# Patient Record
Sex: MALE | RURAL
[De-identification: names, ages, dates, MRNs, and addresses within clinical notes are randomized per-mention and may not be internally consistent; named-entity substitution may affect disease eponyms.]

---

## 2017-03-17 ENCOUNTER — APPOINTMENT (OUTPATIENT)
Age: 62
Setting detail: DERMATOLOGY
End: 2017-03-20

## 2017-03-17 DIAGNOSIS — D485 NEOPLASM OF UNCERTAIN BEHAVIOR OF SKIN: ICD-10-CM

## 2017-03-17 DIAGNOSIS — L57.8 OTHER SKIN CHANGES DUE TO CHRONIC EXPOSURE TO NONIONIZING RADIATION: ICD-10-CM

## 2017-03-17 DIAGNOSIS — L82.1 OTHER SEBORRHEIC KERATOSIS: ICD-10-CM

## 2017-03-17 DIAGNOSIS — L57.0 ACTINIC KERATOSIS: ICD-10-CM

## 2017-03-17 PROBLEM — D48.5 NEOPLASM OF UNCERTAIN BEHAVIOR OF SKIN: Status: ACTIVE | Noted: 2017-03-17

## 2017-03-17 PROCEDURE — OTHER LIQUID NITROGEN: OTHER

## 2017-03-17 PROCEDURE — OTHER COUNSELING: OTHER

## 2017-03-17 PROCEDURE — OTHER BIOPSY BY SHAVE METHOD: OTHER

## 2017-03-17 PROCEDURE — 99203 OFFICE O/P NEW LOW 30 MIN: CPT | Mod: 25

## 2017-03-17 PROCEDURE — OTHER MIPS QUALITY: OTHER

## 2017-03-17 PROCEDURE — 17003 DESTRUCT PREMALG LES 2-14: CPT

## 2017-03-17 PROCEDURE — 17000 DESTRUCT PREMALG LESION: CPT

## 2017-03-17 PROCEDURE — 11100: CPT | Mod: 59

## 2017-03-17 ASSESSMENT — LOCATION SIMPLE DESCRIPTION DERM
LOCATION SIMPLE: LEFT FOREARM
LOCATION SIMPLE: RIGHT FOREHEAD
LOCATION SIMPLE: LEFT CHEEK
LOCATION SIMPLE: LEFT TEMPLE
LOCATION SIMPLE: RIGHT FOREARM
LOCATION SIMPLE: LEFT PRETIBIAL REGION
LOCATION SIMPLE: RIGHT CHEEK
LOCATION SIMPLE: RIGHT PRETIBIAL REGION

## 2017-03-17 ASSESSMENT — LOCATION DETAILED DESCRIPTION DERM
LOCATION DETAILED: LEFT CENTRAL MALAR CHEEK
LOCATION DETAILED: LEFT LATERAL TEMPLE
LOCATION DETAILED: RIGHT DISTAL DORSAL FOREARM
LOCATION DETAILED: RIGHT LATERAL MALAR CHEEK
LOCATION DETAILED: LEFT PROXIMAL PRETIBIAL REGION
LOCATION DETAILED: RIGHT PROXIMAL PRETIBIAL REGION
LOCATION DETAILED: RIGHT SUPERIOR CENTRAL MALAR CHEEK
LOCATION DETAILED: RIGHT SUPERIOR LATERAL MALAR CHEEK
LOCATION DETAILED: RIGHT LATERAL FOREHEAD
LOCATION DETAILED: RIGHT SUPERIOR FOREHEAD
LOCATION DETAILED: LEFT DISTAL DORSAL FOREARM

## 2017-03-17 ASSESSMENT — LOCATION ZONE DERM
LOCATION ZONE: FACE
LOCATION ZONE: ARM
LOCATION ZONE: LEG

## 2017-03-17 NOTE — PROCEDURE: BIOPSY BY SHAVE METHOD
Electrodesiccation Text: The wound bed was treated with electrodesiccation after the biopsy was performed.
Additional Anesthesia Volume In Cc (Will Not Render If 0): 0
Curettage Text: The wound bed was treated with curettage after the biopsy was performed.
Consent: Written consent was obtained and risks were reviewed including but not limited to scarring, infection, bleeding, scabbing, incomplete removal, nerve damage and allergy to anesthesia.
Bill 21818 For Specimen Handling/Conveyance To Laboratory?: no
Anesthesia Volume In Cc: 0.5
Post-Care Instructions: I reviewed with the patient in detail post-care instructions. Patient is to keep the biopsy site dry overnight, and then apply bacitracin twice daily until healed. Patient may apply hydrogen peroxide soaks to remove any crusting.
Type Of Destruction Used: Curettage
Cryotherapy Text: The wound bed was treated with cryotherapy after the biopsy was performed.
Anesthesia Type: 1% lidocaine with epinephrine
Detail Level: Detailed
Hemostasis: Electrocautery
Dressing: bandage
Notification Instructions: Patient will be notified of biopsy results. However, patient instructed to call the office if not contacted within 2 weeks.
Wound Care: Vaseline
Electrodesiccation And Curettage Text: The wound bed was treated with electrodesiccation and curettage after the biopsy was performed.
Silver Nitrate Text: The wound bed was treated with silver nitrate after the biopsy was performed.
Biopsy Method: 10 blade
Billing Type: Third-Party Bill
Biopsy Type: H and E

## 2017-03-17 NOTE — PROCEDURE: LIQUID NITROGEN
Number Of Freeze-Thaw Cycles: 1 freeze-thaw cycle
Detail Level: Detailed
Post-Care Instructions: I reviewed with the patient in detail post-care instructions. Patient is to wear sunprotection, and avoid picking at any of the treated lesions. Pt may apply Vaseline to crusted or scabbing areas.
Duration Of Freeze Thaw-Cycle (Seconds): 5
Consent: The patient's consent was obtained including but not limited to risks of crusting, scabbing, blistering, scarring, darker or lighter pigmentary change, recurrence, incomplete removal and infection.
Render Post-Care Instructions In Note?: no

## 2017-03-17 NOTE — PROCEDURE: MIPS QUALITY
Quality 110: Preventive Care And Screening: Influenza Immunization: Influenza Immunization previously received during influenza season
Quality 111:Pneumonia Vaccination Status For Older Adults: Pneumococcal Vaccination Previously Received
Detail Level: Detailed
Quality 47: Advance Care Plan: Advance Care Planning discussed and documented; advance care plan or surrogate decision maker documented in the medical record.

## 2017-04-03 ENCOUNTER — APPOINTMENT (OUTPATIENT)
Age: 62
Setting detail: DERMATOLOGY
End: 2017-04-03

## 2017-04-03 DIAGNOSIS — L82.0 INFLAMED SEBORRHEIC KERATOSIS: ICD-10-CM

## 2017-04-03 PROBLEM — C44.329 SQUAMOUS CELL CARCINOMA OF SKIN OF OTHER PARTS OF FACE: Status: ACTIVE | Noted: 2017-04-03

## 2017-04-03 PROCEDURE — OTHER LIQUID NITROGEN: OTHER

## 2017-04-03 PROCEDURE — 77300 RADIATION THERAPY DOSE PLAN: CPT

## 2017-04-03 PROCEDURE — 17110 DESTRUCT B9 LESION 1-14: CPT

## 2017-04-03 PROCEDURE — OTHER OTHER: OTHER

## 2017-04-03 PROCEDURE — OTHER SUPERFICIAL RADIATION TREATMENT: OTHER

## 2017-04-03 PROCEDURE — 77334 RADIATION TREATMENT AID(S): CPT

## 2017-04-03 PROCEDURE — 99213 OFFICE O/P EST LOW 20 MIN: CPT | Mod: 25

## 2017-04-03 PROCEDURE — OTHER COUNSELING: OTHER

## 2017-04-03 PROCEDURE — 77261 THER RADIOLOGY TX PLNG SMPL: CPT

## 2017-04-03 PROCEDURE — 99214 OFFICE O/P EST MOD 30 MIN: CPT | Mod: 25

## 2017-04-03 PROCEDURE — 77280 THER RAD SIMULAJ FIELD SMPL: CPT

## 2017-04-03 ASSESSMENT — LOCATION DETAILED DESCRIPTION DERM
LOCATION DETAILED: RIGHT PROXIMAL PRETIBIAL REGION
LOCATION DETAILED: LEFT VENTRAL PROXIMAL FOREARM

## 2017-04-03 ASSESSMENT — LOCATION ZONE DERM
LOCATION ZONE: LEG
LOCATION ZONE: ARM

## 2017-04-03 ASSESSMENT — LOCATION SIMPLE DESCRIPTION DERM
LOCATION SIMPLE: LEFT FOREARM
LOCATION SIMPLE: RIGHT PRETIBIAL REGION

## 2017-04-03 NOTE — PROCEDURE: LIQUID NITROGEN
Include Z78.9 (Other Specified Conditions Influencing Health Status) As An Associated Diagnosis?: No
Medical Necessity Clause: This procedure was medically necessary because the lesions that were treated were:
Post-Care Instructions: I reviewed with the patient in detail post-care instructions. Patient is to wear sunprotection, and avoid picking at any of the treated lesions. Pt may apply Vaseline to crusted or scabbing areas.
Medical Necessity Information: It is in your best interest to select a reason for this procedure from the list below. All of these items fulfill various CMS LCD requirements except the new and changing color options.
Detail Level: Detailed
Consent: The patient's consent was obtained including but not limited to risks of crusting, scabbing, blistering, scarring, darker or lighter pigmentary change, recurrence, incomplete removal and infection.

## 2017-04-03 NOTE — PROCEDURE: OTHER
Other (Free Text): Discussed mohs, SRT.  Pt decided to proceed with SRT. Herbert performed today
Note Text (......Xxx Chief Complaint.): This diagnosis correlates with the
Detail Level: Zone

## 2017-04-03 NOTE — PROCEDURE: SUPERFICIAL RADIATION TREATMENT
Shielding Size (Optional- Include Units): 2.0 X 2.0
Bill For Treatment Devices Only: No
Shielding Size (Optional- Include Units) Rx 2: 2.5 x 2.5
Dose / Tx In Cgy (Optional): 288.42
Total Dose (Optional-Please Include Units): 5100 cGy
Dimensions-Y Axis In Cm: 1
Daily Fractionated Dose (Optional- Include Units) Rx 2: 255 cGy
Field Size (Applicator): 3.0 cm
Treatment Margins In Cm: 0.5
Fractionation Number (Evaluation): 15
Patient Positioning: Supine
Fractions / Week: 3
Port Dimensions-X Axis In Cm: 2
Computed Treatment Time In Min (Will Render The Same As Calculated Treatment Time If Left Blank): per device
Render Prescriptions In Note?: Yes
Treatment Time / Fractionation (Optional- Include Units): 0.34
Simple Simulation Afterword Text Will Be Included With Simple Simulations (Indications............): The patient had a complete consultation regarding all applicable modalities for the treatment of their skin cancer and based on a variety of factors including the type of tumor, size, and location, the relevant medical history as well as local tissue factors, the functional status of the individual, the ability to perform necessary postoperative wound instructions and the need for simultaneous treatments as well as overall wound healing status, it was determined that the patient would begin radiation therapy treatment for skin cancer.  A full simulation and treatment device design was performed including the determination and formulation of appropriate simple and complex devices including lead shield of 0.762 mm thickness to form molded customized shielding to specifically correlate with the lesion size including treatment margin.  The custom lead shield is adequate to accommodate the appropriate applicator and provide adequate shielding around the treatment site.  The specific field applicator, shields, and devices both simple and complex as well as the specific patient setup is outlined below.  The patient was given a full consent for superficial radiation to both verbally and in writing and the full determination of patient's eligibility for treatment and selection is outlined on the patient eligibility and treatment selection form.  The specific superficial radiotherapy prescription was determined and was documented on the superficial radiotherapy prescription form.  A treatment calculation was also performed and documented on the treatment calculation form.  Based on the prescription, the patient was scheduled for a series of fractional treatments.
Additional Prescription Justification Text: If there is any interruption in treatment exceeding 5 days please see Decay and Dose Adjustment Calculation and complete treatment under Prescription 2.
Simple Simulation Preamble Text Will Be Included With Simple Simulations (.......... Indications): Simple simulation was performed today for the following reasons:
Fractions / Week Rx 2: 4
Fractionation Number: 0
Daily Fractionated Dose (Optional- Include Units): 255cGy
Dose Per Fractionation In Cgy (Optional): 255
Treatment Device Design After Initial Simulation Justification (Will Render If Bill For Treatment Devices = Yes): The patient is status post radiation simulation and is evaluated as to the use of additional devices for shielding and placement for radiation therapy.
Intro Statement (Will Not Render If Left Blank): The patient is undergoing superficial radiation therapy for skin cancer and presents for weekly evaluation and management.  Per protocol and as documented on the flow sheet, the patient was questioned as to subjective redness, pruritus, pain, drainage, fatigue, or any other symptoms.  Objectively, the radiation area was evaluated with regards to erythema, atrophy, scale, crusting, erosion, ulceration, edema, purpura, tenderness, warmth, drainage, and any other findings.  The plan was extensively reviewed including the dose, and dosing schedule.  The simulation and clinical setup was also reviewed as was the external and any internal shields and based on this review the appropriateness and sufficiency of treatment was determined.
Energy (Optional-Please Include Units): 50kV
Functional Status: 0 (fully active)
Custom Shielding Preamble Text Will Not Be Included With Simple Simulations (.......... X X Y Cm): A lead shield of 0.762 mm thickness is utilized to form a molded, custom shield with a
Time Dose Fractionation (Optional- Include Units If Applicable): 88
Energy (Optional-Please Include Units) Rx 2: 50 kV
Detail Level: Detailed
Custom Shielding Afterword Text Will Not Be Included With Simple Simulations (X X Y Cm............): port to correlate with the lesion size, including treatment margin. The custom lead shield is adequate to accommodate the appropriate applicator and provide adequate shielding around the treatment site. Additional shielding (as noted below) is used to protect sensitive, normal tissues.
Assessment: Appropriate reaction
Fractions / Week Rx 4: 5
Treatment Time In Min (Optional): 0.38
Total Number Of Fractions: 20

## 2017-04-04 ENCOUNTER — APPOINTMENT (OUTPATIENT)
Age: 62
Setting detail: DERMATOLOGY
End: 2017-04-04

## 2017-04-04 PROBLEM — C44.329 SQUAMOUS CELL CARCINOMA OF SKIN OF OTHER PARTS OF FACE: Status: ACTIVE | Noted: 2017-04-04

## 2017-04-04 PROCEDURE — OTHER FOLLOW UP FOR NEXT VISIT: OTHER

## 2017-04-04 PROCEDURE — 77280 THER RAD SIMULAJ FIELD SMPL: CPT

## 2017-04-04 PROCEDURE — OTHER TREATMENT REGIMEN: OTHER

## 2017-04-04 PROCEDURE — G6001 ECHO GUIDANCE RADIOTHERAPY: HCPCS

## 2017-04-04 PROCEDURE — 77401 RADIATION TX DELIVERY SUPFC: CPT

## 2017-04-04 PROCEDURE — OTHER SUPERFICIAL RADIATION TREATMENT: OTHER

## 2017-04-04 NOTE — PROCEDURE: TREATMENT REGIMEN
Detail Level: Zone
Plan: Patient's skin evaluated and appears to have appropriate response and reaction according to dose and fraction. Patient will continue with SRT as prescribed. \\n\\nUS DEPTH: 0.83mm

## 2017-04-04 NOTE — PROCEDURE: SUPERFICIAL RADIATION TREATMENT
Day Of The Week Treatment Administered: Tuesday
Total Number Of Fractions Rx 2: 15
Total Dose (Optional-Please Include Units): 5100 cGy
Custom Shielding Preamble Text Will Not Be Included With Simple Simulations (.......... X X Y Cm): A lead shield of 0.762 mm thickness is utilized to form a molded, custom shield with a
Shielding Size (Optional- Include Units) Rx 2: 2.5 x 2.5
Field Size (Applicator): 3.0 cm
Daily Fractionated Dose (Optional- Include Units) Rx 2: 255 cGy
Energy (Optional-Please Include Units): 50kV
Render Prescriptions In Note?: No
Time Dose Fractionation (Optional- Include Units If Applicable): 88
Computed Treatment Time In Min (Will Render The Same As Calculated Treatment Time If Left Blank): per device
Additional Prescription Justification Text: If there is any interruption in treatment exceeding 5 days please see Decay and Dose Adjustment Calculation and complete treatment under Prescription 2.
Depth (Optional-Please Include Units): 0.83mm
Cumulative Dose In Cgy (Optional): 256.7
Fractions / Week: 3
Total Number Of Fractions: 20
Simple Simulation Preamble Text Will Be Included With Simple Simulations (.......... Indications): Simple simulation was performed today for the following reasons:
Port Dimensions-X Axis In Cm: 2
Number Of Days Off Treatment: 1
Functional Status: 0 (fully active)
Custom Shielding Afterword Text Will Not Be Included With Simple Simulations (X X Y Cm............): port to correlate with the lesion size, including treatment margin. The custom lead shield is adequate to accommodate the appropriate applicator and provide adequate shielding around the treatment site. Additional shielding (as noted below) is used to protect sensitive, normal tissues.
Initial Radiation Treatment Planning (Will Render If Bill Simulation = Yes): The patient had a complete consultation regarding all applicable modalities for the treatment of their skin cancer and based on a variety of factors including the type of tumor, size, and location, the relevant medical history as well as local tissue factors, the functional status of the individual, the ability to perform necessary postoperative wound instructions and the need for simultaneous treatments as well as overall wound healing status, it was determined that the patient would begin radiation therapy treatment for skin cancer.  A full simulation and treatment device design was performed including the determination and formulation of appropriate simple and complex devices including lead shield of 0.762 mm thickness to form molded customized shielding to specifically correlate with the lesion size including treatment margin.  The custom lead shield is adequate to accommodate the appropriate applicator and provide adequate shielding around the treatment site.  The specific field applicator, shields, and devices both simple and complex as well as the specific patient setup is outlined below.  The patient was given a full consent for superficial radiation to both verbally and in writing and the full determination of patient's eligibility for treatment and selection is outlined on the patient eligibility and treatment selection form.  The specific superficial radiotherapy prescription was determined and was documented on the superficial radiotherapy prescription form.  A treatment calculation was also performed and documented on the treatment calculation form.  Based on the prescription, the patient was scheduled for a series of fractional treatments.
Detail Level: Detailed
Treatment Time / Fractionation (Optional- Include Units): 0.34
Assessment: Appropriate reaction
Bill For Radiation Treatment: Yes
Fractions / Week Rx 4: 5
Patient Positioning: Supine
Dose Per Fractionation In Cgy (Optional): 255
Energy (Optional-Please Include Units) Rx 2: 50 kV
Intro Statement (Will Not Render If Left Blank): The patient is undergoing superficial radiation therapy for skin cancer and presents for weekly evaluation and management.  Per protocol and as documented on the flow sheet, the patient was questioned as to subjective redness, pruritus, pain, drainage, fatigue, or any other symptoms.  Objectively, the radiation area was evaluated with regards to erythema, atrophy, scale, crusting, erosion, ulceration, edema, purpura, tenderness, warmth, drainage, and any other findings.  The plan was extensively reviewed including the dose, and dosing schedule.  The simulation and clinical setup was also reviewed as was the external and any internal shields and based on this review the appropriateness and sufficiency of treatment was determined.
Treatment Margins In Cm: 0.5
Treatment Device Design After Initial Simulation Justification (Will Render If Bill For Treatment Devices = Yes): The patient is status post radiation simulation and is evaluated as to the use of additional devices for shielding and placement for radiation therapy.
Daily Fractionated Dose (Optional- Include Units): 255cGy
Shielding Size (Optional- Include Units): 2.0 X 2.0
Fractions / Week Rx 2: 4

## 2017-04-05 ENCOUNTER — APPOINTMENT (OUTPATIENT)
Age: 62
Setting detail: DERMATOLOGY
End: 2017-04-05

## 2017-04-05 PROBLEM — C44.329 SQUAMOUS CELL CARCINOMA OF SKIN OF OTHER PARTS OF FACE: Status: ACTIVE | Noted: 2017-04-05

## 2017-04-05 PROCEDURE — OTHER SUPERFICIAL RADIATION TREATMENT: OTHER

## 2017-04-05 PROCEDURE — OTHER FOLLOW UP FOR NEXT VISIT: OTHER

## 2017-04-05 PROCEDURE — 77280 THER RAD SIMULAJ FIELD SMPL: CPT

## 2017-04-05 PROCEDURE — OTHER TREATMENT REGIMEN: OTHER

## 2017-04-05 PROCEDURE — G6001 ECHO GUIDANCE RADIOTHERAPY: HCPCS

## 2017-04-05 PROCEDURE — 77401 RADIATION TX DELIVERY SUPFC: CPT

## 2017-04-05 NOTE — PROCEDURE: SUPERFICIAL RADIATION TREATMENT
Fractions / Week: 3
Prescription Used: 1
Simple Simulation Afterword Text Will Be Included With Simple Simulations (Indications............): The patient had a complete consultation regarding all applicable modalities for the treatment of their skin cancer and based on a variety of factors including the type of tumor, size, and location, the relevant medical history as well as local tissue factors, the functional status of the individual, the ability to perform necessary postoperative wound instructions and the need for simultaneous treatments as well as overall wound healing status, it was determined that the patient would begin radiation therapy treatment for skin cancer.  A full simulation and treatment device design was performed including the determination and formulation of appropriate simple and complex devices including lead shield of 0.762 mm thickness to form molded customized shielding to specifically correlate with the lesion size including treatment margin.  The custom lead shield is adequate to accommodate the appropriate applicator and provide adequate shielding around the treatment site.  The specific field applicator, shields, and devices both simple and complex as well as the specific patient setup is outlined below.  The patient was given a full consent for superficial radiation to both verbally and in writing and the full determination of patient's eligibility for treatment and selection is outlined on the patient eligibility and treatment selection form.  The specific superficial radiotherapy prescription was determined and was documented on the superficial radiotherapy prescription form.  A treatment calculation was also performed and documented on the treatment calculation form.  Based on the prescription, the patient was scheduled for a series of fractional treatments.
Total Number Of Fractions Rx 3: 15
Treatment Time / Fractionation (Optional- Include Units): 0.34
Daily Fractionated Dose (Optional- Include Units) Rx 2: 255 cGy
Bill For Dosimetry/Render Decay And Dose Adjustment Calculation In Note: No
Functional Status: 0 (fully active)
Port Dimensions-Y Axis In Cm: 2
Total Number Of Fractions: 20
Treatment Margins In Cm: 0.5
Shielding Size (Optional- Include Units) Rx 2: 2.5 x 2.5
Shielding Size (Optional- Include Units): 2.0 X 2.0
Total Dose (Optional-Please Include Units): 5100 cGy
Field Size (Applicator): 3.0 cm
Energy (Optional-Please Include Units): 50kV
Fractions / Week Rx 2: 4
Detail Level: Detailed
Computed Treatment Time In Min (Will Render The Same As Calculated Treatment Time If Left Blank): per device
Time Dose Fractionation (Optional- Include Units If Applicable): 88
Daily Fractionated Dose (Optional- Include Units): 255cGy
Patient Positioning: Supine
Depth (Optional-Please Include Units): 0.83mm
Custom Shielding Afterword Text Will Not Be Included With Simple Simulations (X X Y Cm............): port to correlate with the lesion size, including treatment margin. The custom lead shield is adequate to accommodate the appropriate applicator and provide adequate shielding around the treatment site. Additional shielding (as noted below) is used to protect sensitive, normal tissues.
Dose Per Fractionation In Cgy (Optional): 255
Fractions / Week Rx 3: 5
Energy (Optional-Please Include Units) Rx 2: 50 kV
Additional Prescription Justification Text: If there is any interruption in treatment exceeding 5 days please see Decay and Dose Adjustment Calculation and complete treatment under Prescription 2.
Simple Simulation Preamble Text Will Be Included With Simple Simulations (.......... Indications): Simple simulation was performed today for the following reasons:
Assessment: Appropriate reaction
Custom Shielding Preamble Text Will Not Be Included With Simple Simulations (.......... X X Y Cm): A lead shield of 0.762 mm thickness is utilized to form a molded, custom shield with a
Day Of The Week Treatment Administered: Wednesday
Bill For Radiation Treatment: Yes
Dose / Tx In Cgy (Optional): 256.7
Cumulative Dose In Cgy (Optional): 513.4
Intro Statement (Will Not Render If Left Blank): The patient is undergoing superficial radiation therapy for skin cancer and presents for weekly evaluation and management.  Per protocol and as documented on the flow sheet, the patient was questioned as to subjective redness, pruritus, pain, drainage, fatigue, or any other symptoms.  Objectively, the radiation area was evaluated with regards to erythema, atrophy, scale, crusting, erosion, ulceration, edema, purpura, tenderness, warmth, drainage, and any other findings.  The plan was extensively reviewed including the dose, and dosing schedule.  The simulation and clinical setup was also reviewed as was the external and any internal shields and based on this review the appropriateness and sufficiency of treatment was determined.
Treatment Device Design After Initial Simulation Justification (Will Render If Bill For Treatment Devices = Yes): The patient is status post radiation simulation and is evaluated as to the use of additional devices for shielding and placement for radiation therapy.

## 2017-04-05 NOTE — PROCEDURE: TREATMENT REGIMEN
Plan: Patient's skin evaluated and appears to have appropriate response and reaction according to dose and fraction. Patient will continue with SRT as prescribed. \\n\\nUS DEPTH: 1.26mm
Detail Level: Zone

## 2017-04-07 ENCOUNTER — APPOINTMENT (OUTPATIENT)
Age: 62
Setting detail: DERMATOLOGY
End: 2017-04-11

## 2017-04-07 PROBLEM — C44.329 SQUAMOUS CELL CARCINOMA OF SKIN OF OTHER PARTS OF FACE: Status: ACTIVE | Noted: 2017-04-07

## 2017-04-07 PROCEDURE — OTHER SUPERFICIAL RADIATION TREATMENT: OTHER

## 2017-04-07 PROCEDURE — OTHER FOLLOW UP FOR NEXT VISIT: OTHER

## 2017-04-07 PROCEDURE — 77280 THER RAD SIMULAJ FIELD SMPL: CPT

## 2017-04-07 PROCEDURE — G6001 ECHO GUIDANCE RADIOTHERAPY: HCPCS

## 2017-04-07 PROCEDURE — OTHER TREATMENT REGIMEN: OTHER

## 2017-04-07 PROCEDURE — 77401 RADIATION TX DELIVERY SUPFC: CPT

## 2017-04-07 NOTE — PROCEDURE: SUPERFICIAL RADIATION TREATMENT
Depth (Optional-Please Include Units): 0.83mm
Include Rx 3 When Rendering Additional Prescriptions: No
Daily Fractionated Dose (Optional- Include Units) Rx 2: 255 cGy
Daily Fractionated Dose (Optional- Include Units): 255cGy
Dose Per Fractionation In Cgy (Optional): 255
Field Size (Applicator) Rx 2: 3.0 cm
Intro Statement (Will Not Render If Left Blank): The patient is undergoing superficial radiation therapy for skin cancer and presents for weekly evaluation and management.  Per protocol and as documented on the flow sheet, the patient was questioned as to subjective redness, pruritus, pain, drainage, fatigue, or any other symptoms.  Objectively, the radiation area was evaluated with regards to erythema, atrophy, scale, crusting, erosion, ulceration, edema, purpura, tenderness, warmth, drainage, and any other findings.  The plan was extensively reviewed including the dose, and dosing schedule.  The simulation and clinical setup was also reviewed as was the external and any internal shields and based on this review the appropriateness and sufficiency of treatment was determined.
Detail Level: Detailed
Energy (Optional-Please Include Units): 50kV
Custom Shielding Preamble Text Will Not Be Included With Simple Simulations (.......... X X Y Cm): A lead shield of 0.762 mm thickness is utilized to form a molded, custom shield with a
Total Number Of Fractions: 20
Shielding Size (Optional- Include Units) Rx 2: 2.5 x 2.5
Patient Positioning: Supine
Day Of The Week Treatment Administered: Friday
Fractions / Week Rx 2: 4
Prescription Used: 1
Functional Status: 0 (fully active)
Port Dimensions-X Axis In Cm: 2
Shielding Size (Optional- Include Units): 2.0 X 2.0
Additional Prescription Justification Text: If there is any interruption in treatment exceeding 5 days please see Decay and Dose Adjustment Calculation and complete treatment under Prescription 2.
Fractionation Number: 3
Total Dose (Optional-Please Include Units): 5100 cGy
Fractions / Week Rx 4: 5
Energy (Optional-Please Include Units) Rx 2: 50 kV
Initial Radiation Treatment Planning (Will Render If Bill Simulation = Yes): The patient had a complete consultation regarding all applicable modalities for the treatment of their skin cancer and based on a variety of factors including the type of tumor, size, and location, the relevant medical history as well as local tissue factors, the functional status of the individual, the ability to perform necessary postoperative wound instructions and the need for simultaneous treatments as well as overall wound healing status, it was determined that the patient would begin radiation therapy treatment for skin cancer.  A full simulation and treatment device design was performed including the determination and formulation of appropriate simple and complex devices including lead shield of 0.762 mm thickness to form molded customized shielding to specifically correlate with the lesion size including treatment margin.  The custom lead shield is adequate to accommodate the appropriate applicator and provide adequate shielding around the treatment site.  The specific field applicator, shields, and devices both simple and complex as well as the specific patient setup is outlined below.  The patient was given a full consent for superficial radiation to both verbally and in writing and the full determination of patient's eligibility for treatment and selection is outlined on the patient eligibility and treatment selection form.  The specific superficial radiotherapy prescription was determined and was documented on the superficial radiotherapy prescription form.  A treatment calculation was also performed and documented on the treatment calculation form.  Based on the prescription, the patient was scheduled for a series of fractional treatments.
Treatment Time In Min (Optional): 0.34
Cumulative Dose In Cgy (Optional): 770.1
Computed Treatment Time In Min (Will Render The Same As Calculated Treatment Time If Left Blank): per device
Assessment: Appropriate reaction
Total Number Of Fractions Rx 2: 15
Time Dose Fractionation (Optional- Include Units If Applicable): 88
Dose / Tx In Cgy (Optional): 256.7
Bill For Radiation Treatment: Yes
Treatment Device Design After Initial Simulation Justification (Will Render If Bill For Treatment Devices = Yes): The patient is status post radiation simulation and is evaluated as to the use of additional devices for shielding and placement for radiation therapy.
Simple Simulation Preamble Text Will Be Included With Simple Simulations (.......... Indications): Simple simulation was performed today for the following reasons:
Treatment Margins In Cm: 0.5
Custom Shielding Afterword Text Will Not Be Included With Simple Simulations (X X Y Cm............): port to correlate with the lesion size, including treatment margin. The custom lead shield is adequate to accommodate the appropriate applicator and provide adequate shielding around the treatment site. Additional shielding (as noted below) is used to protect sensitive, normal tissues.

## 2017-04-10 ENCOUNTER — APPOINTMENT (OUTPATIENT)
Age: 62
Setting detail: DERMATOLOGY
End: 2017-04-11

## 2017-04-10 PROBLEM — C44.329 SQUAMOUS CELL CARCINOMA OF SKIN OF OTHER PARTS OF FACE: Status: ACTIVE | Noted: 2017-04-10

## 2017-04-10 PROCEDURE — OTHER SUPERFICIAL RADIATION TREATMENT: OTHER

## 2017-04-10 PROCEDURE — 77280 THER RAD SIMULAJ FIELD SMPL: CPT

## 2017-04-10 PROCEDURE — G6001 ECHO GUIDANCE RADIOTHERAPY: HCPCS

## 2017-04-10 PROCEDURE — OTHER TREATMENT REGIMEN: OTHER

## 2017-04-10 PROCEDURE — 77401 RADIATION TX DELIVERY SUPFC: CPT

## 2017-04-10 PROCEDURE — OTHER FOLLOW UP FOR NEXT VISIT: OTHER

## 2017-04-10 NOTE — PROCEDURE: TREATMENT REGIMEN
Plan: Patient's skin evaluated and appears to have appropriate response and reaction according to dose and fraction. Patient will continue with SRT as prescribed. \\n\\nUS DEPTH: 0.86mm
Detail Level: Zone

## 2017-04-10 NOTE — PROCEDURE: SUPERFICIAL RADIATION TREATMENT
Field Size (Applicator): 3.0 cm
Total Number Of Fractions: 20
Prescription Used: 1
Treatment Time In Min (Optional): 0.34
Include Rx 4 When Rendering Additional Prescriptions: No
Energy (Include Units): 50kV
Simple Simulation Afterword Text Will Be Included With Simple Simulations (Indications............): The patient had a complete consultation regarding all applicable modalities for the treatment of their skin cancer and based on a variety of factors including the type of tumor, size, and location, the relevant medical history as well as local tissue factors, the functional status of the individual, the ability to perform necessary postoperative wound instructions and the need for simultaneous treatments as well as overall wound healing status, it was determined that the patient would begin radiation therapy treatment for skin cancer.  A full simulation and treatment device design was performed including the determination and formulation of appropriate simple and complex devices including lead shield of 0.762 mm thickness to form molded customized shielding to specifically correlate with the lesion size including treatment margin.  The custom lead shield is adequate to accommodate the appropriate applicator and provide adequate shielding around the treatment site.  The specific field applicator, shields, and devices both simple and complex as well as the specific patient setup is outlined below.  The patient was given a full consent for superficial radiation to both verbally and in writing and the full determination of patient's eligibility for treatment and selection is outlined on the patient eligibility and treatment selection form.  The specific superficial radiotherapy prescription was determined and was documented on the superficial radiotherapy prescription form.  A treatment calculation was also performed and documented on the treatment calculation form.  Based on the prescription, the patient was scheduled for a series of fractional treatments.
Port Dimensions-Y Axis In Cm: 2
Daily Fractionated Dose (Optional- Include Units) Rx 2: 255 cGy
Assessment: Appropriate reaction
Total Number Of Fractions Rx 2: 15
Fractions / Week Rx 2: 4
Intro Statement (Will Not Render If Left Blank): The patient is undergoing superficial radiation therapy for skin cancer and presents for weekly evaluation and management.  Per protocol and as documented on the flow sheet, the patient was questioned as to subjective redness, pruritus, pain, drainage, fatigue, or any other symptoms.  Objectively, the radiation area was evaluated with regards to erythema, atrophy, scale, crusting, erosion, ulceration, edema, purpura, tenderness, warmth, drainage, and any other findings.  The plan was extensively reviewed including the dose, and dosing schedule.  The simulation and clinical setup was also reviewed as was the external and any internal shields and based on this review the appropriateness and sufficiency of treatment was determined.
Dose Per Fractionation In Cgy (Optional): 255
Shielding Size (Optional- Include Units) Rx 2: 2.5 x 2.5
Energy (Optional-Please Include Units) Rx 2: 50 kV
Depth (Optional-Please Include Units): 0.83mm
Simple Simulation Preamble Text Will Be Included With Simple Simulations (.......... Indications): Simple simulation was performed today for the following reasons:
Time Dose Fractionation (Optional- Include Units If Applicable): 88
Patient Positioning: Supine
Custom Shielding Afterword Text Will Not Be Included With Simple Simulations (X X Y Cm............): port to correlate with the lesion size, including treatment margin. The custom lead shield is adequate to accommodate the appropriate applicator and provide adequate shielding around the treatment site. Additional shielding (as noted below) is used to protect sensitive, normal tissues.
Fractions / Week Rx 3: 5
Additional Prescription Justification Text: If there is any interruption in treatment exceeding 5 days please see Decay and Dose Adjustment Calculation and complete treatment under Prescription 2.
Computed Treatment Time In Min (Will Render The Same As Calculated Treatment Time If Left Blank): per device
Custom Shielding Preamble Text Will Not Be Included With Simple Simulations (.......... X X Y Cm): A lead shield of 0.762 mm thickness is utilized to form a molded, custom shield with a
Bill For Radiation Treatment: Yes
Total Dose (Optional-Please Include Units): 5100 cGy
Cumulative Dose In Cgy (Optional): 1026.8
Treatment Margins In Cm: 0.5
Functional Status: 0 (fully active)
Treatment Device Design After Initial Simulation Justification (Will Render If Bill For Treatment Devices = Yes): The patient is status post radiation simulation and is evaluated as to the use of additional devices for shielding and placement for radiation therapy.
Daily Fractionated Dose (Optional- Include Units): 255cGy
Fractions / Week: 3
Dose / Tx In Cgy (Optional): 256.7
Day Of The Week Treatment Administered: Monday
Shielding Size (Optional- Include Units): 2.0 X 2.0
Detail Level: Detailed

## 2017-04-12 ENCOUNTER — APPOINTMENT (OUTPATIENT)
Age: 62
Setting detail: DERMATOLOGY
End: 2017-04-12

## 2017-04-12 PROBLEM — C44.329 SQUAMOUS CELL CARCINOMA OF SKIN OF OTHER PARTS OF FACE: Status: ACTIVE | Noted: 2017-04-12

## 2017-04-12 PROCEDURE — OTHER SUPERFICIAL RADIATION TREATMENT: OTHER

## 2017-04-12 PROCEDURE — G6001 ECHO GUIDANCE RADIOTHERAPY: HCPCS

## 2017-04-12 PROCEDURE — 77280 THER RAD SIMULAJ FIELD SMPL: CPT

## 2017-04-12 PROCEDURE — 77401 RADIATION TX DELIVERY SUPFC: CPT

## 2017-04-12 PROCEDURE — OTHER FOLLOW UP FOR NEXT VISIT: OTHER

## 2017-04-12 PROCEDURE — OTHER TREATMENT REGIMEN: OTHER

## 2017-04-12 NOTE — PROCEDURE: TREATMENT REGIMEN
Detail Level: Zone
Plan: Per the request of Dr. Gonzales, Jeffy Phan was seen today for Superficial Radiation Therapy requiring simulation (CPT® 78230) in preparation for treatment of specific diseased site(s). Simulation is necessary to determine correct patient and treatment portal positioning, deliver safe and effective radiation therapy. A high frequency ultrasound image was acquired prior to treatment today for three dimensional evaluation of tumor volume and response to treatment, in addition, geometric accuracy of field placement (CPT®  ). Physician evaluation of the ultrasound tumor depth will be ongoing through course of treatment, and is deemed medically necessary ensuring efficacy of treatment. Today’s image and setup was evaluated determining continuation of treatment with the current plan, or necessary changes as appropriate. All appropriate custom blocking and treatment parameters verified by radiation therapist according to initial simulation.\\n \\nUS image guidance and field placement prior to treatment delivery performed. US depth is 0.57mm.\\n \\nPer Dr. Gonzales, continued daily US guidance and simulation is required for field placement, measurement of tumor depth, progress and edema monitoring.

## 2017-04-12 NOTE — PROCEDURE: SUPERFICIAL RADIATION TREATMENT
Total Number Of Fractions Rx 2: 15
Field Size (Applicator): 3.0 cm
Treatment Device Design After Initial Simulation Justification (Will Render If Bill For Treatment Devices = Yes): The patient is status post radiation simulation and is evaluated as to the use of additional devices for shielding and placement for radiation therapy.
Port Dimensions-Y Axis In Cm: 2
Bill For Dosimetry/Render Treatment Time Calculation In Note: No
Fractions / Week Rx 2: 4
Custom Shielding Afterword Text Will Not Be Included With Simple Simulations (X X Y Cm............): port to correlate with the lesion size, including treatment margin. The custom lead shield is adequate to accommodate the appropriate applicator and provide adequate shielding around the treatment site. Additional shielding (as noted below) is used to protect sensitive, normal tissues.
Intro Statement (Will Not Render If Left Blank): The patient is undergoing superficial radiation therapy for skin cancer and presents for weekly evaluation and management.  Per protocol and as documented on the flow sheet, the patient was questioned as to subjective redness, pruritus, pain, drainage, fatigue, or any other symptoms.  Objectively, the radiation area was evaluated with regards to erythema, atrophy, scale, crusting, erosion, ulceration, edema, purpura, tenderness, warmth, drainage, and any other findings.  The plan was extensively reviewed including the dose, and dosing schedule.  The simulation and clinical setup was also reviewed as was the external and any internal shields and based on this review the appropriateness and sufficiency of treatment was determined.
Time Dose Fractionation (Optional- Include Units If Applicable): 88
Total Number Of Fractions: 20
Daily Fractionated Dose (Optional- Include Units): 255cGy
Fractions / Week: 3
Bill For Radiation Treatment: Yes
Treatment Time In Min (Optional): 0.34
Dimensions-X Axis In Cm: 1
Detail Level: Detailed
Simple Simulation Preamble Text Will Be Included With Simple Simulations (.......... Indications): Simple simulation was performed today for the following reasons:
Fractions / Week Rx 4: 5
Dose Per Fractionation In Cgy (Optional): 255
Total Dose (Optional-Please Include Units): 5100 cGy
Initial Radiation Treatment Planning (Will Render If Bill Simulation = Yes): The patient had a complete consultation regarding all applicable modalities for the treatment of their skin cancer and based on a variety of factors including the type of tumor, size, and location, the relevant medical history as well as local tissue factors, the functional status of the individual, the ability to perform necessary postoperative wound instructions and the need for simultaneous treatments as well as overall wound healing status, it was determined that the patient would begin radiation therapy treatment for skin cancer.  A full simulation and treatment device design was performed including the determination and formulation of appropriate simple and complex devices including lead shield of 0.762 mm thickness to form molded customized shielding to specifically correlate with the lesion size including treatment margin.  The custom lead shield is adequate to accommodate the appropriate applicator and provide adequate shielding around the treatment site.  The specific field applicator, shields, and devices both simple and complex as well as the specific patient setup is outlined below.  The patient was given a full consent for superficial radiation to both verbally and in writing and the full determination of patient's eligibility for treatment and selection is outlined on the patient eligibility and treatment selection form.  The specific superficial radiotherapy prescription was determined and was documented on the superficial radiotherapy prescription form.  A treatment calculation was also performed and documented on the treatment calculation form.  Based on the prescription, the patient was scheduled for a series of fractional treatments.
Energy (Optional-Please Include Units) Rx 2: 50 kV
Treatment Margins In Cm: 0.5
Cumulative Dose In Cgy (Optional): 1283.5
Functional Status: 0 (fully active)
Patient Positioning: Supine
Shielding Size (Optional- Include Units) Rx 2: 2.5 x 2.5
Dose / Tx In Cgy (Optional): 256.7
Energy (Include Units): 50kV
Computed Treatment Time In Min (Will Render The Same As Calculated Treatment Time If Left Blank): per device
Day Of The Week Treatment Administered: Wednesday
Additional Prescription Justification Text: If there is any interruption in treatment exceeding 5 days please see Decay and Dose Adjustment Calculation and complete treatment under Prescription 2.
Depth (Optional-Please Include Units): 0.83mm
Assessment: Appropriate reaction
Daily Fractionated Dose (Optional- Include Units) Rx 2: 255 cGy
Custom Shielding Preamble Text Will Not Be Included With Simple Simulations (.......... X X Y Cm): A lead shield of 0.762 mm thickness is utilized to form a molded, custom shield with a
Shielding Size (Optional- Include Units): 2.0 X 2.0

## 2017-04-13 ENCOUNTER — APPOINTMENT (OUTPATIENT)
Age: 62
Setting detail: DERMATOLOGY
End: 2017-04-13

## 2017-04-13 PROCEDURE — OTHER TREATMENT REGIMEN: OTHER

## 2017-04-13 PROCEDURE — 77427 RADIATION TX MANAGEMENT X5: CPT

## 2017-04-13 PROCEDURE — OTHER SUPERFICIAL RADIATION TREATMENT: OTHER

## 2017-04-13 PROCEDURE — OTHER FOLLOW UP FOR NEXT VISIT: OTHER

## 2017-04-13 PROCEDURE — G6001 ECHO GUIDANCE RADIOTHERAPY: HCPCS

## 2017-04-14 PROBLEM — C44.329 SQUAMOUS CELL CARCINOMA OF SKIN OF OTHER PARTS OF FACE: Status: ACTIVE | Noted: 2017-04-14

## 2017-04-14 NOTE — PROCEDURE: SUPERFICIAL RADIATION TREATMENT
Patient Positioning: Supine
Dimensions-Y Axis In Cm: 1
Dose / Tx In Cgy (Optional): 256.7
Include Rx 2 When Rendering Additional Prescriptions: No
Total Number Of Fractions: 20
Shielding Size (Optional- Include Units) Rx 2: 2.5 x 2.5
Fractions / Week Rx 2: 4
Initial Radiation Treatment Planning (Will Render If Bill Simulation = Yes): The patient had a complete consultation regarding all applicable modalities for the treatment of their skin cancer and based on a variety of factors including the type of tumor, size, and location, the relevant medical history as well as local tissue factors, the functional status of the individual, the ability to perform necessary postoperative wound instructions and the need for simultaneous treatments as well as overall wound healing status, it was determined that the patient would begin radiation therapy treatment for skin cancer.  A full simulation and treatment device design was performed including the determination and formulation of appropriate simple and complex devices including lead shield of 0.762 mm thickness to form molded customized shielding to specifically correlate with the lesion size including treatment margin.  The custom lead shield is adequate to accommodate the appropriate applicator and provide adequate shielding around the treatment site.  The specific field applicator, shields, and devices both simple and complex as well as the specific patient setup is outlined below.  The patient was given a full consent for superficial radiation to both verbally and in writing and the full determination of patient's eligibility for treatment and selection is outlined on the patient eligibility and treatment selection form.  The specific superficial radiotherapy prescription was determined and was documented on the superficial radiotherapy prescription form.  A treatment calculation was also performed and documented on the treatment calculation form.  Based on the prescription, the patient was scheduled for a series of fractional treatments.
Detail Level: Detailed
Energy (Include Units): 50kV
Comments: RTOG Stage 0
Treatment Time In Min (Optional): 0.34
Treatment Device Design After Initial Simulation Justification (Will Render If Bill For Treatment Devices = Yes): The patient is status post radiation simulation and is evaluated as to the use of additional devices for shielding and placement for radiation therapy.
Cumulative Dose In Cgy (Optional): 1283.5
Field Size (Applicator) Rx 2: 3.0 cm
Simple Simulation Preamble Text Will Be Included With Simple Simulations (.......... Indications): Simple simulation was performed today for the following reasons:
Total Dose (Optional-Please Include Units): 5100 cGy
Custom Shielding Preamble Text Will Not Be Included With Simple Simulations (.......... X X Y Cm): A lead shield of 0.762 mm thickness is utilized to form a molded, custom shield with a
Fractions / Week Rx 4: 5
Depth (Optional-Please Include Units): 0.83mm
Intro Statement (Will Not Render If Left Blank): The patient is undergoing superficial radiation therapy for skin cancer and presents for weekly evaluation and management.  Per protocol and as documented on the flow sheet, the patient was questioned as to subjective redness, pruritus, pain, drainage, fatigue, or any other symptoms.  Objectively, the radiation area was evaluated with regards to erythema, atrophy, scale, crusting, erosion, ulceration, edema, purpura, tenderness, warmth, drainage, and any other findings.  The plan was extensively reviewed including the dose, and dosing schedule.  The simulation and clinical setup was also reviewed as was the external and any internal shields and based on this review the appropriateness and sufficiency of treatment was determined.
Custom Shielding Afterword Text Will Not Be Included With Simple Simulations (X X Y Cm............): port to correlate with the lesion size, including treatment margin. The custom lead shield is adequate to accommodate the appropriate applicator and provide adequate shielding around the treatment site. Additional shielding (as noted below) is used to protect sensitive, normal tissues.
Energy (Optional-Please Include Units) Rx 2: 50 kV
Day Of The Week Treatment Administered: Wednesday
Functional Status: 0 (fully active)
Shielding Size (Optional- Include Units): 2.0 X 2.0
Port Dimensions-X Axis In Cm: 2
Total Number Of Fractions Rx 2: 15
Daily Fractionated Dose (Optional- Include Units) Rx 2: 255 cGy
Time Dose Fractionation (Optional- Include Units If Applicable): 88
Computed Treatment Time In Min (Will Render The Same As Calculated Treatment Time If Left Blank): per device
Daily Fractionated Dose (Optional- Include Units): 255cGy
Bill And Render Text From Evaluation And Management Tab (Will Bill 19751): Yes
Additional Prescription Justification Text: If there is any interruption in treatment exceeding 5 days please see Decay and Dose Adjustment Calculation and complete treatment under Prescription 2.
Fractions / Week: 3
Dose Per Fractionation In Cgy (Optional): 255
Assessment: Appropriate reaction
Treatment Margins In Cm: 0.5

## 2017-04-14 NOTE — PROCEDURE: TREATMENT REGIMEN
Plan: Per the request of Dr. Gonzales, Jeffy Phan was seen today for Superficial Radiation Therapy requiring simulation (CPT® 19639) in preparation for treatment of specific diseased site(s). Simulation is necessary to determine correct patient and treatment portal positioning, deliver safe and effective radiation therapy. A high frequency ultrasound image was acquired prior to treatment today for three dimensional evaluation of tumor volume and response to treatment, in addition, geometric accuracy of field placement (CPT®  ). Physician evaluation of the ultrasound tumor depth will be ongoing through course of treatment, and is deemed medically necessary ensuring efficacy of treatment. Today’s image and setup was evaluated determining continuation of treatment with the current plan, or necessary changes as appropriate. All appropriate custom blocking and treatment parameters verified by radiation therapist according to initial simulation.\\n \\nUS image guidance and field placement prior to treatment delivery performed. US depth is 0.80mm.\\n \\nPer Dr. Gonzales, continued daily US guidance and simulation is required for field placement, measurement of tumor depth, progress and edema monitoring.
Detail Level: Zone

## 2017-04-17 ENCOUNTER — APPOINTMENT (OUTPATIENT)
Age: 62
Setting detail: DERMATOLOGY
End: 2017-04-18

## 2017-04-17 PROBLEM — C44.329 SQUAMOUS CELL CARCINOMA OF SKIN OF OTHER PARTS OF FACE: Status: ACTIVE | Noted: 2017-04-17

## 2017-04-17 PROCEDURE — 77280 THER RAD SIMULAJ FIELD SMPL: CPT

## 2017-04-17 PROCEDURE — OTHER FOLLOW UP FOR NEXT VISIT: OTHER

## 2017-04-17 PROCEDURE — OTHER SUPERFICIAL RADIATION TREATMENT: OTHER

## 2017-04-17 PROCEDURE — 77401 RADIATION TX DELIVERY SUPFC: CPT

## 2017-04-17 PROCEDURE — OTHER TREATMENT REGIMEN: OTHER

## 2017-04-17 PROCEDURE — G6001 ECHO GUIDANCE RADIOTHERAPY: HCPCS

## 2017-04-17 NOTE — PROCEDURE: TREATMENT REGIMEN
Plan: Per the request of Dr. Gonzales, Jeffy Phan was seen today for Superficial Radiation Therapy requiring simulation (CPT® 70785) in preparation for treatment of specific diseased site(s). Simulation is necessary to determine correct patient and treatment portal positioning, deliver safe and effective radiation therapy. A high frequency ultrasound image was acquired prior to treatment today for three dimensional evaluation of tumor volume and response to treatment, in addition, geometric accuracy of field placement (CPT®  ). Physician evaluation of the ultrasound tumor depth will be ongoing through course of treatment, and is deemed medically necessary ensuring efficacy of treatment. Today’s image and setup was evaluated determining continuation of treatment with the current plan, or necessary changes as appropriate. All appropriate custom blocking and treatment parameters verified by radiation therapist according to initial simulation.\\n \\nUS image guidance and field placement prior to treatment delivery performed. US depth is 0.70mm.\\n \\nPer Dr. Gonzales, continued daily US guidance and simulation is required for field placement, measurement of tumor depth, progress and edema monitoring.
Detail Level: Zone

## 2017-04-17 NOTE — PROCEDURE: SUPERFICIAL RADIATION TREATMENT
Shielding Size (Optional- Include Units): 2.0 X 2.0
Field Size (Applicator) Rx 2: 3.0 cm
Energy (Optional-Please Include Units): 50kV
Bill For Radiation Treatment: Yes
Shielding Size (Optional- Include Units) Rx 2: 2.5 x 2.5
Bill For Simulation And Treatment Device Design: No
Simple Simulation Afterword Text Will Be Included With Simple Simulations (Indications............): The patient had a complete consultation regarding all applicable modalities for the treatment of their skin cancer and based on a variety of factors including the type of tumor, size, and location, the relevant medical history as well as local tissue factors, the functional status of the individual, the ability to perform necessary postoperative wound instructions and the need for simultaneous treatments as well as overall wound healing status, it was determined that the patient would begin radiation therapy treatment for skin cancer.  A full simulation and treatment device design was performed including the determination and formulation of appropriate simple and complex devices including lead shield of 0.762 mm thickness to form molded customized shielding to specifically correlate with the lesion size including treatment margin.  The custom lead shield is adequate to accommodate the appropriate applicator and provide adequate shielding around the treatment site.  The specific field applicator, shields, and devices both simple and complex as well as the specific patient setup is outlined below.  The patient was given a full consent for superficial radiation to both verbally and in writing and the full determination of patient's eligibility for treatment and selection is outlined on the patient eligibility and treatment selection form.  The specific superficial radiotherapy prescription was determined and was documented on the superficial radiotherapy prescription form.  A treatment calculation was also performed and documented on the treatment calculation form.  Based on the prescription, the patient was scheduled for a series of fractional treatments.
Prescription Used: 1
Dose Per Fractionation In Cgy (Optional): 255
Additional Prescription Justification Text: If there is any interruption in treatment exceeding 5 days please see Decay and Dose Adjustment Calculation and complete treatment under Prescription 2.
Depth (Optional-Please Include Units): 0.83mm
Treatment Time In Min (Optional): 0.34
Computed Treatment Time In Min (Will Render The Same As Calculated Treatment Time If Left Blank): per device
Intro Statement (Will Not Render If Left Blank): The patient is undergoing superficial radiation therapy for skin cancer and presents for weekly evaluation and management.  Per protocol and as documented on the flow sheet, the patient was questioned as to subjective redness, pruritus, pain, drainage, fatigue, or any other symptoms.  Objectively, the radiation area was evaluated with regards to erythema, atrophy, scale, crusting, erosion, ulceration, edema, purpura, tenderness, warmth, drainage, and any other findings.  The plan was extensively reviewed including the dose, and dosing schedule.  The simulation and clinical setup was also reviewed as was the external and any internal shields and based on this review the appropriateness and sufficiency of treatment was determined.
Dose / Tx In Cgy (Optional): 256.7
Total Number Of Fractions: 20
Daily Fractionated Dose (Optional- Include Units): 255cGy
Custom Shielding Preamble Text Will Not Be Included With Simple Simulations (.......... X X Y Cm): A lead shield of 0.762 mm thickness is utilized to form a molded, custom shield with a
Fractionation Number (Evaluation): 5
Energy (Optional-Please Include Units) Rx 2: 50 kV
Port Dimensions-X Axis In Cm: 2
Detail Level: Detailed
Custom Shielding Afterword Text Will Not Be Included With Simple Simulations (X X Y Cm............): port to correlate with the lesion size, including treatment margin. The custom lead shield is adequate to accommodate the appropriate applicator and provide adequate shielding around the treatment site. Additional shielding (as noted below) is used to protect sensitive, normal tissues.
Fractionation Number: 6
Treatment Device Design After Initial Simulation Justification (Will Render If Bill For Treatment Devices = Yes): The patient is status post radiation simulation and is evaluated as to the use of additional devices for shielding and placement for radiation therapy.
Patient Positioning: Supine
Simple Simulation Preamble Text Will Be Included With Simple Simulations (.......... Indications): Simple simulation was performed today for the following reasons:
Comments: RTOG Stage 0
Treatment Margins In Cm: 0.5
Functional Status: 0 (fully active)
Time Dose Fractionation (Optional- Include Units If Applicable): 88
Fractions / Week: 3
Day Of The Week Treatment Administered: Monday
Total Number Of Fractions Rx 4: 15
Assessment: Appropriate reaction
Fractions / Week Rx 2: 4
Daily Fractionated Dose (Optional- Include Units) Rx 2: 255 cGy
Cumulative Dose In Cgy (Optional): 1540.2
Total Dose (Optional-Please Include Units): 5100 cGy

## 2017-04-19 ENCOUNTER — APPOINTMENT (OUTPATIENT)
Age: 62
Setting detail: DERMATOLOGY
End: 2017-04-19

## 2017-04-19 PROBLEM — C44.329 SQUAMOUS CELL CARCINOMA OF SKIN OF OTHER PARTS OF FACE: Status: ACTIVE | Noted: 2017-04-19

## 2017-04-19 PROCEDURE — 77401 RADIATION TX DELIVERY SUPFC: CPT

## 2017-04-19 PROCEDURE — 77280 THER RAD SIMULAJ FIELD SMPL: CPT

## 2017-04-19 PROCEDURE — G6001 ECHO GUIDANCE RADIOTHERAPY: HCPCS

## 2017-04-19 PROCEDURE — OTHER TREATMENT REGIMEN: OTHER

## 2017-04-19 PROCEDURE — OTHER SUPERFICIAL RADIATION TREATMENT: OTHER

## 2017-04-19 PROCEDURE — OTHER FOLLOW UP FOR NEXT VISIT: OTHER

## 2017-04-19 NOTE — PROCEDURE: TREATMENT REGIMEN
Detail Level: Zone
Plan: Per the request of Dr. Gonzales, Jeffy Phan was seen today for Superficial Radiation Therapy requiring simulation (CPT® 36750) in preparation for treatment of specific diseased site(s). Simulation is necessary to determine correct patient and treatment portal positioning, deliver safe and effective radiation therapy. A high frequency ultrasound image was acquired prior to treatment today for three dimensional evaluation of tumor volume and response to treatment, in addition, geometric accuracy of field placement (CPT®  ). Physician evaluation of the ultrasound tumor depth will be ongoing through course of treatment, and is deemed medically necessary ensuring efficacy of treatment. Today’s image and setup was evaluated determining continuation of treatment with the current plan, or necessary changes as appropriate. All appropriate custom blocking and treatment parameters verified by radiation therapist according to initial simulation.\\n \\nUS image guidance and field placement prior to treatment delivery performed. US depth is 0.62mm.\\n \\nPer Dr. Gonzales, continued daily US guidance and simulation is required for field placement, measurement of tumor depth, progress and edema monitoring.

## 2017-04-19 NOTE — PROCEDURE: SUPERFICIAL RADIATION TREATMENT
Total Number Of Fractions Rx 3: 15
Shielding Size (Optional- Include Units) Rx 2: 2.5 x 2.5
Treatment Time In Min (Optional): 0.34
Daily Fractionated Dose (Optional- Include Units): 255cGy
Field Size (Applicator): 3.0 cm
Cumulative Dose In Cgy (Optional): 1796.9
Fractions / Week Rx 4: 5
Assessment: Appropriate reaction
Include Rx 3 When Rendering Additional Prescriptions: No
Treatment Device Design After Initial Simulation Justification (Will Render If Bill For Treatment Devices = Yes): The patient is status post radiation simulation and is evaluated as to the use of additional devices for shielding and placement for radiation therapy.
Patient Positioning: Supine
Time Dose Fractionation (Optional- Include Units If Applicable): 88
Energy (Optional-Please Include Units): 50kV
Port Dimensions-X Axis In Cm: 2
Energy (Optional-Please Include Units) Rx 2: 50 kV
Dimensions-X Axis In Cm: 1
Simple Simulation Afterword Text Will Be Included With Simple Simulations (Indications............): The patient had a complete consultation regarding all applicable modalities for the treatment of their skin cancer and based on a variety of factors including the type of tumor, size, and location, the relevant medical history as well as local tissue factors, the functional status of the individual, the ability to perform necessary postoperative wound instructions and the need for simultaneous treatments as well as overall wound healing status, it was determined that the patient would begin radiation therapy treatment for skin cancer.  A full simulation and treatment device design was performed including the determination and formulation of appropriate simple and complex devices including lead shield of 0.762 mm thickness to form molded customized shielding to specifically correlate with the lesion size including treatment margin.  The custom lead shield is adequate to accommodate the appropriate applicator and provide adequate shielding around the treatment site.  The specific field applicator, shields, and devices both simple and complex as well as the specific patient setup is outlined below.  The patient was given a full consent for superficial radiation to both verbally and in writing and the full determination of patient's eligibility for treatment and selection is outlined on the patient eligibility and treatment selection form.  The specific superficial radiotherapy prescription was determined and was documented on the superficial radiotherapy prescription form.  A treatment calculation was also performed and documented on the treatment calculation form.  Based on the prescription, the patient was scheduled for a series of fractional treatments.
Treatment Margins In Cm: 0.5
Fractions / Week: 3
Fractionation Number: 7
Functional Status: 0 (fully active)
Computed Treatment Time In Min (Will Render The Same As Calculated Treatment Time If Left Blank): per device
Total Dose (Optional-Please Include Units): 5100 cGy
Custom Shielding Preamble Text Will Not Be Included With Simple Simulations (.......... X X Y Cm): A lead shield of 0.762 mm thickness is utilized to form a molded, custom shield with a
Dose Per Fractionation In Cgy (Optional): 255
Shielding Size (Optional- Include Units): 2.0 X 2.0
Detail Level: Detailed
Total Number Of Fractions: 20
Fractions / Week Rx 2: 4
Depth (Optional-Please Include Units): 0.83mm
Intro Statement (Will Not Render If Left Blank): The patient is undergoing superficial radiation therapy for skin cancer and presents for weekly evaluation and management.  Per protocol and as documented on the flow sheet, the patient was questioned as to subjective redness, pruritus, pain, drainage, fatigue, or any other symptoms.  Objectively, the radiation area was evaluated with regards to erythema, atrophy, scale, crusting, erosion, ulceration, edema, purpura, tenderness, warmth, drainage, and any other findings.  The plan was extensively reviewed including the dose, and dosing schedule.  The simulation and clinical setup was also reviewed as was the external and any internal shields and based on this review the appropriateness and sufficiency of treatment was determined.
Dose / Tx In Cgy (Optional): 256.7
Additional Prescription Justification Text: If there is any interruption in treatment exceeding 5 days please see Decay and Dose Adjustment Calculation and complete treatment under Prescription 2.
Day Of The Week Treatment Administered: Wednesday
Daily Fractionated Dose (Optional- Include Units) Rx 2: 255 cGy
Custom Shielding Afterword Text Will Not Be Included With Simple Simulations (X X Y Cm............): port to correlate with the lesion size, including treatment margin. The custom lead shield is adequate to accommodate the appropriate applicator and provide adequate shielding around the treatment site. Additional shielding (as noted below) is used to protect sensitive, normal tissues.
Simple Simulation Preamble Text Will Be Included With Simple Simulations (.......... Indications): Simple simulation was performed today for the following reasons:
Comments: RTOG Stage 0
Bill For Radiation Treatment: Yes

## 2017-04-21 ENCOUNTER — APPOINTMENT (OUTPATIENT)
Age: 62
Setting detail: DERMATOLOGY
End: 2017-04-21

## 2017-04-21 PROBLEM — C44.329 SQUAMOUS CELL CARCINOMA OF SKIN OF OTHER PARTS OF FACE: Status: ACTIVE | Noted: 2017-04-21

## 2017-04-21 PROCEDURE — OTHER FOLLOW UP FOR NEXT VISIT: OTHER

## 2017-04-21 PROCEDURE — 77280 THER RAD SIMULAJ FIELD SMPL: CPT

## 2017-04-21 PROCEDURE — OTHER TREATMENT REGIMEN: OTHER

## 2017-04-21 PROCEDURE — 77401 RADIATION TX DELIVERY SUPFC: CPT

## 2017-04-21 PROCEDURE — G6001 ECHO GUIDANCE RADIOTHERAPY: HCPCS

## 2017-04-21 PROCEDURE — OTHER SUPERFICIAL RADIATION TREATMENT: OTHER

## 2017-04-21 NOTE — PROCEDURE: TREATMENT REGIMEN
Detail Level: Zone
Plan: Per the request of Dr. Gonzales, Jeffy Phan was seen today for Superficial Radiation Therapy requiring simulation (CPT® 18832) in preparation for treatment of specific diseased site(s). Simulation is necessary to determine correct patient and treatment portal positioning, deliver safe and effective radiation therapy. A high frequency ultrasound image was acquired prior to treatment today for three dimensional evaluation of tumor volume and response to treatment, in addition, geometric accuracy of field placement (CPT®  ). Physician evaluation of the ultrasound tumor depth will be ongoing through course of treatment, and is deemed medically necessary ensuring efficacy of treatment. Today’s image and setup was evaluated determining continuation of treatment with the current plan, or necessary changes as appropriate. All appropriate custom blocking and treatment parameters verified by radiation therapist according to initial simulation.\\n \\nUS image guidance and field placement prior to treatment delivery performed. US depth is 0.94mm.\\n \\nPer Dr. Gonzales, continued daily US guidance and simulation is required for field placement, measurement of tumor depth, progress and edema monitoring.

## 2017-04-21 NOTE — PROCEDURE: SUPERFICIAL RADIATION TREATMENT
Daily Fractionated Dose (Optional- Include Units): 255cGy
Functional Status: 0 (fully active)
Intro Statement (Will Not Render If Left Blank): The patient is undergoing superficial radiation therapy for skin cancer and presents for weekly evaluation and management.  Per protocol and as documented on the flow sheet, the patient was questioned as to subjective redness, pruritus, pain, drainage, fatigue, or any other symptoms.  Objectively, the radiation area was evaluated with regards to erythema, atrophy, scale, crusting, erosion, ulceration, edema, purpura, tenderness, warmth, drainage, and any other findings.  The plan was extensively reviewed including the dose, and dosing schedule.  The simulation and clinical setup was also reviewed as was the external and any internal shields and based on this review the appropriateness and sufficiency of treatment was determined.
Energy (Optional-Please Include Units): 50kV
Prescription Used: 1
Field Size (Applicator): 3.0 cm
Custom Shielding Preamble Text Will Not Be Included With Simple Simulations (.......... X X Y Cm): A lead shield of 0.762 mm thickness is utilized to form a molded, custom shield with a
Render Additional Prescriptions In Note?: No
Total Dose (Optional-Please Include Units): 5100 cGy
Fractions / Week Rx 4: 5
Fractionation Number: 8
Cumulative Dose In Cgy (Optional): 2053.6
Bill For Radiation Treatment: Yes
Total Number Of Fractions Rx 3: 15
Comments: RTOG Stage 0
Initial Radiation Treatment Planning (Will Render If Bill Simulation = Yes): The patient had a complete consultation regarding all applicable modalities for the treatment of their skin cancer and based on a variety of factors including the type of tumor, size, and location, the relevant medical history as well as local tissue factors, the functional status of the individual, the ability to perform necessary postoperative wound instructions and the need for simultaneous treatments as well as overall wound healing status, it was determined that the patient would begin radiation therapy treatment for skin cancer.  A full simulation and treatment device design was performed including the determination and formulation of appropriate simple and complex devices including lead shield of 0.762 mm thickness to form molded customized shielding to specifically correlate with the lesion size including treatment margin.  The custom lead shield is adequate to accommodate the appropriate applicator and provide adequate shielding around the treatment site.  The specific field applicator, shields, and devices both simple and complex as well as the specific patient setup is outlined below.  The patient was given a full consent for superficial radiation to both verbally and in writing and the full determination of patient's eligibility for treatment and selection is outlined on the patient eligibility and treatment selection form.  The specific superficial radiotherapy prescription was determined and was documented on the superficial radiotherapy prescription form.  A treatment calculation was also performed and documented on the treatment calculation form.  Based on the prescription, the patient was scheduled for a series of fractional treatments.
Dose / Tx In Cgy (Optional): 256.7
Depth (Optional-Please Include Units): 0.83mm
Fractions / Week: 3
Treatment Time / Fractionation (Optional- Include Units): 0.34
Shielding Size (Optional- Include Units): 2.0 X 2.0
Simple Simulation Preamble Text Will Be Included With Simple Simulations (.......... Indications): Simple simulation was performed today for the following reasons:
Computed Treatment Time In Min (Will Render The Same As Calculated Treatment Time If Left Blank): per device
Assessment: Appropriate reaction
Total Number Of Fractions: 20
Additional Prescription Justification Text: If there is any interruption in treatment exceeding 5 days please see Decay and Dose Adjustment Calculation and complete treatment under Prescription 2.
Dose Per Fractionation In Cgy (Optional): 255
Port Dimensions-Y Axis In Cm: 2
Patient Positioning: Supine
Time Dose Fractionation (Optional- Include Units If Applicable): 88
Energy (Optional-Please Include Units) Rx 2: 50 kV
Custom Shielding Afterword Text Will Not Be Included With Simple Simulations (X X Y Cm............): port to correlate with the lesion size, including treatment margin. The custom lead shield is adequate to accommodate the appropriate applicator and provide adequate shielding around the treatment site. Additional shielding (as noted below) is used to protect sensitive, normal tissues.
Shielding Size (Optional- Include Units) Rx 2: 2.5 x 2.5
Day Of The Week Treatment Administered: Friday
Daily Fractionated Dose (Optional- Include Units) Rx 2: 255 cGy
Treatment Margins In Cm: 0.5
Fractions / Week Rx 2: 4
Treatment Device Design After Initial Simulation Justification (Will Render If Bill For Treatment Devices = Yes): The patient is status post radiation simulation and is evaluated as to the use of additional devices for shielding and placement for radiation therapy.
Detail Level: Detailed

## 2017-04-24 ENCOUNTER — APPOINTMENT (OUTPATIENT)
Age: 62
Setting detail: DERMATOLOGY
End: 2017-04-25

## 2017-04-24 PROBLEM — C44.329 SQUAMOUS CELL CARCINOMA OF SKIN OF OTHER PARTS OF FACE: Status: ACTIVE | Noted: 2017-04-24

## 2017-04-24 PROCEDURE — OTHER TREATMENT REGIMEN: OTHER

## 2017-04-24 PROCEDURE — 77280 THER RAD SIMULAJ FIELD SMPL: CPT

## 2017-04-24 PROCEDURE — OTHER SUPERFICIAL RADIATION TREATMENT: OTHER

## 2017-04-24 PROCEDURE — 77401 RADIATION TX DELIVERY SUPFC: CPT

## 2017-04-24 PROCEDURE — OTHER FOLLOW UP FOR NEXT VISIT: OTHER

## 2017-04-24 PROCEDURE — G6001 ECHO GUIDANCE RADIOTHERAPY: HCPCS

## 2017-04-24 NOTE — PROCEDURE: SUPERFICIAL RADIATION TREATMENT
Functional Status: 0 (fully active)
Total Number Of Fractions: 20
Field Size (Applicator): 3.0 cm
Render Additional Prescriptions In Note?: No
Dimensions-X Axis In Cm: 1
Energy (Optional-Please Include Units): 50kV
Depth (Optional-Please Include Units): 0.83mm
Computed Treatment Time In Min (Will Render The Same As Calculated Treatment Time If Left Blank): per device
Cumulative Dose In Cgy (Optional): 2310.3
Detail Level: Detailed
Fractionation Number (Evaluation): 5
Treatment Device Design After Initial Simulation Justification (Will Render If Bill For Treatment Devices = Yes): The patient is status post radiation simulation and is evaluated as to the use of additional devices for shielding and placement for radiation therapy.
Simple Simulation Preamble Text Will Be Included With Simple Simulations (.......... Indications): Simple simulation was performed today for the following reasons:
Daily Fractionated Dose (Optional- Include Units): 255cGy
Fractions / Week Rx 2: 4
Bill For Radiation Treatment: Yes
Fractions / Week: 3
Simple Simulation Afterword Text Will Be Included With Simple Simulations (Indications............): The patient had a complete consultation regarding all applicable modalities for the treatment of their skin cancer and based on a variety of factors including the type of tumor, size, and location, the relevant medical history as well as local tissue factors, the functional status of the individual, the ability to perform necessary postoperative wound instructions and the need for simultaneous treatments as well as overall wound healing status, it was determined that the patient would begin radiation therapy treatment for skin cancer.  A full simulation and treatment device design was performed including the determination and formulation of appropriate simple and complex devices including lead shield of 0.762 mm thickness to form molded customized shielding to specifically correlate with the lesion size including treatment margin.  The custom lead shield is adequate to accommodate the appropriate applicator and provide adequate shielding around the treatment site.  The specific field applicator, shields, and devices both simple and complex as well as the specific patient setup is outlined below.  The patient was given a full consent for superficial radiation to both verbally and in writing and the full determination of patient's eligibility for treatment and selection is outlined on the patient eligibility and treatment selection form.  The specific superficial radiotherapy prescription was determined and was documented on the superficial radiotherapy prescription form.  A treatment calculation was also performed and documented on the treatment calculation form.  Based on the prescription, the patient was scheduled for a series of fractional treatments.
Additional Prescription Justification Text: If there is any interruption in treatment exceeding 5 days please see Decay and Dose Adjustment Calculation and complete treatment under Prescription 2.
Assessment: Appropriate reaction
Daily Fractionated Dose (Optional- Include Units) Rx 2: 255 cGy
Port Dimensions-Y Axis In Cm: 2
Treatment Time / Fractionation (Optional- Include Units): 0.34
Time Dose Fractionation (Optional- Include Units If Applicable): 88
Total Number Of Fractions Rx 4: 15
Total Dose (Optional-Please Include Units): 5100 cGy
Patient Positioning: Supine
Dose / Tx In Cgy (Optional): 256.7
Day Of The Week Treatment Administered: Monday
Fractionation Number: 9
Treatment Margins In Cm: 0.5
Dose Per Fractionation In Cgy (Optional): 255
Shielding Size (Optional- Include Units) Rx 2: 2.5 x 2.5
Custom Shielding Preamble Text Will Not Be Included With Simple Simulations (.......... X X Y Cm): A lead shield of 0.762 mm thickness is utilized to form a molded, custom shield with a
Energy (Optional-Please Include Units) Rx 2: 50 kV
Shielding Size (Optional- Include Units): 2.0 X 2.0
Intro Statement (Will Not Render If Left Blank): The patient is undergoing superficial radiation therapy for skin cancer and presents for weekly evaluation and management.  Per protocol and as documented on the flow sheet, the patient was questioned as to subjective redness, pruritus, pain, drainage, fatigue, or any other symptoms.  Objectively, the radiation area was evaluated with regards to erythema, atrophy, scale, crusting, erosion, ulceration, edema, purpura, tenderness, warmth, drainage, and any other findings.  The plan was extensively reviewed including the dose, and dosing schedule.  The simulation and clinical setup was also reviewed as was the external and any internal shields and based on this review the appropriateness and sufficiency of treatment was determined.
Custom Shielding Afterword Text Will Not Be Included With Simple Simulations (X X Y Cm............): port to correlate with the lesion size, including treatment margin. The custom lead shield is adequate to accommodate the appropriate applicator and provide adequate shielding around the treatment site. Additional shielding (as noted below) is used to protect sensitive, normal tissues.
Comments: RTOG Stage 0

## 2017-04-24 NOTE — PROCEDURE: TREATMENT REGIMEN
Plan: Per the request of Dr. Gonzales, Jeffy Phan was seen today for Superficial Radiation Therapy requiring simulation (CPT® 40032) in preparation for treatment of specific diseased site(s). Simulation is necessary to determine correct patient and treatment portal positioning, deliver safe and effective radiation therapy. A high frequency ultrasound image was acquired prior to treatment today for three dimensional evaluation of tumor volume and response to treatment, in addition, geometric accuracy of field placement (CPT®  ). Physician evaluation of the ultrasound tumor depth will be ongoing through course of treatment, and is deemed medically necessary ensuring efficacy of treatment. Today’s image and setup was evaluated determining continuation of treatment with the current plan, or necessary changes as appropriate. All appropriate custom blocking and treatment parameters verified by radiation therapist according to initial simulation.\\n \\nUS image guidance and field placement prior to treatment delivery performed. US depth is 0.88mm.\\n \\nPer Dr. Gonzales, continued daily US guidance and simulation is required for field placement, measurement of tumor depth, progress and edema monitoring.
Detail Level: Zone

## 2017-04-26 ENCOUNTER — APPOINTMENT (OUTPATIENT)
Age: 62
Setting detail: DERMATOLOGY
End: 2017-04-27

## 2017-04-26 PROBLEM — C44.329 SQUAMOUS CELL CARCINOMA OF SKIN OF OTHER PARTS OF FACE: Status: ACTIVE | Noted: 2017-04-26

## 2017-04-26 PROCEDURE — OTHER TREATMENT REGIMEN: OTHER

## 2017-04-26 PROCEDURE — OTHER SUPERFICIAL RADIATION TREATMENT: OTHER

## 2017-04-26 PROCEDURE — OTHER FOLLOW UP FOR NEXT VISIT: OTHER

## 2017-04-26 PROCEDURE — 77401 RADIATION TX DELIVERY SUPFC: CPT

## 2017-04-26 PROCEDURE — 77280 THER RAD SIMULAJ FIELD SMPL: CPT

## 2017-04-26 PROCEDURE — G6001 ECHO GUIDANCE RADIOTHERAPY: HCPCS

## 2017-04-26 NOTE — PROCEDURE: SUPERFICIAL RADIATION TREATMENT
Port Dimensions-Y Axis In Cm: 2
Dose Per Fractionation In Cgy (Optional): 255
Field Size (Applicator): 3.0 cm
Shielding Size (Optional- Include Units): 2.0 X 2.0
Comments: RTOG Stage 0
Treatment Device Design After Initial Simulation Justification (Will Render If Bill For Treatment Devices = Yes): The patient is status post radiation simulation and is evaluated as to the use of additional devices for shielding and placement for radiation therapy.
Treatment Time In Min (Optional): 0.34
Assessment: Appropriate reaction
Cumulative Dose In Cgy (Optional): 2567.0
Fractionation Number (Evaluation): 5
Custom Shielding Afterword Text Will Not Be Included With Simple Simulations (X X Y Cm............): port to correlate with the lesion size, including treatment margin. The custom lead shield is adequate to accommodate the appropriate applicator and provide adequate shielding around the treatment site. Additional shielding (as noted below) is used to protect sensitive, normal tissues.
Bill For Dosimetry/Render Decay And Dose Adjustment Calculation In Note: No
Energy (Optional-Please Include Units) Rx 2: 50 kV
Fractions / Week: 3
Custom Shielding Preamble Text Will Not Be Included With Simple Simulations (.......... X X Y Cm): A lead shield of 0.762 mm thickness is utilized to form a molded, custom shield with a
Bill For Radiation Treatment: Yes
Dimensions-Y Axis In Cm: 1
Intro Statement (Will Not Render If Left Blank): The patient is undergoing superficial radiation therapy for skin cancer and presents for weekly evaluation and management.  Per protocol and as documented on the flow sheet, the patient was questioned as to subjective redness, pruritus, pain, drainage, fatigue, or any other symptoms.  Objectively, the radiation area was evaluated with regards to erythema, atrophy, scale, crusting, erosion, ulceration, edema, purpura, tenderness, warmth, drainage, and any other findings.  The plan was extensively reviewed including the dose, and dosing schedule.  The simulation and clinical setup was also reviewed as was the external and any internal shields and based on this review the appropriateness and sufficiency of treatment was determined.
Simple Simulation Preamble Text Will Be Included With Simple Simulations (.......... Indications): Simple simulation was performed today for the following reasons:
Detail Level: Detailed
Additional Prescription Justification Text: If there is any interruption in treatment exceeding 5 days please see Decay and Dose Adjustment Calculation and complete treatment under Prescription 2.
Total Number Of Fractions Rx 2: 15
Shielding Size (Optional- Include Units) Rx 2: 2.5 x 2.5
Fractions / Week Rx 2: 4
Energy (Optional-Please Include Units): 50kV
Total Dose (Optional-Please Include Units): 5100 cGy
Fractionation Number: 10
Treatment Margins In Cm: 0.5
Depth (Optional-Please Include Units): 0.83mm
Day Of The Week Treatment Administered: Wednesday
Total Number Of Fractions: 20
Time Dose Fractionation (Optional- Include Units If Applicable): 88
Patient Positioning: Supine
Computed Treatment Time In Min (Will Render The Same As Calculated Treatment Time If Left Blank): per device
Initial Radiation Treatment Planning (Will Render If Bill Simulation = Yes): The patient had a complete consultation regarding all applicable modalities for the treatment of their skin cancer and based on a variety of factors including the type of tumor, size, and location, the relevant medical history as well as local tissue factors, the functional status of the individual, the ability to perform necessary postoperative wound instructions and the need for simultaneous treatments as well as overall wound healing status, it was determined that the patient would begin radiation therapy treatment for skin cancer.  A full simulation and treatment device design was performed including the determination and formulation of appropriate simple and complex devices including lead shield of 0.762 mm thickness to form molded customized shielding to specifically correlate with the lesion size including treatment margin.  The custom lead shield is adequate to accommodate the appropriate applicator and provide adequate shielding around the treatment site.  The specific field applicator, shields, and devices both simple and complex as well as the specific patient setup is outlined below.  The patient was given a full consent for superficial radiation to both verbally and in writing and the full determination of patient's eligibility for treatment and selection is outlined on the patient eligibility and treatment selection form.  The specific superficial radiotherapy prescription was determined and was documented on the superficial radiotherapy prescription form.  A treatment calculation was also performed and documented on the treatment calculation form.  Based on the prescription, the patient was scheduled for a series of fractional treatments.
Dose / Tx In Cgy (Optional): 256.7
Daily Fractionated Dose (Optional- Include Units): 255cGy
Daily Fractionated Dose (Optional- Include Units) Rx 2: 255 cGy
Functional Status: 0 (fully active)

## 2017-04-26 NOTE — PROCEDURE: TREATMENT REGIMEN
Detail Level: Zone
Plan: Per the request of Dr. Gonzales, Jeffy Phan was seen today for Superficial Radiation Therapy requiring simulation (CPT® 40436) in preparation for treatment of specific diseased site(s). Simulation is necessary to determine correct patient and treatment portal positioning, deliver safe and effective radiation therapy. A high frequency ultrasound image was acquired prior to treatment today for three dimensional evaluation of tumor volume and response to treatment, in addition, geometric accuracy of field placement (CPT®  ). Physician evaluation of the ultrasound tumor depth will be ongoing through course of treatment, and is deemed medically necessary ensuring efficacy of treatment. Today’s image and setup was evaluated determining continuation of treatment with the current plan, or necessary changes as appropriate. All appropriate custom blocking and treatment parameters verified by radiation therapist according to initial simulation.\\n \\nUS image guidance and field placement prior to treatment delivery performed. US depth is 0.63m.\\n \\nPer Dr. Gonzales, continued daily US guidance and simulation is required for field placement, measurement of tumor depth, progress and edema monitoring.

## 2017-04-27 ENCOUNTER — APPOINTMENT (OUTPATIENT)
Age: 62
Setting detail: DERMATOLOGY
End: 2017-04-27

## 2017-04-27 PROBLEM — C44.329 SQUAMOUS CELL CARCINOMA OF SKIN OF OTHER PARTS OF FACE: Status: ACTIVE | Noted: 2017-04-27

## 2017-04-27 PROCEDURE — 77427 RADIATION TX MANAGEMENT X5: CPT

## 2017-04-27 PROCEDURE — OTHER TREATMENT REGIMEN: OTHER

## 2017-04-27 PROCEDURE — G6001 ECHO GUIDANCE RADIOTHERAPY: HCPCS

## 2017-04-27 PROCEDURE — OTHER FOLLOW UP FOR NEXT VISIT: OTHER

## 2017-04-27 PROCEDURE — OTHER SUPERFICIAL RADIATION TREATMENT: OTHER

## 2017-04-27 NOTE — PROCEDURE: SUPERFICIAL RADIATION TREATMENT
Bill For Radiation Treatment: No
Total Number Of Fractions Rx 3: 15
Fractions / Week Rx 2: 4
Comments: RTOG Stage 1
Number Of Days Off Treatment: 1
Field Size (Applicator): 3.0 cm
Energy (Include Units): 50kV
Shielding Size (Optional- Include Units) Rx 2: 2.5 x 2.5
Treatment Device Design After Initial Simulation Justification (Will Render If Bill For Treatment Devices = Yes): The patient is status post radiation simulation and is evaluated as to the use of additional devices for shielding and placement for radiation therapy.
Additional Prescription Justification Text: If there is any interruption in treatment exceeding 5 days please see Decay and Dose Adjustment Calculation and complete treatment under Prescription 2.
Shielding Size (Optional- Include Units): 2.0 X 2.0
Treatment Margins In Cm: 0.5
Treatment Time / Fractionation (Optional- Include Units): 0.34
Port Dimensions-Y Axis In Cm: 2
Custom Shielding Afterword Text Will Not Be Included With Simple Simulations (X X Y Cm............): port to correlate with the lesion size, including treatment margin. The custom lead shield is adequate to accommodate the appropriate applicator and provide adequate shielding around the treatment site. Additional shielding (as noted below) is used to protect sensitive, normal tissues.
Dose / Tx In Cgy (Optional): 256.7
Bill And Render Text From Evaluation And Management Tab (Will Bill [id number]): Yes
Depth (Optional-Please Include Units): 0.83mm
Total Dose (Optional-Please Include Units): 5100 cGy
Dose Per Fractionation In Cgy (Optional): 255
Computed Treatment Time In Min (Will Render The Same As Calculated Treatment Time If Left Blank): per device
Daily Fractionated Dose (Optional- Include Units): 255cGy
Detail Level: Detailed
Fractionation Number: 10
Cumulative Dose In Cgy (Optional): 2567.0
Patient Positioning: Supine
Functional Status: 0 (fully active)
Initial Radiation Treatment Planning (Will Render If Bill Simulation = Yes): The patient had a complete consultation regarding all applicable modalities for the treatment of their skin cancer and based on a variety of factors including the type of tumor, size, and location, the relevant medical history as well as local tissue factors, the functional status of the individual, the ability to perform necessary postoperative wound instructions and the need for simultaneous treatments as well as overall wound healing status, it was determined that the patient would begin radiation therapy treatment for skin cancer.  A full simulation and treatment device design was performed including the determination and formulation of appropriate simple and complex devices including lead shield of 0.762 mm thickness to form molded customized shielding to specifically correlate with the lesion size including treatment margin.  The custom lead shield is adequate to accommodate the appropriate applicator and provide adequate shielding around the treatment site.  The specific field applicator, shields, and devices both simple and complex as well as the specific patient setup is outlined below.  The patient was given a full consent for superficial radiation to both verbally and in writing and the full determination of patient's eligibility for treatment and selection is outlined on the patient eligibility and treatment selection form.  The specific superficial radiotherapy prescription was determined and was documented on the superficial radiotherapy prescription form.  A treatment calculation was also performed and documented on the treatment calculation form.  Based on the prescription, the patient was scheduled for a series of fractional treatments.
Energy (Optional-Please Include Units) Rx 2: 50 kV
Assessment: Appropriate reaction
Intro Statement (Will Not Render If Left Blank): The patient is undergoing superficial radiation therapy for skin cancer and presents for weekly evaluation and management.  Per protocol and as documented on the flow sheet, the patient was questioned as to subjective redness, pruritus, pain, drainage, fatigue, or any other symptoms.  Objectively, the radiation area was evaluated with regards to erythema, atrophy, scale, crusting, erosion, ulceration, edema, purpura, tenderness, warmth, drainage, and any other findings.  The plan was extensively reviewed including the dose, and dosing schedule.  The simulation and clinical setup was also reviewed as was the external and any internal shields and based on this review the appropriateness and sufficiency of treatment was determined.
Fractions / Week: 3
Custom Shielding Preamble Text Will Not Be Included With Simple Simulations (.......... X X Y Cm): A lead shield of 0.762 mm thickness is utilized to form a molded, custom shield with a
Daily Fractionated Dose (Optional- Include Units) Rx 2: 255 cGy
Time Dose Fractionation (Optional- Include Units If Applicable): 88
Simple Simulation Preamble Text Will Be Included With Simple Simulations (.......... Indications): Simple simulation was performed today for the following reasons:
Total Number Of Fractions: 20
Day Of The Week Treatment Administered: Wednesday
Fractions / Week Rx 3: 5

## 2017-04-27 NOTE — PROCEDURE: TREATMENT REGIMEN
Plan: US image guidance performed. US depth is 0.76mm.\\n \\nPer Dr. Gonzales, continued daily US guidance and simulation is required for field placement, measurement of tumor depth, progress and edema monitoring
Detail Level: Zone

## 2017-05-01 ENCOUNTER — APPOINTMENT (OUTPATIENT)
Age: 62
Setting detail: DERMATOLOGY
End: 2017-05-01

## 2017-05-01 PROBLEM — C44.329 SQUAMOUS CELL CARCINOMA OF SKIN OF OTHER PARTS OF FACE: Status: ACTIVE | Noted: 2017-05-01

## 2017-05-01 PROCEDURE — OTHER FOLLOW UP FOR NEXT VISIT: OTHER

## 2017-05-01 PROCEDURE — OTHER SUPERFICIAL RADIATION TREATMENT: OTHER

## 2017-05-01 PROCEDURE — 77280 THER RAD SIMULAJ FIELD SMPL: CPT

## 2017-05-01 PROCEDURE — OTHER TREATMENT REGIMEN: OTHER

## 2017-05-01 PROCEDURE — G6001 ECHO GUIDANCE RADIOTHERAPY: HCPCS

## 2017-05-01 PROCEDURE — 77401 RADIATION TX DELIVERY SUPFC: CPT

## 2017-05-01 NOTE — PROCEDURE: SUPERFICIAL RADIATION TREATMENT
Fractions / Week Rx 3: 5
Cumulative Dose In Cgy (Optional): 2823.7
Bill For Dosimetry/Render Treatment Time Calculation In Note: No
Number Of Days Off Treatment: 1
Treatment Margins In Cm: 0.5
Daily Fractionated Dose (Optional- Include Units): 255cGy
Computed Treatment Time In Min (Will Render The Same As Calculated Treatment Time If Left Blank): per device
Treatment Time / Fractionation (Optional- Include Units): 0.34
Time Dose Fractionation (Optional- Include Units If Applicable): 88
Bill For Radiation Treatment: Yes
Fractions / Week: 3
Treatment Device Design After Initial Simulation Justification (Will Render If Bill For Treatment Devices = Yes): The patient is status post radiation simulation and is evaluated as to the use of additional devices for shielding and placement for radiation therapy.
Dose Per Fractionation In Cgy (Optional): 255
Field Size (Applicator): 3.0 cm
Custom Shielding Preamble Text Will Not Be Included With Simple Simulations (.......... X X Y Cm): A lead shield of 0.762 mm thickness is utilized to form a molded, custom shield with a
Simple Simulation Preamble Text Will Be Included With Simple Simulations (.......... Indications): Simple simulation was performed today for the following reasons:
Simple Simulation Afterword Text Will Be Included With Simple Simulations (Indications............): The patient had a complete consultation regarding all applicable modalities for the treatment of their skin cancer and based on a variety of factors including the type of tumor, size, and location, the relevant medical history as well as local tissue factors, the functional status of the individual, the ability to perform necessary postoperative wound instructions and the need for simultaneous treatments as well as overall wound healing status, it was determined that the patient would begin radiation therapy treatment for skin cancer.  A full simulation and treatment device design was performed including the determination and formulation of appropriate simple and complex devices including lead shield of 0.762 mm thickness to form molded customized shielding to specifically correlate with the lesion size including treatment margin.  The custom lead shield is adequate to accommodate the appropriate applicator and provide adequate shielding around the treatment site.  The specific field applicator, shields, and devices both simple and complex as well as the specific patient setup is outlined below.  The patient was given a full consent for superficial radiation to both verbally and in writing and the full determination of patient's eligibility for treatment and selection is outlined on the patient eligibility and treatment selection form.  The specific superficial radiotherapy prescription was determined and was documented on the superficial radiotherapy prescription form.  A treatment calculation was also performed and documented on the treatment calculation form.  Based on the prescription, the patient was scheduled for a series of fractional treatments.
Detail Level: Detailed
Functional Status: 0 (fully active)
Total Number Of Fractions Rx 3: 15
Depth (Optional-Please Include Units): 0.83mm
Energy (Optional-Please Include Units): 50kV
Intro Statement (Will Not Render If Left Blank): The patient is undergoing superficial radiation therapy for skin cancer and presents for weekly evaluation and management.  Per protocol and as documented on the flow sheet, the patient was questioned as to subjective redness, pruritus, pain, drainage, fatigue, or any other symptoms.  Objectively, the radiation area was evaluated with regards to erythema, atrophy, scale, crusting, erosion, ulceration, edema, purpura, tenderness, warmth, drainage, and any other findings.  The plan was extensively reviewed including the dose, and dosing schedule.  The simulation and clinical setup was also reviewed as was the external and any internal shields and based on this review the appropriateness and sufficiency of treatment was determined.
Fractionation Number: 11
Additional Prescription Justification Text: If there is any interruption in treatment exceeding 5 days please see Decay and Dose Adjustment Calculation and complete treatment under Prescription 2.
Port Dimensions-Y Axis In Cm: 2
Comments: RTOG Stage 0
Custom Shielding Afterword Text Will Not Be Included With Simple Simulations (X X Y Cm............): port to correlate with the lesion size, including treatment margin. The custom lead shield is adequate to accommodate the appropriate applicator and provide adequate shielding around the treatment site. Additional shielding (as noted below) is used to protect sensitive, normal tissues.
Day Of The Week Treatment Administered: Monday
Daily Fractionated Dose (Optional- Include Units) Rx 2: 255 cGy
Shielding Size (Optional- Include Units): 2.0 X 2.0
Fractions / Week Rx 2: 4
Total Number Of Fractions: 20
Total Dose (Optional-Please Include Units): 5100 cGy
Patient Positioning: Supine
Shielding Size (Optional- Include Units) Rx 2: 2.5 x 2.5
Dose / Tx In Cgy (Optional): 256.7
Energy (Optional-Please Include Units) Rx 2: 50 kV
Assessment: Appropriate reaction

## 2017-05-01 NOTE — PROCEDURE: TREATMENT REGIMEN
Detail Level: Zone
Plan: Per the request of Dr. Gonzales, Jeffy Phan was seen today for Superficial Radiation Therapy requiring simulation (CPT® 06095) in preparation for treatment of specific diseased site(s). Simulation is necessary to determine correct patient and treatment portal positioning, deliver safe and effective radiation therapy. A high frequency ultrasound image was acquired prior to treatment today for three dimensional evaluation of tumor volume and response to treatment, in addition, geometric accuracy of field placement (CPT®  ). Physician evaluation of the ultrasound tumor depth will be ongoing through course of treatment, and is deemed medically necessary ensuring efficacy of treatment. Today’s image and setup was evaluated determining continuation of treatment with the current plan, or necessary changes as appropriate. All appropriate custom blocking and treatment parameters verified by radiation therapist according to initial simulation.\\n \\nUS image guidance and field placement prior to treatment delivery performed. US depth is 0.67m.\\n \\nPer Dr. Gonzales, continued daily US guidance and simulation is required for field placement, measurement of tumor depth, progress and edema monitoring.

## 2017-05-02 ENCOUNTER — APPOINTMENT (OUTPATIENT)
Age: 62
Setting detail: DERMATOLOGY
End: 2017-05-03

## 2017-05-02 PROBLEM — C44.329 SQUAMOUS CELL CARCINOMA OF SKIN OF OTHER PARTS OF FACE: Status: ACTIVE | Noted: 2017-05-02

## 2017-05-02 PROCEDURE — OTHER SUPERFICIAL RADIATION TREATMENT: OTHER

## 2017-05-02 PROCEDURE — 77401 RADIATION TX DELIVERY SUPFC: CPT

## 2017-05-02 PROCEDURE — OTHER FOLLOW UP FOR NEXT VISIT: OTHER

## 2017-05-02 PROCEDURE — 77280 THER RAD SIMULAJ FIELD SMPL: CPT

## 2017-05-02 PROCEDURE — OTHER TREATMENT REGIMEN: OTHER

## 2017-05-02 PROCEDURE — G6001 ECHO GUIDANCE RADIOTHERAPY: HCPCS

## 2017-05-02 NOTE — PROCEDURE: SUPERFICIAL RADIATION TREATMENT
Port Dimensions-Y Axis In Cm: 2
Patient Positioning: Supine
Depth (Optional-Please Include Units): 0.83mm
Time Dose Fractionation (Optional- Include Units If Applicable): 88
Dose Per Fractionation In Cgy (Optional): 255
Total Dose (Optional-Please Include Units): 5100 cGy
Fractions / Week Rx 2: 4
Bill For Radiation Treatment: Yes
Fractionation Number: 12
Custom Shielding Afterword Text Will Not Be Included With Simple Simulations (X X Y Cm............): port to correlate with the lesion size, including treatment margin. The custom lead shield is adequate to accommodate the appropriate applicator and provide adequate shielding around the treatment site. Additional shielding (as noted below) is used to protect sensitive, normal tissues.
Total Number Of Fractions Rx 3: 15
Field Size (Applicator): 3.0 cm
Computed Treatment Time In Min (Will Render The Same As Calculated Treatment Time If Left Blank): per device
Comments: RTOG Stage 0
Bill For Treatment Devices Only: No
Prescription Used: 1
Treatment Device Design After Initial Simulation Justification (Will Render If Bill For Treatment Devices = Yes): The patient is status post radiation simulation and is evaluated as to the use of additional devices for shielding and placement for radiation therapy.
Additional Prescription Justification Text: If there is any interruption in treatment exceeding 5 days please see Decay and Dose Adjustment Calculation and complete treatment under Prescription 2.
Intro Statement (Will Not Render If Left Blank): The patient is undergoing superficial radiation therapy for skin cancer and presents for weekly evaluation and management.  Per protocol and as documented on the flow sheet, the patient was questioned as to subjective redness, pruritus, pain, drainage, fatigue, or any other symptoms.  Objectively, the radiation area was evaluated with regards to erythema, atrophy, scale, crusting, erosion, ulceration, edema, purpura, tenderness, warmth, drainage, and any other findings.  The plan was extensively reviewed including the dose, and dosing schedule.  The simulation and clinical setup was also reviewed as was the external and any internal shields and based on this review the appropriateness and sufficiency of treatment was determined.
Fractions / Week Rx 3: 5
Energy (Optional-Please Include Units): 50kV
Simple Simulation Afterword Text Will Be Included With Simple Simulations (Indications............): The patient had a complete consultation regarding all applicable modalities for the treatment of their skin cancer and based on a variety of factors including the type of tumor, size, and location, the relevant medical history as well as local tissue factors, the functional status of the individual, the ability to perform necessary postoperative wound instructions and the need for simultaneous treatments as well as overall wound healing status, it was determined that the patient would begin radiation therapy treatment for skin cancer.  A full simulation and treatment device design was performed including the determination and formulation of appropriate simple and complex devices including lead shield of 0.762 mm thickness to form molded customized shielding to specifically correlate with the lesion size including treatment margin.  The custom lead shield is adequate to accommodate the appropriate applicator and provide adequate shielding around the treatment site.  The specific field applicator, shields, and devices both simple and complex as well as the specific patient setup is outlined below.  The patient was given a full consent for superficial radiation to both verbally and in writing and the full determination of patient's eligibility for treatment and selection is outlined on the patient eligibility and treatment selection form.  The specific superficial radiotherapy prescription was determined and was documented on the superficial radiotherapy prescription form.  A treatment calculation was also performed and documented on the treatment calculation form.  Based on the prescription, the patient was scheduled for a series of fractional treatments.
Day Of The Week Treatment Administered: Tuesday
Treatment Margins In Cm: 0.5
Total Number Of Fractions: 20
Custom Shielding Preamble Text Will Not Be Included With Simple Simulations (.......... X X Y Cm): A lead shield of 0.762 mm thickness is utilized to form a molded, custom shield with a
Energy (Optional-Please Include Units) Rx 2: 50 kV
Treatment Time / Fractionation (Optional- Include Units): 0.34
Daily Fractionated Dose (Optional- Include Units) Rx 2: 255 cGy
Functional Status: 0 (fully active)
Daily Fractionated Dose (Optional- Include Units): 255cGy
Fractions / Week: 3
Shielding Size (Optional- Include Units) Rx 2: 2.5 x 2.5
Dose / Tx In Cgy (Optional): 256.7
Simple Simulation Preamble Text Will Be Included With Simple Simulations (.......... Indications): Simple simulation was performed today for the following reasons:
Shielding Size (Optional- Include Units): 2.0 X 2.0
Cumulative Dose In Cgy (Optional): 3080.4
Detail Level: Detailed
Assessment: Appropriate reaction

## 2017-05-02 NOTE — PROCEDURE: TREATMENT REGIMEN
Plan: Per the request of Dr. Gonzales, Jeffy Phan was seen today for Superficial Radiation Therapy requiring simulation (CPT® 13280) in preparation for treatment of specific diseased site(s). Simulation is necessary to determine correct patient and treatment portal positioning, deliver safe and effective radiation therapy. A high frequency ultrasound image was acquired prior to treatment today for three dimensional evaluation of tumor volume and response to treatment, in addition, geometric accuracy of field placement (CPT®  ). Physician evaluation of the ultrasound tumor depth will be ongoing through course of treatment, and is deemed medically necessary ensuring efficacy of treatment. Today’s image and setup was evaluated determining continuation of treatment with the current plan, or necessary changes as appropriate. All appropriate custom blocking and treatment parameters verified by radiation therapist according to initial simulation.\\n \\nUS image guidance and field placement prior to treatment delivery performed. US depth is 0.69m.\\n \\nPer Dr. Gonzales, continued daily US guidance and simulation is required for field placement, measurement of tumor depth, progress and edema monitoring.
Detail Level: Zone

## 2017-05-04 ENCOUNTER — APPOINTMENT (OUTPATIENT)
Age: 62
Setting detail: DERMATOLOGY
End: 2017-05-05

## 2017-05-04 PROBLEM — C44.329 SQUAMOUS CELL CARCINOMA OF SKIN OF OTHER PARTS OF FACE: Status: ACTIVE | Noted: 2017-05-04

## 2017-05-04 PROCEDURE — 77401 RADIATION TX DELIVERY SUPFC: CPT

## 2017-05-04 PROCEDURE — 77280 THER RAD SIMULAJ FIELD SMPL: CPT

## 2017-05-04 PROCEDURE — OTHER SUPERFICIAL RADIATION TREATMENT: OTHER

## 2017-05-04 PROCEDURE — OTHER TREATMENT REGIMEN: OTHER

## 2017-05-04 PROCEDURE — G6001 ECHO GUIDANCE RADIOTHERAPY: HCPCS

## 2017-05-04 PROCEDURE — OTHER FOLLOW UP FOR NEXT VISIT: OTHER

## 2017-05-04 NOTE — PROCEDURE: SUPERFICIAL RADIATION TREATMENT
Field Size (Applicator): 3.0 cm
Include Rx 2 When Rendering Additional Prescriptions: No
Treatment Device Design After Initial Simulation Justification (Will Render If Bill For Treatment Devices = Yes): The patient is status post radiation simulation and is evaluated as to the use of additional devices for shielding and placement for radiation therapy.
Energy (Optional-Please Include Units): 50kV
Prescription Used: 1
Daily Fractionated Dose (Optional- Include Units): 255cGy
Total Number Of Fractions Rx 3: 15
Fractions / Week Rx 3: 5
Intro Statement (Will Not Render If Left Blank): The patient is undergoing superficial radiation therapy for skin cancer and presents for weekly evaluation and management.  Per protocol and as documented on the flow sheet, the patient was questioned as to subjective redness, pruritus, pain, drainage, fatigue, or any other symptoms.  Objectively, the radiation area was evaluated with regards to erythema, atrophy, scale, crusting, erosion, ulceration, edema, purpura, tenderness, warmth, drainage, and any other findings.  The plan was extensively reviewed including the dose, and dosing schedule.  The simulation and clinical setup was also reviewed as was the external and any internal shields and based on this review the appropriateness and sufficiency of treatment was determined.
Cumulative Dose In Cgy (Optional): 3337.1
Shielding Size (Optional- Include Units) Rx 2: 2.5 x 2.5
Detail Level: Detailed
Port Dimensions-X Axis In Cm: 2
Initial Radiation Treatment Planning (Will Render If Bill Simulation = Yes): The patient had a complete consultation regarding all applicable modalities for the treatment of their skin cancer and based on a variety of factors including the type of tumor, size, and location, the relevant medical history as well as local tissue factors, the functional status of the individual, the ability to perform necessary postoperative wound instructions and the need for simultaneous treatments as well as overall wound healing status, it was determined that the patient would begin radiation therapy treatment for skin cancer.  A full simulation and treatment device design was performed including the determination and formulation of appropriate simple and complex devices including lead shield of 0.762 mm thickness to form molded customized shielding to specifically correlate with the lesion size including treatment margin.  The custom lead shield is adequate to accommodate the appropriate applicator and provide adequate shielding around the treatment site.  The specific field applicator, shields, and devices both simple and complex as well as the specific patient setup is outlined below.  The patient was given a full consent for superficial radiation to both verbally and in writing and the full determination of patient's eligibility for treatment and selection is outlined on the patient eligibility and treatment selection form.  The specific superficial radiotherapy prescription was determined and was documented on the superficial radiotherapy prescription form.  A treatment calculation was also performed and documented on the treatment calculation form.  Based on the prescription, the patient was scheduled for a series of fractional treatments.
Daily Fractionated Dose (Optional- Include Units) Rx 2: 255 cGy
Total Dose (Optional-Please Include Units): 5100 cGy
Patient Positioning: Supine
Computed Treatment Time In Min (Will Render The Same As Calculated Treatment Time If Left Blank): per device
Dose Per Fractionation In Cgy (Optional): 255
Energy (Optional-Please Include Units) Rx 2: 50 kV
Fractionation Number: 13
Total Number Of Fractions: 20
Day Of The Week Treatment Administered: Friday
Fractions / Week Rx 2: 4
Comments: RTOG Stage 0
Bill For Radiation Treatment: Yes
Simple Simulation Preamble Text Will Be Included With Simple Simulations (.......... Indications): Simple simulation was performed today for the following reasons:
Custom Shielding Preamble Text Will Not Be Included With Simple Simulations (.......... X X Y Cm): A lead shield of 0.762 mm thickness is utilized to form a molded, custom shield with a
Treatment Time / Fractionation (Optional- Include Units): 0.34
Fractions / Week: 3
Functional Status: 0 (fully active)
Time Dose Fractionation (Optional- Include Units If Applicable): 88
Treatment Margins In Cm: 0.5
Shielding Size (Optional- Include Units): 2.0 X 2.0
Additional Prescription Justification Text: If there is any interruption in treatment exceeding 5 days please see Decay and Dose Adjustment Calculation and complete treatment under Prescription 2.
Assessment: Appropriate reaction
Custom Shielding Afterword Text Will Not Be Included With Simple Simulations (X X Y Cm............): port to correlate with the lesion size, including treatment margin. The custom lead shield is adequate to accommodate the appropriate applicator and provide adequate shielding around the treatment site. Additional shielding (as noted below) is used to protect sensitive, normal tissues.
Depth (Optional-Please Include Units): 0.83mm
Dose / Tx In Cgy (Optional): 256.7

## 2017-05-04 NOTE — PROCEDURE: TREATMENT REGIMEN
Detail Level: Zone
Plan: Per the request of Dr. Gonzales, Jeffy Phan was seen today for Superficial Radiation Therapy requiring simulation (CPT® 64030) in preparation for treatment of specific diseased site(s). Simulation is necessary to determine correct patient and treatment portal positioning, deliver safe and effective radiation therapy. A high frequency ultrasound image was acquired prior to treatment today for three dimensional evaluation of tumor volume and response to treatment, in addition, geometric accuracy of field placement (CPT®  ). Physician evaluation of the ultrasound tumor depth will be ongoing through course of treatment, and is deemed medically necessary ensuring efficacy of treatment. Today’s image and setup was evaluated determining continuation of treatment with the current plan, or necessary changes as appropriate. All appropriate custom blocking and treatment parameters verified by radiation therapist according to initial simulation.\\n \\nUS image guidance and field placement prior to treatment delivery performed. US depth is 0.80mm.\\n \\nPer Dr. Gonzales, continued daily US guidance and simulation is required for field placement, measurement of tumor depth, progress and edema monitoring.

## 2017-05-08 ENCOUNTER — APPOINTMENT (OUTPATIENT)
Age: 62
Setting detail: DERMATOLOGY
End: 2017-05-08

## 2017-05-08 PROBLEM — C44.329 SQUAMOUS CELL CARCINOMA OF SKIN OF OTHER PARTS OF FACE: Status: ACTIVE | Noted: 2017-05-08

## 2017-05-08 PROCEDURE — OTHER SUPERFICIAL RADIATION TREATMENT: OTHER

## 2017-05-08 PROCEDURE — 77280 THER RAD SIMULAJ FIELD SMPL: CPT

## 2017-05-08 PROCEDURE — OTHER TREATMENT REGIMEN: OTHER

## 2017-05-08 PROCEDURE — OTHER FOLLOW UP FOR NEXT VISIT: OTHER

## 2017-05-08 PROCEDURE — 77401 RADIATION TX DELIVERY SUPFC: CPT

## 2017-05-08 PROCEDURE — G6001 ECHO GUIDANCE RADIOTHERAPY: HCPCS

## 2017-05-08 NOTE — PROCEDURE: SUPERFICIAL RADIATION TREATMENT
Include Rx 2 When Rendering Additional Prescriptions: No
Total Dose (Optional-Please Include Units): 5100 cGy
Dose Per Fractionation In Cgy (Optional): 255
Custom Shielding Afterword Text Will Not Be Included With Simple Simulations (X X Y Cm............): port to correlate with the lesion size, including treatment margin. The custom lead shield is adequate to accommodate the appropriate applicator and provide adequate shielding around the treatment site. Additional shielding (as noted below) is used to protect sensitive, normal tissues.
Field Size (Applicator): 3.0 cm
Total Number Of Fractions Rx 4: 15
Fractionation Number (Evaluation): 5
Daily Fractionated Dose (Optional- Include Units): 255cGy
Additional Prescription Justification Text: If there is any interruption in treatment exceeding 5 days please see Decay and Dose Adjustment Calculation and complete treatment under Prescription 2.
Treatment Time / Fractionation (Optional- Include Units): 0.34
Daily Fractionated Dose (Optional- Include Units) Rx 2: 255 cGy
Day Of The Week Treatment Administered: Monday
Number Of Treatment Days: 1
Energy (Optional-Please Include Units): 50kV
Port Dimensions-Y Axis In Cm: 2
Depth (Optional-Please Include Units): 0.83mm
Time Dose Fractionation (Optional- Include Units If Applicable): 88
Simple Simulation Preamble Text Will Be Included With Simple Simulations (.......... Indications): Simple simulation was performed today for the following reasons:
Dose / Tx In Cgy (Optional): 256.7
Intro Statement (Will Not Render If Left Blank): The patient is undergoing superficial radiation therapy for skin cancer and presents for weekly evaluation and management.  Per protocol and as documented on the flow sheet, the patient was questioned as to subjective redness, pruritus, pain, drainage, fatigue, or any other symptoms.  Objectively, the radiation area was evaluated with regards to erythema, atrophy, scale, crusting, erosion, ulceration, edema, purpura, tenderness, warmth, drainage, and any other findings.  The plan was extensively reviewed including the dose, and dosing schedule.  The simulation and clinical setup was also reviewed as was the external and any internal shields and based on this review the appropriateness and sufficiency of treatment was determined.
Comments: RTOG Stage 0
Functional Status: 0 (fully active)
Detail Level: Detailed
Simple Simulation Afterword Text Will Be Included With Simple Simulations (Indications............): The patient had a complete consultation regarding all applicable modalities for the treatment of their skin cancer and based on a variety of factors including the type of tumor, size, and location, the relevant medical history as well as local tissue factors, the functional status of the individual, the ability to perform necessary postoperative wound instructions and the need for simultaneous treatments as well as overall wound healing status, it was determined that the patient would begin radiation therapy treatment for skin cancer.  A full simulation and treatment device design was performed including the determination and formulation of appropriate simple and complex devices including lead shield of 0.762 mm thickness to form molded customized shielding to specifically correlate with the lesion size including treatment margin.  The custom lead shield is adequate to accommodate the appropriate applicator and provide adequate shielding around the treatment site.  The specific field applicator, shields, and devices both simple and complex as well as the specific patient setup is outlined below.  The patient was given a full consent for superficial radiation to both verbally and in writing and the full determination of patient's eligibility for treatment and selection is outlined on the patient eligibility and treatment selection form.  The specific superficial radiotherapy prescription was determined and was documented on the superficial radiotherapy prescription form.  A treatment calculation was also performed and documented on the treatment calculation form.  Based on the prescription, the patient was scheduled for a series of fractional treatments.
Fractions / Week: 3
Assessment: Appropriate reaction
Total Number Of Fractions: 20
Energy (Optional-Please Include Units) Rx 2: 50 kV
Fractionation Number: 14
Custom Shielding Preamble Text Will Not Be Included With Simple Simulations (.......... X X Y Cm): A lead shield of 0.762 mm thickness is utilized to form a molded, custom shield with a
Treatment Margins In Cm: 0.5
Patient Positioning: Supine
Cumulative Dose In Cgy (Optional): 3593.8
Bill For Radiation Treatment: Yes
Shielding Size (Optional- Include Units): 2.0 X 2.0
Computed Treatment Time In Min (Will Render The Same As Calculated Treatment Time If Left Blank): per device
Fractions / Week Rx 2: 4
Shielding Size (Optional- Include Units) Rx 2: 2.5 x 2.5
Treatment Device Design After Initial Simulation Justification (Will Render If Bill For Treatment Devices = Yes): The patient is status post radiation simulation and is evaluated as to the use of additional devices for shielding and placement for radiation therapy.

## 2017-05-08 NOTE — PROCEDURE: TREATMENT REGIMEN
Plan: Per the request of Dr. Gonzales, Jeffy Phan was seen today for Superficial Radiation Therapy requiring simulation (CPT® 80150) in preparation for treatment of specific diseased site(s). Simulation is necessary to determine correct patient and treatment portal positioning, deliver safe and effective radiation therapy. A high frequency ultrasound image was acquired prior to treatment today for three dimensional evaluation of tumor volume and response to treatment, in addition, geometric accuracy of field placement (CPT®  ). Physician evaluation of the ultrasound tumor depth will be ongoing through course of treatment, and is deemed medically necessary ensuring efficacy of treatment. Today’s image and setup was evaluated determining continuation of treatment with the current plan, or necessary changes as appropriate. All appropriate custom blocking and treatment parameters verified by radiation therapist according to initial simulation.\\n \\nUS image guidance and field placement prior to treatment delivery performed. US depth is 0.79mm.\\n \\nPer Dr. Gonzales, continued daily US guidance and simulation is required for field placement, measurement of tumor depth, progress and edema monitoring.
Detail Level: Zone

## 2017-05-10 ENCOUNTER — APPOINTMENT (OUTPATIENT)
Age: 62
Setting detail: DERMATOLOGY
End: 2017-05-10

## 2017-05-10 PROBLEM — C44.329 SQUAMOUS CELL CARCINOMA OF SKIN OF OTHER PARTS OF FACE: Status: ACTIVE | Noted: 2017-05-10

## 2017-05-10 PROCEDURE — OTHER SUPERFICIAL RADIATION TREATMENT: OTHER

## 2017-05-10 PROCEDURE — OTHER FOLLOW UP FOR NEXT VISIT: OTHER

## 2017-05-10 PROCEDURE — 77280 THER RAD SIMULAJ FIELD SMPL: CPT

## 2017-05-10 PROCEDURE — OTHER TREATMENT REGIMEN: OTHER

## 2017-05-10 PROCEDURE — 77401 RADIATION TX DELIVERY SUPFC: CPT

## 2017-05-10 PROCEDURE — G6001 ECHO GUIDANCE RADIOTHERAPY: HCPCS

## 2017-05-10 NOTE — PROCEDURE: SUPERFICIAL RADIATION TREATMENT
Computed Treatment Time In Min (Will Render The Same As Calculated Treatment Time If Left Blank): per device
Time Dose Fractionation (Optional- Include Units If Applicable): 88
Dimensions-X Axis In Cm: 1
Include Rx 4 When Rendering Additional Prescriptions: No
Intro Statement (Will Not Render If Left Blank): The patient is undergoing superficial radiation therapy for skin cancer and presents for weekly evaluation and management.  Per protocol and as documented on the flow sheet, the patient was questioned as to subjective redness, pruritus, pain, drainage, fatigue, or any other symptoms.  Objectively, the radiation area was evaluated with regards to erythema, atrophy, scale, crusting, erosion, ulceration, edema, purpura, tenderness, warmth, drainage, and any other findings.  The plan was extensively reviewed including the dose, and dosing schedule.  The simulation and clinical setup was also reviewed as was the external and any internal shields and based on this review the appropriateness and sufficiency of treatment was determined.
Dose / Tx In Cgy (Optional): 256.7
Cumulative Dose In Cgy (Optional): 3850.5
Total Number Of Fractions Rx 2: 15
Fractionation Number (Evaluation): 5
Initial Radiation Treatment Planning (Will Render If Bill Simulation = Yes): The patient had a complete consultation regarding all applicable modalities for the treatment of their skin cancer and based on a variety of factors including the type of tumor, size, and location, the relevant medical history as well as local tissue factors, the functional status of the individual, the ability to perform necessary postoperative wound instructions and the need for simultaneous treatments as well as overall wound healing status, it was determined that the patient would begin radiation therapy treatment for skin cancer.  A full simulation and treatment device design was performed including the determination and formulation of appropriate simple and complex devices including lead shield of 0.762 mm thickness to form molded customized shielding to specifically correlate with the lesion size including treatment margin.  The custom lead shield is adequate to accommodate the appropriate applicator and provide adequate shielding around the treatment site.  The specific field applicator, shields, and devices both simple and complex as well as the specific patient setup is outlined below.  The patient was given a full consent for superficial radiation to both verbally and in writing and the full determination of patient's eligibility for treatment and selection is outlined on the patient eligibility and treatment selection form.  The specific superficial radiotherapy prescription was determined and was documented on the superficial radiotherapy prescription form.  A treatment calculation was also performed and documented on the treatment calculation form.  Based on the prescription, the patient was scheduled for a series of fractional treatments.
Field Size (Applicator): 3.0 cm
Shielding Size (Optional- Include Units) Rx 2: 2.5 x 2.5
Simple Simulation Preamble Text Will Be Included With Simple Simulations (.......... Indications): Simple simulation was performed today for the following reasons:
Daily Fractionated Dose (Optional- Include Units): 255cGy
Port Dimensions-X Axis In Cm: 2
Total Dose (Optional-Please Include Units): 5100 cGy
Additional Prescription Justification Text: If there is any interruption in treatment exceeding 5 days please see Decay and Dose Adjustment Calculation and complete treatment under Prescription 2.
Energy (Optional-Please Include Units): 50kV
Detail Level: Detailed
Shielding Size (Optional- Include Units): 2.0 X 2.0
Treatment Time / Fractionation (Optional- Include Units): 0.34
Fractions / Week: 3
Assessment: Appropriate reaction
Functional Status: 0 (fully active)
Dose Per Fractionation In Cgy (Optional): 255
Patient Positioning: Supine
Total Number Of Fractions: 20
Custom Shielding Afterword Text Will Not Be Included With Simple Simulations (X X Y Cm............): port to correlate with the lesion size, including treatment margin. The custom lead shield is adequate to accommodate the appropriate applicator and provide adequate shielding around the treatment site. Additional shielding (as noted below) is used to protect sensitive, normal tissues.
Custom Shielding Preamble Text Will Not Be Included With Simple Simulations (.......... X X Y Cm): A lead shield of 0.762 mm thickness is utilized to form a molded, custom shield with a
Energy (Optional-Please Include Units) Rx 2: 50 kV
Bill For Radiation Treatment: Yes
Treatment Margins In Cm: 0.5
Depth (Optional-Please Include Units): 0.83mm
Daily Fractionated Dose (Optional- Include Units) Rx 2: 255 cGy
Treatment Device Design After Initial Simulation Justification (Will Render If Bill For Treatment Devices = Yes): The patient is status post radiation simulation and is evaluated as to the use of additional devices for shielding and placement for radiation therapy.
Fractions / Week Rx 2: 4
Day Of The Week Treatment Administered: Wednesday
Comments: RTOG Stage 0

## 2017-05-10 NOTE — PROCEDURE: TREATMENT REGIMEN
Plan: Per the request of Dr. Gonzales, Jeffy Phan was seen today for Superficial Radiation Therapy requiring simulation (CPT® 03676) in preparation for treatment of specific diseased site(s). Simulation is necessary to determine correct patient and treatment portal positioning, deliver safe and effective radiation therapy. A high frequency ultrasound image was acquired prior to treatment today for three dimensional evaluation of tumor volume and response to treatment, in addition, geometric accuracy of field placement (CPT®  ). Physician evaluation of the ultrasound tumor depth will be ongoing through course of treatment, and is deemed medically necessary ensuring efficacy of treatment. Today’s image and setup was evaluated determining continuation of treatment with the current plan, or necessary changes as appropriate. All appropriate custom blocking and treatment parameters verified by radiation therapist according to initial simulation.\\n \\nUS image guidance and field placement prior to treatment delivery performed. US depth is 0.51mm.\\n \\nPer Dr. Gonzales, continued daily US guidance and simulation is required for field placement, measurement of tumor depth, progress and edema monitoring.
Detail Level: Zone

## 2017-05-11 ENCOUNTER — APPOINTMENT (OUTPATIENT)
Age: 62
Setting detail: DERMATOLOGY
End: 2017-05-19

## 2017-05-11 ENCOUNTER — APPOINTMENT (OUTPATIENT)
Age: 62
Setting detail: DERMATOLOGY
End: 2017-05-11

## 2017-05-11 DIAGNOSIS — L82.1 OTHER SEBORRHEIC KERATOSIS: ICD-10-CM

## 2017-05-11 DIAGNOSIS — L57.8 OTHER SKIN CHANGES DUE TO CHRONIC EXPOSURE TO NONIONIZING RADIATION: ICD-10-CM

## 2017-05-11 PROBLEM — C44.329 SQUAMOUS CELL CARCINOMA OF SKIN OF OTHER PARTS OF FACE: Status: ACTIVE | Noted: 2017-05-11

## 2017-05-11 PROCEDURE — OTHER COUNSELING: OTHER

## 2017-05-11 PROCEDURE — G6001 ECHO GUIDANCE RADIOTHERAPY: HCPCS

## 2017-05-11 PROCEDURE — OTHER FOLLOW UP FOR NEXT VISIT: OTHER

## 2017-05-11 PROCEDURE — OTHER LIQUID NITROGEN: OTHER

## 2017-05-11 PROCEDURE — 99213 OFFICE O/P EST LOW 20 MIN: CPT

## 2017-05-11 PROCEDURE — OTHER TREATMENT REGIMEN: OTHER

## 2017-05-11 PROCEDURE — 77427 RADIATION TX MANAGEMENT X5: CPT

## 2017-05-11 PROCEDURE — OTHER SUPERFICIAL RADIATION TREATMENT: OTHER

## 2017-05-11 ASSESSMENT — LOCATION SIMPLE DESCRIPTION DERM
LOCATION SIMPLE: LEFT CHEEK
LOCATION SIMPLE: RIGHT CHEEK
LOCATION SIMPLE: RIGHT SHOULDER
LOCATION SIMPLE: POSTERIOR NECK
LOCATION SIMPLE: RIGHT UPPER BACK

## 2017-05-11 ASSESSMENT — LOCATION DETAILED DESCRIPTION DERM
LOCATION DETAILED: RIGHT MEDIAL TRAPEZIAL NECK
LOCATION DETAILED: LEFT CENTRAL MALAR CHEEK
LOCATION DETAILED: RIGHT INFERIOR CENTRAL MALAR CHEEK
LOCATION DETAILED: RIGHT POSTERIOR SHOULDER
LOCATION DETAILED: RIGHT SUPERIOR LATERAL UPPER BACK

## 2017-05-11 ASSESSMENT — LOCATION ZONE DERM
LOCATION ZONE: NECK
LOCATION ZONE: TRUNK
LOCATION ZONE: ARM
LOCATION ZONE: FACE

## 2017-05-11 NOTE — PROCEDURE: TREATMENT REGIMEN
Detail Level: Zone
Plan: Per the request of Dr. Gonzales, Jeffy Phan was seen today for Superficial Radiation Therapy requiring simulation (CPT® 00224) in preparation for treatment of specific diseased site(s). Simulation is necessary to determine correct patient and treatment portal positioning, deliver safe and effective radiation therapy. A high frequency ultrasound image was acquired prior to treatment today for three dimensional evaluation of tumor volume and response to treatment, in addition, geometric accuracy of field placement (CPT®  ). Physician evaluation of the ultrasound tumor depth will be ongoing through course of treatment, and is deemed medically necessary ensuring efficacy of treatment. Today’s image and setup was evaluated determining continuation of treatment with the current plan, or necessary changes as appropriate. All appropriate custom blocking and treatment parameters verified by radiation therapist according to initial simulation.\\n \\nUS image guidance and field placement prior to treatment delivery performed. US depth is 0.56mm.\\n \\nPer Dr. Gonzales, continued daily US guidance and simulation is required for field placement, measurement of tumor depth, progress and edema monitoring.

## 2017-05-11 NOTE — PROCEDURE: SUPERFICIAL RADIATION TREATMENT
Fractions / Week Rx 2: 4
Detail Level: Detailed
Malcolm For Simulation Without Treatment Device Design (Simple Simulation): No
Bill And Render Text From Evaluation And Management Tab (Will Bill [id number]): Yes
Patient Positioning: Supine
Additional Prescription Justification Text: If there is any interruption in treatment exceeding 5 days please see Decay and Dose Adjustment Calculation and complete treatment under Prescription 2.
Shielding Size (Optional- Include Units): 2.0 X 2.0
Intro Statement (Will Not Render If Left Blank): The patient is undergoing superficial radiation therapy for skin cancer and presents for weekly evaluation and management.  Per protocol and as documented on the flow sheet, the patient was questioned as to subjective redness, pruritus, pain, drainage, fatigue, or any other symptoms.  Objectively, the radiation area was evaluated with regards to erythema, atrophy, scale, crusting, erosion, ulceration, edema, purpura, tenderness, warmth, drainage, and any other findings.  The plan was extensively reviewed including the dose, and dosing schedule.  The simulation and clinical setup was also reviewed as was the external and any internal shields and based on this review the appropriateness and sufficiency of treatment was determined.
Depth (Optional-Please Include Units): 0.83mm
Fractionation Number (Evaluation): 15
Dose Per Fractionation In Cgy (Optional): 255
Custom Shielding Afterword Text Will Not Be Included With Simple Simulations (X X Y Cm............): port to correlate with the lesion size, including treatment margin. The custom lead shield is adequate to accommodate the appropriate applicator and provide adequate shielding around the treatment site. Additional shielding (as noted below) is used to protect sensitive, normal tissues.
Number Of Days Off Treatment: 1
Fractions / Week: 3
Fractions / Week Rx 4: 5
Assessment: Appropriate reaction
Custom Shielding Preamble Text Will Not Be Included With Simple Simulations (.......... X X Y Cm): A lead shield of 0.762 mm thickness is utilized to form a molded, custom shield with a
Cumulative Dose In Cgy (Optional): 3850.5
Field Size (Applicator): 3.0 cm
Functional Status: 0 (fully active)
Port Dimensions-X Axis In Cm: 2
Day Of The Week Treatment Administered: Wednesday
Simple Simulation Afterword Text Will Be Included With Simple Simulations (Indications............): The patient had a complete consultation regarding all applicable modalities for the treatment of their skin cancer and based on a variety of factors including the type of tumor, size, and location, the relevant medical history as well as local tissue factors, the functional status of the individual, the ability to perform necessary postoperative wound instructions and the need for simultaneous treatments as well as overall wound healing status, it was determined that the patient would begin radiation therapy treatment for skin cancer.  A full simulation and treatment device design was performed including the determination and formulation of appropriate simple and complex devices including lead shield of 0.762 mm thickness to form molded customized shielding to specifically correlate with the lesion size including treatment margin.  The custom lead shield is adequate to accommodate the appropriate applicator and provide adequate shielding around the treatment site.  The specific field applicator, shields, and devices both simple and complex as well as the specific patient setup is outlined below.  The patient was given a full consent for superficial radiation to both verbally and in writing and the full determination of patient's eligibility for treatment and selection is outlined on the patient eligibility and treatment selection form.  The specific superficial radiotherapy prescription was determined and was documented on the superficial radiotherapy prescription form.  A treatment calculation was also performed and documented on the treatment calculation form.  Based on the prescription, the patient was scheduled for a series of fractional treatments.
Time Dose Fractionation (Optional- Include Units If Applicable): 88
Energy (Optional-Please Include Units): 50kV
Simple Simulation Preamble Text Will Be Included With Simple Simulations (.......... Indications): Simple simulation was performed today for the following reasons:
Treatment Device Design After Initial Simulation Justification (Will Render If Bill For Treatment Devices = Yes): The patient is status post radiation simulation and is evaluated as to the use of additional devices for shielding and placement for radiation therapy.
Daily Fractionated Dose (Optional- Include Units) Rx 2: 255 cGy
Total Dose (Optional-Please Include Units): 5100 cGy
Daily Fractionated Dose (Optional- Include Units): 255cGy
Shielding Size (Optional- Include Units) Rx 2: 2.5 x 2.5
Treatment Time In Min (Optional): 0.34
Energy (Optional-Please Include Units) Rx 2: 50 kV
Treatment Margins In Cm: 0.5
Comments: RTOG Stage 1
Total Number Of Fractions: 20
Dose / Tx In Cgy (Optional): 256.7
Computed Treatment Time In Min (Will Render The Same As Calculated Treatment Time If Left Blank): per device

## 2017-05-11 NOTE — PROCEDURE: LIQUID NITROGEN
Medical Necessity Information: It is in your best interest to select a reason for this procedure from the list below. All of these items fulfill various CMS LCD requirements except the new and changing color options.
Duration Of Freeze Thaw-Cycle (Seconds): 0
Post-Care Instructions: I reviewed with the patient in detail post-care instructions. Patient is to wear sunprotection, and avoid picking at any of the treated lesions. Pt may apply Vaseline to crusted or scabbing areas.
Render Post-Care Instructions In Note?: no
Detail Level: Detailed
Consent: The patient's consent was obtained including but not limited to risks of crusting, scabbing, blistering, scarring, darker or lighter pigmentary change, recurrence, incomplete removal and infection.
Medical Necessity Clause: This procedure was medically necessary because the lesions that were treated were:

## 2017-05-12 ENCOUNTER — APPOINTMENT (OUTPATIENT)
Age: 62
Setting detail: DERMATOLOGY
End: 2017-05-15

## 2017-05-12 PROBLEM — C44.329 SQUAMOUS CELL CARCINOMA OF SKIN OF OTHER PARTS OF FACE: Status: ACTIVE | Noted: 2017-05-12

## 2017-05-12 PROCEDURE — OTHER SUPERFICIAL RADIATION TREATMENT: OTHER

## 2017-05-12 PROCEDURE — OTHER TREATMENT REGIMEN: OTHER

## 2017-05-12 PROCEDURE — 77401 RADIATION TX DELIVERY SUPFC: CPT

## 2017-05-12 PROCEDURE — 77280 THER RAD SIMULAJ FIELD SMPL: CPT

## 2017-05-12 PROCEDURE — G6001 ECHO GUIDANCE RADIOTHERAPY: HCPCS

## 2017-05-12 PROCEDURE — OTHER FOLLOW UP FOR NEXT VISIT: OTHER

## 2017-05-12 NOTE — PROCEDURE: TREATMENT REGIMEN
Plan: Per the request of Dr. Gonzales, Jeffy Phan was seen today for Superficial Radiation Therapy requiring simulation (CPT® 22992) in preparation for treatment of specific diseased site(s). Simulation is necessary to determine correct patient and treatment portal positioning, deliver safe and effective radiation therapy. A high frequency ultrasound image was acquired prior to treatment today for three dimensional evaluation of tumor volume and response to treatment, in addition, geometric accuracy of field placement (CPT®  ). Physician evaluation of the ultrasound tumor depth will be ongoing through course of treatment, and is deemed medically necessary ensuring efficacy of treatment. Today’s image and setup was evaluated determining continuation of treatment with the current plan, or necessary changes as appropriate. All appropriate custom blocking and treatment parameters verified by radiation therapist according to initial simulation.\\n \\nUS image guidance and field placement prior to treatment delivery performed. US depth is 0.61mm.\\n \\nPer Dr. Gonzales, continued daily US guidance and simulation is required for field placement, measurement of tumor depth, progress and edema monitoring.
Detail Level: Zone

## 2017-05-12 NOTE — PROCEDURE: SUPERFICIAL RADIATION TREATMENT
Energy (Optional-Please Include Units) Rx 2: 50 kV
Bill For Treatment Devices Only: No
Dimensions-Y Axis In Cm: 1
Computed Treatment Time In Min (Will Render The Same As Calculated Treatment Time If Left Blank): per device
Daily Fractionated Dose (Optional- Include Units): 255cGy
Assessment: Appropriate reaction
Treatment Time / Fractionation (Optional- Include Units): 0.34
Simple Simulation Preamble Text Will Be Included With Simple Simulations (.......... Indications): Simple simulation was performed today for the following reasons:
Additional Prescription Justification Text: If there is any interruption in treatment exceeding 5 days please see Decay and Dose Adjustment Calculation and complete treatment under Prescription 2.
Simple Simulation Afterword Text Will Be Included With Simple Simulations (Indications............): The patient had a complete consultation regarding all applicable modalities for the treatment of their skin cancer and based on a variety of factors including the type of tumor, size, and location, the relevant medical history as well as local tissue factors, the functional status of the individual, the ability to perform necessary postoperative wound instructions and the need for simultaneous treatments as well as overall wound healing status, it was determined that the patient would begin radiation therapy treatment for skin cancer.  A full simulation and treatment device design was performed including the determination and formulation of appropriate simple and complex devices including lead shield of 0.762 mm thickness to form molded customized shielding to specifically correlate with the lesion size including treatment margin.  The custom lead shield is adequate to accommodate the appropriate applicator and provide adequate shielding around the treatment site.  The specific field applicator, shields, and devices both simple and complex as well as the specific patient setup is outlined below.  The patient was given a full consent for superficial radiation to both verbally and in writing and the full determination of patient's eligibility for treatment and selection is outlined on the patient eligibility and treatment selection form.  The specific superficial radiotherapy prescription was determined and was documented on the superficial radiotherapy prescription form.  A treatment calculation was also performed and documented on the treatment calculation form.  Based on the prescription, the patient was scheduled for a series of fractional treatments.
Field Size (Applicator): 3.0 cm
Fractionation Number: 16
Total Dose (Optional-Please Include Units): 5100 cGy
Port Dimensions-X Axis In Cm: 2
Energy (Include Units): 50kV
Total Number Of Fractions Rx 4: 15
Comments: RTOG Stage 0
Fractions / Week Rx 4: 5
Shielding Size (Optional- Include Units): 2.0 X 2.0
Total Number Of Fractions: 20
Detail Level: Detailed
Treatment Device Design After Initial Simulation Justification (Will Render If Bill For Treatment Devices = Yes): The patient is status post radiation simulation and is evaluated as to the use of additional devices for shielding and placement for radiation therapy.
Day Of The Week Treatment Administered: Friday
Custom Shielding Afterword Text Will Not Be Included With Simple Simulations (X X Y Cm............): port to correlate with the lesion size, including treatment margin. The custom lead shield is adequate to accommodate the appropriate applicator and provide adequate shielding around the treatment site. Additional shielding (as noted below) is used to protect sensitive, normal tissues.
Intro Statement (Will Not Render If Left Blank): The patient is undergoing superficial radiation therapy for skin cancer and presents for weekly evaluation and management.  Per protocol and as documented on the flow sheet, the patient was questioned as to subjective redness, pruritus, pain, drainage, fatigue, or any other symptoms.  Objectively, the radiation area was evaluated with regards to erythema, atrophy, scale, crusting, erosion, ulceration, edema, purpura, tenderness, warmth, drainage, and any other findings.  The plan was extensively reviewed including the dose, and dosing schedule.  The simulation and clinical setup was also reviewed as was the external and any internal shields and based on this review the appropriateness and sufficiency of treatment was determined.
Fractions / Week Rx 2: 4
Dose / Tx In Cgy (Optional): 256.7
Functional Status: 0 (fully active)
Dose Per Fractionation In Cgy (Optional): 255
Bill For Radiation Treatment: Yes
Time Dose Fractionation (Optional- Include Units If Applicable): 88
Shielding Size (Optional- Include Units) Rx 2: 2.5 x 2.5
Cumulative Dose In Cgy (Optional): 4107.2
Treatment Margins In Cm: 0.5
Daily Fractionated Dose (Optional- Include Units) Rx 2: 255 cGy
Fractions / Week: 3
Patient Positioning: Supine
Depth (Optional-Please Include Units): 0.83mm
Custom Shielding Preamble Text Will Not Be Included With Simple Simulations (.......... X X Y Cm): A lead shield of 0.762 mm thickness is utilized to form a molded, custom shield with a

## 2017-05-15 ENCOUNTER — APPOINTMENT (OUTPATIENT)
Age: 62
Setting detail: DERMATOLOGY
End: 2017-05-15

## 2017-05-15 PROBLEM — C44.329 SQUAMOUS CELL CARCINOMA OF SKIN OF OTHER PARTS OF FACE: Status: ACTIVE | Noted: 2017-05-15

## 2017-05-15 PROCEDURE — 77280 THER RAD SIMULAJ FIELD SMPL: CPT

## 2017-05-15 PROCEDURE — OTHER TREATMENT REGIMEN: OTHER

## 2017-05-15 PROCEDURE — 77401 RADIATION TX DELIVERY SUPFC: CPT

## 2017-05-15 PROCEDURE — G6001 ECHO GUIDANCE RADIOTHERAPY: HCPCS

## 2017-05-15 PROCEDURE — OTHER FOLLOW UP FOR NEXT VISIT: OTHER

## 2017-05-15 PROCEDURE — OTHER SUPERFICIAL RADIATION TREATMENT: OTHER

## 2017-05-15 NOTE — PROCEDURE: TREATMENT REGIMEN
Detail Level: Zone
Plan: Per the request of Dr. Gonzales, Jeffy Phan was seen today for Superficial Radiation Therapy requiring simulation (CPT® 34075) in preparation for treatment of specific diseased site(s). Simulation is necessary to determine correct patient and treatment portal positioning, deliver safe and effective radiation therapy. A high frequency ultrasound image was acquired prior to treatment today for three dimensional evaluation of tumor volume and response to treatment, in addition, geometric accuracy of field placement (CPT®  ). Physician evaluation of the ultrasound tumor depth will be ongoing through course of treatment, and is deemed medically necessary ensuring efficacy of treatment. Today’s image and setup was evaluated determining continuation of treatment with the current plan, or necessary changes as appropriate. All appropriate custom blocking and treatment parameters verified by radiation therapist according to initial simulation.\\n \\nUS image guidance and field placement prior to treatment delivery performed. US depth is 0.55mm.\\n \\nPer Dr. Gonzales, continued daily US guidance and simulation is required for field placement, measurement of tumor depth, progress and edema monitoring.

## 2017-05-15 NOTE — PROCEDURE: SUPERFICIAL RADIATION TREATMENT
Fractions / Week Rx 3: 5
Bill For Dosimetry/Render Treatment Time Calculation In Note: No
Simple Simulation Preamble Text Will Be Included With Simple Simulations (.......... Indications): Simple simulation was performed today for the following reasons:
Field Size (Applicator): 3.0 cm
Energy (Optional-Please Include Units): 50kV
Total Number Of Fractions Rx 2: 15
Port Dimensions-X Axis In Cm: 2
Assessment: Appropriate reaction
Treatment Time / Fractionation (Optional- Include Units): 0.34
Bill For Radiation Treatment: Yes
Daily Fractionated Dose (Optional- Include Units) Rx 2: 255 cGy
Number Of Treatment Days: 1
Total Dose (Optional-Please Include Units): 5100 cGy
Day Of The Week Treatment Administered: Monday
Computed Treatment Time In Min (Will Render The Same As Calculated Treatment Time If Left Blank): per device
Simple Simulation Afterword Text Will Be Included With Simple Simulations (Indications............): The patient had a complete consultation regarding all applicable modalities for the treatment of their skin cancer and based on a variety of factors including the type of tumor, size, and location, the relevant medical history as well as local tissue factors, the functional status of the individual, the ability to perform necessary postoperative wound instructions and the need for simultaneous treatments as well as overall wound healing status, it was determined that the patient would begin radiation therapy treatment for skin cancer.  A full simulation and treatment device design was performed including the determination and formulation of appropriate simple and complex devices including lead shield of 0.762 mm thickness to form molded customized shielding to specifically correlate with the lesion size including treatment margin.  The custom lead shield is adequate to accommodate the appropriate applicator and provide adequate shielding around the treatment site.  The specific field applicator, shields, and devices both simple and complex as well as the specific patient setup is outlined below.  The patient was given a full consent for superficial radiation to both verbally and in writing and the full determination of patient's eligibility for treatment and selection is outlined on the patient eligibility and treatment selection form.  The specific superficial radiotherapy prescription was determined and was documented on the superficial radiotherapy prescription form.  A treatment calculation was also performed and documented on the treatment calculation form.  Based on the prescription, the patient was scheduled for a series of fractional treatments.
Functional Status: 0 (fully active)
Patient Positioning: Supine
Time Dose Fractionation (Optional- Include Units If Applicable): 88
Fractions / Week: 3
Treatment Device Design After Initial Simulation Justification (Will Render If Bill For Treatment Devices = Yes): The patient is status post radiation simulation and is evaluated as to the use of additional devices for shielding and placement for radiation therapy.
Additional Prescription Justification Text: If there is any interruption in treatment exceeding 5 days please see Decay and Dose Adjustment Calculation and complete treatment under Prescription 2.
Dose Per Fractionation In Cgy (Optional): 255
Comments: RTOG Stage 0
Dose / Tx In Cgy (Optional): 256.7
Cumulative Dose In Cgy (Optional): 4363.9
Fractionation Number: 17
Treatment Margins In Cm: 0.5
Energy (Optional-Please Include Units) Rx 2: 50 kV
Depth (Optional-Please Include Units): 0.83mm
Custom Shielding Preamble Text Will Not Be Included With Simple Simulations (.......... X X Y Cm): A lead shield of 0.762 mm thickness is utilized to form a molded, custom shield with a
Shielding Size (Optional- Include Units) Rx 2: 2.5 x 2.5
Total Number Of Fractions: 20
Fractions / Week Rx 2: 4
Detail Level: Detailed
Daily Fractionated Dose (Optional- Include Units): 255cGy
Intro Statement (Will Not Render If Left Blank): The patient is undergoing superficial radiation therapy for skin cancer and presents for weekly evaluation and management.  Per protocol and as documented on the flow sheet, the patient was questioned as to subjective redness, pruritus, pain, drainage, fatigue, or any other symptoms.  Objectively, the radiation area was evaluated with regards to erythema, atrophy, scale, crusting, erosion, ulceration, edema, purpura, tenderness, warmth, drainage, and any other findings.  The plan was extensively reviewed including the dose, and dosing schedule.  The simulation and clinical setup was also reviewed as was the external and any internal shields and based on this review the appropriateness and sufficiency of treatment was determined.
Custom Shielding Afterword Text Will Not Be Included With Simple Simulations (X X Y Cm............): port to correlate with the lesion size, including treatment margin. The custom lead shield is adequate to accommodate the appropriate applicator and provide adequate shielding around the treatment site. Additional shielding (as noted below) is used to protect sensitive, normal tissues.
Shielding Size (Optional- Include Units): 2.0 X 2.0

## 2017-05-17 ENCOUNTER — APPOINTMENT (OUTPATIENT)
Age: 62
Setting detail: DERMATOLOGY
End: 2017-05-17

## 2017-05-17 PROBLEM — C44.329 SQUAMOUS CELL CARCINOMA OF SKIN OF OTHER PARTS OF FACE: Status: ACTIVE | Noted: 2017-05-17

## 2017-05-17 PROCEDURE — OTHER FOLLOW UP FOR NEXT VISIT: OTHER

## 2017-05-17 PROCEDURE — OTHER TREATMENT REGIMEN: OTHER

## 2017-05-17 PROCEDURE — 77401 RADIATION TX DELIVERY SUPFC: CPT

## 2017-05-17 PROCEDURE — G6001 ECHO GUIDANCE RADIOTHERAPY: HCPCS

## 2017-05-17 PROCEDURE — OTHER SUPERFICIAL RADIATION TREATMENT: OTHER

## 2017-05-17 PROCEDURE — 77280 THER RAD SIMULAJ FIELD SMPL: CPT

## 2017-05-17 NOTE — PROCEDURE: SUPERFICIAL RADIATION TREATMENT
Bill For Simulation And Treatment Device Design: No
Treatment Time In Min (Optional): 0.34
Daily Fractionated Dose (Optional- Include Units): 255cGy
Custom Shielding Preamble Text Will Not Be Included With Simple Simulations (.......... X X Y Cm): A lead shield of 0.762 mm thickness is utilized to form a molded, custom shield with a
Additional Prescription Justification Text: If there is any interruption in treatment exceeding 5 days please see Decay and Dose Adjustment Calculation and complete treatment under Prescription 2.
Depth (Optional-Please Include Units): 0.83mm
Bill For Radiation Treatment: Yes
Computed Treatment Time In Min (Will Render The Same As Calculated Treatment Time If Left Blank): per device
Daily Fractionated Dose (Optional- Include Units) Rx 2: 255 cGy
Dimensions-X Axis In Cm: 1
Field Size (Applicator): 3.0 cm
Simple Simulation Preamble Text Will Be Included With Simple Simulations (.......... Indications): Simple simulation was performed today for the following reasons:
Shielding Size (Optional- Include Units) Rx 2: 2.5 x 2.5
Fractions / Week Rx 3: 5
Day Of The Week Treatment Administered: Wednesday
Cumulative Dose In Cgy (Optional): 4620.6
Functional Status: 0 (fully active)
Detail Level: Detailed
Energy (Optional-Please Include Units): 50kV
Assessment: Appropriate reaction
Port Dimensions-X Axis In Cm: 2
Total Number Of Fractions Rx 3: 15
Dose Per Fractionation In Cgy (Optional): 255
Energy (Optional-Please Include Units) Rx 2: 50 kV
Treatment Margins In Cm: 0.5
Total Dose (Optional-Please Include Units): 5100 cGy
Total Number Of Fractions: 20
Simple Simulation Afterword Text Will Be Included With Simple Simulations (Indications............): The patient had a complete consultation regarding all applicable modalities for the treatment of their skin cancer and based on a variety of factors including the type of tumor, size, and location, the relevant medical history as well as local tissue factors, the functional status of the individual, the ability to perform necessary postoperative wound instructions and the need for simultaneous treatments as well as overall wound healing status, it was determined that the patient would begin radiation therapy treatment for skin cancer.  A full simulation and treatment device design was performed including the determination and formulation of appropriate simple and complex devices including lead shield of 0.762 mm thickness to form molded customized shielding to specifically correlate with the lesion size including treatment margin.  The custom lead shield is adequate to accommodate the appropriate applicator and provide adequate shielding around the treatment site.  The specific field applicator, shields, and devices both simple and complex as well as the specific patient setup is outlined below.  The patient was given a full consent for superficial radiation to both verbally and in writing and the full determination of patient's eligibility for treatment and selection is outlined on the patient eligibility and treatment selection form.  The specific superficial radiotherapy prescription was determined and was documented on the superficial radiotherapy prescription form.  A treatment calculation was also performed and documented on the treatment calculation form.  Based on the prescription, the patient was scheduled for a series of fractional treatments.
Fractions / Week Rx 2: 4
Shielding Size (Optional- Include Units): 2.0 X 2.0
Fractions / Week: 3
Intro Statement (Will Not Render If Left Blank): The patient is undergoing superficial radiation therapy for skin cancer and presents for weekly evaluation and management.  Per protocol and as documented on the flow sheet, the patient was questioned as to subjective redness, pruritus, pain, drainage, fatigue, or any other symptoms.  Objectively, the radiation area was evaluated with regards to erythema, atrophy, scale, crusting, erosion, ulceration, edema, purpura, tenderness, warmth, drainage, and any other findings.  The plan was extensively reviewed including the dose, and dosing schedule.  The simulation and clinical setup was also reviewed as was the external and any internal shields and based on this review the appropriateness and sufficiency of treatment was determined.
Treatment Device Design After Initial Simulation Justification (Will Render If Bill For Treatment Devices = Yes): The patient is status post radiation simulation and is evaluated as to the use of additional devices for shielding and placement for radiation therapy.
Dose / Tx In Cgy (Optional): 256.7
Custom Shielding Afterword Text Will Not Be Included With Simple Simulations (X X Y Cm............): port to correlate with the lesion size, including treatment margin. The custom lead shield is adequate to accommodate the appropriate applicator and provide adequate shielding around the treatment site. Additional shielding (as noted below) is used to protect sensitive, normal tissues.
Fractionation Number: 18
Time Dose Fractionation (Optional- Include Units If Applicable): 88
Comments: RTOG Stage 0
Patient Positioning: Supine

## 2017-05-17 NOTE — PROCEDURE: TREATMENT REGIMEN
Plan: Per the request of Dr. Gonzales, Jeffy Phan was seen today for Superficial Radiation Therapy requiring simulation (CPT® 71865) in preparation for treatment of specific diseased site(s). Simulation is necessary to determine correct patient and treatment portal positioning, deliver safe and effective radiation therapy. A high frequency ultrasound image was acquired prior to treatment today for three dimensional evaluation of tumor volume and response to treatment, in addition, geometric accuracy of field placement (CPT®  ). Physician evaluation of the ultrasound tumor depth will be ongoing through course of treatment, and is deemed medically necessary ensuring efficacy of treatment. Today’s image and setup was evaluated determining continuation of treatment with the current plan, or necessary changes as appropriate. All appropriate custom blocking and treatment parameters verified by radiation therapist according to initial simulation.\\n \\nUS image guidance and field placement prior to treatment delivery performed. US depth is 0.47mm.\\n \\nPer Dr. Gonzales, continued daily US guidance and simulation is required for field placement, measurement of tumor depth, progress and edema monitoring.
Detail Level: Zone

## 2017-05-19 ENCOUNTER — APPOINTMENT (OUTPATIENT)
Age: 62
Setting detail: DERMATOLOGY
End: 2017-05-22

## 2017-05-19 PROBLEM — C44.329 SQUAMOUS CELL CARCINOMA OF SKIN OF OTHER PARTS OF FACE: Status: ACTIVE | Noted: 2017-05-19

## 2017-05-19 PROCEDURE — OTHER TREATMENT REGIMEN: OTHER

## 2017-05-19 PROCEDURE — OTHER SUPERFICIAL RADIATION TREATMENT: OTHER

## 2017-05-19 PROCEDURE — OTHER FOLLOW UP FOR NEXT VISIT: OTHER

## 2017-05-19 PROCEDURE — 77401 RADIATION TX DELIVERY SUPFC: CPT

## 2017-05-19 PROCEDURE — 77280 THER RAD SIMULAJ FIELD SMPL: CPT

## 2017-05-19 PROCEDURE — G6001 ECHO GUIDANCE RADIOTHERAPY: HCPCS

## 2017-05-19 NOTE — PROCEDURE: TREATMENT REGIMEN
Detail Level: Zone
Plan: Per the request of Dr. Gonzales, Jeffy Phan was seen today for Superficial Radiation Therapy requiring simulation (CPT® 67383) in preparation for treatment of specific diseased site(s). Simulation is necessary to determine correct patient and treatment portal positioning, deliver safe and effective radiation therapy. A high frequency ultrasound image was acquired prior to treatment today for three dimensional evaluation of tumor volume and response to treatment, in addition, geometric accuracy of field placement (CPT®  ). Physician evaluation of the ultrasound tumor depth will be ongoing through course of treatment, and is deemed medically necessary ensuring efficacy of treatment. Today’s image and setup was evaluated determining continuation of treatment with the current plan, or necessary changes as appropriate. All appropriate custom blocking and treatment parameters verified by radiation therapist according to initial simulation.\\n \\nUS image guidance and field placement prior to treatment delivery performed. US depth is 0.63mm.\\n \\nPer Dr. Gonzales, continued daily US guidance and simulation is required for field placement, measurement of tumor depth, progress and edema monitoring.

## 2017-05-19 NOTE — PROCEDURE: SUPERFICIAL RADIATION TREATMENT
Bill For Dosimetry/Render Decay And Dose Adjustment Calculation In Note: No
Total Number Of Fractions Rx 4: 15
Simple Simulation Preamble Text Will Be Included With Simple Simulations (.......... Indications): Simple simulation was performed today for the following reasons:
Simple Simulation Afterword Text Will Be Included With Simple Simulations (Indications............): The patient had a complete consultation regarding all applicable modalities for the treatment of their skin cancer and based on a variety of factors including the type of tumor, size, and location, the relevant medical history as well as local tissue factors, the functional status of the individual, the ability to perform necessary postoperative wound instructions and the need for simultaneous treatments as well as overall wound healing status, it was determined that the patient would begin radiation therapy treatment for skin cancer.  A full simulation and treatment device design was performed including the determination and formulation of appropriate simple and complex devices including lead shield of 0.762 mm thickness to form molded customized shielding to specifically correlate with the lesion size including treatment margin.  The custom lead shield is adequate to accommodate the appropriate applicator and provide adequate shielding around the treatment site.  The specific field applicator, shields, and devices both simple and complex as well as the specific patient setup is outlined below.  The patient was given a full consent for superficial radiation to both verbally and in writing and the full determination of patient's eligibility for treatment and selection is outlined on the patient eligibility and treatment selection form.  The specific superficial radiotherapy prescription was determined and was documented on the superficial radiotherapy prescription form.  A treatment calculation was also performed and documented on the treatment calculation form.  Based on the prescription, the patient was scheduled for a series of fractional treatments.
Energy (Include Units): 50kV
Assessment: Appropriate reaction
Field Size (Applicator): 3.0 cm
Additional Prescription Justification Text: If there is any interruption in treatment exceeding 5 days please see Decay and Dose Adjustment Calculation and complete treatment under Prescription 2.
Time Dose Fractionation (Optional- Include Units If Applicable): 88
Treatment Device Design After Initial Simulation Justification (Will Render If Bill For Treatment Devices = Yes): The patient is status post radiation simulation and is evaluated as to the use of additional devices for shielding and placement for radiation therapy.
Computed Treatment Time In Min (Will Render The Same As Calculated Treatment Time If Left Blank): per device
Treatment Time / Fractionation (Optional- Include Units): 0.34
Dose Per Fractionation In Cgy (Optional): 255
Fractions / Week Rx 3: 5
Total Dose (Optional-Please Include Units): 5100 cGy
Bill For Radiation Treatment: Yes
Intro Statement (Will Not Render If Left Blank): The patient is undergoing superficial radiation therapy for skin cancer and presents for weekly evaluation and management.  Per protocol and as documented on the flow sheet, the patient was questioned as to subjective redness, pruritus, pain, drainage, fatigue, or any other symptoms.  Objectively, the radiation area was evaluated with regards to erythema, atrophy, scale, crusting, erosion, ulceration, edema, purpura, tenderness, warmth, drainage, and any other findings.  The plan was extensively reviewed including the dose, and dosing schedule.  The simulation and clinical setup was also reviewed as was the external and any internal shields and based on this review the appropriateness and sufficiency of treatment was determined.
Patient Positioning: Supine
Number Of Treatment Days: 1
Dose / Tx In Cgy (Optional): 256.7
Fractionation Number: 19
Daily Fractionated Dose (Optional- Include Units): 255cGy
Custom Shielding Preamble Text Will Not Be Included With Simple Simulations (.......... X X Y Cm): A lead shield of 0.762 mm thickness is utilized to form a molded, custom shield with a
Port Dimensions-Y Axis In Cm: 2
Comments: RTOG Stage 0
Fractions / Week: 3
Depth (Optional-Please Include Units): 0.83mm
Fractions / Week Rx 2: 4
Detail Level: Detailed
Day Of The Week Treatment Administered: Friday
Total Number Of Fractions: 20
Daily Fractionated Dose (Optional- Include Units) Rx 2: 255 cGy
Energy (Optional-Please Include Units) Rx 2: 50 kV
Treatment Margins In Cm: 0.5
Shielding Size (Optional- Include Units) Rx 2: 2.5 x 2.5
Custom Shielding Afterword Text Will Not Be Included With Simple Simulations (X X Y Cm............): port to correlate with the lesion size, including treatment margin. The custom lead shield is adequate to accommodate the appropriate applicator and provide adequate shielding around the treatment site. Additional shielding (as noted below) is used to protect sensitive, normal tissues.
Shielding Size (Optional- Include Units): 2.0 X 2.0
Cumulative Dose In Cgy (Optional): 4877.3
Functional Status: 0 (fully active)

## 2017-05-22 ENCOUNTER — APPOINTMENT (OUTPATIENT)
Age: 62
Setting detail: DERMATOLOGY
End: 2017-05-22

## 2017-05-22 PROBLEM — C44.329 SQUAMOUS CELL CARCINOMA OF SKIN OF OTHER PARTS OF FACE: Status: ACTIVE | Noted: 2017-05-22

## 2017-05-22 PROCEDURE — 77280 THER RAD SIMULAJ FIELD SMPL: CPT

## 2017-05-22 PROCEDURE — G6001 ECHO GUIDANCE RADIOTHERAPY: HCPCS

## 2017-05-22 PROCEDURE — 77401 RADIATION TX DELIVERY SUPFC: CPT

## 2017-05-22 PROCEDURE — OTHER FOLLOW UP FOR NEXT VISIT: OTHER

## 2017-05-22 PROCEDURE — OTHER TREATMENT REGIMEN: OTHER

## 2017-05-22 PROCEDURE — OTHER SUPERFICIAL RADIATION TREATMENT: OTHER

## 2017-05-22 NOTE — PROCEDURE: TREATMENT REGIMEN
Detail Level: Zone
Plan: Per the request of Dr. Gonzales, Jeffy Phan was seen today for Superficial Radiation Therapy requiring simulation (CPT® 25595) in preparation for treatment of specific diseased site(s). Simulation is necessary to determine correct patient and treatment portal positioning, deliver safe and effective radiation therapy. A high frequency ultrasound image was acquired prior to treatment today for three dimensional evaluation of tumor volume and response to treatment, in addition, geometric accuracy of field placement (CPT®  ). Physician evaluation of the ultrasound tumor depth will be ongoing through course of treatment, and is deemed medically necessary ensuring efficacy of treatment. Today’s image and setup was evaluated determining continuation of treatment with the current plan, or necessary changes as appropriate. All appropriate custom blocking and treatment parameters verified by radiation therapist according to initial simulation.\\n \\nUS image guidance and field placement prior to treatment delivery performed. US depth shows No Visual Disease.\\n \\nPer Dr. Gonzales, continued daily US guidance and simulation is required for field placement, measurement of tumor depth, progress and edema monitoring.

## 2017-05-22 NOTE — PROCEDURE: SUPERFICIAL RADIATION TREATMENT
Dose Per Fractionation In Cgy (Optional): 255
Fractions / Week: 3
Total Number Of Fractions: 20
Total Number Of Fractions Rx 3: 15
Dose / Tx In Cgy (Optional): 256.7
Depth (Optional-Please Include Units): 0.83mm
Fractions / Week Rx 3: 5
Custom Shielding Afterword Text Will Not Be Included With Simple Simulations (X X Y Cm............): port to correlate with the lesion size, including treatment margin. The custom lead shield is adequate to accommodate the appropriate applicator and provide adequate shielding around the treatment site. Additional shielding (as noted below) is used to protect sensitive, normal tissues.
Render Prescriptions In Note?: No
Intro Statement (Will Not Render If Left Blank): The patient is undergoing superficial radiation therapy for skin cancer and presents for weekly evaluation and management.  Per protocol and as documented on the flow sheet, the patient was questioned as to subjective redness, pruritus, pain, drainage, fatigue, or any other symptoms.  Objectively, the radiation area was evaluated with regards to erythema, atrophy, scale, crusting, erosion, ulceration, edema, purpura, tenderness, warmth, drainage, and any other findings.  The plan was extensively reviewed including the dose, and dosing schedule.  The simulation and clinical setup was also reviewed as was the external and any internal shields and based on this review the appropriateness and sufficiency of treatment was determined.
Functional Status: 0 (fully active)
Daily Fractionated Dose (Optional- Include Units): 255cGy
Energy (Include Units): 50kV
Shielding Size (Optional- Include Units): 2.0 X 2.0
Prescription Used: 1
Assessment: Appropriate reaction
Treatment Time In Min (Optional): 0.34
Detail Level: Detailed
Total Dose (Optional-Please Include Units): 5100 cGy
Field Size (Applicator) Rx 2: 3.0 cm
Computed Treatment Time In Min (Will Render The Same As Calculated Treatment Time If Left Blank): per device
Daily Fractionated Dose (Optional- Include Units) Rx 2: 255 cGy
Custom Shielding Preamble Text Will Not Be Included With Simple Simulations (.......... X X Y Cm): A lead shield of 0.762 mm thickness is utilized to form a molded, custom shield with a
Day Of The Week Treatment Administered: Monday
Port Dimensions-X Axis In Cm: 2
Initial Radiation Treatment Planning (Will Render If Bill Simulation = Yes): The patient had a complete consultation regarding all applicable modalities for the treatment of their skin cancer and based on a variety of factors including the type of tumor, size, and location, the relevant medical history as well as local tissue factors, the functional status of the individual, the ability to perform necessary postoperative wound instructions and the need for simultaneous treatments as well as overall wound healing status, it was determined that the patient would begin radiation therapy treatment for skin cancer.  A full simulation and treatment device design was performed including the determination and formulation of appropriate simple and complex devices including lead shield of 0.762 mm thickness to form molded customized shielding to specifically correlate with the lesion size including treatment margin.  The custom lead shield is adequate to accommodate the appropriate applicator and provide adequate shielding around the treatment site.  The specific field applicator, shields, and devices both simple and complex as well as the specific patient setup is outlined below.  The patient was given a full consent for superficial radiation to both verbally and in writing and the full determination of patient's eligibility for treatment and selection is outlined on the patient eligibility and treatment selection form.  The specific superficial radiotherapy prescription was determined and was documented on the superficial radiotherapy prescription form.  A treatment calculation was also performed and documented on the treatment calculation form.  Based on the prescription, the patient was scheduled for a series of fractional treatments.
Fractions / Week Rx 2: 4
Treatment Device Design After Initial Simulation Justification (Will Render If Bill For Treatment Devices = Yes): The patient is status post radiation simulation and is evaluated as to the use of additional devices for shielding and placement for radiation therapy.
Energy (Optional-Please Include Units) Rx 2: 50 kV
Comments: RTOG Stage 0
Time Dose Fractionation (Optional- Include Units If Applicable): 88
Patient Positioning: Supine
Additional Prescription Justification Text: If there is any interruption in treatment exceeding 5 days please see Decay and Dose Adjustment Calculation and complete treatment under Prescription 2.
Shielding Size (Optional- Include Units) Rx 2: 2.5 x 2.5
Cumulative Dose In Cgy (Optional): 5134.0
Treatment Margins In Cm: 0.5
Bill For Radiation Treatment: Yes
Simple Simulation Preamble Text Will Be Included With Simple Simulations (.......... Indications): Simple simulation was performed today for the following reasons:

## 2017-06-05 ENCOUNTER — APPOINTMENT (OUTPATIENT)
Age: 62
Setting detail: DERMATOLOGY
End: 2017-06-07

## 2017-06-05 PROBLEM — C44.329 SQUAMOUS CELL CARCINOMA OF SKIN OF OTHER PARTS OF FACE: Status: ACTIVE | Noted: 2017-06-05

## 2017-06-05 PROCEDURE — G6001 ECHO GUIDANCE RADIOTHERAPY: HCPCS

## 2017-06-05 PROCEDURE — OTHER SUPERFICIAL RADIATION TREATMENT: OTHER

## 2017-06-05 PROCEDURE — 77427 RADIATION TX MANAGEMENT X5: CPT

## 2017-06-05 PROCEDURE — OTHER FOLLOW UP FOR NEXT VISIT: OTHER

## 2017-06-05 PROCEDURE — OTHER TREATMENT REGIMEN: OTHER

## 2017-06-05 NOTE — PROCEDURE: TREATMENT REGIMEN
Detail Level: Zone
Plan: Per the request of Dr. Gonzales, Jeffy Phan was seen today for Superficial Radiation Therapy requiring simulation (CPT® 77395) in preparation for treatment of specific diseased site(s). Simulation is necessary to determine correct patient and treatment portal positioning, deliver safe and effective radiation therapy. A high frequency ultrasound image was acquired prior to treatment today for three dimensional evaluation of tumor volume and response to treatment, in addition, geometric accuracy of field placement (CPT®  ). Physician evaluation of the ultrasound tumor depth will be ongoing through course of treatment, and is deemed medically necessary ensuring efficacy of treatment. Today’s image and setup was evaluated determining continuation of treatment with the current plan, or necessary changes as appropriate. All appropriate custom blocking and treatment parameters verified by radiation therapist according to initial simulation.\\n \\nUS image guidance and field placement prior to treatment delivery performed. US depth shows No Visual Disease.\\n \\nPer Dr. Gonzales, continued daily US guidance and simulation is required for field placement, measurement of tumor depth, progress and edema monitoring.

## 2017-06-05 NOTE — PROCEDURE: SUPERFICIAL RADIATION TREATMENT
Energy (Include Units): 50kV
Energy (Optional-Please Include Units) Rx 2: 50 kV
Comments: RTOG Stage 1
Shielding Size (Optional- Include Units) Rx 2: 2.5 x 2.5
Bill For Dosimetry/Render Treatment Time Calculation In Note: No
Prescription Used: 1
Dose Per Fractionation In Cgy (Optional): 255
Port Dimensions-X Axis In Cm: 2
Time Dose Fractionation (Optional- Include Units If Applicable): 88
Detail Level: Detailed
Depth (Optional-Please Include Units): 0.83mm
Fractions / Week Rx 3: 5
Intro Statement (Will Not Render If Left Blank): The patient is undergoing superficial radiation therapy for skin cancer and presents for weekly evaluation and management.  Per protocol and as documented on the flow sheet, the patient was questioned as to subjective redness, pruritus, pain, drainage, fatigue, or any other symptoms.  Objectively, the radiation area was evaluated with regards to erythema, atrophy, scale, crusting, erosion, ulceration, edema, purpura, tenderness, warmth, drainage, and any other findings.  The plan was extensively reviewed including the dose, and dosing schedule.  The simulation and clinical setup was also reviewed as was the external and any internal shields and based on this review the appropriateness and sufficiency of treatment was determined.
Functional Status: 0 (fully active)
Assessment: Appropriate reaction
Treatment Margins In Cm: 0.5
Fractions / Week: 3
Patient Positioning: Supine
Fractionation Number: 20
Bill And Render Text From Evaluation And Management Tab (Will Bill [id number]): Yes
Initial Radiation Treatment Planning (Will Render If Bill Simulation = Yes): The patient had a complete consultation regarding all applicable modalities for the treatment of their skin cancer and based on a variety of factors including the type of tumor, size, and location, the relevant medical history as well as local tissue factors, the functional status of the individual, the ability to perform necessary postoperative wound instructions and the need for simultaneous treatments as well as overall wound healing status, it was determined that the patient would begin radiation therapy treatment for skin cancer.  A full simulation and treatment device design was performed including the determination and formulation of appropriate simple and complex devices including lead shield of 0.762 mm thickness to form molded customized shielding to specifically correlate with the lesion size including treatment margin.  The custom lead shield is adequate to accommodate the appropriate applicator and provide adequate shielding around the treatment site.  The specific field applicator, shields, and devices both simple and complex as well as the specific patient setup is outlined below.  The patient was given a full consent for superficial radiation to both verbally and in writing and the full determination of patient's eligibility for treatment and selection is outlined on the patient eligibility and treatment selection form.  The specific superficial radiotherapy prescription was determined and was documented on the superficial radiotherapy prescription form.  A treatment calculation was also performed and documented on the treatment calculation form.  Based on the prescription, the patient was scheduled for a series of fractional treatments.
Treatment Time / Fractionation (Optional- Include Units): 0.34
Total Dose (Optional-Please Include Units): 5100 cGy
Total Number Of Fractions Rx 2: 15
Additional Prescription Justification Text: If there is any interruption in treatment exceeding 5 days please see Decay and Dose Adjustment Calculation and complete treatment under Prescription 2.
Field Size (Applicator): 3.0 cm
Dose / Tx In Cgy (Optional): 256.7
Custom Shielding Afterword Text Will Not Be Included With Simple Simulations (X X Y Cm............): port to correlate with the lesion size, including treatment margin. The custom lead shield is adequate to accommodate the appropriate applicator and provide adequate shielding around the treatment site. Additional shielding (as noted below) is used to protect sensitive, normal tissues.
Shielding Size (Optional- Include Units): 2.0 X 2.0
Fractions / Week Rx 2: 4
Simple Simulation Preamble Text Will Be Included With Simple Simulations (.......... Indications): Simple simulation was performed today for the following reasons:
Daily Fractionated Dose (Optional- Include Units) Rx 2: 255 cGy
Treatment Device Design After Initial Simulation Justification (Will Render If Bill For Treatment Devices = Yes): The patient is status post radiation simulation and is evaluated as to the use of additional devices for shielding and placement for radiation therapy.
Computed Treatment Time In Min (Will Render The Same As Calculated Treatment Time If Left Blank): per device
Daily Fractionated Dose (Optional- Include Units): 255cGy
Custom Shielding Preamble Text Will Not Be Included With Simple Simulations (.......... X X Y Cm): A lead shield of 0.762 mm thickness is utilized to form a molded, custom shield with a
Cumulative Dose In Cgy (Optional): 5134.0
Day Of The Week Treatment Administered: Monday

## 2017-09-05 ENCOUNTER — APPOINTMENT (OUTPATIENT)
Age: 62
Setting detail: DERMATOLOGY
End: 2017-09-05

## 2017-09-05 DIAGNOSIS — L82.1 OTHER SEBORRHEIC KERATOSIS: ICD-10-CM

## 2017-09-05 DIAGNOSIS — L57.0 ACTINIC KERATOSIS: ICD-10-CM

## 2017-09-05 DIAGNOSIS — D485 NEOPLASM OF UNCERTAIN BEHAVIOR OF SKIN: ICD-10-CM

## 2017-09-05 PROBLEM — D48.5 NEOPLASM OF UNCERTAIN BEHAVIOR OF SKIN: Status: ACTIVE | Noted: 2017-09-05

## 2017-09-05 PROCEDURE — OTHER COUNSELING: OTHER

## 2017-09-05 PROCEDURE — 11100: CPT | Mod: 59

## 2017-09-05 PROCEDURE — OTHER LIQUID NITROGEN: OTHER

## 2017-09-05 PROCEDURE — 11101: CPT

## 2017-09-05 PROCEDURE — 99213 OFFICE O/P EST LOW 20 MIN: CPT | Mod: 25

## 2017-09-05 PROCEDURE — 17004 DESTROY PREMAL LESIONS 15/>: CPT

## 2017-09-05 PROCEDURE — OTHER BIOPSY BY SHAVE METHOD: OTHER

## 2017-09-05 ASSESSMENT — LOCATION DETAILED DESCRIPTION DERM
LOCATION DETAILED: LEFT PROXIMAL DORSAL FOREARM
LOCATION DETAILED: LEFT FOREHEAD
LOCATION DETAILED: RIGHT MEDIAL TRAPEZIAL NECK
LOCATION DETAILED: RIGHT MEDIAL ZYGOMA
LOCATION DETAILED: LEFT MEDIAL UPPER BACK
LOCATION DETAILED: RIGHT SUPERIOR UPPER BACK
LOCATION DETAILED: LEFT SUPERIOR LATERAL UPPER BACK
LOCATION DETAILED: RIGHT SUPERIOR HELIX
LOCATION DETAILED: RIGHT INFERIOR CENTRAL MALAR CHEEK
LOCATION DETAILED: MID POSTERIOR NECK
LOCATION DETAILED: LEFT INFERIOR LATERAL FOREHEAD
LOCATION DETAILED: LEFT DISTAL DORSAL FOREARM
LOCATION DETAILED: RIGHT SUPERIOR LATERAL UPPER BACK
LOCATION DETAILED: LEFT CENTRAL MALAR CHEEK
LOCATION DETAILED: LEFT SUPERIOR UPPER BACK
LOCATION DETAILED: RIGHT LATERAL MALAR CHEEK
LOCATION DETAILED: LEFT LATERAL EYEBROW

## 2017-09-05 ASSESSMENT — LOCATION SIMPLE DESCRIPTION DERM
LOCATION SIMPLE: RIGHT EAR
LOCATION SIMPLE: LEFT UPPER BACK
LOCATION SIMPLE: POSTERIOR NECK
LOCATION SIMPLE: RIGHT CHEEK
LOCATION SIMPLE: RIGHT ZYGOMA
LOCATION SIMPLE: LEFT FOREHEAD
LOCATION SIMPLE: RIGHT UPPER BACK
LOCATION SIMPLE: LEFT FOREARM
LOCATION SIMPLE: LEFT EYEBROW
LOCATION SIMPLE: LEFT CHEEK

## 2017-09-05 ASSESSMENT — LOCATION ZONE DERM
LOCATION ZONE: FACE
LOCATION ZONE: TRUNK
LOCATION ZONE: EAR
LOCATION ZONE: ARM
LOCATION ZONE: NECK

## 2017-09-05 NOTE — PROCEDURE: BIOPSY BY SHAVE METHOD
Anesthesia Type: 1% lidocaine with epinephrine
Biopsy Type: H and E
Post-Care Instructions: I reviewed with the patient in detail post-care instructions. Patient is to keep the biopsy site dry overnight, and then apply bacitracin twice daily until healed. Patient may apply hydrogen peroxide soaks to remove any crusting.
Hemostasis: Electrocautery
Wound Care: Vaseline
Curettage Text: The wound bed was treated with curettage after the biopsy was performed.
Notification Instructions: Patient will be notified of biopsy results. However, patient instructed to call the office if not contacted within 2 weeks.
Dressing: bandage
Cryotherapy Text: The wound bed was treated with cryotherapy after the biopsy was performed.
X Size Of Lesion In Cm: 0
Size Of Lesion In Cm: 0.5
Consent: Written consent was obtained and risks were reviewed including but not limited to scarring, infection, bleeding, scabbing, incomplete removal, nerve damage and allergy to anesthesia.
Bill [id number] For Specimen Handling/Conveyance To Laboratory?: no
Electrodesiccation And Curettage Text: The wound bed was treated with electrodesiccation and curettage after the biopsy was performed.
Size Of Lesion In Cm: 0.4
Type Of Destruction Used: Curettage
Billing Type: Third-Party Bill
Electrodesiccation Text: The wound bed was treated with electrodesiccation after the biopsy was performed.
Silver Nitrate Text: The wound bed was treated with silver nitrate after the biopsy was performed.
Biopsy Method: 10 blade
Detail Level: Detailed

## 2017-09-26 ENCOUNTER — APPOINTMENT (OUTPATIENT)
Age: 62
Setting detail: DERMATOLOGY
End: 2017-09-27

## 2017-09-26 PROBLEM — C44.519 BASAL CELL CARCINOMA OF SKIN OF OTHER PART OF TRUNK: Status: ACTIVE | Noted: 2017-09-26

## 2017-09-26 PROCEDURE — OTHER EXCISION: OTHER

## 2017-09-26 PROCEDURE — 11606 EXC TR-EXT MAL+MARG >4 CM: CPT

## 2017-09-26 PROCEDURE — 12032 INTMD RPR S/A/T/EXT 2.6-7.5: CPT

## 2017-09-26 NOTE — PROCEDURE: EXCISION
Star Wedge Flap Text: The defect edges were debeveled with a #15 scalpel blade.  Given the location of the defect, shape of the defect and the proximity to free margins a star wedge flap was deemed most appropriate.  Using a sterile surgical marker, an appropriate rotation flap was drawn incorporating the defect and placing the expected incisions within the relaxed skin tension lines where possible. The area thus outlined was incised deep to adipose tissue with a #15 scalpel blade.  The skin margins were undermined to an appropriate distance in all directions utilizing iris scissors.
Modified Advancement Flap Text: The defect edges were debeveled with a #15 scalpel blade.  Given the location of the defect, shape of the defect and the proximity to free margins a modified advancement flap was deemed most appropriate.  Using a sterile surgical marker, an appropriate advancement flap was drawn incorporating the defect and placing the expected incisions within the relaxed skin tension lines where possible.    The area thus outlined was incised deep to adipose tissue with a #15 scalpel blade.  The skin margins were undermined to an appropriate distance in all directions utilizing iris scissors.
Bilateral Helical Rim Advancement Flap Text: The defect edges were debeveled with a #15 blade scalpel.  Given the location of the defect and the proximity to free margins (helical rim) a bilateral helical rim advancement flap was deemed most appropriate.  Using a sterile surgical marker, the appropriate advancement flaps were drawn incorporating the defect and placing the expected incisions between the helical rim and antihelix where possible.  The area thus outlined was incised through and through with a #15 scalpel blade.  With a skin hook and iris scissors, the flaps were gently and sharply undermined and freed up.
Anesthesia Type: 1% lidocaine with epinephrine
Posterior Auricular Interpolation Flap Text: A decision was made to reconstruct the defect utilizing an interpolation axial flap and a staged reconstruction.  A telfa template was made of the defect.  This telfa template was then used to outline the posterior auricular interpolation flap.  The donor area for the pedicle flap was then injected with anesthesia.  The flap was excised through the skin and subcutaneous tissue down to the layer of the underlying musculature.  The pedicle flap was carefully excised within this deep plane to maintain its blood supply.  The edges of the donor site were undermined.   The donor site was closed in a primary fashion.  The pedicle was then rotated into position and sutured.  Once the tube was sutured into place, adequate blood supply was confirmed with blanching and refill.  The pedicle was then wrapped with xeroform gauze and dressed appropriately with a telfa and gauze bandage to ensure continued blood supply and protect the attached pedicle.
Transposition Flap Text: The defect edges were debeveled with a #15 scalpel blade.  Given the location of the defect and the proximity to free margins a transposition flap was deemed most appropriate.  Using a sterile surgical marker, an appropriate transposition flap was drawn incorporating the defect.    The area thus outlined was incised deep to adipose tissue with a #15 scalpel blade.  The skin margins were undermined to an appropriate distance in all directions utilizing iris scissors.
Show Previous Accession Variable: Yes
Wound Care: Bacitracin
Complex Repair And M Plasty Text: The defect edges were debeveled with a #15 scalpel blade.  The primary defect was closed partially with a complex linear closure.  Given the location of the remaining defect, shape of the defect and the proximity to free margins an M plasty was deemed most appropriate for complete closure of the defect.  Using a sterile surgical marker, an appropriate advancement flap was drawn incorporating the defect and placing the expected incisions within the relaxed skin tension lines where possible.    The area thus outlined was incised deep to adipose tissue with a #15 scalpel blade.  The skin margins were undermined to an appropriate distance in all directions utilizing iris scissors.
Complex Repair And Rhombic Flap Text: The defect edges were debeveled with a #15 scalpel blade.  The primary defect was closed partially with a complex linear closure.  Given the location of the remaining defect, shape of the defect and the proximity to free margins a rhombic flap was deemed most appropriate for complete closure of the defect.  Using a sterile surgical marker, an appropriate advancement flap was drawn incorporating the defect and placing the expected incisions within the relaxed skin tension lines where possible.    The area thus outlined was incised deep to adipose tissue with a #15 scalpel blade.  The skin margins were undermined to an appropriate distance in all directions utilizing iris scissors.
Composite Graft Text: The defect edges were debeveled with a #15 scalpel blade.  Given the location of the defect, shape of the defect, the proximity to free margins and the fact the defect was full thickness a composite graft was deemed most appropriate.  The defect was outline and then transferred to the donor site.  A full thickness graft was then excised from the donor site. The graft was then placed in the primary defect, oriented appropriately and then sutured into place.  The secondary defect was then repaired using a primary closure.
Complex Repair And Bilobe Flap Text: The defect edges were debeveled with a #15 scalpel blade.  The primary defect was closed partially with a complex linear closure.  Given the location of the remaining defect, shape of the defect and the proximity to free margins a bilobe flap was deemed most appropriate for complete closure of the defect.  Using a sterile surgical marker, an appropriate advancement flap was drawn incorporating the defect and placing the expected incisions within the relaxed skin tension lines where possible.    The area thus outlined was incised deep to adipose tissue with a #15 scalpel blade.  The skin margins were undermined to an appropriate distance in all directions utilizing iris scissors.
Skin Substitute Units (Will Override Primary Defect Units If Greater Than 0): 0
Mucosal Advancement Flap Text: Given the location of the defect, shape of the defect and the proximity to free margins a mucosal advancement flap was deemed most appropriate. Incisions were made with a 15 blade scalpel in the appropriate fashion along the cutaneous vermilion border and the mucosal lip. The remaining actinically damaged mucosal tissue was excised.  The mucosal advancement flap was then elevated to the gingival sulcus with care taken to preserve the neurovascular structures and advanced into the primary defect. Care was taken to ensure that precise realignment of the vermilion border was achieved.
Complex Repair And Epidermal Autograft Text: The defect edges were debeveled with a #15 scalpel blade.  The primary defect was closed partially with a complex linear closure.  Given the location of the defect, shape of the defect and the proximity to free margins an epidermal autograft was deemed most appropriate to repair the remaining defect.  The graft was trimmed to fit the size of the remaining defect.  The graft was then placed in the primary defect, oriented appropriately, and sutured into place.
Interpolation Flap Text: A decision was made to reconstruct the defect utilizing an interpolation axial flap and a staged reconstruction.  A telfa template was made of the defect.  This telfa template was then used to outline the interpolation flap.  The donor area for the pedicle flap was then injected with anesthesia.  The flap was excised through the skin and subcutaneous tissue down to the layer of the underlying musculature.  The interpolation flap was carefully excised within this deep plane to maintain its blood supply.  The edges of the donor site were undermined.   The donor site was closed in a primary fashion.  The pedicle was then rotated into position and sutured.  Once the tube was sutured into place, adequate blood supply was confirmed with blanching and refill.  The pedicle was then wrapped with xeroform gauze and dressed appropriately with a telfa and gauze bandage to ensure continued blood supply and protect the attached pedicle.
Body Location Override (Optional - Billing Will Still Be Based On Selected Body Map Location If Applicable): upper back
Bill [id number] For Specimen Handling/Conveyance To Laboratory?: no
V-Y Plasty Text: The defect edges were debeveled with a #15 scalpel blade.  Given the location of the defect, shape of the defect and the proximity to free margins an V-Y advancement flap was deemed most appropriate.  Using a sterile surgical marker, an appropriate advancement flap was drawn incorporating the defect and placing the expected incisions within the relaxed skin tension lines where possible.    The area thus outlined was incised deep to adipose tissue with a #15 scalpel blade.  The skin margins were undermined to an appropriate distance in all directions utilizing iris scissors.
Complex Repair And Dermal Autograft Text: The defect edges were debeveled with a #15 scalpel blade.  The primary defect was closed partially with a complex linear closure.  Given the location of the defect, shape of the defect and the proximity to free margins an dermal autograft was deemed most appropriate to repair the remaining defect.  The graft was trimmed to fit the size of the remaining defect.  The graft was then placed in the primary defect, oriented appropriately, and sutured into place.
Complex Repair And Z Plasty Text: The defect edges were debeveled with a #15 scalpel blade.  The primary defect was closed partially with a complex linear closure.  Given the location of the remaining defect, shape of the defect and the proximity to free margins a Z plasty was deemed most appropriate for complete closure of the defect.  Using a sterile surgical marker, an appropriate advancement flap was drawn incorporating the defect and placing the expected incisions within the relaxed skin tension lines where possible.    The area thus outlined was incised deep to adipose tissue with a #15 scalpel blade.  The skin margins were undermined to an appropriate distance in all directions utilizing iris scissors.
Hemostasis: Electrocautery
Size Of Lesion In Cm: 3.5
Detail Level: Detailed
Positioning (Leave Blank If You Do Not Want): The patient was placed in a comfortable position exposing the surgical site.
Saucerization Excision Additional Text (Leave Blank If You Do Not Want): The margin was drawn around the clinically apparent lesion.  Incisions were then made along these lines, in a tangential fashion, to the appropriate tissue plane and the lesion was extirpated.
Additional Primary Defect Width (In Cm): -
Complex Repair And Melolabial Flap Text: The defect edges were debeveled with a #15 scalpel blade.  The primary defect was closed partially with a complex linear closure.  Given the location of the remaining defect, shape of the defect and the proximity to free margins a melolabial flap was deemed most appropriate for complete closure of the defect.  Using a sterile surgical marker, an appropriate advancement flap was drawn incorporating the defect and placing the expected incisions within the relaxed skin tension lines where possible.    The area thus outlined was incised deep to adipose tissue with a #15 scalpel blade.  The skin margins were undermined to an appropriate distance in all directions utilizing iris scissors.
V-Y Flap Text: The defect edges were debeveled with a #15 scalpel blade.  Given the location of the defect, shape of the defect and the proximity to free margins a V-Y flap was deemed most appropriate.  Using a sterile surgical marker, an appropriate advancement flap was drawn incorporating the defect and placing the expected incisions within the relaxed skin tension lines where possible.    The area thus outlined was incised deep to adipose tissue with a #15 scalpel blade.  The skin margins were undermined to an appropriate distance in all directions utilizing iris scissors.
Complex Repair And Split-Thickness Skin Graft Text: The defect edges were debeveled with a #15 scalpel blade.  The primary defect was closed partially with a complex linear closure.  Given the location of the defect, shape of the defect and the proximity to free margins a split thickness skin graft was deemed most appropriate to repair the remaining defect.  The graft was trimmed to fit the size of the remaining defect.  The graft was then placed in the primary defect, oriented appropriately, and sutured into place.
Complex Repair And Ftsg Text: The defect edges were debeveled with a #15 scalpel blade.  The primary defect was closed partially with a complex linear closure.  Given the location of the defect, shape of the defect and the proximity to free margins a full thickness skin graft was deemed most appropriate to repair the remaining defect.  The graft was trimmed to fit the size of the remaining defect.  The graft was then placed in the primary defect, oriented appropriately, and sutured into place.
Advancement Flap (Single) Text: The defect edges were debeveled with a #15 scalpel blade.  Given the location of the defect and the proximity to free margins a single advancement flap was deemed most appropriate.  Using a sterile surgical marker, an appropriate advancement flap was drawn incorporating the defect and placing the expected incisions within the relaxed skin tension lines where possible.    The area thus outlined was incised deep to adipose tissue with a #15 scalpel blade.  The skin margins were undermined to an appropriate distance in all directions utilizing iris scissors.
Complex Repair And Double M Plasty Text: The defect edges were debeveled with a #15 scalpel blade.  The primary defect was closed partially with a complex linear closure.  Given the location of the remaining defect, shape of the defect and the proximity to free margins a double M plasty was deemed most appropriate for complete closure of the defect.  Using a sterile surgical marker, an appropriate advancement flap was drawn incorporating the defect and placing the expected incisions within the relaxed skin tension lines where possible.    The area thus outlined was incised deep to adipose tissue with a #15 scalpel blade.  The skin margins were undermined to an appropriate distance in all directions utilizing iris scissors.
Estimated Blood Loss (Cc): minimal
Burow's Advancement Flap Text: The defect edges were debeveled with a #15 scalpel blade.  Given the location of the defect and the proximity to free margins a Burow's advancement flap was deemed most appropriate.  Using a sterile surgical marker, the appropriate advancement flap was drawn incorporating the defect and placing the expected incisions within the relaxed skin tension lines where possible.    The area thus outlined was incised deep to adipose tissue with a #15 scalpel blade.  The skin margins were undermined to an appropriate distance in all directions utilizing iris scissors.
Keystone Flap Text: The defect edges were debeveled with a #15 scalpel blade.  Given the location of the defect, shape of the defect a keystone flap was deemed most appropriate.  Using a sterile surgical marker, an appropriate keystone flap was drawn incorporating the defect, outlining the appropriate donor tissue and placing the expected incisions within the relaxed skin tension lines where possible. The area thus outlined was incised deep to adipose tissue with a #15 scalpel blade.  The skin margins were undermined to an appropriate distance in all directions around the primary defect and laterally outward around the flap utilizing iris scissors.
Repair Type: Intermediate
Rotation Flap Text: The defect edges were debeveled with a #15 scalpel blade.  Given the location of the defect, shape of the defect and the proximity to free margins a rotation flap was deemed most appropriate.  Using a sterile surgical marker, an appropriate rotation flap was drawn incorporating the defect and placing the expected incisions within the relaxed skin tension lines where possible.    The area thus outlined was incised deep to adipose tissue with a #15 scalpel blade.  The skin margins were undermined to an appropriate distance in all directions utilizing iris scissors.
Mastoid Interpolation Flap Text: A decision was made to reconstruct the defect utilizing an interpolation axial flap and a staged reconstruction.  A telfa template was made of the defect.  This telfa template was then used to outline the mastoid interpolation flap.  The donor area for the pedicle flap was then injected with anesthesia.  The flap was excised through the skin and subcutaneous tissue down to the layer of the underlying musculature.  The pedicle flap was carefully excised within this deep plane to maintain its blood supply.  The edges of the donor site were undermined.   The donor site was closed in a primary fashion.  The pedicle was then rotated into position and sutured.  Once the tube was sutured into place, adequate blood supply was confirmed with blanching and refill.  The pedicle was then wrapped with xeroform gauze and dressed appropriately with a telfa and gauze bandage to ensure continued blood supply and protect the attached pedicle.
Skin Substitute Text: The defect edges were debeveled with a #15 scalpel blade.  Given the location of the defect, shape of the defect and the proximity to free margins a skin substitute graft was deemed most appropriate.  The graft material was trimmed to fit the size of the defect. The graft was then placed in the primary defect and oriented appropriately.
Complex Repair And W Plasty Text: The defect edges were debeveled with a #15 scalpel blade.  The primary defect was closed partially with a complex linear closure.  Given the location of the remaining defect, shape of the defect and the proximity to free margins a W plasty was deemed most appropriate for complete closure of the defect.  Using a sterile surgical marker, an appropriate advancement flap was drawn incorporating the defect and placing the expected incisions within the relaxed skin tension lines where possible.    The area thus outlined was incised deep to adipose tissue with a #15 scalpel blade.  The skin margins were undermined to an appropriate distance in all directions utilizing iris scissors.
Slit Excision Additional Text (Leave Blank If You Do Not Want): A linear line was drawn on the skin overlying the lesion. An incision was made slowly until the lesion was visualized.  Once visualized, the lesion was removed with blunt dissection.
Epidermal Autograft Text: The defect edges were debeveled with a #15 scalpel blade.  Given the location of the defect, shape of the defect and the proximity to free margins an epidermal autograft was deemed most appropriate.  Using a sterile surgical marker, the primary defect shape was transferred to the donor site. The epidermal graft was then harvested.  The skin graft was then placed in the primary defect and oriented appropriately.
No Repair - Repaired With Adjacent Surgical Defect Text (Leave Blank If You Do Not Want): After the excision the defect was repaired concurrently with another surgical defect which was in close approximation.
Complex Repair And Double Advancement Flap Text: The defect edges were debeveled with a #15 scalpel blade.  The primary defect was closed partially with a complex linear closure.  Given the location of the remaining defect, shape of the defect and the proximity to free margins a double advancement flap was deemed most appropriate for complete closure of the defect.  Using a sterile surgical marker, an appropriate advancement flap was drawn incorporating the defect and placing the expected incisions within the relaxed skin tension lines where possible.    The area thus outlined was incised deep to adipose tissue with a #15 scalpel blade.  The skin margins were undermined to an appropriate distance in all directions utilizing iris scissors.
Dressing: dry sterile dressing
Complex Repair And Xenograft Text: The defect edges were debeveled with a #15 scalpel blade.  The primary defect was closed partially with a complex linear closure.  Given the location of the defect, shape of the defect and the proximity to free margins a xenograft was deemed most appropriate to repair the remaining defect.  The graft was trimmed to fit the size of the remaining defect.  The graft was then placed in the primary defect, oriented appropriately, and sutured into place.
Intermediate Repair Preamble Text (Leave Blank If You Do Not Want): Undermining was performed with blunt dissection.
Size Of Margin In Cm: 0.3
Complex Repair And Modified Advancement Flap Text: The defect edges were debeveled with a #15 scalpel blade.  The primary defect was closed partially with a complex linear closure.  Given the location of the remaining defect, shape of the defect and the proximity to free margins a modified advancement flap was deemed most appropriate for complete closure of the defect.  Using a sterile surgical marker, an appropriate advancement flap was drawn incorporating the defect and placing the expected incisions within the relaxed skin tension lines where possible.    The area thus outlined was incised deep to adipose tissue with a #15 scalpel blade.  The skin margins were undermined to an appropriate distance in all directions utilizing iris scissors.
Tissue Cultured Epidermal Autograft Text: The defect edges were debeveled with a #15 scalpel blade.  Given the location of the defect, shape of the defect and the proximity to free margins a tissue cultured epidermal autograft was deemed most appropriate.  The graft was then trimmed to fit the size of the defect.  The graft was then placed in the primary defect and oriented appropriately.
Dorsal Nasal Flap Text: The defect edges were debeveled with a #15 scalpel blade.  Given the location of the defect and the proximity to free margins a dorsal nasal flap was deemed most appropriate.  Using a sterile surgical marker, an appropriate dorsal nasal flap was drawn around the defect.    The area thus outlined was incised deep to adipose tissue with a #15 scalpel blade.  The skin margins were undermined to an appropriate distance in all directions utilizing iris scissors.
O-L Flap Text: The defect edges were debeveled with a #15 scalpel blade.  Given the location of the defect, shape of the defect and the proximity to free margins an O-L flap was deemed most appropriate.  Using a sterile surgical marker, an appropriate advancement flap was drawn incorporating the defect and placing the expected incisions within the relaxed skin tension lines where possible.    The area thus outlined was incised deep to adipose tissue with a #15 scalpel blade.  The skin margins were undermined to an appropriate distance in all directions utilizing iris scissors.
Crescentic Advancement Flap Text: The defect edges were debeveled with a #15 scalpel blade.  Given the location of the defect and the proximity to free margins a crescentic advancement flap was deemed most appropriate.  Using a sterile surgical marker, the appropriate advancement flap was drawn incorporating the defect and placing the expected incisions within the relaxed skin tension lines where possible.    The area thus outlined was incised deep to adipose tissue with a #15 scalpel blade.  The skin margins were undermined to an appropriate distance in all directions utilizing iris scissors.
Epidermal Sutures: 4-0 Ethilon
Alar Island Pedicle Flap Text: The defect edges were debeveled with a #15 scalpel blade.  Given the location of the defect, shape of the defect and the proximity to the alar rim an island pedicle advancement flap was deemed most appropriate.  Using a sterile surgical marker, an appropriate advancement flap was drawn incorporating the defect, outlining the appropriate donor tissue and placing the expected incisions within the nasal ala running parallel to the alar rim. The area thus outlined was incised with a #15 scalpel blade.  The skin margins were undermined minimally to an appropriate distance in all directions around the primary defect and laterally outward around the island pedicle utilizing iris scissors.  There was minimal undermining beneath the pedicle flap.
Intermediate / Complex Repair - Final Wound Length In Cm: 6
Bilobed Flap Text: The defect edges were debeveled with a #15 scalpel blade.  Given the location of the defect and the proximity to free margins a bilobe flap was deemed most appropriate.  Using a sterile surgical marker, an appropriate bilobe flap drawn around the defect.    The area thus outlined was incised deep to adipose tissue with a #15 scalpel blade.  The skin margins were undermined to an appropriate distance in all directions utilizing iris scissors.
Melolabial Interpolation Flap Text: A decision was made to reconstruct the defect utilizing an interpolation axial flap and a staged reconstruction.  A telfa template was made of the defect.  This telfa template was then used to outline the melolabial interpolation flap.  The donor area for the pedicle flap was then injected with anesthesia.  The flap was excised through the skin and subcutaneous tissue down to the layer of the underlying musculature.  The pedicle flap was carefully excised within this deep plane to maintain its blood supply.  The edges of the donor site were undermined.   The donor site was closed in a primary fashion.  The pedicle was then rotated into position and sutured.  Once the tube was sutured into place, adequate blood supply was confirmed with blanching and refill.  The pedicle was then wrapped with xeroform gauze and dressed appropriately with a telfa and gauze bandage to ensure continued blood supply and protect the attached pedicle.
Scalpel Size: 15 blade
Excision Depth: adipose tissue
Island Pedicle Flap Text: The defect edges were debeveled with a #15 scalpel blade.  Given the location of the defect, shape of the defect and the proximity to free margins an island pedicle advancement flap was deemed most appropriate.  Using a sterile surgical marker, an appropriate advancement flap was drawn incorporating the defect, outlining the appropriate donor tissue and placing the expected incisions within the relaxed skin tension lines where possible.    The area thus outlined was incised deep to adipose tissue with a #15 scalpel blade.  The skin margins were undermined to an appropriate distance in all directions around the primary defect and laterally outward around the island pedicle utilizing iris scissors.  There was minimal undermining beneath the pedicle flap.
Bilobed Transposition Flap Text: The defect edges were debeveled with a #15 scalpel blade.  Given the location of the defect and the proximity to free margins a bilobed transposition flap was deemed most appropriate.  Using a sterile surgical marker, an appropriate bilobe flap drawn around the defect.    The area thus outlined was incised deep to adipose tissue with a #15 scalpel blade.  The skin margins were undermined to an appropriate distance in all directions utilizing iris scissors.
Z Plasty Text: The lesion was extirpated to the level of the fat with a #15 scalpel blade.  Given the location of the defect, shape of the defect and the proximity to free margins a Z-plasty was deemed most appropriate for repair.  Using a sterile surgical marker, the appropriate transposition arms of the Z-plasty were drawn incorporating the defect and placing the expected incisions within the relaxed skin tension lines where possible.    The area thus outlined was incised deep to adipose tissue with a #15 scalpel blade.  The skin margins were undermined to an appropriate distance in all directions utilizing iris scissors.  The opposing transposition arms were then transposed into place in opposite direction and anchored with interrupted buried subcutaneous sutures.
Post-Care Instructions: I reviewed with the patient in detail post-care instructions. Patient is not to engage in any heavy lifting, exercise, or swimming for the next 14 days. Should the patient develop any fevers, chills, bleeding, severe pain patient will contact the office immediately.
Complex Repair And O-L Flap Text: The defect edges were debeveled with a #15 scalpel blade.  The primary defect was closed partially with a complex linear closure.  Given the location of the remaining defect, shape of the defect and the proximity to free margins an O-L flap was deemed most appropriate for complete closure of the defect.  Using a sterile surgical marker, an appropriate flap was drawn incorporating the defect and placing the expected incisions within the relaxed skin tension lines where possible.    The area thus outlined was incised deep to adipose tissue with a #15 scalpel blade.  The skin margins were undermined to an appropriate distance in all directions utilizing iris scissors.
Suture Removal: 14 days
Pre-Excision Curettage Text (Leave Blank If You Do Not Want): Prior to drawing the surgical margin the visible lesion was removed with electrodesiccation and curettage to clearly define the lesion size.
Lip Wedge Excision Repair Text: Given the location of the defect and the proximity to free margins a full thickness wedge repair was deemed most appropriate.  Using a sterile surgical marker, the appropriate repair was drawn incorporating the defect and placing the expected incisions perpendicular to the vermilion border.  The vermilion border was also meticulously outlined to ensure appropriate reapproximation during the repair.  The area thus outlined was incised through and through with a #15 scalpel blade.  The muscularis and dermis were reaproximated with deep sutures following hemostasis. Care was taken to realign the vermilion border before proceeding with the superficial closure.  Once the vermilion was realigned the superfical and mucosal closure was finished.
Excision Method: Fusiform
Ftsg Text: The defect edges were debeveled with a #15 scalpel blade.  Given the location of the defect, shape of the defect and the proximity to free margins a full thickness skin graft was deemed most appropriate.  Using a sterile surgical marker, the primary defect shape was transferred to the donor site. The area thus outlined was incised deep to adipose tissue with a #15 scalpel blade.  The harvested graft was then trimmed of adipose tissue until only dermis and epidermis was left.  The skin margins of the secondary defect were undermined to an appropriate distance in all directions utilizing iris scissors.  The secondary defect was closed with interrupted buried subcutaneous sutures.  The skin edges were then re-apposed with running  sutures.  The skin graft was then placed in the primary defect and oriented appropriately.
Complex Repair And Tissue Cultured Epidermal Autograft Text: The defect edges were debeveled with a #15 scalpel blade.  The primary defect was closed partially with a complex linear closure.  Given the location of the defect, shape of the defect and the proximity to free margins an tissue cultured epidermal autograft was deemed most appropriate to repair the remaining defect.  The graft was trimmed to fit the size of the remaining defect.  The graft was then placed in the primary defect, oriented appropriately, and sutured into place.
W Plasty Text: The lesion was extirpated to the level of the fat with a #15 scalpel blade.  Given the location of the defect, shape of the defect and the proximity to free margins a W-plasty was deemed most appropriate for repair.  Using a sterile surgical marker, the appropriate transposition arms of the W-plasty were drawn incorporating the defect and placing the expected incisions within the relaxed skin tension lines where possible.    The area thus outlined was incised deep to adipose tissue with a #15 scalpel blade.  The skin margins were undermined to an appropriate distance in all directions utilizing iris scissors.  The opposing transposition arms were then transposed into place in opposite direction and anchored with interrupted buried subcutaneous sutures.
H Plasty Text: Given the location of the defect, shape of the defect and the proximity to free margins a H-plasty was deemed most appropriate for repair.  Using a sterile surgical marker, the appropriate advancement arms of the H-plasty were drawn incorporating the defect and placing the expected incisions within the relaxed skin tension lines where possible. The area thus outlined was incised deep to adipose tissue with a #15 scalpel blade. The skin margins were undermined to an appropriate distance in all directions utilizing iris scissors.  The opposing advancement arms were then advanced into place in opposite direction and anchored with interrupted buried subcutaneous sutures.
Melolabial Transposition Flap Text: The defect edges were debeveled with a #15 scalpel blade.  Given the location of the defect and the proximity to free margins a melolabial flap was deemed most appropriate.  Using a sterile surgical marker, an appropriate melolabial transposition flap was drawn incorporating the defect.    The area thus outlined was incised deep to adipose tissue with a #15 scalpel blade.  The skin margins were undermined to an appropriate distance in all directions utilizing iris scissors.
Graft Donor Site Bandage (Optional-Leave Blank If You Don't Want In Note): Steri-strips and a pressure bandage were applied to the donor site.
Consent was obtained from the patient. The risks and benefits to therapy were discussed in detail. Specifically, the risks of infection, scarring, bleeding, prolonged wound healing, incomplete removal, allergy to anesthesia, nerve injury and recurrence were addressed. Prior to the procedure, the treatment site was clearly identified and confirmed by the patient. All components of Universal Protocol/PAUSE Rule completed.
Excisional Biopsy Additional Text (Leave Blank If You Do Not Want): The margin was drawn around the clinically apparent lesion. An elliptical shape was then drawn on the skin incorporating the lesion and margins.  Incisions were then made along these lines to the appropriate tissue plane and the lesion was extirpated.
Cheek-To-Nose Interpolation Flap Text: A decision was made to reconstruct the defect utilizing an interpolation axial flap and a staged reconstruction.  A telfa template was made of the defect.  This telfa template was then used to outline the Cheek-To-Nose Interpolation flap.  The donor area for the pedicle flap was then injected with anesthesia.  The flap was excised through the skin and subcutaneous tissue down to the layer of the underlying musculature.  The interpolation flap was carefully excised within this deep plane to maintain its blood supply.  The edges of the donor site were undermined.   The donor site was closed in a primary fashion.  The pedicle was then rotated into position and sutured.  Once the tube was sutured into place, adequate blood supply was confirmed with blanching and refill.  The pedicle was then wrapped with xeroform gauze and dressed appropriately with a telfa and gauze bandage to ensure continued blood supply and protect the attached pedicle.
Billing Type: Third-Party Bill
Advancement-Rotation Flap Text: The defect edges were debeveled with a #15 scalpel blade.  Given the location of the defect, shape of the defect and the proximity to free margins an advancement-rotation flap was deemed most appropriate.  Using a sterile surgical marker, an appropriate flap was drawn incorporating the defect and placing the expected incisions within the relaxed skin tension lines where possible. The area thus outlined was incised deep to adipose tissue with a #15 scalpel blade.  The skin margins were undermined to an appropriate distance in all directions utilizing iris scissors.
Home Suture Removal Text: Patient was provided a home suture removal kit and will remove their sutures at home.  If they have any questions or difficulties they will call the office.
Complex Repair And Rotation Flap Text: The defect edges were debeveled with a #15 scalpel blade.  The primary defect was closed partially with a complex linear closure.  Given the location of the remaining defect, shape of the defect and the proximity to free margins a rotation flap was deemed most appropriate for complete closure of the defect.  Using a sterile surgical marker, an appropriate advancement flap was drawn incorporating the defect and placing the expected incisions within the relaxed skin tension lines where possible.    The area thus outlined was incised deep to adipose tissue with a #15 scalpel blade.  The skin margins were undermined to an appropriate distance in all directions utilizing iris scissors.
Cheek Interpolation Flap Text: A decision was made to reconstruct the defect utilizing an interpolation axial flap and a staged reconstruction.  A telfa template was made of the defect.  This telfa template was then used to outline the Cheek Interpolation flap.  The donor area for the pedicle flap was then injected with anesthesia.  The flap was excised through the skin and subcutaneous tissue down to the layer of the underlying musculature.  The interpolation flap was carefully excised within this deep plane to maintain its blood supply.  The edges of the donor site were undermined.   The donor site was closed in a primary fashion.  The pedicle was then rotated into position and sutured.  Once the tube was sutured into place, adequate blood supply was confirmed with blanching and refill.  The pedicle was then wrapped with xeroform gauze and dressed appropriately with a telfa and gauze bandage to ensure continued blood supply and protect the attached pedicle.
O-Z Plasty Text: The defect edges were debeveled with a #15 scalpel blade.  Given the location of the defect, shape of the defect and the proximity to free margins an O-Z plasty (double transposition flap) was deemed most appropriate.  Using a sterile surgical marker, the appropriate transposition flaps were drawn incorporating the defect and placing the expected incisions within the relaxed skin tension lines where possible.    The area thus outlined was incised deep to adipose tissue with a #15 scalpel blade.  The skin margins were undermined to an appropriate distance in all directions utilizing iris scissors.  Hemostasis was achieved with electrocautery.  The flaps were then transposed into place, one clockwise and the other counterclockwise, and anchored with interrupted buried subcutaneous sutures.
Island Pedicle Flap-Requiring Vessel Identification Text: The defect edges were debeveled with a #15 scalpel blade.  Given the location of the defect, shape of the defect and the proximity to free margins an island pedicle advancement flap was deemed most appropriate.  Using a sterile surgical marker, an appropriate advancement flap was drawn, based on the axial vessel mentioned above, incorporating the defect, outlining the appropriate donor tissue and placing the expected incisions within the relaxed skin tension lines where possible.    The area thus outlined was incised deep to adipose tissue with a #15 scalpel blade.  The skin margins were undermined to an appropriate distance in all directions around the primary defect and laterally outward around the island pedicle utilizing iris scissors.  There was minimal undermining beneath the pedicle flap.
Partial Purse String (Intermediate) Text: Given the location of the defect and the characteristics of the surrounding skin an intermediate purse string closure was deemed most appropriate.  Undermining was performed circumferentially around the surgical defect.  A purse string suture was then placed and tightened. Wound tension of the circular defect prevented complete closure of the wound.
S Plasty Text: Given the location and shape of the defect, and the orientation of relaxed skin tension lines, an S-plasty was deemed most appropriate for repair.  Using a sterile surgical marker, the appropriate outline of the S-plasty was drawn, incorporating the defect and placing the expected incisions within the relaxed skin tension lines where possible.  The area thus outlined was incised deep to adipose tissue with a #15 scalpel blade.  The skin margins were undermined to an appropriate distance in all directions utilizing iris scissors. The skin flaps were advanced over the defect.  The opposing margins were then approximated with interrupted buried subcutaneous sutures.
Island Pedicle Flap With Canthal Suspension Text: The defect edges were debeveled with a #15 scalpel blade.  Given the location of the defect, shape of the defect and the proximity to free margins an island pedicle advancement flap was deemed most appropriate.  Using a sterile surgical marker, an appropriate advancement flap was drawn incorporating the defect, outlining the appropriate donor tissue and placing the expected incisions within the relaxed skin tension lines where possible. The area thus outlined was incised deep to adipose tissue with a #15 scalpel blade.  The skin margins were undermined to an appropriate distance in all directions around the primary defect and laterally outward around the island pedicle utilizing iris scissors.  There was minimal undermining beneath the pedicle flap. A suspension suture was placed in the canthal tendon to prevent tension and prevent ectropion.
Partial Purse String (Simple) Text: Given the location of the defect and the characteristics of the surrounding skin a simple purse string closure was deemed most appropriate.  Undermining was performed circumferentially around the surgical defect.  A purse string suture was then placed and tightened. Wound tension of the circular defect prevented complete closure of the wound.
Complex Repair And Skin Substitute Graft Text: The defect edges were debeveled with a #15 scalpel blade.  The primary defect was closed partially with a complex linear closure.  Given the location of the remaining defect, shape of the defect and the proximity to free margins a skin substitute graft was deemed most appropriate to repair the remaining defect.  The graft was trimmed to fit the size of the remaining defect.  The graft was then placed in the primary defect, oriented appropriately, and sutured into place.
Complex Repair And Single Advancement Flap Text: The defect edges were debeveled with a #15 scalpel blade.  The primary defect was closed partially with a complex linear closure.  Given the location of the remaining defect, shape of the defect and the proximity to free margins a single advancement flap was deemed most appropriate for complete closure of the defect.  Using a sterile surgical marker, an appropriate advancement flap was drawn incorporating the defect and placing the expected incisions within the relaxed skin tension lines where possible.    The area thus outlined was incised deep to adipose tissue with a #15 scalpel blade.  The skin margins were undermined to an appropriate distance in all directions utilizing iris scissors.
Complex Repair Preamble Text (Leave Blank If You Do Not Want): Extensive wide undermining was performed.
Epidermal Closure: running vertical and retension
Double Island Pedicle Flap Text: The defect edges were debeveled with a #15 scalpel blade.  Given the location of the defect, shape of the defect and the proximity to free margins a double island pedicle advancement flap was deemed most appropriate.  Using a sterile surgical marker, an appropriate advancement flap was drawn incorporating the defect, outlining the appropriate donor tissue and placing the expected incisions within the relaxed skin tension lines where possible.    The area thus outlined was incised deep to adipose tissue with a #15 scalpel blade.  The skin margins were undermined to an appropriate distance in all directions around the primary defect and laterally outward around the island pedicle utilizing iris scissors.  There was minimal undermining beneath the pedicle flap.
Path Notes (To The Dermatopathologist): Please check margins.
Paramedian Forehead Flap Text: A decision was made to reconstruct the defect utilizing an interpolation axial flap and a staged reconstruction.  A telfa template was made of the defect.  This telfa template was then used to outline the paramedian forehead pedicle flap.  The donor area for the pedicle flap was then injected with anesthesia.  The flap was excised through the skin and subcutaneous tissue down to the layer of the underlying musculature.  The pedicle flap was carefully excised within this deep plane to maintain its blood supply.  The edges of the donor site were undermined.   The donor site was closed in a primary fashion.  The pedicle was then rotated into position and sutured.  Once the tube was sutured into place, adequate blood supply was confirmed with blanching and refill.  The pedicle was then wrapped with xeroform gauze and dressed appropriately with a telfa and gauze bandage to ensure continued blood supply and protect the attached pedicle.
Xenograft Text: The defect edges were debeveled with a #15 scalpel blade.  Given the location of the defect, shape of the defect and the proximity to free margins a xenograft was deemed most appropriate.  The graft was then trimmed to fit the size of the defect.  The graft was then placed in the primary defect and oriented appropriately.
Ear Star Wedge Flap Text: The defect edges were debeveled with a #15 blade scalpel.  Given the location of the defect and the proximity to free margins (helical rim) an ear star wedge flap was deemed most appropriate.  Using a sterile surgical marker, the appropriate flap was drawn incorporating the defect and placing the expected incisions between the helical rim and antihelix where possible.  The area thus outlined was incised through and through with a #15 scalpel blade.
Bi-Rhombic Flap Text: The defect edges were debeveled with a #15 scalpel blade.  Given the location of the defect and the proximity to free margins a bi-rhombic flap was deemed most appropriate.  Using a sterile surgical marker, an appropriate rhombic flap was drawn incorporating the defect. The area thus outlined was incised deep to adipose tissue with a #15 scalpel blade.  The skin margins were undermined to an appropriate distance in all directions utilizing iris scissors.
Complex Repair And Dorsal Nasal Flap Text: The defect edges were debeveled with a #15 scalpel blade.  The primary defect was closed partially with a complex linear closure.  Given the location of the remaining defect, shape of the defect and the proximity to free margins a dorsal nasal flap was deemed most appropriate for complete closure of the defect.  Using a sterile surgical marker, an appropriate flap was drawn incorporating the defect and placing the expected incisions within the relaxed skin tension lines where possible.    The area thus outlined was incised deep to adipose tissue with a #15 scalpel blade.  The skin margins were undermined to an appropriate distance in all directions utilizing iris scissors.
Advancement Flap (Double) Text: The defect edges were debeveled with a #15 scalpel blade.  Given the location of the defect and the proximity to free margins a double advancement flap was deemed most appropriate.  Using a sterile surgical marker, the appropriate advancement flaps were drawn incorporating the defect and placing the expected incisions within the relaxed skin tension lines where possible.    The area thus outlined was incised deep to adipose tissue with a #15 scalpel blade.  The skin margins were undermined to an appropriate distance in all directions utilizing iris scissors.
O-T Advancement Flap Text: The defect edges were debeveled with a #15 scalpel blade.  Given the location of the defect, shape of the defect and the proximity to free margins an O-T advancement flap was deemed most appropriate.  Using a sterile surgical marker, an appropriate advancement flap was drawn incorporating the defect and placing the expected incisions within the relaxed skin tension lines where possible.    The area thus outlined was incised deep to adipose tissue with a #15 scalpel blade.  The skin margins were undermined to an appropriate distance in all directions utilizing iris scissors.
Complex Repair And O-T Advancement Flap Text: The defect edges were debeveled with a #15 scalpel blade.  The primary defect was closed partially with a complex linear closure.  Given the location of the remaining defect, shape of the defect and the proximity to free margins an O-T advancement flap was deemed most appropriate for complete closure of the defect.  Using a sterile surgical marker, an appropriate advancement flap was drawn incorporating the defect and placing the expected incisions within the relaxed skin tension lines where possible.    The area thus outlined was incised deep to adipose tissue with a #15 scalpel blade.  The skin margins were undermined to an appropriate distance in all directions utilizing iris scissors.
Complex Repair And A-T Advancement Flap Text: The defect edges were debeveled with a #15 scalpel blade.  The primary defect was closed partially with a complex linear closure.  Given the location of the remaining defect, shape of the defect and the proximity to free margins an A-T advancement flap was deemed most appropriate for complete closure of the defect.  Using a sterile surgical marker, an appropriate advancement flap was drawn incorporating the defect and placing the expected incisions within the relaxed skin tension lines where possible.    The area thus outlined was incised deep to adipose tissue with a #15 scalpel blade.  The skin margins were undermined to an appropriate distance in all directions utilizing iris scissors.
Elliptical Excision Additional Text (Leave Blank If You Do Not Want): The margin was drawn around the clinically apparent lesion.  An elliptical shape was then drawn on the skin incorporating the lesion and margins.  Incisions were then made along these lines to the appropriate tissue plane and the lesion was extirpated.
Cartilage Graft Text: The defect edges were debeveled with a #15 scalpel blade.  Given the location of the defect, shape of the defect, the fact the defect involved a full thickness cartilage defect a cartilage graft was deemed most appropriate.  An appropriate donor site was identified, cleansed, and anesthetized. The cartilage graft was then harvested and transferred to the recipient site, oriented appropriately and then sutured into place.  The secondary defect was then repaired using a primary closure.
O-T Plasty Text: The defect edges were debeveled with a #15 scalpel blade.  Given the location of the defect, shape of the defect and the proximity to free margins an O-T plasty was deemed most appropriate.  Using a sterile surgical marker, an appropriate O-T plasty was drawn incorporating the defect and placing the expected incisions within the relaxed skin tension lines where possible.    The area thus outlined was incised deep to adipose tissue with a #15 scalpel blade.  The skin margins were undermined to an appropriate distance in all directions utilizing iris scissors.
Dermal Autograft Text: The defect edges were debeveled with a #15 scalpel blade.  Given the location of the defect, shape of the defect and the proximity to free margins a dermal autograft was deemed most appropriate.  Using a sterile surgical marker, the primary defect shape was transferred to the donor site. The area thus outlined was incised deep to adipose tissue with a #15 scalpel blade.  The harvested graft was then trimmed of adipose and epidermal tissue until only dermis was left.  The skin graft was then placed in the primary defect and oriented appropriately.
Rhombic Flap Text: The defect edges were debeveled with a #15 scalpel blade.  Given the location of the defect and the proximity to free margins a rhombic flap was deemed most appropriate.  Using a sterile surgical marker, an appropriate rhombic flap was drawn incorporating the defect.    The area thus outlined was incised deep to adipose tissue with a #15 scalpel blade.  The skin margins were undermined to an appropriate distance in all directions utilizing iris scissors.
Purse String (Simple) Text: Given the location of the defect and the characteristics of the surrounding skin a purse string simple closure was deemed most appropriate.  Undermining was performed circumferentially around the surgical defect.  A purse string suture was then placed and tightened.
Trilobed Flap Text: The defect edges were debeveled with a #15 scalpel blade.  Given the location of the defect and the proximity to free margins a trilobed flap was deemed most appropriate.  Using a sterile surgical marker, an appropriate trilobed flap drawn around the defect.    The area thus outlined was incised deep to adipose tissue with a #15 scalpel blade.  The skin margins were undermined to an appropriate distance in all directions utilizing iris scissors.
Complex Repair And Transposition Flap Text: The defect edges were debeveled with a #15 scalpel blade.  The primary defect was closed partially with a complex linear closure.  Given the location of the remaining defect, shape of the defect and the proximity to free margins a transposition flap was deemed most appropriate for complete closure of the defect.  Using a sterile surgical marker, an appropriate advancement flap was drawn incorporating the defect and placing the expected incisions within the relaxed skin tension lines where possible.    The area thus outlined was incised deep to adipose tissue with a #15 scalpel blade.  The skin margins were undermined to an appropriate distance in all directions utilizing iris scissors.
Perilesional Excision Additional Text (Leave Blank If You Do Not Want): The margin was drawn around the clinically apparent lesion. Incisions were then made along these lines to the appropriate tissue plane and the lesion was extirpated.
Epidermal Closure Graft Donor Site (Optional): simple interrupted
Deep Sutures: 3-0 Maxon
Split-Thickness Skin Graft Text: The defect edges were debeveled with a #15 scalpel blade.  Given the location of the defect, shape of the defect and the proximity to free margins a split thickness skin graft was deemed most appropriate.  Using a sterile surgical marker, the primary defect shape was transferred to the donor site. The split thickness graft was then harvested.  The skin graft was then placed in the primary defect and oriented appropriately.
Complex Repair And V-Y Plasty Text: The defect edges were debeveled with a #15 scalpel blade.  The primary defect was closed partially with a complex linear closure.  Given the location of the remaining defect, shape of the defect and the proximity to free margins a V-Y plasty was deemed most appropriate for complete closure of the defect.  Using a sterile surgical marker, an appropriate advancement flap was drawn incorporating the defect and placing the expected incisions within the relaxed skin tension lines where possible.    The area thus outlined was incised deep to adipose tissue with a #15 scalpel blade.  The skin margins were undermined to an appropriate distance in all directions utilizing iris scissors.
Fusiform Excision Additional Text (Leave Blank If You Do Not Want): The margin was drawn around the clinically apparent lesion.  A fusiform shape was then drawn on the skin incorporating the lesion and margins.  Incisions were then made along these lines to the appropriate tissue plane and the lesion was extirpated.
Hatchet Flap Text: The defect edges were debeveled with a #15 scalpel blade.  Given the location of the defect, shape of the defect and the proximity to free margins a hatchet flap was deemed most appropriate.  Using a sterile surgical marker, an appropriate hatchet flap was drawn incorporating the defect and placing the expected incisions within the relaxed skin tension lines where possible.    The area thus outlined was incised deep to adipose tissue with a #15 scalpel blade.  The skin margins were undermined to an appropriate distance in all directions utilizing iris scissors.
Muscle Hinge Flap Text: The defect edges were debeveled with a #15 scalpel blade.  Given the size, depth and location of the defect and the proximity to free margins a muscle hinge flap was deemed most appropriate.  Using a sterile surgical marker, an appropriate hinge flap was drawn incorporating the defect. The area thus outlined was incised with a #15 scalpel blade.  The skin margins were undermined to an appropriate distance in all directions utilizing iris scissors.
Spiral Flap Text: The defect edges were debeveled with a #15 scalpel blade.  Given the location of the defect, shape of the defect and the proximity to free margins a spiral flap was deemed most appropriate.  Using a sterile surgical marker, an appropriate rotation flap was drawn incorporating the defect and placing the expected incisions within the relaxed skin tension lines where possible. The area thus outlined was incised deep to adipose tissue with a #15 scalpel blade.  The skin margins were undermined to an appropriate distance in all directions utilizing iris scissors.
Purse String (Intermediate) Text: Given the location of the defect and the characteristics of the surrounding skin a pursestring intermediate closure was deemed most appropriate.  Undermining was performed circumfirentially around the surgical defect.  A purstring suture was then placed and tightened.
Helical Rim Advancement Flap Text: The defect edges were debeveled with a #15 blade scalpel.  Given the location of the defect and the proximity to free margins (helical rim) a double helical rim advancement flap was deemed most appropriate.  Using a sterile surgical marker, the appropriate advancement flaps were drawn incorporating the defect and placing the expected incisions between the helical rim and antihelix where possible.  The area thus outlined was incised through and through with a #15 scalpel blade.  With a skin hook and iris scissors, the flaps were gently and sharply undermined and freed up.
A-T Advancement Flap Text: The defect edges were debeveled with a #15 scalpel blade.  Given the location of the defect, shape of the defect and the proximity to free margins an A-T advancement flap was deemed most appropriate.  Using a sterile surgical marker, an appropriate advancement flap was drawn incorporating the defect and placing the expected incisions within the relaxed skin tension lines where possible.    The area thus outlined was incised deep to adipose tissue with a #15 scalpel blade.  The skin margins were undermined to an appropriate distance in all directions utilizing iris scissors.
Repair Performed By Another Provider Text (Leave Blank If You Do Not Want): After the tissue was excised the defect was repaired by another provider.
Curvilinear Excision Additional Text (Leave Blank If You Do Not Want): The margin was drawn around the clinically apparent lesion.  A curvilinear shape was then drawn on the skin incorporating the lesion and margins.  Incisions were then made along these lines to the appropriate tissue plane and the lesion was extirpated.

## 2017-10-11 ENCOUNTER — APPOINTMENT (OUTPATIENT)
Age: 62
Setting detail: DERMATOLOGY
End: 2017-10-12

## 2017-10-11 DIAGNOSIS — Z48.02 ENCOUNTER FOR REMOVAL OF SUTURES: ICD-10-CM

## 2017-10-11 PROCEDURE — OTHER SUTURE REMOVAL (GLOBAL PERIOD): OTHER

## 2017-10-11 ASSESSMENT — LOCATION DETAILED DESCRIPTION DERM: LOCATION DETAILED: LEFT MID-UPPER BACK

## 2017-10-11 ASSESSMENT — LOCATION SIMPLE DESCRIPTION DERM: LOCATION SIMPLE: LEFT UPPER BACK

## 2017-10-11 ASSESSMENT — LOCATION ZONE DERM: LOCATION ZONE: TRUNK

## 2017-10-11 NOTE — PROCEDURE: SUTURE REMOVAL (GLOBAL PERIOD)
Detail Level: Detailed
Add 58760 Cpt? (Important Note: In 2017 The Use Of 69863 Is Being Tracked By Cms To Determine Future Global Period Reimbursement For Global Periods): no

## 2018-03-08 ENCOUNTER — APPOINTMENT (OUTPATIENT)
Age: 63
Setting detail: DERMATOLOGY
End: 2018-03-09

## 2018-03-08 DIAGNOSIS — L57.0 ACTINIC KERATOSIS: ICD-10-CM

## 2018-03-08 DIAGNOSIS — Z85.828 PERSONAL HISTORY OF OTHER MALIGNANT NEOPLASM OF SKIN: ICD-10-CM

## 2018-03-08 DIAGNOSIS — L82.1 OTHER SEBORRHEIC KERATOSIS: ICD-10-CM

## 2018-03-08 PROBLEM — D48.5 NEOPLASM OF UNCERTAIN BEHAVIOR OF SKIN: Status: ACTIVE | Noted: 2018-03-08

## 2018-03-08 PROCEDURE — 11100: CPT | Mod: 59

## 2018-03-08 PROCEDURE — OTHER BIOPSY BY SHAVE METHOD: OTHER

## 2018-03-08 PROCEDURE — OTHER COUNSELING: OTHER

## 2018-03-08 PROCEDURE — OTHER LIQUID NITROGEN: OTHER

## 2018-03-08 PROCEDURE — 99213 OFFICE O/P EST LOW 20 MIN: CPT | Mod: 25

## 2018-03-08 PROCEDURE — 17003 DESTRUCT PREMALG LES 2-14: CPT

## 2018-03-08 PROCEDURE — 17000 DESTRUCT PREMALG LESION: CPT

## 2018-03-08 PROCEDURE — 69100 BIOPSY OF EXTERNAL EAR: CPT | Mod: 59

## 2018-03-08 ASSESSMENT — LOCATION DETAILED DESCRIPTION DERM
LOCATION DETAILED: LEFT POPLITEAL SKIN
LOCATION DETAILED: LEFT DISTAL DORSAL FOREARM
LOCATION DETAILED: LEFT INFERIOR UPPER BACK
LOCATION DETAILED: RIGHT DISTAL DORSAL FOREARM
LOCATION DETAILED: LEFT CENTRAL MALAR CHEEK
LOCATION DETAILED: RIGHT ANTERIOR MEDIAL PROXIMAL THIGH
LOCATION DETAILED: RIGHT CENTRAL ZYGOMA
LOCATION DETAILED: RIGHT PROXIMAL DORSAL FOREARM
LOCATION DETAILED: LEFT CENTRAL ZYGOMA
LOCATION DETAILED: RIGHT SCAPHA
LOCATION DETAILED: RIGHT CENTRAL TEMPLE
LOCATION DETAILED: RIGHT SUPERIOR CRUS OF ANTIHELIX
LOCATION DETAILED: LEFT INFERIOR LATERAL MALAR CHEEK
LOCATION DETAILED: RIGHT MID-UPPER BACK
LOCATION DETAILED: LEFT SUPERIOR POSTERIOR NECK

## 2018-03-08 ASSESSMENT — LOCATION ZONE DERM
LOCATION ZONE: EAR
LOCATION ZONE: LEG
LOCATION ZONE: TRUNK
LOCATION ZONE: FACE
LOCATION ZONE: ARM
LOCATION ZONE: NECK

## 2018-03-08 ASSESSMENT — LOCATION SIMPLE DESCRIPTION DERM
LOCATION SIMPLE: RIGHT THIGH
LOCATION SIMPLE: RIGHT UPPER BACK
LOCATION SIMPLE: LEFT CHEEK
LOCATION SIMPLE: LEFT FOREARM
LOCATION SIMPLE: POSTERIOR NECK
LOCATION SIMPLE: RIGHT FOREARM
LOCATION SIMPLE: LEFT ZYGOMA
LOCATION SIMPLE: RIGHT TEMPLE
LOCATION SIMPLE: LEFT POPLITEAL SKIN
LOCATION SIMPLE: RIGHT ZYGOMA
LOCATION SIMPLE: LEFT UPPER BACK
LOCATION SIMPLE: RIGHT EAR

## 2018-03-08 NOTE — PROCEDURE: BIOPSY BY SHAVE METHOD
Biopsy Type: H and E
Billing Type: Third-Party Bill
Bill For Surgical Tray: no
Size Of Lesion In Cm: 0.9
Curettage Text: The wound bed was treated with curettage after the biopsy was performed.
Electrodesiccation And Curettage Text: The wound bed was treated with electrodesiccation and curettage after the biopsy was performed.
Post-Care Instructions: I reviewed with the patient in detail post-care instructions. Patient is to keep the biopsy site dry overnight, and then apply bacitracin twice daily until healed. Patient may apply hydrogen peroxide soaks to remove any crusting.
Type Of Destruction Used: Curettage
Detail Level: Detailed
Additional Anesthesia Volume In Cc (Will Not Render If 0): 0
Cryotherapy Text: The wound bed was treated with cryotherapy after the biopsy was performed.
Wound Care: Mupirocin
Silver Nitrate Text: The wound bed was treated with silver nitrate after the biopsy was performed.
Consent: Written consent was obtained and risks were reviewed including but not limited to scarring, infection, bleeding, scabbing, incomplete removal, nerve damage and allergy to anesthesia.
Electrodesiccation Text: The wound bed was treated with electrodesiccation after the biopsy was performed.
Anesthesia Volume In Cc: 0.5
Size Of Lesion In Cm: 1
Anesthesia Type: 1% lidocaine with epinephrine
Biopsy Method: 10 blade
Notification Instructions: Patient will be notified of biopsy results. However, patient instructed to call the office if not contacted within 2 weeks.
Dressing: bandage
Hemostasis: Electrocautery

## 2018-03-20 ENCOUNTER — APPOINTMENT (OUTPATIENT)
Age: 63
Setting detail: DERMATOLOGY
End: 2018-03-21

## 2018-03-20 DIAGNOSIS — L57.0 ACTINIC KERATOSIS: ICD-10-CM

## 2018-03-20 PROCEDURE — 17003 DESTRUCT PREMALG LES 2-14: CPT

## 2018-03-20 PROCEDURE — OTHER LIQUID NITROGEN: OTHER

## 2018-03-20 PROCEDURE — 17000 DESTRUCT PREMALG LESION: CPT

## 2018-03-20 ASSESSMENT — LOCATION DETAILED DESCRIPTION DERM
LOCATION DETAILED: LEFT INFERIOR POSTAURICULAR SKIN
LOCATION DETAILED: LEFT POSTAURICULAR SKIN

## 2018-03-20 ASSESSMENT — LOCATION SIMPLE DESCRIPTION DERM: LOCATION SIMPLE: POSTERIOR SCALP

## 2018-03-20 ASSESSMENT — LOCATION ZONE DERM: LOCATION ZONE: SCALP

## 2018-03-20 NOTE — PROCEDURE: LIQUID NITROGEN
Consent: The patient's consent was obtained including but not limited to risks of crusting, scabbing, blistering, scarring, darker or lighter pigmentary change, recurrence, incomplete removal and infection.
Detail Level: Detailed
Render Post-Care Instructions In Note?: no
Number Of Freeze-Thaw Cycles: 1 freeze-thaw cycle
Duration Of Freeze Thaw-Cycle (Seconds): 5
Post-Care Instructions: I reviewed with the patient in detail post-care instructions. Patient is to wear sunprotection, and avoid picking at any of the treated lesions. Pt may apply Vaseline to crusted or scabbing areas.

## 2018-09-06 ENCOUNTER — APPOINTMENT (OUTPATIENT)
Age: 63
Setting detail: DERMATOLOGY
End: 2018-09-07

## 2018-09-06 DIAGNOSIS — L57.8 OTHER SKIN CHANGES DUE TO CHRONIC EXPOSURE TO NONIONIZING RADIATION: ICD-10-CM

## 2018-09-06 DIAGNOSIS — L82.1 OTHER SEBORRHEIC KERATOSIS: ICD-10-CM

## 2018-09-06 DIAGNOSIS — L81.4 OTHER MELANIN HYPERPIGMENTATION: ICD-10-CM

## 2018-09-06 DIAGNOSIS — L57.0 ACTINIC KERATOSIS: ICD-10-CM

## 2018-09-06 DIAGNOSIS — Z85.828 PERSONAL HISTORY OF OTHER MALIGNANT NEOPLASM OF SKIN: ICD-10-CM

## 2018-09-06 PROCEDURE — 17000 DESTRUCT PREMALG LESION: CPT

## 2018-09-06 PROCEDURE — 99214 OFFICE O/P EST MOD 30 MIN: CPT | Mod: 25

## 2018-09-06 PROCEDURE — OTHER REASSURANCE: OTHER

## 2018-09-06 PROCEDURE — 17003 DESTRUCT PREMALG LES 2-14: CPT

## 2018-09-06 PROCEDURE — OTHER OBSERVATION: OTHER

## 2018-09-06 PROCEDURE — OTHER COUNSELING: OTHER

## 2018-09-06 PROCEDURE — OTHER LIQUID NITROGEN: OTHER

## 2018-09-06 ASSESSMENT — LOCATION DETAILED DESCRIPTION DERM
LOCATION DETAILED: RIGHT SUPERIOR MEDIAL UPPER BACK
LOCATION DETAILED: RIGHT PROXIMAL DORSAL FOREARM
LOCATION DETAILED: RIGHT SUPERIOR LATERAL MALAR CHEEK
LOCATION DETAILED: LEFT INFERIOR ANTERIOR NECK
LOCATION DETAILED: LEFT MID TEMPLE
LOCATION DETAILED: LEFT DORSAL WRIST
LOCATION DETAILED: RIGHT POSTERIOR SHOULDER
LOCATION DETAILED: LEFT ULNAR DORSAL HAND
LOCATION DETAILED: LEFT CENTRAL MALAR CHEEK
LOCATION DETAILED: LEFT POSTERIOR SHOULDER
LOCATION DETAILED: RIGHT POSTERIOR NECK
LOCATION DETAILED: RIGHT INFERIOR POSTERIOR NECK
LOCATION DETAILED: LEFT INFERIOR UPPER BACK
LOCATION DETAILED: LEFT PROXIMAL DORSAL FOREARM

## 2018-09-06 ASSESSMENT — LOCATION SIMPLE DESCRIPTION DERM
LOCATION SIMPLE: LEFT CHEEK
LOCATION SIMPLE: RIGHT CHEEK
LOCATION SIMPLE: RIGHT UPPER BACK
LOCATION SIMPLE: LEFT UPPER BACK
LOCATION SIMPLE: LEFT ANTERIOR NECK
LOCATION SIMPLE: LEFT HAND
LOCATION SIMPLE: LEFT SHOULDER
LOCATION SIMPLE: RIGHT SHOULDER
LOCATION SIMPLE: LEFT TEMPLE
LOCATION SIMPLE: LEFT WRIST
LOCATION SIMPLE: POSTERIOR NECK
LOCATION SIMPLE: LEFT FOREARM
LOCATION SIMPLE: RIGHT FOREARM

## 2018-09-06 ASSESSMENT — LOCATION ZONE DERM
LOCATION ZONE: NECK
LOCATION ZONE: ARM
LOCATION ZONE: HAND
LOCATION ZONE: FACE
LOCATION ZONE: TRUNK

## 2018-09-06 NOTE — PROCEDURE: OBSERVATION
Size Of Lesion In Cm (Optional): 0
Body Location Override (Optional - Billing Will Still Be Based On Selected Body Map Location If Applicable): left lat temple
Detail Level: Detailed
Body Location Override (Optional - Billing Will Still Be Based On Selected Body Map Location If Applicable): left medial upper back

## 2019-03-20 ENCOUNTER — APPOINTMENT (OUTPATIENT)
Age: 64
Setting detail: DERMATOLOGY
End: 2019-03-21

## 2019-03-20 DIAGNOSIS — L57.0 ACTINIC KERATOSIS: ICD-10-CM

## 2019-03-20 DIAGNOSIS — D485 NEOPLASM OF UNCERTAIN BEHAVIOR OF SKIN: ICD-10-CM

## 2019-03-20 DIAGNOSIS — Z85.828 PERSONAL HISTORY OF OTHER MALIGNANT NEOPLASM OF SKIN: ICD-10-CM

## 2019-03-20 PROBLEM — D48.5 NEOPLASM OF UNCERTAIN BEHAVIOR OF SKIN: Status: ACTIVE | Noted: 2019-03-20

## 2019-03-20 PROCEDURE — 17000 DESTRUCT PREMALG LESION: CPT | Mod: 59

## 2019-03-20 PROCEDURE — OTHER BIOPSY BY SHAVE METHOD: OTHER

## 2019-03-20 PROCEDURE — OTHER MIPS QUALITY: OTHER

## 2019-03-20 PROCEDURE — 99213 OFFICE O/P EST LOW 20 MIN: CPT | Mod: 25

## 2019-03-20 PROCEDURE — OTHER OBSERVATION: OTHER

## 2019-03-20 PROCEDURE — 11102 TANGNTL BX SKIN SINGLE LES: CPT

## 2019-03-20 PROCEDURE — OTHER COUNSELING: OTHER

## 2019-03-20 PROCEDURE — 17003 DESTRUCT PREMALG LES 2-14: CPT

## 2019-03-20 PROCEDURE — OTHER LIQUID NITROGEN: OTHER

## 2019-03-20 PROCEDURE — 11103 TANGNTL BX SKIN EA SEP/ADDL: CPT

## 2019-03-20 ASSESSMENT — LOCATION SIMPLE DESCRIPTION DERM
LOCATION SIMPLE: LEFT FOREHEAD
LOCATION SIMPLE: LEFT FOREARM
LOCATION SIMPLE: RIGHT ZYGOMA
LOCATION SIMPLE: RIGHT CHEEK
LOCATION SIMPLE: SCALP
LOCATION SIMPLE: LEFT CHEEK
LOCATION SIMPLE: NOSE
LOCATION SIMPLE: RIGHT FOREHEAD
LOCATION SIMPLE: LEFT SHOULDER
LOCATION SIMPLE: LEFT UPPER BACK
LOCATION SIMPLE: RIGHT TEMPLE
LOCATION SIMPLE: LEFT TEMPLE

## 2019-03-20 ASSESSMENT — LOCATION DETAILED DESCRIPTION DERM
LOCATION DETAILED: LEFT POSTERIOR SHOULDER
LOCATION DETAILED: LEFT FOREHEAD
LOCATION DETAILED: LEFT DISTAL DORSAL FOREARM
LOCATION DETAILED: RIGHT SUPERIOR LATERAL MALAR CHEEK
LOCATION DETAILED: LEFT INFERIOR FOREHEAD
LOCATION DETAILED: RIGHT LATERAL TEMPLE
LOCATION DETAILED: LEFT CENTRAL POSTAURICULAR SKIN
LOCATION DETAILED: LEFT INFERIOR UPPER BACK
LOCATION DETAILED: LEFT CENTRAL TEMPLE
LOCATION DETAILED: RIGHT MEDIAL ZYGOMA
LOCATION DETAILED: LEFT INFERIOR CENTRAL MALAR CHEEK
LOCATION DETAILED: LEFT PROXIMAL DORSAL FOREARM
LOCATION DETAILED: RIGHT FOREHEAD
LOCATION DETAILED: RIGHT NASAL DORSUM
LOCATION DETAILED: LEFT MID TEMPLE
LOCATION DETAILED: RIGHT CENTRAL TEMPLE
LOCATION DETAILED: RIGHT CENTRAL ZYGOMA
LOCATION DETAILED: LEFT SUPERIOR CENTRAL MALAR CHEEK

## 2019-03-20 ASSESSMENT — LOCATION ZONE DERM
LOCATION ZONE: ARM
LOCATION ZONE: NOSE
LOCATION ZONE: SCALP
LOCATION ZONE: FACE
LOCATION ZONE: TRUNK

## 2019-03-20 NOTE — PROCEDURE: BIOPSY BY SHAVE METHOD
Silver Nitrate Text: The wound bed was treated with silver nitrate after the biopsy was performed.
Cryotherapy Text: The wound bed was treated with cryotherapy after the biopsy was performed.
Additional Anesthesia Volume In Cc (Will Not Render If 0): 0
Dressing: bandage
Anesthesia Type: 1% lidocaine with epinephrine
Bill For Surgical Tray: no
Biopsy Type: H and E
Billing Type: Third-Party Bill
Curettage Text: The wound bed was treated with curettage after the biopsy was performed.
Size Of Lesion In Cm: 0.4
Was A Bandage Applied: Yes
Electrodesiccation Text: The wound bed was treated with electrodesiccation after the biopsy was performed.
Consent: Written consent was obtained and risks were reviewed including but not limited to scarring, infection, bleeding, scabbing, incomplete removal, nerve damage and allergy to anesthesia.  Clinical evaluation reveals changes suspicious for rule out provided in pathology requisition.  Intent of procedure is to obtain tissue sample for histopathic examination.  A skin biopsy is considered a necessary and appropriate to clarify the diagnosis.
Post-Care Instructions: I reviewed with the patient in detail post-care instructions. Patient is to keep the biopsy site dry overnight, and then apply bacitracin twice daily until healed. Patient may apply hydrogen peroxide soaks to remove any crusting.
Biopsy Method: 10 blade
Hemostasis: Electrocautery
Wound Care: Vaseline
Depth Of Biopsy: dermis
Notification Instructions: Patient will be notified of biopsy results. However, patient instructed to call the office if not contacted within 2 weeks.
Electrodesiccation And Curettage Text: The wound bed was treated with electrodesiccation and curettage after the biopsy was performed.
Type Of Destruction Used: Curettage
Anesthesia Volume In Cc: 0.5
Detail Level: Detailed

## 2019-03-20 NOTE — PROCEDURE: OBSERVATION
Size Of Lesion In Cm (Optional): 0
Body Location Override (Optional - Billing Will Still Be Based On Selected Body Map Location If Applicable): left lat temple
Body Location Override (Optional - Billing Will Still Be Based On Selected Body Map Location If Applicable): left medial upper back
Detail Level: Detailed

## 2019-03-20 NOTE — PROCEDURE: LIQUID NITROGEN
Detail Level: Detailed
Duration Of Freeze Thaw-Cycle (Seconds): 5
Number Of Freeze-Thaw Cycles: 1 freeze-thaw cycle
Render Post-Care Instructions In Note?: no
Consent: The patient's consent was obtained including but not limited to risks of crusting, scabbing, blistering, scarring, darker or lighter pigmentary change, recurrence, incomplete removal and infection.
Post-Care Instructions: I reviewed with the patient in detail post-care instructions. Patient is to wear sunprotection, and avoid picking at any of the treated lesions. Pt may apply Vaseline to crusted or scabbing areas.

## 2019-04-17 ENCOUNTER — APPOINTMENT (OUTPATIENT)
Age: 64
Setting detail: DERMATOLOGY
End: 2019-04-18

## 2019-04-17 DIAGNOSIS — L57.0 ACTINIC KERATOSIS: ICD-10-CM

## 2019-04-17 PROBLEM — C44.619 BASAL CELL CARCINOMA OF SKIN OF LEFT UPPER LIMB, INCLUDING SHOULDER: Status: ACTIVE | Noted: 2019-04-17

## 2019-04-17 PROCEDURE — OTHER LIQUID NITROGEN: OTHER

## 2019-04-17 PROCEDURE — OTHER MIPS QUALITY: OTHER

## 2019-04-17 PROCEDURE — OTHER EXCISION: OTHER

## 2019-04-17 PROCEDURE — 12032 INTMD RPR S/A/T/EXT 2.6-7.5: CPT | Mod: 59

## 2019-04-17 PROCEDURE — 11603 EXC TR-EXT MAL+MARG 2.1-3 CM: CPT

## 2019-04-17 PROCEDURE — 17000 DESTRUCT PREMALG LESION: CPT | Mod: 59

## 2019-04-17 ASSESSMENT — LOCATION DETAILED DESCRIPTION DERM: LOCATION DETAILED: LEFT CENTRAL POSTAURICULAR SKIN

## 2019-04-17 ASSESSMENT — LOCATION ZONE DERM: LOCATION ZONE: SCALP

## 2019-04-17 ASSESSMENT — LOCATION SIMPLE DESCRIPTION DERM: LOCATION SIMPLE: SCALP

## 2019-04-17 NOTE — PROCEDURE: EXCISION
Anesthesia Volume In Cc: 6
Trilobed Flap Text: The defect edges were debeveled with a #15 scalpel blade.  Given the location of the defect and the proximity to free margins a trilobed flap was deemed most appropriate.  Using a sterile surgical marker, an appropriate trilobed flap drawn around the defect.    The area thus outlined was incised deep to adipose tissue with a #15 scalpel blade.  The skin margins were undermined to an appropriate distance in all directions utilizing iris scissors.
Island Pedicle Flap-Requiring Vessel Identification Text: The defect edges were debeveled with a #15 scalpel blade.  Given the location of the defect, shape of the defect and the proximity to free margins an island pedicle advancement flap was deemed most appropriate.  Using a sterile surgical marker, an appropriate advancement flap was drawn, based on the axial vessel mentioned above, incorporating the defect, outlining the appropriate donor tissue and placing the expected incisions within the relaxed skin tension lines where possible.    The area thus outlined was incised deep to adipose tissue with a #15 scalpel blade.  The skin margins were undermined to an appropriate distance in all directions around the primary defect and laterally outward around the island pedicle utilizing iris scissors.  There was minimal undermining beneath the pedicle flap.
Bilobed Transposition Flap Text: The defect edges were debeveled with a #15 scalpel blade.  Given the location of the defect and the proximity to free margins a bilobed transposition flap was deemed most appropriate.  Using a sterile surgical marker, an appropriate bilobe flap drawn around the defect.    The area thus outlined was incised deep to adipose tissue with a #15 scalpel blade.  The skin margins were undermined to an appropriate distance in all directions utilizing iris scissors.
Complex Repair And Ftsg Text: The defect edges were debeveled with a #15 scalpel blade.  The primary defect was closed partially with a complex linear closure.  Given the location of the defect, shape of the defect and the proximity to free margins a full thickness skin graft was deemed most appropriate to repair the remaining defect.  The graft was trimmed to fit the size of the remaining defect.  The graft was then placed in the primary defect, oriented appropriately, and sutured into place.
Bi-Rhombic Flap Text: The defect edges were debeveled with a #15 scalpel blade.  Given the location of the defect and the proximity to free margins a bi-rhombic flap was deemed most appropriate.  Using a sterile surgical marker, an appropriate rhombic flap was drawn incorporating the defect. The area thus outlined was incised deep to adipose tissue with a #15 scalpel blade.  The skin margins were undermined to an appropriate distance in all directions utilizing iris scissors.
Complex Repair And Transposition Flap Text: The defect edges were debeveled with a #15 scalpel blade.  The primary defect was closed partially with a complex linear closure.  Given the location of the remaining defect, shape of the defect and the proximity to free margins a transposition flap was deemed most appropriate for complete closure of the defect.  Using a sterile surgical marker, an appropriate advancement flap was drawn incorporating the defect and placing the expected incisions within the relaxed skin tension lines where possible.    The area thus outlined was incised deep to adipose tissue with a #15 scalpel blade.  The skin margins were undermined to an appropriate distance in all directions utilizing iris scissors.
Complex Repair And Xenograft Text: The defect edges were debeveled with a #15 scalpel blade.  The primary defect was closed partially with a complex linear closure.  Given the location of the defect, shape of the defect and the proximity to free margins a xenograft was deemed most appropriate to repair the remaining defect.  The graft was trimmed to fit the size of the remaining defect.  The graft was then placed in the primary defect, oriented appropriately, and sutured into place.
Transposition Flap Text: The defect edges were debeveled with a #15 scalpel blade.  Given the location of the defect and the proximity to free margins a transposition flap was deemed most appropriate.  Using a sterile surgical marker, an appropriate transposition flap was drawn incorporating the defect.    The area thus outlined was incised deep to adipose tissue with a #15 scalpel blade.  The skin margins were undermined to an appropriate distance in all directions utilizing iris scissors.
Purse String (Intermediate) Text: Given the location of the defect and the characteristics of the surrounding skin a pursestring intermediate closure was deemed most appropriate.  Undermining was performed circumfirentially around the surgical defect.  A purstring suture was then placed and tightened.
Cheek Interpolation Flap Text: A decision was made to reconstruct the defect utilizing an interpolation axial flap and a staged reconstruction.  A telfa template was made of the defect.  This telfa template was then used to outline the Cheek Interpolation flap.  The donor area for the pedicle flap was then injected with anesthesia.  The flap was excised through the skin and subcutaneous tissue down to the layer of the underlying musculature.  The interpolation flap was carefully excised within this deep plane to maintain its blood supply.  The edges of the donor site were undermined.   The donor site was closed in a primary fashion.  The pedicle was then rotated into position and sutured.  Once the tube was sutured into place, adequate blood supply was confirmed with blanching and refill.  The pedicle was then wrapped with xeroform gauze and dressed appropriately with a telfa and gauze bandage to ensure continued blood supply and protect the attached pedicle.
Lip Wedge Excision Repair Text: Given the location of the defect and the proximity to free margins a full thickness wedge repair was deemed most appropriate.  Using a sterile surgical marker, the appropriate repair was drawn incorporating the defect and placing the expected incisions perpendicular to the vermilion border.  The vermilion border was also meticulously outlined to ensure appropriate reapproximation during the repair.  The area thus outlined was incised through and through with a #15 scalpel blade.  The muscularis and dermis were reaproximated with deep sutures following hemostasis. Care was taken to realign the vermilion border before proceeding with the superficial closure.  Once the vermilion was realigned the superfical and mucosal closure was finished.
Complex Repair Preamble Text (Leave Blank If You Do Not Want): Extensive wide undermining was performed.
Wound Care: Mupirocin
Crescentic Intermediate Repair Preamble Text (Leave Blank If You Do Not Want): Undermining was performed with blunt dissection.
Mastoid Interpolation Flap Text: A decision was made to reconstruct the defect utilizing an interpolation axial flap and a staged reconstruction.  A telfa template was made of the defect.  This telfa template was then used to outline the mastoid interpolation flap.  The donor area for the pedicle flap was then injected with anesthesia.  The flap was excised through the skin and subcutaneous tissue down to the layer of the underlying musculature.  The pedicle flap was carefully excised within this deep plane to maintain its blood supply.  The edges of the donor site were undermined.   The donor site was closed in a primary fashion.  The pedicle was then rotated into position and sutured.  Once the tube was sutured into place, adequate blood supply was confirmed with blanching and refill.  The pedicle was then wrapped with xeroform gauze and dressed appropriately with a telfa and gauze bandage to ensure continued blood supply and protect the attached pedicle.
Rhomboid Transposition Flap Text: The defect edges were debeveled with a #15 scalpel blade.  Given the location of the defect and the proximity to free margins a rhomboid transposition flap was deemed most appropriate.  Using a sterile surgical marker, an appropriate rhomboid flap was drawn incorporating the defect.    The area thus outlined was incised deep to adipose tissue with a #15 scalpel blade.  The skin margins were undermined to an appropriate distance in all directions utilizing iris scissors.
Bill For Surgical Tray: no
Epidermal Sutures: 3-0 Nylon
A-T Advancement Flap Text: The defect edges were debeveled with a #15 scalpel blade.  Given the location of the defect, shape of the defect and the proximity to free margins an A-T advancement flap was deemed most appropriate.  Using a sterile surgical marker, an appropriate advancement flap was drawn incorporating the defect and placing the expected incisions within the relaxed skin tension lines where possible.    The area thus outlined was incised deep to adipose tissue with a #15 scalpel blade.  The skin margins were undermined to an appropriate distance in all directions utilizing iris scissors.
Star Wedge Flap Text: The defect edges were debeveled with a #15 scalpel blade.  Given the location of the defect, shape of the defect and the proximity to free margins a star wedge flap was deemed most appropriate.  Using a sterile surgical marker, an appropriate rotation flap was drawn incorporating the defect and placing the expected incisions within the relaxed skin tension lines where possible. The area thus outlined was incised deep to adipose tissue with a #15 scalpel blade.  The skin margins were undermined to an appropriate distance in all directions utilizing iris scissors.
Muscle Hinge Flap Text: The defect edges were debeveled with a #15 scalpel blade.  Given the size, depth and location of the defect and the proximity to free margins a muscle hinge flap was deemed most appropriate.  Using a sterile surgical marker, an appropriate hinge flap was drawn incorporating the defect. The area thus outlined was incised with a #15 scalpel blade.  The skin margins were undermined to an appropriate distance in all directions utilizing iris scissors.
W Plasty Text: The lesion was extirpated to the level of the fat with a #15 scalpel blade.  Given the location of the defect, shape of the defect and the proximity to free margins a W-plasty was deemed most appropriate for repair.  Using a sterile surgical marker, the appropriate transposition arms of the W-plasty were drawn incorporating the defect and placing the expected incisions within the relaxed skin tension lines where possible.    The area thus outlined was incised deep to adipose tissue with a #15 scalpel blade.  The skin margins were undermined to an appropriate distance in all directions utilizing iris scissors.  The opposing transposition arms were then transposed into place in opposite direction and anchored with interrupted buried subcutaneous sutures.
Complex Repair And V-Y Plasty Text: The defect edges were debeveled with a #15 scalpel blade.  The primary defect was closed partially with a complex linear closure.  Given the location of the remaining defect, shape of the defect and the proximity to free margins a V-Y plasty was deemed most appropriate for complete closure of the defect.  Using a sterile surgical marker, an appropriate advancement flap was drawn incorporating the defect and placing the expected incisions within the relaxed skin tension lines where possible.    The area thus outlined was incised deep to adipose tissue with a #15 scalpel blade.  The skin margins were undermined to an appropriate distance in all directions utilizing iris scissors.
Show Asc Variables: Yes
Partial Purse String (Simple) Text: Given the location of the defect and the characteristics of the surrounding skin a simple purse string closure was deemed most appropriate.  Undermining was performed circumferentially around the surgical defect.  A purse string suture was then placed and tightened. Wound tension of the circular defect prevented complete closure of the wound.
Complex Repair And O-T Advancement Flap Text: The defect edges were debeveled with a #15 scalpel blade.  The primary defect was closed partially with a complex linear closure.  Given the location of the remaining defect, shape of the defect and the proximity to free margins an O-T advancement flap was deemed most appropriate for complete closure of the defect.  Using a sterile surgical marker, an appropriate advancement flap was drawn incorporating the defect and placing the expected incisions within the relaxed skin tension lines where possible.    The area thus outlined was incised deep to adipose tissue with a #15 scalpel blade.  The skin margins were undermined to an appropriate distance in all directions utilizing iris scissors.
Burow's Advancement Flap Text: The defect edges were debeveled with a #15 scalpel blade.  Given the location of the defect and the proximity to free margins a Burow's advancement flap was deemed most appropriate.  Using a sterile surgical marker, the appropriate advancement flap was drawn incorporating the defect and placing the expected incisions within the relaxed skin tension lines where possible.    The area thus outlined was incised deep to adipose tissue with a #15 scalpel blade.  The skin margins were undermined to an appropriate distance in all directions utilizing iris scissors.
Home Suture Removal Text: Patient was provided a home suture removal kit and will remove their sutures at home.  If they have any questions or difficulties they will call the office.
Information: Selecting Yes will display possible errors in your note based on the variables you have selected. This validation is only offered as a suggestion for you. PLEASE NOTE THAT THE VALIDATION TEXT WILL BE REMOVED WHEN YOU FINALIZE YOUR NOTE. IF YOU WANT TO FAX A PRELIMINARY NOTE YOU WILL NEED TO TOGGLE THIS TO 'NO' IF YOU DO NOT WANT IT IN YOUR FAXED NOTE.
Secondary Defect Length (In Cm): 0
Complex Repair And Epidermal Autograft Text: The defect edges were debeveled with a #15 scalpel blade.  The primary defect was closed partially with a complex linear closure.  Given the location of the defect, shape of the defect and the proximity to free margins an epidermal autograft was deemed most appropriate to repair the remaining defect.  The graft was trimmed to fit the size of the remaining defect.  The graft was then placed in the primary defect, oriented appropriately, and sutured into place.
Complex Repair And Double Advancement Flap Text: The defect edges were debeveled with a #15 scalpel blade.  The primary defect was closed partially with a complex linear closure.  Given the location of the remaining defect, shape of the defect and the proximity to free margins a double advancement flap was deemed most appropriate for complete closure of the defect.  Using a sterile surgical marker, an appropriate advancement flap was drawn incorporating the defect and placing the expected incisions within the relaxed skin tension lines where possible.    The area thus outlined was incised deep to adipose tissue with a #15 scalpel blade.  The skin margins were undermined to an appropriate distance in all directions utilizing iris scissors.
Graft Donor Site Bandage (Optional-Leave Blank If You Don't Want In Note): Steri-strips and a pressure bandage were applied to the donor site.
Repair Type: Intermediate
Excisional Biopsy Additional Text (Leave Blank If You Do Not Want): The margin was drawn around the clinically apparent lesion. An elliptical shape was then drawn on the skin incorporating the lesion and margins.  Incisions were then made along these lines to the appropriate tissue plane and the lesion was extirpated.
Complex Repair And Dorsal Nasal Flap Text: The defect edges were debeveled with a #15 scalpel blade.  The primary defect was closed partially with a complex linear closure.  Given the location of the remaining defect, shape of the defect and the proximity to free margins a dorsal nasal flap was deemed most appropriate for complete closure of the defect.  Using a sterile surgical marker, an appropriate flap was drawn incorporating the defect and placing the expected incisions within the relaxed skin tension lines where possible.    The area thus outlined was incised deep to adipose tissue with a #15 scalpel blade.  The skin margins were undermined to an appropriate distance in all directions utilizing iris scissors.
Size Of Margin In Cm: 0.3
Intermediate / Complex Repair - Final Wound Length In Cm: 3.5
Bilobed Flap Text: The defect edges were debeveled with a #15 scalpel blade.  Given the location of the defect and the proximity to free margins a bilobe flap was deemed most appropriate.  Using a sterile surgical marker, an appropriate bilobe flap drawn around the defect.    The area thus outlined was incised deep to adipose tissue with a #15 scalpel blade.  The skin margins were undermined to an appropriate distance in all directions utilizing iris scissors.
Pre-Excision Curettage Text (Leave Blank If You Do Not Want): Prior to drawing the surgical margin the visible lesion was removed with electrodesiccation and curettage to clearly define the lesion size.
Dressing: dry sterile dressing
Estimated Blood Loss (Cc): minimal
Dermal Autograft Text: The defect edges were debeveled with a #15 scalpel blade.  Given the location of the defect, shape of the defect and the proximity to free margins a dermal autograft was deemed most appropriate.  Using a sterile surgical marker, the primary defect shape was transferred to the donor site. The area thus outlined was incised deep to adipose tissue with a #15 scalpel blade.  The harvested graft was then trimmed of adipose and epidermal tissue until only dermis was left.  The skin graft was then placed in the primary defect and oriented appropriately.
Complex Repair And Split-Thickness Skin Graft Text: The defect edges were debeveled with a #15 scalpel blade.  The primary defect was closed partially with a complex linear closure.  Given the location of the defect, shape of the defect and the proximity to free margins a split thickness skin graft was deemed most appropriate to repair the remaining defect.  The graft was trimmed to fit the size of the remaining defect.  The graft was then placed in the primary defect, oriented appropriately, and sutured into place.
Complex Repair And Rhombic Flap Text: The defect edges were debeveled with a #15 scalpel blade.  The primary defect was closed partially with a complex linear closure.  Given the location of the remaining defect, shape of the defect and the proximity to free margins a rhombic flap was deemed most appropriate for complete closure of the defect.  Using a sterile surgical marker, an appropriate advancement flap was drawn incorporating the defect and placing the expected incisions within the relaxed skin tension lines where possible.    The area thus outlined was incised deep to adipose tissue with a #15 scalpel blade.  The skin margins were undermined to an appropriate distance in all directions utilizing iris scissors.
Hatchet Flap Text: The defect edges were debeveled with a #15 scalpel blade.  Given the location of the defect, shape of the defect and the proximity to free margins a hatchet flap was deemed most appropriate.  Using a sterile surgical marker, an appropriate hatchet flap was drawn incorporating the defect and placing the expected incisions within the relaxed skin tension lines where possible.    The area thus outlined was incised deep to adipose tissue with a #15 scalpel blade.  The skin margins were undermined to an appropriate distance in all directions utilizing iris scissors.
Ftsg Text: The defect edges were debeveled with a #15 scalpel blade.  Given the location of the defect, shape of the defect and the proximity to free margins a full thickness skin graft was deemed most appropriate.  Using a sterile surgical marker, the primary defect shape was transferred to the donor site. The area thus outlined was incised deep to adipose tissue with a #15 scalpel blade.  The harvested graft was then trimmed of adipose tissue until only dermis and epidermis was left.  The skin margins of the secondary defect were undermined to an appropriate distance in all directions utilizing iris scissors.  The secondary defect was closed with interrupted buried subcutaneous sutures.  The skin edges were then re-apposed with running  sutures.  The skin graft was then placed in the primary defect and oriented appropriately.
Size Of Lesion In Cm: 1.5
Xenograft Text: The defect edges were debeveled with a #15 scalpel blade.  Given the location of the defect, shape of the defect and the proximity to free margins a xenograft was deemed most appropriate.  The graft was then trimmed to fit the size of the defect.  The graft was then placed in the primary defect and oriented appropriately.
Positioning (Leave Blank If You Do Not Want): The patient was placed in a comfortable position exposing the surgical site.
Perilesional Excision Additional Text (Leave Blank If You Do Not Want): The margin was drawn around the clinically apparent lesion. Incisions were then made along these lines to the appropriate tissue plane and the lesion was extirpated.
Post-Care Instructions: I reviewed with the patient in detail post-care instructions. Patient is not to engage in any heavy lifting, exercise, or swimming for the next 14 days. Should the patient develop any fevers, chills, bleeding, severe pain patient will contact the office immediately.
Double O-Z Plasty Text: The defect edges were debeveled with a #15 scalpel blade.  Given the location of the defect, shape of the defect and the proximity to free margins a Double O-Z plasty (double transposition flap) was deemed most appropriate.  Using a sterile surgical marker, the appropriate transposition flaps were drawn incorporating the defect and placing the expected incisions within the relaxed skin tension lines where possible. The area thus outlined was incised deep to adipose tissue with a #15 scalpel blade.  The skin margins were undermined to an appropriate distance in all directions utilizing iris scissors.  Hemostasis was achieved with electrocautery.  The flaps were then transposed into place, one clockwise and the other counterclockwise, and anchored with interrupted buried subcutaneous sutures.
Anesthesia Type: 1% lidocaine with epinephrine
Complex Repair And Single Advancement Flap Text: The defect edges were debeveled with a #15 scalpel blade.  The primary defect was closed partially with a complex linear closure.  Given the location of the remaining defect, shape of the defect and the proximity to free margins a single advancement flap was deemed most appropriate for complete closure of the defect.  Using a sterile surgical marker, an appropriate advancement flap was drawn incorporating the defect and placing the expected incisions within the relaxed skin tension lines where possible.    The area thus outlined was incised deep to adipose tissue with a #15 scalpel blade.  The skin margins were undermined to an appropriate distance in all directions utilizing iris scissors.
O-T Advancement Flap Text: The defect edges were debeveled with a #15 scalpel blade.  Given the location of the defect, shape of the defect and the proximity to free margins an O-T advancement flap was deemed most appropriate.  Using a sterile surgical marker, an appropriate advancement flap was drawn incorporating the defect and placing the expected incisions within the relaxed skin tension lines where possible.    The area thus outlined was incised deep to adipose tissue with a #15 scalpel blade.  The skin margins were undermined to an appropriate distance in all directions utilizing iris scissors.
Deep Sutures: 3-0 Vicryl
Crescentic Advancement Flap Text: The defect edges were debeveled with a #15 scalpel blade.  Given the location of the defect and the proximity to free margins a crescentic advancement flap was deemed most appropriate.  Using a sterile surgical marker, the appropriate advancement flap was drawn incorporating the defect and placing the expected incisions within the relaxed skin tension lines where possible.    The area thus outlined was incised deep to adipose tissue with a #15 scalpel blade.  The skin margins were undermined to an appropriate distance in all directions utilizing iris scissors.
Complex Repair And Melolabial Flap Text: The defect edges were debeveled with a #15 scalpel blade.  The primary defect was closed partially with a complex linear closure.  Given the location of the remaining defect, shape of the defect and the proximity to free margins a melolabial flap was deemed most appropriate for complete closure of the defect.  Using a sterile surgical marker, an appropriate advancement flap was drawn incorporating the defect and placing the expected incisions within the relaxed skin tension lines where possible.    The area thus outlined was incised deep to adipose tissue with a #15 scalpel blade.  The skin margins were undermined to an appropriate distance in all directions utilizing iris scissors.
Cartilage Graft Text: The defect edges were debeveled with a #15 scalpel blade.  Given the location of the defect, shape of the defect, the fact the defect involved a full thickness cartilage defect a cartilage graft was deemed most appropriate.  An appropriate donor site was identified, cleansed, and anesthetized. The cartilage graft was then harvested and transferred to the recipient site, oriented appropriately and then sutured into place.  The secondary defect was then repaired using a primary closure.
Elliptical Excision Additional Text (Leave Blank If You Do Not Want): The margin was drawn around the clinically apparent lesion.  An elliptical shape was then drawn on the skin incorporating the lesion and margins.  Incisions were then made along these lines to the appropriate tissue plane and the lesion was extirpated.
Cheek-To-Nose Interpolation Flap Text: A decision was made to reconstruct the defect utilizing an interpolation axial flap and a staged reconstruction.  A telfa template was made of the defect.  This telfa template was then used to outline the Cheek-To-Nose Interpolation flap.  The donor area for the pedicle flap was then injected with anesthesia.  The flap was excised through the skin and subcutaneous tissue down to the layer of the underlying musculature.  The interpolation flap was carefully excised within this deep plane to maintain its blood supply.  The edges of the donor site were undermined.   The donor site was closed in a primary fashion.  The pedicle was then rotated into position and sutured.  Once the tube was sutured into place, adequate blood supply was confirmed with blanching and refill.  The pedicle was then wrapped with xeroform gauze and dressed appropriately with a telfa and gauze bandage to ensure continued blood supply and protect the attached pedicle.
Saucerization Excision Additional Text (Leave Blank If You Do Not Want): The margin was drawn around the clinically apparent lesion.  Incisions were then made along these lines, in a tangential fashion, to the appropriate tissue plane and the lesion was extirpated.
Path Notes (To The Dermatopathologist): Please check margins.
Complex Repair And Bilobe Flap Text: The defect edges were debeveled with a #15 scalpel blade.  The primary defect was closed partially with a complex linear closure.  Given the location of the remaining defect, shape of the defect and the proximity to free margins a bilobe flap was deemed most appropriate for complete closure of the defect.  Using a sterile surgical marker, an appropriate advancement flap was drawn incorporating the defect and placing the expected incisions within the relaxed skin tension lines where possible.    The area thus outlined was incised deep to adipose tissue with a #15 scalpel blade.  The skin margins were undermined to an appropriate distance in all directions utilizing iris scissors.
Partial Purse String (Intermediate) Text: Given the location of the defect and the characteristics of the surrounding skin an intermediate purse string closure was deemed most appropriate.  Undermining was performed circumferentially around the surgical defect.  A purse string suture was then placed and tightened. Wound tension of the circular defect prevented complete closure of the wound.
Mucosal Advancement Flap Text: Given the location of the defect, shape of the defect and the proximity to free margins a mucosal advancement flap was deemed most appropriate. Incisions were made with a 15 blade scalpel in the appropriate fashion along the cutaneous vermilion border and the mucosal lip. The remaining actinically damaged mucosal tissue was excised.  The mucosal advancement flap was then elevated to the gingival sulcus with care taken to preserve the neurovascular structures and advanced into the primary defect. Care was taken to ensure that precise realignment of the vermilion border was achieved.
Curvilinear Excision Additional Text (Leave Blank If You Do Not Want): The margin was drawn around the clinically apparent lesion.  A curvilinear shape was then drawn on the skin incorporating the lesion and margins.  Incisions were then made along these lines to the appropriate tissue plane and the lesion was extirpated.
Excision Method: Fusiform
Interpolation Flap Text: A decision was made to reconstruct the defect utilizing an interpolation axial flap and a staged reconstruction.  A telfa template was made of the defect.  This telfa template was then used to outline the interpolation flap.  The donor area for the pedicle flap was then injected with anesthesia.  The flap was excised through the skin and subcutaneous tissue down to the layer of the underlying musculature.  The interpolation flap was carefully excised within this deep plane to maintain its blood supply.  The edges of the donor site were undermined.   The donor site was closed in a primary fashion.  The pedicle was then rotated into position and sutured.  Once the tube was sutured into place, adequate blood supply was confirmed with blanching and refill.  The pedicle was then wrapped with xeroform gauze and dressed appropriately with a telfa and gauze bandage to ensure continued blood supply and protect the attached pedicle.
Melolabial Interpolation Flap Text: A decision was made to reconstruct the defect utilizing an interpolation axial flap and a staged reconstruction.  A telfa template was made of the defect.  This telfa template was then used to outline the melolabial interpolation flap.  The donor area for the pedicle flap was then injected with anesthesia.  The flap was excised through the skin and subcutaneous tissue down to the layer of the underlying musculature.  The pedicle flap was carefully excised within this deep plane to maintain its blood supply.  The edges of the donor site were undermined.   The donor site was closed in a primary fashion.  The pedicle was then rotated into position and sutured.  Once the tube was sutured into place, adequate blood supply was confirmed with blanching and refill.  The pedicle was then wrapped with xeroform gauze and dressed appropriately with a telfa and gauze bandage to ensure continued blood supply and protect the attached pedicle.
V-Y Plasty Text: The defect edges were debeveled with a #15 scalpel blade.  Given the location of the defect, shape of the defect and the proximity to free margins an V-Y advancement flap was deemed most appropriate.  Using a sterile surgical marker, an appropriate advancement flap was drawn incorporating the defect and placing the expected incisions within the relaxed skin tension lines where possible.    The area thus outlined was incised deep to adipose tissue with a #15 scalpel blade.  The skin margins were undermined to an appropriate distance in all directions utilizing iris scissors.
Melolabial Transposition Flap Text: The defect edges were debeveled with a #15 scalpel blade.  Given the location of the defect and the proximity to free margins a melolabial flap was deemed most appropriate.  Using a sterile surgical marker, an appropriate melolabial transposition flap was drawn incorporating the defect.    The area thus outlined was incised deep to adipose tissue with a #15 scalpel blade.  The skin margins were undermined to an appropriate distance in all directions utilizing iris scissors.
S Plasty Text: Given the location and shape of the defect, and the orientation of relaxed skin tension lines, an S-plasty was deemed most appropriate for repair.  Using a sterile surgical marker, the appropriate outline of the S-plasty was drawn, incorporating the defect and placing the expected incisions within the relaxed skin tension lines where possible.  The area thus outlined was incised deep to adipose tissue with a #15 scalpel blade.  The skin margins were undermined to an appropriate distance in all directions utilizing iris scissors. The skin flaps were advanced over the defect.  The opposing margins were then approximated with interrupted buried subcutaneous sutures.
Slit Excision Additional Text (Leave Blank If You Do Not Want): A linear line was drawn on the skin overlying the lesion. An incision was made slowly until the lesion was visualized.  Once visualized, the lesion was removed with blunt dissection.
Modified Advancement Flap Text: The defect edges were debeveled with a #15 scalpel blade.  Given the location of the defect, shape of the defect and the proximity to free margins a modified advancement flap was deemed most appropriate.  Using a sterile surgical marker, an appropriate advancement flap was drawn incorporating the defect and placing the expected incisions within the relaxed skin tension lines where possible.    The area thus outlined was incised deep to adipose tissue with a #15 scalpel blade.  The skin margins were undermined to an appropriate distance in all directions utilizing iris scissors.
Complex Repair And Double M Plasty Text: The defect edges were debeveled with a #15 scalpel blade.  The primary defect was closed partially with a complex linear closure.  Given the location of the remaining defect, shape of the defect and the proximity to free margins a double M plasty was deemed most appropriate for complete closure of the defect.  Using a sterile surgical marker, an appropriate advancement flap was drawn incorporating the defect and placing the expected incisions within the relaxed skin tension lines where possible.    The area thus outlined was incised deep to adipose tissue with a #15 scalpel blade.  The skin margins were undermined to an appropriate distance in all directions utilizing iris scissors.
Helical Rim Advancement Flap Text: The defect edges were debeveled with a #15 blade scalpel.  Given the location of the defect and the proximity to free margins (helical rim) a double helical rim advancement flap was deemed most appropriate.  Using a sterile surgical marker, the appropriate advancement flaps were drawn incorporating the defect and placing the expected incisions between the helical rim and antihelix where possible.  The area thus outlined was incised through and through with a #15 scalpel blade.  With a skin hook and iris scissors, the flaps were gently and sharply undermined and freed up.
Island Pedicle Flap With Canthal Suspension Text: The defect edges were debeveled with a #15 scalpel blade.  Given the location of the defect, shape of the defect and the proximity to free margins an island pedicle advancement flap was deemed most appropriate.  Using a sterile surgical marker, an appropriate advancement flap was drawn incorporating the defect, outlining the appropriate donor tissue and placing the expected incisions within the relaxed skin tension lines where possible. The area thus outlined was incised deep to adipose tissue with a #15 scalpel blade.  The skin margins were undermined to an appropriate distance in all directions around the primary defect and laterally outward around the island pedicle utilizing iris scissors.  There was minimal undermining beneath the pedicle flap. A suspension suture was placed in the canthal tendon to prevent tension and prevent ectropion.
Complex Repair And Skin Substitute Graft Text: The defect edges were debeveled with a #15 scalpel blade.  The primary defect was closed partially with a complex linear closure.  Given the location of the remaining defect, shape of the defect and the proximity to free margins a skin substitute graft was deemed most appropriate to repair the remaining defect.  The graft was trimmed to fit the size of the remaining defect.  The graft was then placed in the primary defect, oriented appropriately, and sutured into place.
Spiral Flap Text: The defect edges were debeveled with a #15 scalpel blade.  Given the location of the defect, shape of the defect and the proximity to free margins a spiral flap was deemed most appropriate.  Using a sterile surgical marker, an appropriate rotation flap was drawn incorporating the defect and placing the expected incisions within the relaxed skin tension lines where possible. The area thus outlined was incised deep to adipose tissue with a #15 scalpel blade.  The skin margins were undermined to an appropriate distance in all directions utilizing iris scissors.
Alar Island Pedicle Flap Text: The defect edges were debeveled with a #15 scalpel blade.  Given the location of the defect, shape of the defect and the proximity to the alar rim an island pedicle advancement flap was deemed most appropriate.  Using a sterile surgical marker, an appropriate advancement flap was drawn incorporating the defect, outlining the appropriate donor tissue and placing the expected incisions within the nasal ala running parallel to the alar rim. The area thus outlined was incised with a #15 scalpel blade.  The skin margins were undermined minimally to an appropriate distance in all directions around the primary defect and laterally outward around the island pedicle utilizing iris scissors.  There was minimal undermining beneath the pedicle flap.
Billing Type: Third-Party Bill
Advancement-Rotation Flap Text: The defect edges were debeveled with a #15 scalpel blade.  Given the location of the defect, shape of the defect and the proximity to free margins an advancement-rotation flap was deemed most appropriate.  Using a sterile surgical marker, an appropriate flap was drawn incorporating the defect and placing the expected incisions within the relaxed skin tension lines where possible. The area thus outlined was incised deep to adipose tissue with a #15 scalpel blade.  The skin margins were undermined to an appropriate distance in all directions utilizing iris scissors.
Ear Star Wedge Flap Text: The defect edges were debeveled with a #15 blade scalpel.  Given the location of the defect and the proximity to free margins (helical rim) an ear star wedge flap was deemed most appropriate.  Using a sterile surgical marker, the appropriate flap was drawn incorporating the defect and placing the expected incisions between the helical rim and antihelix where possible.  The area thus outlined was incised through and through with a #15 scalpel blade.
Fusiform Excision Additional Text (Leave Blank If You Do Not Want): The margin was drawn around the clinically apparent lesion.  A fusiform shape was then drawn on the skin incorporating the lesion and margins.  Incisions were then made along these lines to the appropriate tissue plane and the lesion was extirpated.
Detail Level: Detailed
Advancement Flap (Single) Text: The defect edges were debeveled with a #15 scalpel blade.  Given the location of the defect and the proximity to free margins a single advancement flap was deemed most appropriate.  Using a sterile surgical marker, an appropriate advancement flap was drawn incorporating the defect and placing the expected incisions within the relaxed skin tension lines where possible.    The area thus outlined was incised deep to adipose tissue with a #15 scalpel blade.  The skin margins were undermined to an appropriate distance in all directions utilizing iris scissors.
O-Z Plasty Text: The defect edges were debeveled with a #15 scalpel blade.  Given the location of the defect, shape of the defect and the proximity to free margins an O-Z plasty (double transposition flap) was deemed most appropriate.  Using a sterile surgical marker, the appropriate transposition flaps were drawn incorporating the defect and placing the expected incisions within the relaxed skin tension lines where possible.    The area thus outlined was incised deep to adipose tissue with a #15 scalpel blade.  The skin margins were undermined to an appropriate distance in all directions utilizing iris scissors.  Hemostasis was achieved with electrocautery.  The flaps were then transposed into place, one clockwise and the other counterclockwise, and anchored with interrupted buried subcutaneous sutures.
Epidermal Closure: simple interrupted
O-L Flap Text: The defect edges were debeveled with a #15 scalpel blade.  Given the location of the defect, shape of the defect and the proximity to free margins an O-L flap was deemed most appropriate.  Using a sterile surgical marker, an appropriate advancement flap was drawn incorporating the defect and placing the expected incisions within the relaxed skin tension lines where possible.    The area thus outlined was incised deep to adipose tissue with a #15 scalpel blade.  The skin margins were undermined to an appropriate distance in all directions utilizing iris scissors.
Complex Repair And O-L Flap Text: The defect edges were debeveled with a #15 scalpel blade.  The primary defect was closed partially with a complex linear closure.  Given the location of the remaining defect, shape of the defect and the proximity to free margins an O-L flap was deemed most appropriate for complete closure of the defect.  Using a sterile surgical marker, an appropriate flap was drawn incorporating the defect and placing the expected incisions within the relaxed skin tension lines where possible.    The area thus outlined was incised deep to adipose tissue with a #15 scalpel blade.  The skin margins were undermined to an appropriate distance in all directions utilizing iris scissors.
Z Plasty Text: The lesion was extirpated to the level of the fat with a #15 scalpel blade.  Given the location of the defect, shape of the defect and the proximity to free margins a Z-plasty was deemed most appropriate for repair.  Using a sterile surgical marker, the appropriate transposition arms of the Z-plasty were drawn incorporating the defect and placing the expected incisions within the relaxed skin tension lines where possible.    The area thus outlined was incised deep to adipose tissue with a #15 scalpel blade.  The skin margins were undermined to an appropriate distance in all directions utilizing iris scissors.  The opposing transposition arms were then transposed into place in opposite direction and anchored with interrupted buried subcutaneous sutures.
H Plasty Text: Given the location of the defect, shape of the defect and the proximity to free margins a H-plasty was deemed most appropriate for repair.  Using a sterile surgical marker, the appropriate advancement arms of the H-plasty were drawn incorporating the defect and placing the expected incisions within the relaxed skin tension lines where possible. The area thus outlined was incised deep to adipose tissue with a #15 scalpel blade. The skin margins were undermined to an appropriate distance in all directions utilizing iris scissors.  The opposing advancement arms were then advanced into place in opposite direction and anchored with interrupted buried subcutaneous sutures.
Hemostasis: Electrocautery
Paramedian Forehead Flap Text: A decision was made to reconstruct the defect utilizing an interpolation axial flap and a staged reconstruction.  A telfa template was made of the defect.  This telfa template was then used to outline the paramedian forehead pedicle flap.  The donor area for the pedicle flap was then injected with anesthesia.  The flap was excised through the skin and subcutaneous tissue down to the layer of the underlying musculature.  The pedicle flap was carefully excised within this deep plane to maintain its blood supply.  The edges of the donor site were undermined.   The donor site was closed in a primary fashion.  The pedicle was then rotated into position and sutured.  Once the tube was sutured into place, adequate blood supply was confirmed with blanching and refill.  The pedicle was then wrapped with xeroform gauze and dressed appropriately with a telfa and gauze bandage to ensure continued blood supply and protect the attached pedicle.
Consent was obtained from the patient. The risks and benefits to therapy were discussed in detail. Specifically, the risks of infection, scarring, bleeding, prolonged wound healing, incomplete removal, allergy to anesthesia, nerve injury and recurrence were addressed. Prior to the procedure, the treatment site was clearly identified and confirmed by the patient. All components of Universal Protocol/PAUSE Rule completed.
Island Pedicle Flap Text: The defect edges were debeveled with a #15 scalpel blade.  Given the location of the defect, shape of the defect and the proximity to free margins an island pedicle advancement flap was deemed most appropriate.  Using a sterile surgical marker, an appropriate advancement flap was drawn incorporating the defect, outlining the appropriate donor tissue and placing the expected incisions within the relaxed skin tension lines where possible.    The area thus outlined was incised deep to adipose tissue with a #15 scalpel blade.  The skin margins were undermined to an appropriate distance in all directions around the primary defect and laterally outward around the island pedicle utilizing iris scissors.  There was minimal undermining beneath the pedicle flap.
O-T Plasty Text: The defect edges were debeveled with a #15 scalpel blade.  Given the location of the defect, shape of the defect and the proximity to free margins an O-T plasty was deemed most appropriate.  Using a sterile surgical marker, an appropriate O-T plasty was drawn incorporating the defect and placing the expected incisions within the relaxed skin tension lines where possible.    The area thus outlined was incised deep to adipose tissue with a #15 scalpel blade.  The skin margins were undermined to an appropriate distance in all directions utilizing iris scissors.
Scalpel Size: 10 blade
Tissue Cultured Epidermal Autograft Text: The defect edges were debeveled with a #15 scalpel blade.  Given the location of the defect, shape of the defect and the proximity to free margins a tissue cultured epidermal autograft was deemed most appropriate.  The graft was then trimmed to fit the size of the defect.  The graft was then placed in the primary defect and oriented appropriately.
Complex Repair And Tissue Cultured Epidermal Autograft Text: The defect edges were debeveled with a #15 scalpel blade.  The primary defect was closed partially with a complex linear closure.  Given the location of the defect, shape of the defect and the proximity to free margins an tissue cultured epidermal autograft was deemed most appropriate to repair the remaining defect.  The graft was trimmed to fit the size of the remaining defect.  The graft was then placed in the primary defect, oriented appropriately, and sutured into place.
Epidermal Autograft Text: The defect edges were debeveled with a #15 scalpel blade.  Given the location of the defect, shape of the defect and the proximity to free margins an epidermal autograft was deemed most appropriate.  Using a sterile surgical marker, the primary defect shape was transferred to the donor site. The epidermal graft was then harvested.  The skin graft was then placed in the primary defect and oriented appropriately.
Rotation Flap Text: The defect edges were debeveled with a #15 scalpel blade.  Given the location of the defect, shape of the defect and the proximity to free margins a rotation flap was deemed most appropriate.  Using a sterile surgical marker, an appropriate rotation flap was drawn incorporating the defect and placing the expected incisions within the relaxed skin tension lines where possible.    The area thus outlined was incised deep to adipose tissue with a #15 scalpel blade.  The skin margins were undermined to an appropriate distance in all directions utilizing iris scissors.
Posterior Auricular Interpolation Flap Text: A decision was made to reconstruct the defect utilizing an interpolation axial flap and a staged reconstruction.  A telfa template was made of the defect.  This telfa template was then used to outline the posterior auricular interpolation flap.  The donor area for the pedicle flap was then injected with anesthesia.  The flap was excised through the skin and subcutaneous tissue down to the layer of the underlying musculature.  The pedicle flap was carefully excised within this deep plane to maintain its blood supply.  The edges of the donor site were undermined.   The donor site was closed in a primary fashion.  The pedicle was then rotated into position and sutured.  Once the tube was sutured into place, adequate blood supply was confirmed with blanching and refill.  The pedicle was then wrapped with xeroform gauze and dressed appropriately with a telfa and gauze bandage to ensure continued blood supply and protect the attached pedicle.
Dorsal Nasal Flap Text: The defect edges were debeveled with a #15 scalpel blade.  Given the location of the defect and the proximity to free margins a dorsal nasal flap was deemed most appropriate.  Using a sterile surgical marker, an appropriate dorsal nasal flap was drawn around the defect.    The area thus outlined was incised deep to adipose tissue with a #15 scalpel blade.  The skin margins were undermined to an appropriate distance in all directions utilizing iris scissors.
Rhombic Flap Text: The defect edges were debeveled with a #15 scalpel blade.  Given the location of the defect and the proximity to free margins a rhombic flap was deemed most appropriate.  Using a sterile surgical marker, an appropriate rhombic flap was drawn incorporating the defect.    The area thus outlined was incised deep to adipose tissue with a #15 scalpel blade.  The skin margins were undermined to an appropriate distance in all directions utilizing iris scissors.
Repair Performed By Another Provider Text (Leave Blank If You Do Not Want): After the tissue was excised the defect was repaired by another provider.
Complex Repair And Rotation Flap Text: The defect edges were debeveled with a #15 scalpel blade.  The primary defect was closed partially with a complex linear closure.  Given the location of the remaining defect, shape of the defect and the proximity to free margins a rotation flap was deemed most appropriate for complete closure of the defect.  Using a sterile surgical marker, an appropriate advancement flap was drawn incorporating the defect and placing the expected incisions within the relaxed skin tension lines where possible.    The area thus outlined was incised deep to adipose tissue with a #15 scalpel blade.  The skin margins were undermined to an appropriate distance in all directions utilizing iris scissors.
Complex Repair And M Plasty Text: The defect edges were debeveled with a #15 scalpel blade.  The primary defect was closed partially with a complex linear closure.  Given the location of the remaining defect, shape of the defect and the proximity to free margins an M plasty was deemed most appropriate for complete closure of the defect.  Using a sterile surgical marker, an appropriate advancement flap was drawn incorporating the defect and placing the expected incisions within the relaxed skin tension lines where possible.    The area thus outlined was incised deep to adipose tissue with a #15 scalpel blade.  The skin margins were undermined to an appropriate distance in all directions utilizing iris scissors.
Complex Repair And Dermal Autograft Text: The defect edges were debeveled with a #15 scalpel blade.  The primary defect was closed partially with a complex linear closure.  Given the location of the defect, shape of the defect and the proximity to free margins an dermal autograft was deemed most appropriate to repair the remaining defect.  The graft was trimmed to fit the size of the remaining defect.  The graft was then placed in the primary defect, oriented appropriately, and sutured into place.
Keystone Flap Text: The defect edges were debeveled with a #15 scalpel blade.  Given the location of the defect, shape of the defect a keystone flap was deemed most appropriate.  Using a sterile surgical marker, an appropriate keystone flap was drawn incorporating the defect, outlining the appropriate donor tissue and placing the expected incisions within the relaxed skin tension lines where possible. The area thus outlined was incised deep to adipose tissue with a #15 scalpel blade.  The skin margins were undermined to an appropriate distance in all directions around the primary defect and laterally outward around the flap utilizing iris scissors.
Mercedes Flap Text: The defect edges were debeveled with a #15 scalpel blade.  Given the location of the defect, shape of the defect and the proximity to free margins a Mercedes flap was deemed most appropriate.  Using a sterile surgical marker, an appropriate advancement flap was drawn incorporating the defect and placing the expected incisions within the relaxed skin tension lines where possible. The area thus outlined was incised deep to adipose tissue with a #15 scalpel blade.  The skin margins were undermined to an appropriate distance in all directions utilizing iris scissors.
No Repair - Repaired With Adjacent Surgical Defect Text (Leave Blank If You Do Not Want): After the excision the defect was repaired concurrently with another surgical defect which was in close approximation.
Excision Depth: adipose tissue
Complex Repair And Z Plasty Text: The defect edges were debeveled with a #15 scalpel blade.  The primary defect was closed partially with a complex linear closure.  Given the location of the remaining defect, shape of the defect and the proximity to free margins a Z plasty was deemed most appropriate for complete closure of the defect.  Using a sterile surgical marker, an appropriate advancement flap was drawn incorporating the defect and placing the expected incisions within the relaxed skin tension lines where possible.    The area thus outlined was incised deep to adipose tissue with a #15 scalpel blade.  The skin margins were undermined to an appropriate distance in all directions utilizing iris scissors.
Complex Repair And A-T Advancement Flap Text: The defect edges were debeveled with a #15 scalpel blade.  The primary defect was closed partially with a complex linear closure.  Given the location of the remaining defect, shape of the defect and the proximity to free margins an A-T advancement flap was deemed most appropriate for complete closure of the defect.  Using a sterile surgical marker, an appropriate advancement flap was drawn incorporating the defect and placing the expected incisions within the relaxed skin tension lines where possible.    The area thus outlined was incised deep to adipose tissue with a #15 scalpel blade.  The skin margins were undermined to an appropriate distance in all directions utilizing iris scissors.
Composite Graft Text: The defect edges were debeveled with a #15 scalpel blade.  Given the location of the defect, shape of the defect, the proximity to free margins and the fact the defect was full thickness a composite graft was deemed most appropriate.  The defect was outline and then transferred to the donor site.  A full thickness graft was then excised from the donor site. The graft was then placed in the primary defect, oriented appropriately and then sutured into place.  The secondary defect was then repaired using a primary closure.
Skin Substitute Text: The defect edges were debeveled with a #15 scalpel blade.  Given the location of the defect, shape of the defect and the proximity to free margins a skin substitute graft was deemed most appropriate.  The graft material was trimmed to fit the size of the defect. The graft was then placed in the primary defect and oriented appropriately.
V-Y Flap Text: The defect edges were debeveled with a #15 scalpel blade.  Given the location of the defect, shape of the defect and the proximity to free margins a V-Y flap was deemed most appropriate.  Using a sterile surgical marker, an appropriate advancement flap was drawn incorporating the defect and placing the expected incisions within the relaxed skin tension lines where possible.    The area thus outlined was incised deep to adipose tissue with a #15 scalpel blade.  The skin margins were undermined to an appropriate distance in all directions utilizing iris scissors.
Banner Transposition Flap Text: The defect edges were debeveled with a #15 scalpel blade.  Given the location of the defect and the proximity to free margins a Banner transposition flap was deemed most appropriate.  Using a sterile surgical marker, an appropriate flap drawn around the defect. The area thus outlined was incised deep to adipose tissue with a #15 scalpel blade.  The skin margins were undermined to an appropriate distance in all directions utilizing iris scissors.
Complex Repair And Modified Advancement Flap Text: The defect edges were debeveled with a #15 scalpel blade.  The primary defect was closed partially with a complex linear closure.  Given the location of the remaining defect, shape of the defect and the proximity to free margins a modified advancement flap was deemed most appropriate for complete closure of the defect.  Using a sterile surgical marker, an appropriate advancement flap was drawn incorporating the defect and placing the expected incisions within the relaxed skin tension lines where possible.    The area thus outlined was incised deep to adipose tissue with a #15 scalpel blade.  The skin margins were undermined to an appropriate distance in all directions utilizing iris scissors.
Split-Thickness Skin Graft Text: The defect edges were debeveled with a #15 scalpel blade.  Given the location of the defect, shape of the defect and the proximity to free margins a split thickness skin graft was deemed most appropriate.  Using a sterile surgical marker, the primary defect shape was transferred to the donor site. The split thickness graft was then harvested.  The skin graft was then placed in the primary defect and oriented appropriately.
Bilateral Helical Rim Advancement Flap Text: The defect edges were debeveled with a #15 blade scalpel.  Given the location of the defect and the proximity to free margins (helical rim) a bilateral helical rim advancement flap was deemed most appropriate.  Using a sterile surgical marker, the appropriate advancement flaps were drawn incorporating the defect and placing the expected incisions between the helical rim and antihelix where possible.  The area thus outlined was incised through and through with a #15 scalpel blade.  With a skin hook and iris scissors, the flaps were gently and sharply undermined and freed up.
Complex Repair And W Plasty Text: The defect edges were debeveled with a #15 scalpel blade.  The primary defect was closed partially with a complex linear closure.  Given the location of the remaining defect, shape of the defect and the proximity to free margins a W plasty was deemed most appropriate for complete closure of the defect.  Using a sterile surgical marker, an appropriate advancement flap was drawn incorporating the defect and placing the expected incisions within the relaxed skin tension lines where possible.    The area thus outlined was incised deep to adipose tissue with a #15 scalpel blade.  The skin margins were undermined to an appropriate distance in all directions utilizing iris scissors.
Double Island Pedicle Flap Text: The defect edges were debeveled with a #15 scalpel blade.  Given the location of the defect, shape of the defect and the proximity to free margins a double island pedicle advancement flap was deemed most appropriate.  Using a sterile surgical marker, an appropriate advancement flap was drawn incorporating the defect, outlining the appropriate donor tissue and placing the expected incisions within the relaxed skin tension lines where possible.    The area thus outlined was incised deep to adipose tissue with a #15 scalpel blade.  The skin margins were undermined to an appropriate distance in all directions around the primary defect and laterally outward around the island pedicle utilizing iris scissors.  There was minimal undermining beneath the pedicle flap.
Advancement Flap (Double) Text: The defect edges were debeveled with a #15 scalpel blade.  Given the location of the defect and the proximity to free margins a double advancement flap was deemed most appropriate.  Using a sterile surgical marker, the appropriate advancement flaps were drawn incorporating the defect and placing the expected incisions within the relaxed skin tension lines where possible.    The area thus outlined was incised deep to adipose tissue with a #15 scalpel blade.  The skin margins were undermined to an appropriate distance in all directions utilizing iris scissors.
Purse String (Simple) Text: Given the location of the defect and the characteristics of the surrounding skin a purse string simple closure was deemed most appropriate.  Undermining was performed circumferentially around the surgical defect.  A purse string suture was then placed and tightened.

## 2019-05-01 ENCOUNTER — APPOINTMENT (OUTPATIENT)
Age: 64
Setting detail: DERMATOLOGY
End: 2019-05-02

## 2019-05-01 DIAGNOSIS — Z48.02 ENCOUNTER FOR REMOVAL OF SUTURES: ICD-10-CM

## 2019-05-01 PROCEDURE — OTHER SUTURE REMOVAL (GLOBAL PERIOD): OTHER

## 2019-05-01 PROCEDURE — 99024 POSTOP FOLLOW-UP VISIT: CPT

## 2019-05-01 PROCEDURE — OTHER MIPS QUALITY: OTHER

## 2019-05-01 ASSESSMENT — LOCATION DETAILED DESCRIPTION DERM: LOCATION DETAILED: LEFT POSTERIOR SHOULDER

## 2019-05-01 ASSESSMENT — LOCATION SIMPLE DESCRIPTION DERM: LOCATION SIMPLE: LEFT SHOULDER

## 2019-05-01 ASSESSMENT — LOCATION ZONE DERM: LOCATION ZONE: ARM

## 2019-05-01 NOTE — PROCEDURE: SUTURE REMOVAL (GLOBAL PERIOD)
Detail Level: Detailed
Add 08166 Cpt? (Important Note: In 2017 The Use Of 84497 Is Being Tracked By Cms To Determine Future Global Period Reimbursement For Global Periods): yes

## 2019-09-19 ENCOUNTER — APPOINTMENT (OUTPATIENT)
Age: 64
Setting detail: DERMATOLOGY
End: 2019-09-20

## 2019-09-19 DIAGNOSIS — L71.8 OTHER ROSACEA: ICD-10-CM

## 2019-09-19 DIAGNOSIS — L57.0 ACTINIC KERATOSIS: ICD-10-CM

## 2019-09-19 DIAGNOSIS — L81.4 OTHER MELANIN HYPERPIGMENTATION: ICD-10-CM

## 2019-09-19 DIAGNOSIS — L82.1 OTHER SEBORRHEIC KERATOSIS: ICD-10-CM

## 2019-09-19 DIAGNOSIS — Z71.89 OTHER SPECIFIED COUNSELING: ICD-10-CM

## 2019-09-19 DIAGNOSIS — Z85.828 PERSONAL HISTORY OF OTHER MALIGNANT NEOPLASM OF SKIN: ICD-10-CM

## 2019-09-19 PROCEDURE — OTHER TREATMENT REGIMEN: OTHER

## 2019-09-19 PROCEDURE — OTHER OBSERVATION: OTHER

## 2019-09-19 PROCEDURE — 99214 OFFICE O/P EST MOD 30 MIN: CPT | Mod: 25

## 2019-09-19 PROCEDURE — 17004 DESTROY PREMAL LESIONS 15/>: CPT

## 2019-09-19 PROCEDURE — OTHER LIQUID NITROGEN: OTHER

## 2019-09-19 PROCEDURE — OTHER REASSURANCE: OTHER

## 2019-09-19 PROCEDURE — OTHER MIPS QUALITY: OTHER

## 2019-09-19 PROCEDURE — OTHER COUNSELING: OTHER

## 2019-09-19 ASSESSMENT — LOCATION DETAILED DESCRIPTION DERM
LOCATION DETAILED: LEFT MEDIAL INFERIOR CHEST
LOCATION DETAILED: LEFT SUPERIOR PREAURICULAR CHEEK
LOCATION DETAILED: RIGHT LATERAL INFERIOR CHEST
LOCATION DETAILED: LEFT SUPERIOR POSTAURICULAR SKIN
LOCATION DETAILED: LEFT LATERAL TEMPLE
LOCATION DETAILED: RIGHT SUPERIOR PREAURICULAR CHEEK
LOCATION DETAILED: LEFT SUPERIOR CENTRAL MALAR CHEEK
LOCATION DETAILED: LEFT MEDIAL UPPER BACK
LOCATION DETAILED: RIGHT LATERAL ZYGOMA
LOCATION DETAILED: RIGHT DISTAL PRETIBIAL REGION
LOCATION DETAILED: LEFT CLAVICULAR NECK
LOCATION DETAILED: RIGHT DISTAL DORSAL FOREARM
LOCATION DETAILED: RIGHT INFERIOR LATERAL FOREHEAD
LOCATION DETAILED: LEFT PROXIMAL PRETIBIAL REGION
LOCATION DETAILED: RIGHT INFERIOR LATERAL MALAR CHEEK
LOCATION DETAILED: LEFT CENTRAL BUCCAL CHEEK
LOCATION DETAILED: LEFT SUPERIOR LATERAL MIDBACK
LOCATION DETAILED: RIGHT CENTRAL FRONTAL SCALP
LOCATION DETAILED: RIGHT INFERIOR MEDIAL MIDBACK
LOCATION DETAILED: LEFT SUPERIOR LATERAL MALAR CHEEK
LOCATION DETAILED: LEFT SUPERIOR LATERAL UPPER BACK
LOCATION DETAILED: RIGHT CENTRAL TEMPLE
LOCATION DETAILED: LEFT INFERIOR MEDIAL MIDBACK
LOCATION DETAILED: LEFT MID TEMPLE
LOCATION DETAILED: LEFT PROXIMAL DORSAL FOREARM
LOCATION DETAILED: LEFT INFERIOR UPPER BACK

## 2019-09-19 ASSESSMENT — LOCATION SIMPLE DESCRIPTION DERM
LOCATION SIMPLE: LEFT UPPER BACK
LOCATION SIMPLE: RIGHT FOREARM
LOCATION SIMPLE: LEFT FOREARM
LOCATION SIMPLE: CHEST
LOCATION SIMPLE: LEFT PRETIBIAL REGION
LOCATION SIMPLE: RIGHT CHEEK
LOCATION SIMPLE: RIGHT PRETIBIAL REGION
LOCATION SIMPLE: SCALP
LOCATION SIMPLE: LEFT CHEEK
LOCATION SIMPLE: RIGHT FOREHEAD
LOCATION SIMPLE: LEFT TEMPLE
LOCATION SIMPLE: LEFT LOWER BACK
LOCATION SIMPLE: RIGHT ZYGOMA
LOCATION SIMPLE: RIGHT LOWER BACK
LOCATION SIMPLE: RIGHT TEMPLE
LOCATION SIMPLE: LEFT ANTERIOR NECK

## 2019-09-19 ASSESSMENT — LOCATION ZONE DERM
LOCATION ZONE: ARM
LOCATION ZONE: SCALP
LOCATION ZONE: TRUNK
LOCATION ZONE: LEG
LOCATION ZONE: FACE
LOCATION ZONE: NECK

## 2019-09-19 NOTE — PROCEDURE: OBSERVATION
X Size Of Lesion In Cm (Optional): 0
Detail Level: Detailed
Body Location Override (Optional - Billing Will Still Be Based On Selected Body Map Location If Applicable): left medial upper back
Body Location Override (Optional - Billing Will Still Be Based On Selected Body Map Location If Applicable): left lat temple

## 2019-10-02 ENCOUNTER — APPOINTMENT (OUTPATIENT)
Age: 64
Setting detail: DERMATOLOGY
End: 2019-10-03

## 2019-10-02 DIAGNOSIS — Z71.89 OTHER SPECIFIED COUNSELING: ICD-10-CM

## 2019-10-02 DIAGNOSIS — Z41.9 ENCOUNTER FOR PROCEDURE FOR PURPOSES OTHER THAN REMEDYING HEALTH STATE, UNSPECIFIED: ICD-10-CM

## 2019-10-02 PROCEDURE — OTHER COUNSELING: OTHER

## 2019-10-02 PROCEDURE — OTHER EXCEL V LASER: OTHER

## 2019-10-02 PROCEDURE — OTHER MIPS QUALITY: OTHER

## 2019-10-02 ASSESSMENT — LOCATION SIMPLE DESCRIPTION DERM
LOCATION SIMPLE: RIGHT FOREHEAD
LOCATION SIMPLE: LEFT FOREHEAD

## 2019-10-02 ASSESSMENT — LOCATION DETAILED DESCRIPTION DERM
LOCATION DETAILED: RIGHT FOREHEAD
LOCATION DETAILED: RIGHT MEDIAL FOREHEAD
LOCATION DETAILED: LEFT FOREHEAD

## 2019-10-02 ASSESSMENT — LOCATION ZONE DERM: LOCATION ZONE: FACE

## 2019-10-02 NOTE — PROCEDURE: EXCEL V LASER
Spot Size: 5
Post-Procedure Text: Following the treatment, ice and broad spectrum sunscreen was applied to the treatment areas.  Post-care instructions were discussed.
Repetition Rate: 1.0 Hz
Treatment Number (Will Not Render If 0): 0
Consent: Written consent obtained, risks reviewed including but not limited to crusting, scabbing, blistering, scarring, darker or lighter pigmentary change, and/or incomplete removal.
Pulse Width In Msec: less than 2
Post-Care Instructions: I reviewed with the patient in detail post-care instructions. Patient is to apply vaseline with a q-tip to all crusted areas, and avoid picking at any scabs. Pt should stay away from the sun and wear sun protection until fully healed.
Laser Type: KTP 532nm
Pre-Procedure Text: After consent was obtained and the procedure was explained, all persons present in the exam room put on their protective eyewear. Cold gel was applied to the treatment areas.
Procedural Text: The treatment areas where then treated as noted above.
Detail Level: Detailed
Price (Use Numbers Only, No Special Characters Or $): 50
Fluence In J: 9.0

## 2019-11-06 ENCOUNTER — APPOINTMENT (OUTPATIENT)
Age: 64
Setting detail: DERMATOLOGY
End: 2019-11-07

## 2019-11-06 DIAGNOSIS — Z71.89 OTHER SPECIFIED COUNSELING: ICD-10-CM

## 2019-11-06 DIAGNOSIS — Z41.9 ENCOUNTER FOR PROCEDURE FOR PURPOSES OTHER THAN REMEDYING HEALTH STATE, UNSPECIFIED: ICD-10-CM

## 2019-11-06 PROCEDURE — OTHER MIPS QUALITY: OTHER

## 2019-11-06 PROCEDURE — OTHER EXCEL V LASER: OTHER

## 2019-11-06 PROCEDURE — OTHER COUNSELING: OTHER

## 2019-11-06 ASSESSMENT — LOCATION SIMPLE DESCRIPTION DERM
LOCATION SIMPLE: RIGHT FOREHEAD
LOCATION SIMPLE: LEFT FOREHEAD

## 2019-11-06 ASSESSMENT — LOCATION DETAILED DESCRIPTION DERM
LOCATION DETAILED: RIGHT FOREHEAD
LOCATION DETAILED: LEFT FOREHEAD
LOCATION DETAILED: RIGHT MEDIAL FOREHEAD

## 2019-11-06 ASSESSMENT — LOCATION ZONE DERM: LOCATION ZONE: FACE

## 2019-11-06 NOTE — PROCEDURE: EXCEL V LASER
Laser Type: KTP 532nm
Pulse Width In Msec: less than 2
Consent: Written consent obtained, risks reviewed including but not limited to crusting, scabbing, blistering, scarring, darker or lighter pigmentary change, and/or incomplete removal.
Spot Size: 5
Post-Care Instructions: I reviewed with the patient in detail post-care instructions. Patient is to apply vaseline with a q-tip to all crusted areas, and avoid picking at any scabs. Pt should stay away from the sun and wear sun protection until fully healed.
Fluence In J: 9.0
Treatment Number (Will Not Render If 0): 0
Procedural Text: The treatment areas where then treated as noted above.
Detail Level: Detailed
Pre-Procedure Text: After consent was obtained and the procedure was explained, all persons present in the exam room put on their protective eyewear. Cold gel was applied to the treatment areas.
Repetition Rate: 1.0 Hz
Post-Procedure Text: Following the treatment, ice and broad spectrum sunscreen was applied to the treatment areas.  Post-care instructions were discussed.

## 2020-03-19 ENCOUNTER — APPOINTMENT (OUTPATIENT)
Age: 65
Setting detail: DERMATOLOGY
End: 2020-03-20

## 2020-03-19 DIAGNOSIS — L57.0 ACTINIC KERATOSIS: ICD-10-CM

## 2020-03-19 DIAGNOSIS — L82.1 OTHER SEBORRHEIC KERATOSIS: ICD-10-CM

## 2020-03-19 DIAGNOSIS — Z85.828 PERSONAL HISTORY OF OTHER MALIGNANT NEOPLASM OF SKIN: ICD-10-CM

## 2020-03-19 PROCEDURE — 17000 DESTRUCT PREMALG LESION: CPT

## 2020-03-19 PROCEDURE — OTHER REASSURANCE: OTHER

## 2020-03-19 PROCEDURE — 99214 OFFICE O/P EST MOD 30 MIN: CPT | Mod: 25

## 2020-03-19 PROCEDURE — OTHER LIQUID NITROGEN: OTHER

## 2020-03-19 PROCEDURE — 17003 DESTRUCT PREMALG LES 2-14: CPT

## 2020-03-19 PROCEDURE — OTHER OTHER: OTHER

## 2020-03-19 PROCEDURE — OTHER MIPS QUALITY: OTHER

## 2020-03-19 PROCEDURE — OTHER COUNSELING: OTHER

## 2020-03-19 ASSESSMENT — LOCATION DETAILED DESCRIPTION DERM
LOCATION DETAILED: RIGHT CENTRAL FRONTAL SCALP
LOCATION DETAILED: RIGHT SUPERIOR CENTRAL MALAR CHEEK
LOCATION DETAILED: LEFT INFERIOR LATERAL MALAR CHEEK
LOCATION DETAILED: LEFT DISTAL DORSAL FOREARM
LOCATION DETAILED: RIGHT CENTRAL TEMPLE
LOCATION DETAILED: LEFT SUPERIOR HELIX
LOCATION DETAILED: LEFT LATERAL MALAR CHEEK
LOCATION DETAILED: RIGHT SUPERIOR LATERAL BUCCAL CHEEK
LOCATION DETAILED: RIGHT MEDIAL SUPERIOR CHEST
LOCATION DETAILED: INFERIOR THORACIC SPINE
LOCATION DETAILED: LEFT DORSAL WRIST
LOCATION DETAILED: LEFT POSTERIOR SHOULDER
LOCATION DETAILED: RIGHT CLAVICULAR SKIN
LOCATION DETAILED: LEFT CENTRAL MALAR CHEEK
LOCATION DETAILED: LEFT PROXIMAL DORSAL FOREARM
LOCATION DETAILED: LEFT PROXIMAL CALF
LOCATION DETAILED: LEFT SUPERIOR CENTRAL MALAR CHEEK
LOCATION DETAILED: LEFT SCAPHA
LOCATION DETAILED: RIGHT INFERIOR LATERAL MALAR CHEEK
LOCATION DETAILED: RIGHT MEDIAL UPPER BACK

## 2020-03-19 ASSESSMENT — LOCATION SIMPLE DESCRIPTION DERM
LOCATION SIMPLE: RIGHT TEMPLE
LOCATION SIMPLE: LEFT FOREARM
LOCATION SIMPLE: LEFT CHEEK
LOCATION SIMPLE: RIGHT SCALP
LOCATION SIMPLE: LEFT CALF
LOCATION SIMPLE: RIGHT CLAVICULAR SKIN
LOCATION SIMPLE: LEFT SHOULDER
LOCATION SIMPLE: LEFT WRIST
LOCATION SIMPLE: RIGHT UPPER BACK
LOCATION SIMPLE: UPPER BACK
LOCATION SIMPLE: CHEST
LOCATION SIMPLE: RIGHT CHEEK
LOCATION SIMPLE: LEFT EAR

## 2020-03-19 ASSESSMENT — LOCATION ZONE DERM
LOCATION ZONE: SCALP
LOCATION ZONE: LEG
LOCATION ZONE: EAR
LOCATION ZONE: TRUNK
LOCATION ZONE: FACE
LOCATION ZONE: ARM

## 2020-03-19 NOTE — PROCEDURE: LIQUID NITROGEN
Render Post-Care Instructions In Note?: no
Duration Of Freeze Thaw-Cycle (Seconds): 5
Number Of Freeze-Thaw Cycles: 1 freeze-thaw cycle
Consent: The patient's consent was obtained including but not limited to risks of crusting, scabbing, blistering, scarring, darker or lighter pigmentary change, recurrence, incomplete removal and infection.
Detail Level: Detailed
Post-Care Instructions: I reviewed with the patient in detail post-care instructions. Patient is to wear sunprotection, and avoid picking at any of the treated lesions. Pt may apply Vaseline to crusted or scabbing areas.

## 2020-03-19 NOTE — PROCEDURE: OTHER
Other (Free Text): Will plan Efudex to arms in the fall.
Detail Level: Zone
Note Text (......Xxx Chief Complaint.): This diagnosis correlates with the

## 2020-10-15 ENCOUNTER — APPOINTMENT (OUTPATIENT)
Age: 65
Setting detail: DERMATOLOGY
End: 2020-10-16

## 2020-10-15 VITALS — TEMPERATURE: 98.1 F

## 2020-10-15 DIAGNOSIS — L57.0 ACTINIC KERATOSIS: ICD-10-CM

## 2020-10-15 DIAGNOSIS — Z85.828 PERSONAL HISTORY OF OTHER MALIGNANT NEOPLASM OF SKIN: ICD-10-CM

## 2020-10-15 DIAGNOSIS — L82.1 OTHER SEBORRHEIC KERATOSIS: ICD-10-CM

## 2020-10-15 DIAGNOSIS — D485 NEOPLASM OF UNCERTAIN BEHAVIOR OF SKIN: ICD-10-CM

## 2020-10-15 PROBLEM — D48.5 NEOPLASM OF UNCERTAIN BEHAVIOR OF SKIN: Status: ACTIVE | Noted: 2020-10-15

## 2020-10-15 PROCEDURE — 11103 TANGNTL BX SKIN EA SEP/ADDL: CPT

## 2020-10-15 PROCEDURE — OTHER REASSURANCE: OTHER

## 2020-10-15 PROCEDURE — 99214 OFFICE O/P EST MOD 30 MIN: CPT | Mod: 25

## 2020-10-15 PROCEDURE — OTHER LIQUID NITROGEN: OTHER

## 2020-10-15 PROCEDURE — OTHER COUNSELING: OTHER

## 2020-10-15 PROCEDURE — OTHER BIOPSY BY SHAVE METHOD: OTHER

## 2020-10-15 PROCEDURE — OTHER MIPS QUALITY: OTHER

## 2020-10-15 PROCEDURE — OTHER OTHER: OTHER

## 2020-10-15 PROCEDURE — 17000 DESTRUCT PREMALG LESION: CPT | Mod: 59

## 2020-10-15 PROCEDURE — 17003 DESTRUCT PREMALG LES 2-14: CPT

## 2020-10-15 PROCEDURE — 11102 TANGNTL BX SKIN SINGLE LES: CPT

## 2020-10-15 ASSESSMENT — LOCATION SIMPLE DESCRIPTION DERM
LOCATION SIMPLE: LEFT SHOULDER
LOCATION SIMPLE: RIGHT TEMPLE
LOCATION SIMPLE: RIGHT CHEEK
LOCATION SIMPLE: LEFT CHEEK
LOCATION SIMPLE: LEFT EAR
LOCATION SIMPLE: RIGHT SCALP
LOCATION SIMPLE: LEFT CALF
LOCATION SIMPLE: UPPER BACK
LOCATION SIMPLE: RIGHT UPPER BACK
LOCATION SIMPLE: RIGHT FOREHEAD

## 2020-10-15 ASSESSMENT — LOCATION ZONE DERM
LOCATION ZONE: EAR
LOCATION ZONE: SCALP
LOCATION ZONE: FACE
LOCATION ZONE: LEG
LOCATION ZONE: ARM
LOCATION ZONE: TRUNK

## 2020-10-15 ASSESSMENT — LOCATION DETAILED DESCRIPTION DERM
LOCATION DETAILED: LEFT CENTRAL MALAR CHEEK
LOCATION DETAILED: LEFT SCAPHA
LOCATION DETAILED: LEFT INFERIOR LATERAL MALAR CHEEK
LOCATION DETAILED: LEFT LATERAL MALAR CHEEK
LOCATION DETAILED: LEFT PROXIMAL CALF
LOCATION DETAILED: INFERIOR THORACIC SPINE
LOCATION DETAILED: RIGHT CENTRAL TEMPLE
LOCATION DETAILED: RIGHT MEDIAL UPPER BACK
LOCATION DETAILED: LEFT SUPERIOR CENTRAL MALAR CHEEK
LOCATION DETAILED: LEFT POSTERIOR SHOULDER
LOCATION DETAILED: RIGHT INFERIOR LATERAL MALAR CHEEK
LOCATION DETAILED: RIGHT INFERIOR FOREHEAD
LOCATION DETAILED: LEFT SUPERIOR HELIX
LOCATION DETAILED: RIGHT CENTRAL FRONTAL SCALP

## 2020-10-15 NOTE — PROCEDURE: OTHER
Detail Level: Zone
Other (Free Text): Will plan Efudex to arms in the fall.
Note Text (......Xxx Chief Complaint.): This diagnosis correlates with the

## 2020-10-15 NOTE — PROCEDURE: BIOPSY BY SHAVE METHOD
Hide Accession Number?: No
Curettage Text: The wound bed was treated with curettage after the biopsy was performed.
Information: Selecting Yes will display possible errors in your note based on the variables you have selected. This validation is only offered as a suggestion for you. PLEASE NOTE THAT THE VALIDATION TEXT WILL BE REMOVED WHEN YOU FINALIZE YOUR NOTE. IF YOU WANT TO FAX A PRELIMINARY NOTE YOU WILL NEED TO TOGGLE THIS TO 'NO' IF YOU DO NOT WANT IT IN YOUR FAXED NOTE.
Dressing: bandage
Anesthesia Type: 1% lidocaine with epinephrine
Size Of Lesion In Cm: 0.3
Cryotherapy Text: The wound bed was treated with cryotherapy after the biopsy was performed.
Anesthesia Volume In Cc: 0.5
Consent: Written consent was obtained and risks were reviewed including but not limited to scarring, infection, bleeding, scabbing, incomplete removal, nerve damage and allergy to anesthesia.  Clinical evaluation reveals changes suspicious for rule out provided in pathology requisition.  Intent of procedure is to obtain tissue sample for histopathic examination.  A skin biopsy is considered a necessary and appropriate to clarify the diagnosis.
Billing Type: Third-Party Bill
Depth Of Biopsy: dermis
Biopsy Method: 10 blade
Type Of Destruction Used: Curettage
Biopsy Type: H and E
Electrodesiccation And Curettage Text: The wound bed was treated with electrodesiccation and curettage after the biopsy was performed.
Wound Care: Vaseline
Post-Care Instructions: I reviewed with the patient in detail post-care instructions. Patient is to keep the biopsy site dry overnight, and then apply bacitracin twice daily until healed. Patient may apply hydrogen peroxide soaks to remove any crusting.
Silver Nitrate Text: The wound bed was treated with silver nitrate after the biopsy was performed.
Additional Anesthesia Volume In Cc (Will Not Render If 0): 0
Size Of Lesion In Cm: 0.2
Electrodesiccation Text: The wound bed was treated with electrodesiccation after the biopsy was performed.
Detail Level: Detailed
Was A Bandage Applied: Yes
Hemostasis: Electrocautery
Notification Instructions: Patient will be notified of biopsy results. However, patient instructed to call the office if not contacted within 2 weeks.

## 2020-10-15 NOTE — PROCEDURE: MIPS QUALITY
Quality 226: Preventive Care And Screening: Tobacco Use: Screening And Cessation Intervention: Patient screened for tobacco use and is an ex/non-smoker
Quality 47: Advance Care Plan: Advance Care Planning discussed and documented; advance care plan or surrogate decision maker documented in the medical record.
Quality 110: Preventive Care And Screening: Influenza Immunization: Influenza Immunization Administered during Influenza season
Detail Level: Detailed
Quality 111:Pneumonia Vaccination Status For Older Adults: Pneumococcal Vaccination not Administered or Previously Received, Reason not Otherwise Specified

## 2020-11-03 ENCOUNTER — APPOINTMENT (OUTPATIENT)
Age: 65
Setting detail: DERMATOLOGY
End: 2020-11-05

## 2020-11-03 VITALS — TEMPERATURE: 98.2 F

## 2020-11-03 DIAGNOSIS — Z85.828 PERSONAL HISTORY OF OTHER MALIGNANT NEOPLASM OF SKIN: ICD-10-CM

## 2020-11-03 PROBLEM — C44.329 SQUAMOUS CELL CARCINOMA OF SKIN OF OTHER PARTS OF FACE: Status: ACTIVE | Noted: 2020-11-03

## 2020-11-03 PROBLEM — C44.319 BASAL CELL CARCINOMA OF SKIN OF OTHER PARTS OF FACE: Status: ACTIVE | Noted: 2020-11-03

## 2020-11-03 PROCEDURE — OTHER MIPS QUALITY: OTHER

## 2020-11-03 PROCEDURE — 99213 OFFICE O/P EST LOW 20 MIN: CPT

## 2020-11-03 PROCEDURE — OTHER COUNSELING: OTHER

## 2020-11-03 PROCEDURE — OTHER OTHER: OTHER

## 2020-11-03 ASSESSMENT — LOCATION SIMPLE DESCRIPTION DERM
LOCATION SIMPLE: RIGHT UPPER BACK
LOCATION SIMPLE: LEFT SHOULDER

## 2020-11-03 ASSESSMENT — LOCATION DETAILED DESCRIPTION DERM
LOCATION DETAILED: RIGHT MEDIAL UPPER BACK
LOCATION DETAILED: LEFT POSTERIOR SHOULDER

## 2020-11-03 ASSESSMENT — LOCATION ZONE DERM
LOCATION ZONE: TRUNK
LOCATION ZONE: ARM

## 2020-11-03 NOTE — PROCEDURE: MIPS QUALITY
Quality 47: Advance Care Plan: Advance Care Planning discussed and documented; advance care plan or surrogate decision maker documented in the medical record.
Quality 226: Preventive Care And Screening: Tobacco Use: Screening And Cessation Intervention: Patient screened for tobacco use and is an ex/non-smoker
Quality 111:Pneumonia Vaccination Status For Older Adults: Pneumococcal Vaccination not Administered or Previously Received, Reason not Otherwise Specified
Detail Level: Detailed
Quality 110: Preventive Care And Screening: Influenza Immunization: Influenza Immunization Administered during Influenza season

## 2020-11-03 NOTE — PROCEDURE: OTHER
Note Text (......Xxx Chief Complaint.): This diagnosis correlates with the
Detail Level: Zone
Other (Free Text): Discussed treatment options Srt vs Excision. Patient will proceed with SRT

## 2020-11-06 ENCOUNTER — APPOINTMENT (OUTPATIENT)
Age: 65
Setting detail: DERMATOLOGY
End: 2020-11-10

## 2020-11-06 VITALS — TEMPERATURE: 98.4 F

## 2020-11-06 PROBLEM — D04.39 CARCINOMA IN SITU OF SKIN OF OTHER PARTS OF FACE: Status: ACTIVE | Noted: 2020-11-06

## 2020-11-06 PROBLEM — C44.329 SQUAMOUS CELL CARCINOMA OF SKIN OF OTHER PARTS OF FACE: Status: ACTIVE | Noted: 2020-11-06

## 2020-11-06 PROBLEM — C44.319 BASAL CELL CARCINOMA OF SKIN OF OTHER PARTS OF FACE: Status: ACTIVE | Noted: 2020-11-06

## 2020-11-06 PROCEDURE — 77290 THER RAD SIMULAJ FIELD CPLX: CPT

## 2020-11-06 PROCEDURE — G6001 ECHO GUIDANCE RADIOTHERAPY: HCPCS

## 2020-11-06 PROCEDURE — 77334 RADIATION TREATMENT AID(S): CPT

## 2020-11-06 PROCEDURE — 77300 RADIATION THERAPY DOSE PLAN: CPT

## 2020-11-06 PROCEDURE — OTHER SUPERFICIAL RADIATION TREATMENT: OTHER

## 2020-11-06 PROCEDURE — OTHER FOLLOW UP FOR NEXT VISIT: OTHER

## 2020-11-06 PROCEDURE — 77263 THER RADIOLOGY TX PLNG CPLX: CPT

## 2020-11-06 PROCEDURE — 99213 OFFICE O/P EST LOW 20 MIN: CPT | Mod: 59

## 2020-11-06 PROCEDURE — OTHER TREATMENT REGIMEN: OTHER

## 2020-11-06 NOTE — PROCEDURE: SUPERFICIAL RADIATION TREATMENT
Field Size (Applicator): 2.5 cm
Treatment Time / Fractionation (Optional- Include Units) Rx 2: .38
Fractions / Week Rx 3: 5
Energy (Optional-Please Include Units): 50kV
Number Of Treatment Days: 1
Daily Fractionated Dose (Optional- Include Units) Rx 2: 264.18
Cumulative Dose In Cgy (Optional): 268.62
Depth (Optional-Please Include Units): 0.7
Simple Simulation Afterword Text Will Be Included With Simple Simulations (Indications............): Patient had a complete consultation regarding all applicable modalities for the treatment of their skin cancer and based on a variety of factors including the type of tumor, size, and location, the relevant medical history as well as local tissue factors, the functional status of the individual, the ability to perform necessary postoperative wound instructions and the need for simultaneous treatments as well as overall wound healing status, it was determined that the patient would begin radiation therapy treatment for skin cancer.  A full simulation and treatment device design was performed including the determination and formulation of appropriate simple and complex devices including lead shield of 0.762 mm thickness to form molded customized shielding to specifically correlate with the lesion size including treatment margin.  The custom lead shield is adequate to accommodate the appropriate applicator and provide adequate shielding around the treatment site.  The specific field applicator, shields, and devices both simple and complex as well as the specific patient setup is outlined below.  The patient was given a full consent for superficial radiation to both verbally and in writing and the full determination of patient's eligibility for treatment and selection is outlined on the patient eligibility and treatment selection form.  The specific superficial radiotherapy prescription was determined and was documented on the superficial radiotherapy prescription form.  A treatment calculation was also performed and documented on the treatment calculation form.  Based on the prescription, the patient was scheduled for a series of fractional treatments.
Treatment Margins In Cm: 0.5
Custom Shielding Preamble Text Will Not Be Included With Simple Simulations (.......... X X Y Cm): A lead shield of 0.762 mm thickness is utilized to form a molded, custom shield for intranasal shield.
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
Dose / Tx In Cgy (Optional): 270.9
Assessment: Appropriate reaction
Functional Status: 0 (fully active)
Custom Shielding Afterword Text Will Not Be Included With Simple Simulations (X X Y Cm............): port to correlate with the lesion size, including treatment margin. The custom lead shield is adequate to accommodate the appropriate applicator and provide adequate shielding around the treatment site. Additional shielding (as noted below) is used to protect sensitive, normal tissues.
Render Prescriptions In Note?: Yes
Shielding Size (Optional- Include Units): 2.0 cm x 2.0 cm
Detail Level: Detailed
Treatment Time In Min (Optional): .43
Bill For Radiation Treatment: No
Port Dimensions-X Axis In Cm: 2
Simple Simulation Preamble Text Will Be Included With Simple Simulations (.......... Indications): Complex simulation was performed today for the following reasons:\\nPatient has custom shield designed for each SRT
Limb Positioning Or Elevation: pillow
Time Dose Fractionation (Optional- Include Units If Applicable) Rx 2: 46 (Total 94)
Time Dose Fractionation (Optional- Include Units If Applicable): 96
Computed Treatment Time In Min (Will Render The Same As Calculated Treatment Time If Left Blank): 0.43
Information: Selecting Yes will display possible errors in your note based on the variables you have selected. This validation is only offered as a suggestion for you. PLEASE NOTE THAT THE VALIDATION TEXT WILL BE REMOVED WHEN YOU FINALIZE YOUR NOTE. IF YOU WANT TO FAX A PRELIMINARY NOTE YOU WILL NEED TO TOGGLE THIS TO 'NO' IF YOU DO NOT WANT IT IN YOUR FAXED NOTE.
Fractions / Week: 3
Fractionation Number: 0
Treatment Time / Fractionation (Optional- Include Units): 0.43min
Total Number Of Fractions Rx 3: 15
Treatment Device Design After Initial Simulation Justification (Will Render If Bill For Treatment Devices = Yes): Patient is status post radiation simulation and is evaluated as to the use of additional devices for shielding and placement for radiation therapy.
Energy (Optional-Please Include Units): 70kV
Shielding Size (Optional- Include Units) Rx 2: 2.0cmX2.0cm
Total Number Of Fractions Rx 2: 10
Time Dose Fractionation (Optional- Include Units If Applicable): 94
Patient Positioning: Side-lying (left)
Total Dose (Optional-Please Include Units) Rx 2: 2641.8 (Total 5352.2)
Computed Treatment Time In Min (Will Render The Same As Calculated Treatment Time If Left Blank): 0.37
Depth (Optional-Please Include Units): 1.65
Treatment Time / Fractionation (Optional- Include Units): 0.37min
Intro Statement (Will Not Render If Left Blank): Per the request of Dr. Robin, patient was seen today for Superficial Radiation Therapy requiring simulation (CPT® 15347) in preparation for treatment of specific diseased site(s). Simulation is necessary to determine correct patient and treatment portal positioning, deliver safe and effective radiation therapy. Today’s setup was evaluated determining continuation of treatment with the current plan, or necessary changes as appropriate. All appropriate custom blocking and treatment parameters verified by the Radiation Therapist.\\nToday’s visit is for Simulation and planning for radiation therapy.  Question were answered and concerns were address.  Patient was evaluated based on listed criteria and is a suitable candidate to begin SRT.  \\n
Energy (Include Units): 70 kV
Total Number Of Fractions: 20
Pathology Override (Pathology Will Render As Diagnosis Name If Left Blank): Basal Cell Carcinoma Nodular Type
Depth (Optional-Please Include Units): 0.66
Treatment Time In Min (Optional): .37
Total Dose (Optional-Please Include Units): 5418. cGy
Total Dose (Optional-Please Include Units): 5372.4. cGy
Energy (Include Units): 50 kV

## 2020-11-06 NOTE — PROCEDURE: TREATMENT REGIMEN
Plan: Per the request of Dr. Robin, patient was seen today for Superficial Radiation Therapy requiring simulation (CPT® 57628) in preparation for treatment of specific diseased site(s). Simulation is necessary to determine correct patient and treatment portal positioning, deliver safe and effective radiation therapy. A high frequency ultrasound image was acquired today for three-dimensional evaluation of tumor volume and response to treatment, in addition, geometric accuracy of field placement (CPT® ). Physician evaluation of the ultrasound tumor depth will be ongoing through course of treatment and is deemed medically necessary ensuring efficacy of treatment. Today’s image and setup was evaluated determining continuation of treatment with the current plan, or necessary changes as appropriate. All appropriate custom blocking and treatment parameters verified by the Radiation Therapist.\\n\\n\\nToday’s visit is for Simulation and planning for radiation therapy.  Question were answered and concerns were address.  Patient was evaluated based on listed criteria and is a suitable candidate to begin SRT.  Ultrasound was used to confirm treatment location and determine depth of treatment.  Depth: 1.65 mm\\n
Detail Level: Zone
Plan: Per the request of Dr. Robin, patient was seen today for Superficial Radiation Therapy requiring simulation (CPT® 06205) in preparation for treatment of specific diseased site(s). Simulation is necessary to determine correct patient and treatment portal positioning, deliver safe and effective radiation therapy. A high frequency ultrasound image was acquired today for three-dimensional evaluation of tumor volume and response to treatment, in addition, geometric accuracy of field placement (CPT® ). Physician evaluation of the ultrasound tumor depth will be ongoing through course of treatment and is deemed medically necessary ensuring efficacy of treatment. Today’s image and setup was evaluated determining continuation of treatment with the current plan, or necessary changes as appropriate. All appropriate custom blocking and treatment parameters verified by the Radiation Therapist.\\n\\n\\nToday’s visit is for Simulation and planning for radiation therapy.  Question were answered and concerns were address.  Patient was evaluated based on listed criteria and is a suitable candidate to begin SRT.  Ultrasound was used to confirm treatment location and determine depth of treatment.  Depth: 0.7 mm
Plan: Per the request of Dr. Robin, patient was seen today for Superficial Radiation Therapy requiring simulation (CPT® 01353) in preparation for treatment of specific diseased site(s). Simulation is necessary to determine correct patient and treatment portal positioning, deliver safe and effective radiation therapy. A high frequency ultrasound image was acquired today for three-dimensional evaluation of tumor volume and response to treatment, in addition, geometric accuracy of field placement (CPT® ). Physician evaluation of the ultrasound tumor depth will be ongoing through course of treatment and is deemed medically necessary ensuring efficacy of treatment. Today’s image and setup was evaluated determining continuation of treatment with the current plan, or necessary changes as appropriate. All appropriate custom blocking and treatment parameters verified by the Radiation Therapist.\\n\\n\\nToday’s visit is for Simulation and planning for radiation therapy.  Question were answered and concerns were address.  Patient was evaluated based on listed criteria and is a suitable candidate to begin SRT.  Ultrasound was used to confirm treatment location and determine depth of treatment.  Depth: 0.66 mm\\n

## 2020-11-09 ENCOUNTER — APPOINTMENT (OUTPATIENT)
Age: 65
Setting detail: DERMATOLOGY
End: 2020-11-11

## 2020-11-09 VITALS — TEMPERATURE: 98 F

## 2020-11-09 PROBLEM — D04.39 CARCINOMA IN SITU OF SKIN OF OTHER PARTS OF FACE: Status: ACTIVE | Noted: 2020-11-09

## 2020-11-09 PROBLEM — C44.329 SQUAMOUS CELL CARCINOMA OF SKIN OF OTHER PARTS OF FACE: Status: ACTIVE | Noted: 2020-11-09

## 2020-11-09 PROBLEM — C44.319 BASAL CELL CARCINOMA OF SKIN OF OTHER PARTS OF FACE: Status: ACTIVE | Noted: 2020-11-09

## 2020-11-09 PROCEDURE — 77401 RADIATION TX DELIVERY SUPFC: CPT

## 2020-11-09 PROCEDURE — 77280 THER RAD SIMULAJ FIELD SMPL: CPT

## 2020-11-09 PROCEDURE — OTHER TREATMENT REGIMEN: OTHER

## 2020-11-09 PROCEDURE — OTHER FOLLOW UP FOR NEXT VISIT: OTHER

## 2020-11-09 PROCEDURE — OTHER SUPERFICIAL RADIATION TREATMENT: OTHER

## 2020-11-09 PROCEDURE — G6001 ECHO GUIDANCE RADIOTHERAPY: HCPCS

## 2020-11-09 NOTE — PROCEDURE: TREATMENT REGIMEN
Detail Level: Zone
Plan: This patient has been treated today with superficial radiation therapy for non-melanoma skin cancer.    \\n\\nWritten informed consent has been previously obtained from this patient for this treatment.  This consent is documented in the patient’s chart.  The patient gave verbal consent to continue treatment today. \\n\\nThe patient was treated with a specific radiation dose and setup as prescribed by the provider listed on this visit note.  A Radiation Therapist performed administration of radiation. The treatment parameters and cumulative dose are indicated above.\\n  \\nPrior to administering the radiation, the patient underwent a verification simulation defining relevant normal and abnormal target anatomy and acquiring images and data necessary to develop optimal radiation treatment process for the patient. This process includes verification of the treatment port(s) and proper treatment positioning.  All treatment ports were photographed.  The patient’s customized lead blocking was confirmed.   Considering, superficial radiotherapy setup is a clinical setup, this requires physician and radiation therapist to clarify location interest being treated against initial images, pathology and patient anatomy. Care was taken ensuring fields treated were geometrically accurate and properly positioned using daily verification simulation.  This process is also utilized to determine if any changes are necessary.   These steps are therefore medically necessary ensuring safe and effective administration of radiation. \\n\\nA high frequency ultrasound image was acquired today for two-dimensional evaluation of the tumor volume and response to treatment, in addition to geometric accuracy of field placement. The daily field placement is separate and distinct from the initial simulation and is an important task in providing safe administration of radiation therapy. Physician evaluation of the ultrasound tumor depth will be ongoing throughout the course of treatment and is deemed medically necessary in order to ensure the efficacy of treatment. Today’s image was evaluated for determination of continuation of treatment with the current plan or with necessary changes as appropriate. Additionally, the use of visualization and targeted assessment allows the patient to be able to see their cancer(s) progress, encouraging patient to complete full course of radiotherapy. \\n\\nLocation:  US Depth of 1.65 mm with +/- 0.00 change from previous imaging.  Repop: +, ZAKI equivocal\\n\\nPer Dr. Robin, continued daily US guidance and clinical setup simulation is required for field placement, measurement of tumor depth, progress and acute effect monitoring. \\n
Plan: This patient has been treated today with superficial radiation therapy for non-melanoma skin cancer.    \\n\\nWritten informed consent has been previously obtained from this patient for this treatment.  This consent is documented in the patient’s chart.  The patient gave verbal consent to continue treatment today. \\n\\nThe patient was treated with a specific radiation dose and setup as prescribed by the provider listed on this visit note.  A Radiation Therapist performed administration of radiation. The treatment parameters and cumulative dose are indicated above.\\n  \\nPrior to administering the radiation, the patient underwent a verification simulation defining relevant normal and abnormal target anatomy and acquiring images and data necessary to develop optimal radiation treatment process for the patient. This process includes verification of the treatment port(s) and proper treatment positioning.  All treatment ports were photographed.  The patient’s customized lead blocking was confirmed.   Considering, superficial radiotherapy setup is a clinical setup, this requires physician and radiation therapist to clarify location interest being treated against initial images, pathology and patient anatomy. Care was taken ensuring fields treated were geometrically accurate and properly positioned using daily verification simulation.  This process is also utilized to determine if any changes are necessary.   These steps are therefore medically necessary ensuring safe and effective administration of radiation. \\n\\nA high frequency ultrasound image was acquired today for two-dimensional evaluation of the tumor volume and response to treatment, in addition to geometric accuracy of field placement. The daily field placement is separate and distinct from the initial simulation and is an important task in providing safe administration of radiation therapy. Physician evaluation of the ultrasound tumor depth will be ongoing throughout the course of treatment and is deemed medically necessary in order to ensure the efficacy of treatment. Today’s image was evaluated for determination of continuation of treatment with the current plan or with necessary changes as appropriate. Additionally, the use of visualization and targeted assessment allows the patient to be able to see their cancer(s) progress, encouraging patient to complete full course of radiotherapy. \\n\\nLocation:  US Depth of 0.67 mm with +/- 0.01 change from previous imaging.  Repop: ++, ZAKI equivocal\\n\\nPer Dr. Robin, continued daily US guidance and clinical setup simulation is required for field placement, measurement of tumor depth, progress and acute effect monitoring. \\n
Plan: This patient has been treated today with superficial radiation therapy for non-melanoma skin cancer.    \\n\\nWritten informed consent has been previously obtained from this patient for this treatment.  This consent is documented in the patient’s chart.  The patient gave verbal consent to continue treatment today. \\n\\nThe patient was treated with a specific radiation dose and setup as prescribed by the provider listed on this visit note.  A Radiation Therapist performed administration of radiation. The treatment parameters and cumulative dose are indicated above.\\n  \\nPrior to administering the radiation, the patient underwent a verification simulation defining relevant normal and abnormal target anatomy and acquiring images and data necessary to develop optimal radiation treatment process for the patient. This process includes verification of the treatment port(s) and proper treatment positioning.  All treatment ports were photographed.  The patient’s customized lead blocking was confirmed.   Considering, superficial radiotherapy setup is a clinical setup, this requires physician and radiation therapist to clarify location interest being treated against initial images, pathology and patient anatomy. Care was taken ensuring fields treated were geometrically accurate and properly positioned using daily verification simulation.  This process is also utilized to determine if any changes are necessary.   These steps are therefore medically necessary ensuring safe and effective administration of radiation. \\n\\nA high frequency ultrasound image was acquired today for two-dimensional evaluation of the tumor volume and response to treatment, in addition to geometric accuracy of field placement. The daily field placement is separate and distinct from the initial simulation and is an important task in providing safe administration of radiation therapy. Physician evaluation of the ultrasound tumor depth will be ongoing throughout the course of treatment and is deemed medically necessary in order to ensure the efficacy of treatment. Today’s image was evaluated for determination of continuation of treatment with the current plan or with necessary changes as appropriate. Additionally, the use of visualization and targeted assessment allows the patient to be able to see their cancer(s) progress, encouraging patient to complete full course of radiotherapy. \\n\\nLocation:  US Depth of 0.63 mm with +/- 0.07 change from previous imaging.  Repop: ++, ZAKI equivocal\\n\\nPer Dr. Robin, continued daily US guidance and clinical setup simulation is required for field placement, measurement of tumor depth, progress and acute effect monitoring. \\n

## 2020-11-09 NOTE — PROCEDURE: SUPERFICIAL RADIATION TREATMENT
Shielding Size (Optional- Include Units): 2.0 cm x 2.0 cm
Render Prescriptions In Note?: No
Detail Level: Detailed
Daily Fractionated Dose (Optional- Include Units) Rx 2: 264.18
Dose / Tx In Cgy (Optional): 270.9
Assessment: Appropriate reaction
Day Of The Week Treatment Administered: Monday
Field Size (Applicator): 2.5 cm
Pathology Override (Pathology Will Render As Diagnosis Name If Left Blank): Basal Cell Carcinoma Nodular Type
Time Dose Fractionation (Optional- Include Units If Applicable) Rx 2: 46 (Total 94)
Total Dose (Optional-Please Include Units): 5418. cGy
Fractions / Week Rx 3: 5
Dose / Tx In Cgy (Optional): 268.62
Energy (Optional-Please Include Units): 70kV
Functional Status: 0 (fully active)
Number Of Treatment Days: 1
Total Dose (Optional-Please Include Units): 5372.4. cGy
Initial Radiation Treatment Planning (Will Render If Bill Simulation = Yes): Patient had a complete consultation regarding all applicable modalities for the treatment of their skin cancer and based on a variety of factors including the type of tumor, size, and location, the relevant medical history as well as local tissue factors, the functional status of the individual, the ability to perform necessary postoperative wound instructions and the need for simultaneous treatments as well as overall wound healing status, it was determined that the patient would begin radiation therapy treatment for skin cancer.  A full simulation and treatment device design was performed including the determination and formulation of appropriate simple and complex devices including lead shield of 0.762 mm thickness to form molded customized shielding to specifically correlate with the lesion size including treatment margin.  The custom lead shield is adequate to accommodate the appropriate applicator and provide adequate shielding around the treatment site.  The specific field applicator, shields, and devices both simple and complex as well as the specific patient setup is outlined below.  The patient was given a full consent for superficial radiation to both verbally and in writing and the full determination of patient's eligibility for treatment and selection is outlined on the patient eligibility and treatment selection form.  The specific superficial radiotherapy prescription was determined and was documented on the superficial radiotherapy prescription form.  A treatment calculation was also performed and documented on the treatment calculation form.  Based on the prescription, the patient was scheduled for a series of fractional treatments.
Limb Positioning Or Elevation: pillow
Treatment Time In Min (Optional): .43
Computed Treatment Time In Min (Will Render The Same As Calculated Treatment Time If Left Blank): 0.37
Total Dose (Optional-Please Include Units) Rx 2: 2641.8 (Total 5352.2)
Port Dimensions-X Axis In Cm: 2
Energy (Optional-Please Include Units) Rx 2: 50kV
Fractions / Week Rx 2: 3
Treatment Time In Min (Optional): .37
Total Number Of Fractions: 20
Depth (Optional-Please Include Units): 0.7
Total Number Of Fractions Rx 2: 10
Depth (Optional-Please Include Units): 0.66
Total Number Of Fractions Rx 3: 15
Patient Positioning: Side-lying (left)
Information: Selecting Yes will display possible errors in your note based on the variables you have selected. This validation is only offered as a suggestion for you. PLEASE NOTE THAT THE VALIDATION TEXT WILL BE REMOVED WHEN YOU FINALIZE YOUR NOTE. IF YOU WANT TO FAX A PRELIMINARY NOTE YOU WILL NEED TO TOGGLE THIS TO 'NO' IF YOU DO NOT WANT IT IN YOUR FAXED NOTE.
Simple Simulation Preamble Text Will Be Included With Simple Simulations (.......... Indications): Complex simulation was performed today for the following reasons:\\nPatient has custom shield designed for each SRT
Intro Statement (Will Not Render If Left Blank): Per the request of Dr. Robin, patient was seen today for Superficial Radiation Therapy requiring simulation (CPT® 50456) in preparation for treatment of specific diseased site(s). Simulation is necessary to determine correct patient and treatment portal positioning, deliver safe and effective radiation therapy. Today’s setup was evaluated determining continuation of treatment with the current plan, or necessary changes as appropriate. All appropriate custom blocking and treatment parameters verified by the Radiation Therapist.\\nToday’s visit is for Simulation and planning for radiation therapy.  Question were answered and concerns were address.  Patient was evaluated based on listed criteria and is a suitable candidate to begin SRT.  \\n
Bill For Radiation Treatment: Yes
Treatment Time / Fractionation (Optional- Include Units) Rx 2: .38
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
Depth (Optional-Please Include Units): 1.65
Time Dose Fractionation (Optional- Include Units If Applicable): 94
Energy (Include Units): 70 kV
Treatment Time / Fractionation (Optional- Include Units): 0.43min
Time Dose Fractionation (Optional- Include Units If Applicable): 96
Shielding Size (Optional- Include Units) Rx 2: 2.0cmX2.0cm
Custom Shielding Preamble Text Will Not Be Included With Simple Simulations (.......... X X Y Cm): A lead shield of 0.762 mm thickness is utilized to form a molded, custom shield for intranasal shield.
Treatment Margins In Cm: 0.5
Treatment Device Design After Initial Simulation Justification (Will Render If Bill For Treatment Devices = Yes): Patient is status post radiation simulation and is evaluated as to the use of additional devices for shielding and placement for radiation therapy.
Custom Shielding Afterword Text Will Not Be Included With Simple Simulations (X X Y Cm............): port to correlate with the lesion size, including treatment margin. The custom lead shield is adequate to accommodate the appropriate applicator and provide adequate shielding around the treatment site. Additional shielding (as noted below) is used to protect sensitive, normal tissues.
Computed Treatment Time In Min (Will Render The Same As Calculated Treatment Time If Left Blank): 0.43
Energy (Include Units): 50 kV
Treatment Time / Fractionation (Optional- Include Units): 0.37min

## 2020-11-11 ENCOUNTER — APPOINTMENT (OUTPATIENT)
Age: 65
Setting detail: DERMATOLOGY
End: 2020-11-17

## 2020-11-11 VITALS — TEMPERATURE: 98.2 F

## 2020-11-11 PROBLEM — C44.329 SQUAMOUS CELL CARCINOMA OF SKIN OF OTHER PARTS OF FACE: Status: ACTIVE | Noted: 2020-11-11

## 2020-11-11 PROBLEM — D04.39 CARCINOMA IN SITU OF SKIN OF OTHER PARTS OF FACE: Status: ACTIVE | Noted: 2020-11-11

## 2020-11-11 PROBLEM — C44.319 BASAL CELL CARCINOMA OF SKIN OF OTHER PARTS OF FACE: Status: ACTIVE | Noted: 2020-11-11

## 2020-11-11 PROCEDURE — OTHER TREATMENT REGIMEN: OTHER

## 2020-11-11 PROCEDURE — G6001 ECHO GUIDANCE RADIOTHERAPY: HCPCS

## 2020-11-11 PROCEDURE — OTHER SUPERFICIAL RADIATION TREATMENT: OTHER

## 2020-11-11 PROCEDURE — OTHER FOLLOW UP FOR NEXT VISIT: OTHER

## 2020-11-11 PROCEDURE — 77401 RADIATION TX DELIVERY SUPFC: CPT

## 2020-11-11 PROCEDURE — 77280 THER RAD SIMULAJ FIELD SMPL: CPT

## 2020-11-11 NOTE — PROCEDURE: TREATMENT REGIMEN
Detail Level: Zone
Plan: This patient has been treated today with superficial radiation therapy for non-melanoma skin cancer.    \\n\\nWritten informed consent has been previously obtained from this patient for this treatment.  This consent is documented in the patient’s chart.  The patient gave verbal consent to continue treatment today. \\n\\nThe patient was treated with a specific radiation dose and setup as prescribed by the provider listed on this visit note.  A Radiation Therapist performed administration of radiation. The treatment parameters and cumulative dose are indicated above.\\n  \\nPrior to administering the radiation, the patient underwent a verification simulation defining relevant normal and abnormal target anatomy and acquiring images and data necessary to develop optimal radiation treatment process for the patient. This process includes verification of the treatment port(s) and proper treatment positioning.  All treatment ports were photographed.  The patient’s customized lead blocking was confirmed.   Considering, superficial radiotherapy setup is a clinical setup, this requires physician and radiation therapist to clarify location interest being treated against initial images, pathology and patient anatomy. Care was taken ensuring fields treated were geometrically accurate and properly positioned using daily verification simulation.  This process is also utilized to determine if any changes are necessary.   These steps are therefore medically necessary ensuring safe and effective administration of radiation. \\n\\nA high frequency ultrasound image was acquired today for two-dimensional evaluation of the tumor volume and response to treatment, in addition to geometric accuracy of field placement. The daily field placement is separate and distinct from the initial simulation and is an important task in providing safe administration of radiation therapy. Physician evaluation of the ultrasound tumor depth will be ongoing throughout the course of treatment and is deemed medically necessary in order to ensure the efficacy of treatment. Today’s image was evaluated for determination of continuation of treatment with the current plan or with necessary changes as appropriate. Additionally, the use of visualization and targeted assessment allows the patient to be able to see their cancer(s) progress, encouraging patient to complete full course of radiotherapy. \\n\\nLocation:  US Depth of 0.64 mm with +/- 0.01 change from previous imaging.  Repop: ++, ZAKI equivocal\\n\\nPer Dr. Robin, continued daily US guidance and clinical setup simulation is required for field placement, measurement of tumor depth, progress and acute effect monitoring. \\n
Plan: This patient has been treated today with superficial radiation therapy for non-melanoma skin cancer.    \\n\\nWritten informed consent has been previously obtained from this patient for this treatment.  This consent is documented in the patient’s chart.  The patient gave verbal consent to continue treatment today. \\n\\nThe patient was treated with a specific radiation dose and setup as prescribed by the provider listed on this visit note.  A Radiation Therapist performed administration of radiation. The treatment parameters and cumulative dose are indicated above.\\n  \\nPrior to administering the radiation, the patient underwent a verification simulation defining relevant normal and abnormal target anatomy and acquiring images and data necessary to develop optimal radiation treatment process for the patient. This process includes verification of the treatment port(s) and proper treatment positioning.  All treatment ports were photographed.  The patient’s customized lead blocking was confirmed.   Considering, superficial radiotherapy setup is a clinical setup, this requires physician and radiation therapist to clarify location interest being treated against initial images, pathology and patient anatomy. Care was taken ensuring fields treated were geometrically accurate and properly positioned using daily verification simulation.  This process is also utilized to determine if any changes are necessary.   These steps are therefore medically necessary ensuring safe and effective administration of radiation. \\n\\nA high frequency ultrasound image was acquired today for two-dimensional evaluation of the tumor volume and response to treatment, in addition to geometric accuracy of field placement. The daily field placement is separate and distinct from the initial simulation and is an important task in providing safe administration of radiation therapy. Physician evaluation of the ultrasound tumor depth will be ongoing throughout the course of treatment and is deemed medically necessary in order to ensure the efficacy of treatment. Today’s image was evaluated for determination of continuation of treatment with the current plan or with necessary changes as appropriate. Additionally, the use of visualization and targeted assessment allows the patient to be able to see their cancer(s) progress, encouraging patient to complete full course of radiotherapy. \\n\\nLocation:  US Depth of 1.68 mm with +/- 0.03 change from previous imaging.  Repop: +, ZAKI equivocal\\n\\nPer Dr. Robin, continued daily US guidance and clinical setup simulation is required for field placement, measurement of tumor depth, progress and acute effect monitoring. \\n
Plan: This patient has been treated today with superficial radiation therapy for non-melanoma skin cancer.    \\n\\nWritten informed consent has been previously obtained from this patient for this treatment.  This consent is documented in the patient’s chart.  The patient gave verbal consent to continue treatment today. \\n\\nThe patient was treated with a specific radiation dose and setup as prescribed by the provider listed on this visit note.  A Radiation Therapist performed administration of radiation. The treatment parameters and cumulative dose are indicated above.\\n  \\nPrior to administering the radiation, the patient underwent a verification simulation defining relevant normal and abnormal target anatomy and acquiring images and data necessary to develop optimal radiation treatment process for the patient. This process includes verification of the treatment port(s) and proper treatment positioning.  All treatment ports were photographed.  The patient’s customized lead blocking was confirmed.   Considering, superficial radiotherapy setup is a clinical setup, this requires physician and radiation therapist to clarify location interest being treated against initial images, pathology and patient anatomy. Care was taken ensuring fields treated were geometrically accurate and properly positioned using daily verification simulation.  This process is also utilized to determine if any changes are necessary.   These steps are therefore medically necessary ensuring safe and effective administration of radiation. \\n\\nA high frequency ultrasound image was acquired today for two-dimensional evaluation of the tumor volume and response to treatment, in addition to geometric accuracy of field placement. The daily field placement is separate and distinct from the initial simulation and is an important task in providing safe administration of radiation therapy. Physician evaluation of the ultrasound tumor depth will be ongoing throughout the course of treatment and is deemed medically necessary in order to ensure the efficacy of treatment. Today’s image was evaluated for determination of continuation of treatment with the current plan or with necessary changes as appropriate. Additionally, the use of visualization and targeted assessment allows the patient to be able to see their cancer(s) progress, encouraging patient to complete full course of radiotherapy. \\n\\nLocation:  US Depth of 0.58 mm with +/- 0.11 change from previous imaging.  Repop: ++, ZAKI equivocal\\n\\nPer Dr. Robin, continued daily US guidance and clinical setup simulation is required for field placement, measurement of tumor depth, progress and acute effect monitoring. \\n

## 2020-11-11 NOTE — PROCEDURE: SUPERFICIAL RADIATION TREATMENT
Prescription Used: 1
Information: Selecting Yes will display possible errors in your note based on the variables you have selected. This validation is only offered as a suggestion for you. PLEASE NOTE THAT THE VALIDATION TEXT WILL BE REMOVED WHEN YOU FINALIZE YOUR NOTE. IF YOU WANT TO FAX A PRELIMINARY NOTE YOU WILL NEED TO TOGGLE THIS TO 'NO' IF YOU DO NOT WANT IT IN YOUR FAXED NOTE.
Intro Statement (Will Not Render If Left Blank): Per the request of Dr. Robin, patient was seen today for Superficial Radiation Therapy requiring simulation (CPT® 93989) in preparation for treatment of specific diseased site(s). Simulation is necessary to determine correct patient and treatment portal positioning, deliver safe and effective radiation therapy. Today’s setup was evaluated determining continuation of treatment with the current plan, or necessary changes as appropriate. All appropriate custom blocking and treatment parameters verified by the Radiation Therapist.\\nToday’s visit is for Simulation and planning for radiation therapy.  Question were answered and concerns were address.  Patient was evaluated based on listed criteria and is a suitable candidate to begin SRT.  \\n
Fractionation Number (Evaluation): 5
Patient Positioning: Side-lying (left)
Render Additional Prescriptions In Note?: No
Shielding Size (Optional- Include Units) Rx 2: 2.0cmX2.0cm
Fractions / Week: 3
Total Number Of Fractions Rx 2: 10
Daily Fractionated Dose (Optional- Include Units): 270.9
Energy (Include Units): 70 kV
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
Time Dose Fractionation (Optional- Include Units If Applicable) Rx 2: 46 (Total 94)
Treatment Device Design After Initial Simulation Justification (Will Render If Bill For Treatment Devices = Yes): Patient is status post radiation simulation and is evaluated as to the use of additional devices for shielding and placement for radiation therapy.
Port Dimensions-Y Axis In Cm: 2
Simple Simulation Afterword Text Will Be Included With Simple Simulations (Indications............): Patient had a complete consultation regarding all applicable modalities for the treatment of their skin cancer and based on a variety of factors including the type of tumor, size, and location, the relevant medical history as well as local tissue factors, the functional status of the individual, the ability to perform necessary postoperative wound instructions and the need for simultaneous treatments as well as overall wound healing status, it was determined that the patient would begin radiation therapy treatment for skin cancer.  A full simulation and treatment device design was performed including the determination and formulation of appropriate simple and complex devices including lead shield of 0.762 mm thickness to form molded customized shielding to specifically correlate with the lesion size including treatment margin.  The custom lead shield is adequate to accommodate the appropriate applicator and provide adequate shielding around the treatment site.  The specific field applicator, shields, and devices both simple and complex as well as the specific patient setup is outlined below.  The patient was given a full consent for superficial radiation to both verbally and in writing and the full determination of patient's eligibility for treatment and selection is outlined on the patient eligibility and treatment selection form.  The specific superficial radiotherapy prescription was determined and was documented on the superficial radiotherapy prescription form.  A treatment calculation was also performed and documented on the treatment calculation form.  Based on the prescription, the patient was scheduled for a series of fractional treatments.
Cumulative Dose In Cgy (Optional): 537.24
Cumulative Dose In Cgy (Optional): 541.8
Treatment Margins In Cm: 0.5
Depth (Optional-Please Include Units): 0.7
Treatment Time / Fractionation (Optional- Include Units) Rx 2: .38
Daily Fractionated Dose (Optional- Include Units) Rx 2: 264.18
Field Size (Applicator): 2.5 cm
Depth (Optional-Please Include Units): 1.65
Energy (Optional-Please Include Units): 70kV
Time Dose Fractionation (Optional- Include Units If Applicable): 96
Total Number Of Fractions Rx 4: 15
Energy (Include Units): 50 kV
Total Number Of Fractions: 20
Bill For Radiation Treatment: Yes
Computed Treatment Time In Min (Will Render The Same As Calculated Treatment Time If Left Blank): 0.43
Custom Shielding Preamble Text Will Not Be Included With Simple Simulations (.......... X X Y Cm): A lead shield of 0.762 mm thickness is utilized to form a molded, custom shield for intranasal shield.
Treatment Time In Min (Optional): .43
Custom Shielding Afterword Text Will Not Be Included With Simple Simulations (X X Y Cm............): port to correlate with the lesion size, including treatment margin. The custom lead shield is adequate to accommodate the appropriate applicator and provide adequate shielding around the treatment site. Additional shielding (as noted below) is used to protect sensitive, normal tissues.
Time Dose Fractionation (Optional- Include Units If Applicable): 94
Functional Status: 0 (fully active)
Detail Level: Detailed
Energy (Optional-Please Include Units): 50kV
Limb Positioning Or Elevation: pillow
Total Dose (Optional-Please Include Units) Rx 2: 2641.8 (Total 5352.2)
Treatment Time / Fractionation (Optional- Include Units): 0.43min
Assessment: Appropriate reaction
Computed Treatment Time In Min (Will Render The Same As Calculated Treatment Time If Left Blank): 0.37
Pathology Override (Pathology Will Render As Diagnosis Name If Left Blank): Basal Cell Carcinoma Nodular Type
Shielding Size (Optional- Include Units): 2.0 cm x 2.0 cm
Treatment Time / Fractionation (Optional- Include Units): 0.37min
Daily Fractionated Dose (Optional- Include Units): 268.62
Total Dose (Optional-Please Include Units): 5418. cGy
Day Of The Week Treatment Administered: Wednesday
Depth (Optional-Please Include Units): 0.66
Total Dose (Optional-Please Include Units): 5372.4. cGy
Simple Simulation Preamble Text Will Be Included With Simple Simulations (.......... Indications): Complex simulation was performed today for the following reasons:\\nPatient has custom shield designed for each SRT
Treatment Time In Min (Optional): .37

## 2020-11-13 ENCOUNTER — APPOINTMENT (OUTPATIENT)
Age: 65
Setting detail: DERMATOLOGY
End: 2020-11-15

## 2020-11-13 VITALS — TEMPERATURE: 98.3 F

## 2020-11-13 PROBLEM — C44.319 BASAL CELL CARCINOMA OF SKIN OF OTHER PARTS OF FACE: Status: ACTIVE | Noted: 2020-11-13

## 2020-11-13 PROBLEM — D04.39 CARCINOMA IN SITU OF SKIN OF OTHER PARTS OF FACE: Status: ACTIVE | Noted: 2020-11-13

## 2020-11-13 PROBLEM — C44.329 SQUAMOUS CELL CARCINOMA OF SKIN OF OTHER PARTS OF FACE: Status: ACTIVE | Noted: 2020-11-13

## 2020-11-13 PROCEDURE — OTHER SUPERFICIAL RADIATION TREATMENT: OTHER

## 2020-11-13 PROCEDURE — G6001 ECHO GUIDANCE RADIOTHERAPY: HCPCS

## 2020-11-13 PROCEDURE — 77401 RADIATION TX DELIVERY SUPFC: CPT

## 2020-11-13 PROCEDURE — OTHER FOLLOW UP FOR NEXT VISIT: OTHER

## 2020-11-13 PROCEDURE — OTHER TREATMENT REGIMEN: OTHER

## 2020-11-13 PROCEDURE — 77280 THER RAD SIMULAJ FIELD SMPL: CPT

## 2020-11-13 NOTE — PROCEDURE: SUPERFICIAL RADIATION TREATMENT
Fractions / Week: 3
Include Rx 3 When Rendering Additional Prescriptions: No
Initial Radiation Treatment Planning (Will Render If Bill Simulation = Yes): Patient had a complete consultation regarding all applicable modalities for the treatment of their skin cancer and based on a variety of factors including the type of tumor, size, and location, the relevant medical history as well as local tissue factors, the functional status of the individual, the ability to perform necessary postoperative wound instructions and the need for simultaneous treatments as well as overall wound healing status, it was determined that the patient would begin radiation therapy treatment for skin cancer.  A full simulation and treatment device design was performed including the determination and formulation of appropriate simple and complex devices including lead shield of 0.762 mm thickness to form molded customized shielding to specifically correlate with the lesion size including treatment margin.  The custom lead shield is adequate to accommodate the appropriate applicator and provide adequate shielding around the treatment site.  The specific field applicator, shields, and devices both simple and complex as well as the specific patient setup is outlined below.  The patient was given a full consent for superficial radiation to both verbally and in writing and the full determination of patient's eligibility for treatment and selection is outlined on the patient eligibility and treatment selection form.  The specific superficial radiotherapy prescription was determined and was documented on the superficial radiotherapy prescription form.  A treatment calculation was also performed and documented on the treatment calculation form.  Based on the prescription, the patient was scheduled for a series of fractional treatments.
Bill For Radiation Treatment: Yes
Patient Positioning: Side-lying (left)
Simple Simulation Preamble Text Will Be Included With Simple Simulations (.......... Indications): Complex simulation was performed today for the following reasons:\\nPatient has custom shield designed for each SRT
Dimensions-X Axis In Cm: 1
Treatment Time / Fractionation (Optional- Include Units) Rx 2: .38
Shielding Size (Optional- Include Units) Rx 2: 2.0cmX2.0cm
Total Number Of Fractions: 20
Computed Treatment Time In Min (Will Render The Same As Calculated Treatment Time If Left Blank): 0.43
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
Time Dose Fractionation (Optional- Include Units If Applicable) Rx 2: 46 (Total 94)
Energy (Optional-Please Include Units): 50kV
Energy (Optional-Please Include Units): 70kV
Fractionation Number (Evaluation): 5
Information: Selecting Yes will display possible errors in your note based on the variables you have selected. This validation is only offered as a suggestion for you. PLEASE NOTE THAT THE VALIDATION TEXT WILL BE REMOVED WHEN YOU FINALIZE YOUR NOTE. IF YOU WANT TO FAX A PRELIMINARY NOTE YOU WILL NEED TO TOGGLE THIS TO 'NO' IF YOU DO NOT WANT IT IN YOUR FAXED NOTE.
Treatment Margins In Cm: 0.5
Detail Level: Detailed
Limb Positioning Or Elevation: pillow
Field Number: 2
Dose Per Fractionation In Cgy (Optional): 270.9
Functional Status: 0 (fully active)
Dose Per Fractionation In Cgy (Optional): 268.62
Shielding Size (Optional- Include Units): 2.0 cm x 2.0 cm
Depth (Optional-Please Include Units): 1.65
Treatment Time / Fractionation (Optional- Include Units): 0.43min
Cumulative Dose In Cgy (Optional): 812.7
Field Size (Applicator) Rx 2: 2.5 cm
Time Dose Fractionation (Optional- Include Units If Applicable): 94
Custom Shielding Preamble Text Will Not Be Included With Simple Simulations (.......... X X Y Cm): A lead shield of 0.762 mm thickness is utilized to form a molded, custom shield for intranasal shield.
Treatment Device Design After Initial Simulation Justification (Will Render If Bill For Treatment Devices = Yes): Patient is status post radiation simulation and is evaluated as to the use of additional devices for shielding and placement for radiation therapy.
Total Number Of Fractions Rx 4: 15
Total Dose (Optional-Please Include Units): 5418. cGy
Computed Treatment Time In Min (Will Render The Same As Calculated Treatment Time If Left Blank): 0.37
Day Of The Week Treatment Administered: Friday
Custom Shielding Afterword Text Will Not Be Included With Simple Simulations (X X Y Cm............): port to correlate with the lesion size, including treatment margin. The custom lead shield is adequate to accommodate the appropriate applicator and provide adequate shielding around the treatment site. Additional shielding (as noted below) is used to protect sensitive, normal tissues.
Total Dose (Optional-Please Include Units) Rx 2: 2641.8 (Total 5352.2)
Energy (Include Units): 70 kV
Total Number Of Fractions Rx 2: 10
Assessment: Appropriate reaction
Daily Fractionated Dose (Optional- Include Units) Rx 2: 264.18
Depth (Optional-Please Include Units): 0.7
Time Dose Fractionation (Optional- Include Units If Applicable): 96
Energy (Include Units): 50 kV
Intro Statement (Will Not Render If Left Blank): Per the request of Dr. Robin, patient was seen today for Superficial Radiation Therapy requiring simulation (CPT® 42125) in preparation for treatment of specific diseased site(s). Simulation is necessary to determine correct patient and treatment portal positioning, deliver safe and effective radiation therapy. Today’s setup was evaluated determining continuation of treatment with the current plan, or necessary changes as appropriate. All appropriate custom blocking and treatment parameters verified by the Radiation Therapist.\\nToday’s visit is for Simulation and planning for radiation therapy.  Question were answered and concerns were address.  Patient was evaluated based on listed criteria and is a suitable candidate to begin SRT.  \\n
Depth (Optional-Please Include Units): 0.66
Total Dose (Optional-Please Include Units): 5372.4. cGy
Treatment Time In Min (Optional): .37
Treatment Time / Fractionation (Optional- Include Units): 0.37min
Pathology Override (Pathology Will Render As Diagnosis Name If Left Blank): Basal Cell Carcinoma Nodular Type
Cumulative Dose In Cgy (Optional): 805.86
Treatment Time In Min (Optional): .43

## 2020-11-13 NOTE — PROCEDURE: TREATMENT REGIMEN
Plan: This patient has been treated today with superficial radiation therapy for non-melanoma skin cancer.    \\n\\nWritten informed consent has been previously obtained from this patient for this treatment.  This consent is documented in the patient’s chart.  The patient gave verbal consent to continue treatment today. \\n\\nThe patient was treated with a specific radiation dose and setup as prescribed by the provider listed on this visit note.  A Radiation Therapist performed administration of radiation. The treatment parameters and cumulative dose are indicated above.\\n  \\nPrior to administering the radiation, the patient underwent a verification simulation defining relevant normal and abnormal target anatomy and acquiring images and data necessary to develop optimal radiation treatment process for the patient. This process includes verification of the treatment port(s) and proper treatment positioning.  All treatment ports were photographed.  The patient’s customized lead blocking was confirmed.   Considering, superficial radiotherapy setup is a clinical setup, this requires physician and radiation therapist to clarify location interest being treated against initial images, pathology and patient anatomy. Care was taken ensuring fields treated were geometrically accurate and properly positioned using daily verification simulation.  This process is also utilized to determine if any changes are necessary.   These steps are therefore medically necessary ensuring safe and effective administration of radiation. \\n\\nA high frequency ultrasound image was acquired today for two-dimensional evaluation of the tumor volume and response to treatment, in addition to geometric accuracy of field placement. The daily field placement is separate and distinct from the initial simulation and is an important task in providing safe administration of radiation therapy. Physician evaluation of the ultrasound tumor depth will be ongoing throughout the course of treatment and is deemed medically necessary in order to ensure the efficacy of treatment. Today’s image was evaluated for determination of continuation of treatment with the current plan or with necessary changes as appropriate. Additionally, the use of visualization and targeted assessment allows the patient to be able to see their cancer(s) progress, encouraging patient to complete full course of radiotherapy. \\n\\nLocation:  US Depth of 1.38 mm with +/- 0.80 change from previous imaging.  Repop: ++, ZAKI equivocal\\n\\nPer Dr. Robin, continued daily US guidance and clinical setup simulation is required for field placement, measurement of tumor depth, progress and acute effect monitoring. \\n
Detail Level: Zone
Plan: This patient has been treated today with superficial radiation therapy for non-melanoma skin cancer.    \\n\\nWritten informed consent has been previously obtained from this patient for this treatment.  This consent is documented in the patient’s chart.  The patient gave verbal consent to continue treatment today. \\n\\nThe patient was treated with a specific radiation dose and setup as prescribed by the provider listed on this visit note.  A Radiation Therapist performed administration of radiation. The treatment parameters and cumulative dose are indicated above.\\n  \\nPrior to administering the radiation, the patient underwent a verification simulation defining relevant normal and abnormal target anatomy and acquiring images and data necessary to develop optimal radiation treatment process for the patient. This process includes verification of the treatment port(s) and proper treatment positioning.  All treatment ports were photographed.  The patient’s customized lead blocking was confirmed.   Considering, superficial radiotherapy setup is a clinical setup, this requires physician and radiation therapist to clarify location interest being treated against initial images, pathology and patient anatomy. Care was taken ensuring fields treated were geometrically accurate and properly positioned using daily verification simulation.  This process is also utilized to determine if any changes are necessary.   These steps are therefore medically necessary ensuring safe and effective administration of radiation. \\n\\nA high frequency ultrasound image was acquired today for two-dimensional evaluation of the tumor volume and response to treatment, in addition to geometric accuracy of field placement. The daily field placement is separate and distinct from the initial simulation and is an important task in providing safe administration of radiation therapy. Physician evaluation of the ultrasound tumor depth will be ongoing throughout the course of treatment and is deemed medically necessary in order to ensure the efficacy of treatment. Today’s image was evaluated for determination of continuation of treatment with the current plan or with necessary changes as appropriate. Additionally, the use of visualization and targeted assessment allows the patient to be able to see their cancer(s) progress, encouraging patient to complete full course of radiotherapy. \\n\\nLocation:  US Depth of 1.43 mm with +/- 0.25 change from previous imaging.  Repop: +, ZAKI equivocal\\n\\nPer Dr. Robin, continued daily US guidance and clinical setup simulation is required for field placement, measurement of tumor depth, progress and acute effect monitoring. \\n
Plan: This patient has been treated today with superficial radiation therapy for non-melanoma skin cancer.    \\n\\nWritten informed consent has been previously obtained from this patient for this treatment.  This consent is documented in the patient’s chart.  The patient gave verbal consent to continue treatment today. \\n\\nThe patient was treated with a specific radiation dose and setup as prescribed by the provider listed on this visit note.  A Radiation Therapist performed administration of radiation. The treatment parameters and cumulative dose are indicated above.\\n  \\nPrior to administering the radiation, the patient underwent a verification simulation defining relevant normal and abnormal target anatomy and acquiring images and data necessary to develop optimal radiation treatment process for the patient. This process includes verification of the treatment port(s) and proper treatment positioning.  All treatment ports were photographed.  The patient’s customized lead blocking was confirmed.   Considering, superficial radiotherapy setup is a clinical setup, this requires physician and radiation therapist to clarify location interest being treated against initial images, pathology and patient anatomy. Care was taken ensuring fields treated were geometrically accurate and properly positioned using daily verification simulation.  This process is also utilized to determine if any changes are necessary.   These steps are therefore medically necessary ensuring safe and effective administration of radiation. \\n\\nA high frequency ultrasound image was acquired today for two-dimensional evaluation of the tumor volume and response to treatment, in addition to geometric accuracy of field placement. The daily field placement is separate and distinct from the initial simulation and is an important task in providing safe administration of radiation therapy. Physician evaluation of the ultrasound tumor depth will be ongoing throughout the course of treatment and is deemed medically necessary in order to ensure the efficacy of treatment. Today’s image was evaluated for determination of continuation of treatment with the current plan or with necessary changes as appropriate. Additionally, the use of visualization and targeted assessment allows the patient to be able to see their cancer(s) progress, encouraging patient to complete full course of radiotherapy. \\n\\nLocation:  US Depth of 0.8 mm with +/- 0.16 change from previous imaging.  Repop: ++, ZAKI equivocal\\n\\nPer Dr. Robin, continued daily US guidance and clinical setup simulation is required for field placement, measurement of tumor depth, progress and acute effect monitoring. \\n

## 2020-11-16 ENCOUNTER — APPOINTMENT (OUTPATIENT)
Age: 65
Setting detail: DERMATOLOGY
End: 2020-11-17

## 2020-11-16 VITALS — TEMPERATURE: 97.7 F

## 2020-11-16 PROBLEM — C44.319 BASAL CELL CARCINOMA OF SKIN OF OTHER PARTS OF FACE: Status: ACTIVE | Noted: 2020-11-16

## 2020-11-16 PROBLEM — D04.39 CARCINOMA IN SITU OF SKIN OF OTHER PARTS OF FACE: Status: ACTIVE | Noted: 2020-11-16

## 2020-11-16 PROBLEM — C44.329 SQUAMOUS CELL CARCINOMA OF SKIN OF OTHER PARTS OF FACE: Status: ACTIVE | Noted: 2020-11-16

## 2020-11-16 PROCEDURE — 77401 RADIATION TX DELIVERY SUPFC: CPT

## 2020-11-16 PROCEDURE — OTHER TREATMENT REGIMEN: OTHER

## 2020-11-16 PROCEDURE — OTHER FOLLOW UP FOR NEXT VISIT: OTHER

## 2020-11-16 PROCEDURE — G6001 ECHO GUIDANCE RADIOTHERAPY: HCPCS

## 2020-11-16 PROCEDURE — 77280 THER RAD SIMULAJ FIELD SMPL: CPT

## 2020-11-16 PROCEDURE — OTHER SUPERFICIAL RADIATION TREATMENT: OTHER

## 2020-11-16 NOTE — PROCEDURE: TREATMENT REGIMEN
Plan: This patient has been treated today with superficial radiation therapy for non-melanoma skin cancer.    \\n\\nWritten informed consent has been previously obtained from this patient for this treatment.  This consent is documented in the patient’s chart.  The patient gave verbal consent to continue treatment today. \\n\\nThe patient was treated with a specific radiation dose and setup as prescribed by the provider listed on this visit note.  A Radiation Therapist performed administration of radiation. The treatment parameters and cumulative dose are indicated above.\\n  \\nPrior to administering the radiation, the patient underwent a verification simulation defining relevant normal and abnormal target anatomy and acquiring images and data necessary to develop optimal radiation treatment process for the patient. This process includes verification of the treatment port(s) and proper treatment positioning.  All treatment ports were photographed.  The patient’s customized lead blocking was confirmed.   Considering, superficial radiotherapy setup is a clinical setup, this requires physician and radiation therapist to clarify location interest being treated against initial images, pathology and patient anatomy. Care was taken ensuring fields treated were geometrically accurate and properly positioned using daily verification simulation.  This process is also utilized to determine if any changes are necessary.   These steps are therefore medically necessary ensuring safe and effective administration of radiation. \\n\\nA high frequency ultrasound image was acquired today for two-dimensional evaluation of the tumor volume and response to treatment, in addition to geometric accuracy of field placement. The daily field placement is separate and distinct from the initial simulation and is an important task in providing safe administration of radiation therapy. Physician evaluation of the ultrasound tumor depth will be ongoing throughout the course of treatment and is deemed medically necessary in order to ensure the efficacy of treatment. Today’s image was evaluated for determination of continuation of treatment with the current plan or with necessary changes as appropriate. Additionally, the use of visualization and targeted assessment allows the patient to be able to see their cancer(s) progress, encouraging patient to complete full course of radiotherapy. \\n\\nLocation:  US Depth of 0.76 mm with +/- 0.04 change from previous imaging.  Repop: ++, ZAKI equivocal\\n\\nPer Dr. Robin, continued daily US guidance and clinical setup simulation is required for field placement, measurement of tumor depth, progress and acute effect monitoring. \\n
Plan: This patient has been treated today with superficial radiation therapy for non-melanoma skin cancer.    \\n\\nWritten informed consent has been previously obtained from this patient for this treatment.  This consent is documented in the patient’s chart.  The patient gave verbal consent to continue treatment today. \\n\\nThe patient was treated with a specific radiation dose and setup as prescribed by the provider listed on this visit note.  A Radiation Therapist performed administration of radiation. The treatment parameters and cumulative dose are indicated above.\\n  \\nPrior to administering the radiation, the patient underwent a verification simulation defining relevant normal and abnormal target anatomy and acquiring images and data necessary to develop optimal radiation treatment process for the patient. This process includes verification of the treatment port(s) and proper treatment positioning.  All treatment ports were photographed.  The patient’s customized lead blocking was confirmed.   Considering, superficial radiotherapy setup is a clinical setup, this requires physician and radiation therapist to clarify location interest being treated against initial images, pathology and patient anatomy. Care was taken ensuring fields treated were geometrically accurate and properly positioned using daily verification simulation.  This process is also utilized to determine if any changes are necessary.   These steps are therefore medically necessary ensuring safe and effective administration of radiation. \\n\\nA high frequency ultrasound image was acquired today for two-dimensional evaluation of the tumor volume and response to treatment, in addition to geometric accuracy of field placement. The daily field placement is separate and distinct from the initial simulation and is an important task in providing safe administration of radiation therapy. Physician evaluation of the ultrasound tumor depth will be ongoing throughout the course of treatment and is deemed medically necessary in order to ensure the efficacy of treatment. Today’s image was evaluated for determination of continuation of treatment with the current plan or with necessary changes as appropriate. Additionally, the use of visualization and targeted assessment allows the patient to be able to see their cancer(s) progress, encouraging patient to complete full course of radiotherapy. \\n\\nLocation:  US Depth of 1.07 mm with +/- 0.31 change from previous imaging.  Repop: ++, ZAKI equivocal\\n\\nPer Dr. Robin, continued daily US guidance and clinical setup simulation is required for field placement, measurement of tumor depth, progress and acute effect monitoring. \\n
Detail Level: Zone
Plan: This patient has been treated today with superficial radiation therapy for non-melanoma skin cancer.    \\n\\nWritten informed consent has been previously obtained from this patient for this treatment.  This consent is documented in the patient’s chart.  The patient gave verbal consent to continue treatment today. \\n\\nThe patient was treated with a specific radiation dose and setup as prescribed by the provider listed on this visit note.  A Radiation Therapist performed administration of radiation. The treatment parameters and cumulative dose are indicated above.\\n  \\nPrior to administering the radiation, the patient underwent a verification simulation defining relevant normal and abnormal target anatomy and acquiring images and data necessary to develop optimal radiation treatment process for the patient. This process includes verification of the treatment port(s) and proper treatment positioning.  All treatment ports were photographed.  The patient’s customized lead blocking was confirmed.   Considering, superficial radiotherapy setup is a clinical setup, this requires physician and radiation therapist to clarify location interest being treated against initial images, pathology and patient anatomy. Care was taken ensuring fields treated were geometrically accurate and properly positioned using daily verification simulation.  This process is also utilized to determine if any changes are necessary.   These steps are therefore medically necessary ensuring safe and effective administration of radiation. \\n\\nA high frequency ultrasound image was acquired today for two-dimensional evaluation of the tumor volume and response to treatment, in addition to geometric accuracy of field placement. The daily field placement is separate and distinct from the initial simulation and is an important task in providing safe administration of radiation therapy. Physician evaluation of the ultrasound tumor depth will be ongoing throughout the course of treatment and is deemed medically necessary in order to ensure the efficacy of treatment. Today’s image was evaluated for determination of continuation of treatment with the current plan or with necessary changes as appropriate. Additionally, the use of visualization and targeted assessment allows the patient to be able to see their cancer(s) progress, encouraging patient to complete full course of radiotherapy. \\n\\nLocation:  US Depth of 1.48 mm with +/- 0.05 change from previous imaging.  Repop: ++, ZAKI equivocal\\n\\nPer Dr. Robin, continued daily US guidance and clinical setup simulation is required for field placement, measurement of tumor depth, progress and acute effect monitoring. \\n

## 2020-11-16 NOTE — PROCEDURE: SUPERFICIAL RADIATION TREATMENT
Time Dose Fractionation (Optional- Include Units If Applicable) Rx 2: 46 (Total 94)
Functional Status: 0 (fully active)
Intro Statement (Will Not Render If Left Blank): Per the request of Dr. Robin, patient was seen today for Superficial Radiation Therapy requiring simulation (CPT® 43708) in preparation for treatment of specific diseased site(s). Simulation is necessary to determine correct patient and treatment portal positioning, deliver safe and effective radiation therapy. Today’s setup was evaluated determining continuation of treatment with the current plan, or necessary changes as appropriate. All appropriate custom blocking and treatment parameters verified by the Radiation Therapist.\\nToday’s visit is for Simulation and planning for radiation therapy.  Question were answered and concerns were address.  Patient was evaluated based on listed criteria and is a suitable candidate to begin SRT.  \\n
Render Patient Eligibility And Selection In Note?: No
Energy (Optional-Please Include Units): 70kV
Daily Fractionated Dose (Optional- Include Units): 270.9
Fractions / Week Rx 2: 3
Daily Fractionated Dose (Optional- Include Units) Rx 2: 264.18
Number Of Days Off Treatment: 1
Treatment Time / Fractionation (Optional- Include Units): 0.37min
Depth (Optional-Please Include Units): 1.65
Fractionation Number (Evaluation): 5
Initial Radiation Treatment Planning (Will Render If Bill Simulation = Yes): Patient had a complete consultation regarding all applicable modalities for the treatment of their skin cancer and based on a variety of factors including the type of tumor, size, and location, the relevant medical history as well as local tissue factors, the functional status of the individual, the ability to perform necessary postoperative wound instructions and the need for simultaneous treatments as well as overall wound healing status, it was determined that the patient would begin radiation therapy treatment for skin cancer.  A full simulation and treatment device design was performed including the determination and formulation of appropriate simple and complex devices including lead shield of 0.762 mm thickness to form molded customized shielding to specifically correlate with the lesion size including treatment margin.  The custom lead shield is adequate to accommodate the appropriate applicator and provide adequate shielding around the treatment site.  The specific field applicator, shields, and devices both simple and complex as well as the specific patient setup is outlined below.  The patient was given a full consent for superficial radiation to both verbally and in writing and the full determination of patient's eligibility for treatment and selection is outlined on the patient eligibility and treatment selection form.  The specific superficial radiotherapy prescription was determined and was documented on the superficial radiotherapy prescription form.  A treatment calculation was also performed and documented on the treatment calculation form.  Based on the prescription, the patient was scheduled for a series of fractional treatments.
Limb Positioning Or Elevation: pillow
Custom Shielding Preamble Text Will Not Be Included With Simple Simulations (.......... X X Y Cm): A lead shield of 0.762 mm thickness is utilized to form a molded, custom shield for intranasal shield.
Pathology Override (Pathology Will Render As Diagnosis Name If Left Blank): Basal Cell Carcinoma Nodular Type
Custom Shielding Afterword Text Will Not Be Included With Simple Simulations (X X Y Cm............): port to correlate with the lesion size, including treatment margin. The custom lead shield is adequate to accommodate the appropriate applicator and provide adequate shielding around the treatment site. Additional shielding (as noted below) is used to protect sensitive, normal tissues.
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
Field Size (Applicator): 2.5 cm
Cumulative Dose In Cgy (Optional): 1083.6
Day Of The Week Treatment Administered: Monday
Daily Fractionated Dose (Optional- Include Units): 268.62
Bill For Radiation Treatment: Yes
Shielding Size (Optional- Include Units): 2.0 cm x 2.0 cm
Time Dose Fractionation (Optional- Include Units If Applicable): 96
Port Dimensions-X Axis In Cm: 2
Total Number Of Fractions: 20
Total Number Of Fractions Rx 2: 10
Total Number Of Fractions Rx 3: 15
Computed Treatment Time In Min (Will Render The Same As Calculated Treatment Time If Left Blank): 0.43
Simple Simulation Preamble Text Will Be Included With Simple Simulations (.......... Indications): Complex simulation was performed today for the following reasons:\\nPatient has custom shield designed for each SRT
Shielding Size (Optional- Include Units) Rx 2: 2.0cmX2.0cm
Treatment Time / Fractionation (Optional- Include Units) Rx 2: .38
Total Dose (Optional-Please Include Units): 5418. cGy
Treatment Device Design After Initial Simulation Justification (Will Render If Bill For Treatment Devices = Yes): Patient is status post radiation simulation and is evaluated as to the use of additional devices for shielding and placement for radiation therapy.
Fractionation Number: 4
Energy (Optional-Please Include Units): 50kV
Total Dose (Optional-Please Include Units) Rx 2: 2641.8 (Total 5352.2)
Patient Positioning: Side-lying (left)
Total Dose (Optional-Please Include Units): 5372.4. cGy
Detail Level: Detailed
Energy (Include Units): 70 kV
Treatment Time In Min (Optional): .37
Assessment: Appropriate reaction
Treatment Time / Fractionation (Optional- Include Units): 0.43min
Treatment Time In Min (Optional): .43
Information: Selecting Yes will display possible errors in your note based on the variables you have selected. This validation is only offered as a suggestion for you. PLEASE NOTE THAT THE VALIDATION TEXT WILL BE REMOVED WHEN YOU FINALIZE YOUR NOTE. IF YOU WANT TO FAX A PRELIMINARY NOTE YOU WILL NEED TO TOGGLE THIS TO 'NO' IF YOU DO NOT WANT IT IN YOUR FAXED NOTE.
Treatment Margins In Cm: 0.5
Depth (Optional-Please Include Units): 0.7
Cumulative Dose In Cgy (Optional): 1074.48
Depth (Optional-Please Include Units): 0.66
Computed Treatment Time In Min (Will Render The Same As Calculated Treatment Time If Left Blank): 0.37
Energy (Include Units): 50 kV
Time Dose Fractionation (Optional- Include Units If Applicable): 94

## 2020-11-18 ENCOUNTER — APPOINTMENT (OUTPATIENT)
Age: 65
Setting detail: DERMATOLOGY
End: 2020-11-19

## 2020-11-18 VITALS — TEMPERATURE: 98 F

## 2020-11-18 PROBLEM — D04.39 CARCINOMA IN SITU OF SKIN OF OTHER PARTS OF FACE: Status: ACTIVE | Noted: 2020-11-18

## 2020-11-18 PROBLEM — C44.329 SQUAMOUS CELL CARCINOMA OF SKIN OF OTHER PARTS OF FACE: Status: ACTIVE | Noted: 2020-11-18

## 2020-11-18 PROBLEM — C44.319 BASAL CELL CARCINOMA OF SKIN OF OTHER PARTS OF FACE: Status: ACTIVE | Noted: 2020-11-18

## 2020-11-18 PROCEDURE — 77427 RADIATION TX MANAGEMENT X5: CPT

## 2020-11-18 PROCEDURE — G6001 ECHO GUIDANCE RADIOTHERAPY: HCPCS

## 2020-11-18 PROCEDURE — OTHER TREATMENT REGIMEN: OTHER

## 2020-11-18 PROCEDURE — OTHER SUPERFICIAL RADIATION TREATMENT: OTHER

## 2020-11-18 PROCEDURE — 77401 RADIATION TX DELIVERY SUPFC: CPT

## 2020-11-18 PROCEDURE — 77280 THER RAD SIMULAJ FIELD SMPL: CPT

## 2020-11-18 PROCEDURE — OTHER FOLLOW UP FOR NEXT VISIT: OTHER

## 2020-11-18 NOTE — PROCEDURE: SUPERFICIAL RADIATION TREATMENT
Custom Shielding Afterword Text Will Not Be Included With Simple Simulations (X X Y Cm............): port to correlate with the lesion size, including treatment margin. The custom lead shield is adequate to accommodate the appropriate applicator and provide adequate shielding around the treatment site. Additional shielding (as noted below) is used to protect sensitive, normal tissues.
Treatment Time / Fractionation (Optional- Include Units): 0.43min
Bill For Simulation And Treatment Device Design: No
Daily Fractionated Dose (Optional- Include Units): 270.9
Cumulative Dose In Cgy (Optional): 1354.50
Limb Positioning Or Elevation: pillow
Detail Level: Detailed
Total Number Of Fractions Rx 2: 10
Field Size (Applicator) Rx 2: 2.5 cm
Cumulative Dose In Cgy (Optional): 1343.10
Bill For Radiation Treatment: Yes
Port Dimensions-X Axis In Cm: 2
Number Of Treatment Days: 1
Fractionation Number (Evaluation): 5
Simple Simulation Preamble Text Will Be Included With Simple Simulations (.......... Indications): Complex simulation was performed today for the following reasons:\\nPatient has custom shield designed for each SRT
Shielding Size (Optional- Include Units): 2.0 cm x 2.0 cm
Treatment Time / Fractionation (Optional- Include Units) Rx 2: .38
Initial Radiation Treatment Planning (Will Render If Bill Simulation = Yes): Patient had a complete consultation regarding all applicable modalities for the treatment of their skin cancer and based on a variety of factors including the type of tumor, size, and location, the relevant medical history as well as local tissue factors, the functional status of the individual, the ability to perform necessary postoperative wound instructions and the need for simultaneous treatments as well as overall wound healing status, it was determined that the patient would begin radiation therapy treatment for skin cancer.  A full simulation and treatment device design was performed including the determination and formulation of appropriate simple and complex devices including lead shield of 0.762 mm thickness to form molded customized shielding to specifically correlate with the lesion size including treatment margin.  The custom lead shield is adequate to accommodate the appropriate applicator and provide adequate shielding around the treatment site.  The specific field applicator, shields, and devices both simple and complex as well as the specific patient setup is outlined below.  The patient was given a full consent for superficial radiation to both verbally and in writing and the full determination of patient's eligibility for treatment and selection is outlined on the patient eligibility and treatment selection form.  The specific superficial radiotherapy prescription was determined and was documented on the superficial radiotherapy prescription form.  A treatment calculation was also performed and documented on the treatment calculation form.  Based on the prescription, the patient was scheduled for a series of fractional treatments.
Day Of The Week Treatment Administered: Wednesday
Custom Shielding Preamble Text Will Not Be Included With Simple Simulations (.......... X X Y Cm): A lead shield of 0.762 mm thickness is utilized to form a molded, custom shield for intranasal shield.
Information: Selecting Yes will display possible errors in your note based on the variables you have selected. This validation is only offered as a suggestion for you. PLEASE NOTE THAT THE VALIDATION TEXT WILL BE REMOVED WHEN YOU FINALIZE YOUR NOTE. IF YOU WANT TO FAX A PRELIMINARY NOTE YOU WILL NEED TO TOGGLE THIS TO 'NO' IF YOU DO NOT WANT IT IN YOUR FAXED NOTE.
Dose / Tx In Cgy (Optional): 268.62
Daily Fractionated Dose (Optional- Include Units) Rx 2: 264.18
Total Number Of Fractions Rx 3: 15
Pathology Override (Pathology Will Render As Diagnosis Name If Left Blank): Basal Cell Carcinoma Nodular Type
Depth (Optional-Please Include Units): 0.7
Intro Statement (Will Not Render If Left Blank): Per the request of Dr. Robin, patient was seen today for Superficial Radiation Therapy requiring simulation (CPT® 56351) in preparation for treatment of specific diseased site(s). Simulation is necessary to determine correct patient and treatment portal positioning, deliver safe and effective radiation therapy. Today’s setup was evaluated determining continuation of treatment with the current plan, or necessary changes as appropriate. All appropriate custom blocking and treatment parameters verified by the Radiation Therapist.\\nToday’s visit is for Simulation and planning for radiation therapy.  Question were answered and concerns were address.  Patient was evaluated based on listed criteria and is a suitable candidate to begin SRT.  \\n
Energy (Optional-Please Include Units) Rx 2: 50kV
Assessment: Appropriate reaction
Total Dose (Optional-Please Include Units) Rx 2: 2641.8 (Total 5352.2)
Energy (Optional-Please Include Units): 70kV
Total Dose (Optional-Please Include Units): 5418. cGy
Total Number Of Fractions: 20
Patient Positioning: Side-lying (left)
Treatment Margins In Cm: 0.5
Shielding Size (Optional- Include Units) Rx 2: 2.0cmX2.0cm
Functional Status: 0 (fully active)
Treatment Device Design After Initial Simulation Justification (Will Render If Bill For Treatment Devices = Yes): Patient is status post radiation simulation and is evaluated as to the use of additional devices for shielding and placement for radiation therapy.
Energy (Include Units): 70 kV
Fractions / Week Rx 2: 3
Treatment Time / Fractionation (Optional- Include Units): 0.37min
Energy (Include Units): 50 kV
Depth (Optional-Please Include Units): 0.66
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
Time Dose Fractionation (Optional- Include Units If Applicable) Rx 2: 46 (Total 94)
Depth (Optional-Please Include Units): 1.65
Time Dose Fractionation (Optional- Include Units If Applicable): 94
Computed Treatment Time In Min (Will Render The Same As Calculated Treatment Time If Left Blank): 0.43
Total Dose (Optional-Please Include Units): 5372.4. cGy
Time Dose Fractionation (Optional- Include Units If Applicable): 96
Treatment Time In Min (Optional): .37
Treatment Time In Min (Optional): .43
Computed Treatment Time In Min (Will Render The Same As Calculated Treatment Time If Left Blank): 0.37

## 2020-11-18 NOTE — PROCEDURE: TREATMENT REGIMEN
Detail Level: Zone
Plan: This patient has been treated today with superficial radiation therapy for non-melanoma skin cancer.    \\n\\nWritten informed consent has been previously obtained from this patient for this treatment.  This consent is documented in the patient’s chart.  The patient gave verbal consent to continue treatment today. \\n\\nThe patient was treated with a specific radiation dose and setup as prescribed by the provider listed on this visit note.  A Radiation Therapist performed administration of radiation. The treatment parameters and cumulative dose are indicated above.\\n  \\nPrior to administering the radiation, the patient underwent a verification simulation defining relevant normal and abnormal target anatomy and acquiring images and data necessary to develop optimal radiation treatment process for the patient. This process includes verification of the treatment port(s) and proper treatment positioning.  All treatment ports were photographed.  The patient’s customized lead blocking was confirmed.   Considering, superficial radiotherapy setup is a clinical setup, this requires physician and radiation therapist to clarify location interest being treated against initial images, pathology and patient anatomy. Care was taken ensuring fields treated were geometrically accurate and properly positioned using daily verification simulation.  This process is also utilized to determine if any changes are necessary.   These steps are therefore medically necessary ensuring safe and effective administration of radiation. \\n\\nA high frequency ultrasound image was acquired today for two-dimensional evaluation of the tumor volume and response to treatment, in addition to geometric accuracy of field placement. The daily field placement is separate and distinct from the initial simulation and is an important task in providing safe administration of radiation therapy. Physician evaluation of the ultrasound tumor depth will be ongoing throughout the course of treatment and is deemed medically necessary in order to ensure the efficacy of treatment. Today’s image was evaluated for determination of continuation of treatment with the current plan or with necessary changes as appropriate. Additionally, the use of visualization and targeted assessment allows the patient to be able to see their cancer(s) progress, encouraging patient to complete full course of radiotherapy. \\n\\nLocation:  US Depth of 1.27 mm with +/- 0.51 change from previous imaging.  Repop: ++, ZAKI equivocal\\n\\nPer Dr. Robin, continued daily US guidance and clinical setup simulation is required for field placement, measurement of tumor depth, progress and acute effect monitoring. \\n
Plan: This patient has been treated today with superficial radiation therapy for non-melanoma skin cancer.    \\n\\nWritten informed consent has been previously obtained from this patient for this treatment.  This consent is documented in the patient’s chart.  The patient gave verbal consent to continue treatment today. \\n\\nThe patient was treated with a specific radiation dose and setup as prescribed by the provider listed on this visit note.  A Radiation Therapist performed administration of radiation. The treatment parameters and cumulative dose are indicated above.\\n  \\nPrior to administering the radiation, the patient underwent a verification simulation defining relevant normal and abnormal target anatomy and acquiring images and data necessary to develop optimal radiation treatment process for the patient. This process includes verification of the treatment port(s) and proper treatment positioning.  All treatment ports were photographed.  The patient’s customized lead blocking was confirmed.   Considering, superficial radiotherapy setup is a clinical setup, this requires physician and radiation therapist to clarify location interest being treated against initial images, pathology and patient anatomy. Care was taken ensuring fields treated were geometrically accurate and properly positioned using daily verification simulation.  This process is also utilized to determine if any changes are necessary.   These steps are therefore medically necessary ensuring safe and effective administration of radiation. \\n\\nA high frequency ultrasound image was acquired today for two-dimensional evaluation of the tumor volume and response to treatment, in addition to geometric accuracy of field placement. The daily field placement is separate and distinct from the initial simulation and is an important task in providing safe administration of radiation therapy. Physician evaluation of the ultrasound tumor depth will be ongoing throughout the course of treatment and is deemed medically necessary in order to ensure the efficacy of treatment. Today’s image was evaluated for determination of continuation of treatment with the current plan or with necessary changes as appropriate. Additionally, the use of visualization and targeted assessment allows the patient to be able to see their cancer(s) progress, encouraging patient to complete full course of radiotherapy. \\n\\nLocation:  US Depth of 1.39 mm with +/- 0.32mm change from previous imaging.  Repop: ++, ZAKI equivocal\\n\\nPer Dr. Robin, continued daily US guidance and clinical setup simulation is required for field placement, measurement of tumor depth, progress and acute effect monitoring. \\n
Plan: This patient has been treated today with superficial radiation therapy for non-melanoma skin cancer.    \\n\\nWritten informed consent has been previously obtained from this patient for this treatment.  This consent is documented in the patient’s chart.  The patient gave verbal consent to continue treatment today. \\n\\nThe patient was treated with a specific radiation dose and setup as prescribed by the provider listed on this visit note.  A Radiation Therapist performed administration of radiation. The treatment parameters and cumulative dose are indicated above.\\n  \\nPrior to administering the radiation, the patient underwent a verification simulation defining relevant normal and abnormal target anatomy and acquiring images and data necessary to develop optimal radiation treatment process for the patient. This process includes verification of the treatment port(s) and proper treatment positioning.  All treatment ports were photographed.  The patient’s customized lead blocking was confirmed.   Considering, superficial radiotherapy setup is a clinical setup, this requires physician and radiation therapist to clarify location interest being treated against initial images, pathology and patient anatomy. Care was taken ensuring fields treated were geometrically accurate and properly positioned using daily verification simulation.  This process is also utilized to determine if any changes are necessary.   These steps are therefore medically necessary ensuring safe and effective administration of radiation. \\n\\nA high frequency ultrasound image was acquired today for two-dimensional evaluation of the tumor volume and response to treatment, in addition to geometric accuracy of field placement. The daily field placement is separate and distinct from the initial simulation and is an important task in providing safe administration of radiation therapy. Physician evaluation of the ultrasound tumor depth will be ongoing throughout the course of treatment and is deemed medically necessary in order to ensure the efficacy of treatment. Today’s image was evaluated for determination of continuation of treatment with the current plan or with necessary changes as appropriate. Additionally, the use of visualization and targeted assessment allows the patient to be able to see their cancer(s) progress, encouraging patient to complete full course of radiotherapy. \\n\\nLocation:  US Depth of 1.16 mm with +/- 0.32 change from previous imaging.  Repop: ++, ZAKI equivocal\\n\\nPer Dr. Robin, continued daily US guidance and clinical setup simulation is required for field placement, measurement of tumor depth, progress and acute effect monitoring. \\n

## 2020-11-19 ENCOUNTER — APPOINTMENT (OUTPATIENT)
Age: 65
Setting detail: DERMATOLOGY
End: 2020-11-23

## 2020-11-19 VITALS — TEMPERATURE: 97.8 F

## 2020-11-19 PROBLEM — C44.319 BASAL CELL CARCINOMA OF SKIN OF OTHER PARTS OF FACE: Status: ACTIVE | Noted: 2020-11-19

## 2020-11-19 PROBLEM — D04.39 CARCINOMA IN SITU OF SKIN OF OTHER PARTS OF FACE: Status: ACTIVE | Noted: 2020-11-19

## 2020-11-19 PROBLEM — C44.329 SQUAMOUS CELL CARCINOMA OF SKIN OF OTHER PARTS OF FACE: Status: ACTIVE | Noted: 2020-11-19

## 2020-11-19 PROCEDURE — OTHER FOLLOW UP FOR NEXT VISIT: OTHER

## 2020-11-19 PROCEDURE — OTHER TREATMENT REGIMEN: OTHER

## 2020-11-19 PROCEDURE — G6001 ECHO GUIDANCE RADIOTHERAPY: HCPCS

## 2020-11-19 PROCEDURE — 77280 THER RAD SIMULAJ FIELD SMPL: CPT

## 2020-11-19 PROCEDURE — OTHER SUPERFICIAL RADIATION TREATMENT: OTHER

## 2020-11-19 PROCEDURE — 77401 RADIATION TX DELIVERY SUPFC: CPT

## 2020-11-19 NOTE — PROCEDURE: SUPERFICIAL RADIATION TREATMENT
Time Dose Fractionation (Optional- Include Units If Applicable): 96
Treatment Time / Fractionation (Optional- Include Units): 0.43min
Daily Fractionated Dose (Optional- Include Units) Rx 2: 264.18
Patient Positioning: Side-lying (left)
Limb Positioning Or Elevation: pillow
Treatment Margins In Cm: 0.5
Treatment Time / Fractionation (Optional- Include Units) Rx 2: .38
Custom Shielding Afterword Text Will Not Be Included With Simple Simulations (X X Y Cm............): port to correlate with the lesion size, including treatment margin. The custom lead shield is adequate to accommodate the appropriate applicator and provide adequate shielding around the treatment site. Additional shielding (as noted below) is used to protect sensitive, normal tissues.
Render Prescriptions In Note?: No
Simple Simulation Preamble Text Will Be Included With Simple Simulations (.......... Indications): Complex simulation was performed today for the following reasons:\\nPatient has custom shield designed for each SRT
Functional Status: 0 (fully active)
Total Number Of Fractions: 20
Number Of Days Off Treatment: 1
Fractionation Number: 6
Intro Statement (Will Not Render If Left Blank): Per the request of Dr. Robin, patient was seen today for Superficial Radiation Therapy requiring simulation (CPT® 72865) in preparation for treatment of specific diseased site(s). Simulation is necessary to determine correct patient and treatment portal positioning, deliver safe and effective radiation therapy. Today’s setup was evaluated determining continuation of treatment with the current plan, or necessary changes as appropriate. All appropriate custom blocking and treatment parameters verified by the Radiation Therapist.\\nToday’s visit is for Simulation and planning for radiation therapy.  Question were answered and concerns were address.  Patient was evaluated based on listed criteria and is a suitable candidate to begin SRT.  \\n
Field Size (Applicator) Rx 2: 2.5 cm
Initial Radiation Treatment Planning (Will Render If Bill Simulation = Yes): Patient had a complete consultation regarding all applicable modalities for the treatment of their skin cancer and based on a variety of factors including the type of tumor, size, and location, the relevant medical history as well as local tissue factors, the functional status of the individual, the ability to perform necessary postoperative wound instructions and the need for simultaneous treatments as well as overall wound healing status, it was determined that the patient would begin radiation therapy treatment for skin cancer.  A full simulation and treatment device design was performed including the determination and formulation of appropriate simple and complex devices including lead shield of 0.762 mm thickness to form molded customized shielding to specifically correlate with the lesion size including treatment margin.  The custom lead shield is adequate to accommodate the appropriate applicator and provide adequate shielding around the treatment site.  The specific field applicator, shields, and devices both simple and complex as well as the specific patient setup is outlined below.  The patient was given a full consent for superficial radiation to both verbally and in writing and the full determination of patient's eligibility for treatment and selection is outlined on the patient eligibility and treatment selection form.  The specific superficial radiotherapy prescription was determined and was documented on the superficial radiotherapy prescription form.  A treatment calculation was also performed and documented on the treatment calculation form.  Based on the prescription, the patient was scheduled for a series of fractional treatments.
Total Number Of Fractions Rx 2: 10
Fractions / Week Rx 2: 3
Detail Level: Detailed
Dose Per Fractionation In Cgy (Optional): 268.62
Port Dimensions-Y Axis In Cm: 2
Energy (Optional-Please Include Units): 50kV
Fractionation Number (Evaluation): 5
Time Dose Fractionation (Optional- Include Units If Applicable) Rx 2: 46 (Total 94)
Treatment Device Design After Initial Simulation Justification (Will Render If Bill For Treatment Devices = Yes): Patient is status post radiation simulation and is evaluated as to the use of additional devices for shielding and placement for radiation therapy.
Daily Fractionated Dose (Optional- Include Units): 270.9
Total Number Of Fractions Rx 3: 15
Custom Shielding Preamble Text Will Not Be Included With Simple Simulations (.......... X X Y Cm): A lead shield of 0.762 mm thickness is utilized to form a molded, custom shield for intranasal shield.
Energy (Include Units): 70 kV
Energy (Include Units): 50 kV
Energy (Optional-Please Include Units): 70kV
Computed Treatment Time In Min (Will Render The Same As Calculated Treatment Time If Left Blank): 0.37
Bill For Radiation Treatment: Yes
Treatment Time / Fractionation (Optional- Include Units): 0.37min
Day Of The Week Treatment Administered: Thursday
Shielding Size (Optional- Include Units) Rx 2: 2.0cmX2.0cm
Information: Selecting Yes will display possible errors in your note based on the variables you have selected. This validation is only offered as a suggestion for you. PLEASE NOTE THAT THE VALIDATION TEXT WILL BE REMOVED WHEN YOU FINALIZE YOUR NOTE. IF YOU WANT TO FAX A PRELIMINARY NOTE YOU WILL NEED TO TOGGLE THIS TO 'NO' IF YOU DO NOT WANT IT IN YOUR FAXED NOTE.
Cumulative Dose In Cgy (Optional): 1625.4
Total Dose (Optional-Please Include Units) Rx 2: 2641.8 (Total 5352.2)
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
Treatment Time In Min (Optional): .43
Time Dose Fractionation (Optional- Include Units If Applicable): 94
Treatment Time In Min (Optional): .37
Total Dose (Optional-Please Include Units): 5418. cGy
Cumulative Dose In Cgy (Optional): 1611.72
Shielding Size (Optional- Include Units): 2.0 cm x 2.0 cm
Assessment: Appropriate reaction
Pathology Override (Pathology Will Render As Diagnosis Name If Left Blank): Basal Cell Carcinoma Nodular Type
Computed Treatment Time In Min (Will Render The Same As Calculated Treatment Time If Left Blank): 0.43
Total Dose (Optional-Please Include Units): 5372.4. cGy
Depth (Optional-Please Include Units): 1.65
Depth (Optional-Please Include Units): 0.66
Depth (Optional-Please Include Units): 0.7
Cumulative Dose In Cgy (Optional): 1625.40

## 2020-11-19 NOTE — PROCEDURE: TREATMENT REGIMEN
Detail Level: Zone
Plan: This patient has been treated today with superficial radiation therapy for non-melanoma skin cancer.    \\n\\nWritten informed consent has been previously obtained from this patient for this treatment.  This consent is documented in the patient’s chart.  The patient gave verbal consent to continue treatment today. \\n\\nThe patient was treated with a specific radiation dose and setup as prescribed by the provider listed on this visit note.  A Radiation Therapist performed administration of radiation. The treatment parameters and cumulative dose are indicated above.\\n  \\nPrior to administering the radiation, the patient underwent a verification simulation defining relevant normal and abnormal target anatomy and acquiring images and data necessary to develop optimal radiation treatment process for the patient. This process includes verification of the treatment port(s) and proper treatment positioning.  All treatment ports were photographed.  The patient’s customized lead blocking was confirmed.   Considering, superficial radiotherapy setup is a clinical setup, this requires physician and radiation therapist to clarify location interest being treated against initial images, pathology and patient anatomy. Care was taken ensuring fields treated were geometrically accurate and properly positioned using daily verification simulation.  This process is also utilized to determine if any changes are necessary.   These steps are therefore medically necessary ensuring safe and effective administration of radiation. \\n\\nA high frequency ultrasound image was acquired today for two-dimensional evaluation of the tumor volume and response to treatment, in addition to geometric accuracy of field placement. The daily field placement is separate and distinct from the initial simulation and is an important task in providing safe administration of radiation therapy. Physician evaluation of the ultrasound tumor depth will be ongoing throughout the course of treatment and is deemed medically necessary in order to ensure the efficacy of treatment. Today’s image was evaluated for determination of continuation of treatment with the current plan or with necessary changes as appropriate. Additionally, the use of visualization and targeted assessment allows the patient to be able to see their cancer(s) progress, encouraging patient to complete full course of radiotherapy. \\n\\nLocation:  US Depth of 1.83 mm with +/- 0.44 mm change from previous imaging.  Repop: ++, ZAKI equivocal\\n\\nPer Dr. Robin, continued daily US guidance and clinical setup simulation is required for field placement, measurement of tumor depth, progress and acute effect monitoring. \\n
Plan: This patient has been treated today with superficial radiation therapy for non-melanoma skin cancer.    \\n\\nWritten informed consent has been previously obtained from this patient for this treatment.  This consent is documented in the patient’s chart.  The patient gave verbal consent to continue treatment today. \\n\\nThe patient was treated with a specific radiation dose and setup as prescribed by the provider listed on this visit note.  A Radiation Therapist performed administration of radiation. The treatment parameters and cumulative dose are indicated above.\\n  \\nPrior to administering the radiation, the patient underwent a verification simulation defining relevant normal and abnormal target anatomy and acquiring images and data necessary to develop optimal radiation treatment process for the patient. This process includes verification of the treatment port(s) and proper treatment positioning.  All treatment ports were photographed.  The patient’s customized lead blocking was confirmed.   Considering, superficial radiotherapy setup is a clinical setup, this requires physician and radiation therapist to clarify location interest being treated against initial images, pathology and patient anatomy. Care was taken ensuring fields treated were geometrically accurate and properly positioned using daily verification simulation.  This process is also utilized to determine if any changes are necessary.   These steps are therefore medically necessary ensuring safe and effective administration of radiation. \\n\\nA high frequency ultrasound image was acquired today for two-dimensional evaluation of the tumor volume and response to treatment, in addition to geometric accuracy of field placement. The daily field placement is separate and distinct from the initial simulation and is an important task in providing safe administration of radiation therapy. Physician evaluation of the ultrasound tumor depth will be ongoing throughout the course of treatment and is deemed medically necessary in order to ensure the efficacy of treatment. Today’s image was evaluated for determination of continuation of treatment with the current plan or with necessary changes as appropriate. Additionally, the use of visualization and targeted assessment allows the patient to be able to see their cancer(s) progress, encouraging patient to complete full course of radiotherapy. \\n\\nLocation:  US Depth of 1.83 mm with +/- 0.67 change from previous imaging.  Repop: ++, ZAKI equivocal\\n\\nPer Dr. Robin, continued daily US guidance and clinical setup simulation is required for field placement, measurement of tumor depth, progress and acute effect monitoring. \\n
Plan: This patient has been treated today with superficial radiation therapy for non-melanoma skin cancer.    \\n\\nWritten informed consent has been previously obtained from this patient for this treatment.  This consent is documented in the patient’s chart.  The patient gave verbal consent to continue treatment today. \\n\\nThe patient was treated with a specific radiation dose and setup as prescribed by the provider listed on this visit note.  A Radiation Therapist performed administration of radiation. The treatment parameters and cumulative dose are indicated above.\\n  \\nPrior to administering the radiation, the patient underwent a verification simulation defining relevant normal and abnormal target anatomy and acquiring images and data necessary to develop optimal radiation treatment process for the patient. This process includes verification of the treatment port(s) and proper treatment positioning.  All treatment ports were photographed.  The patient’s customized lead blocking was confirmed.   Considering, superficial radiotherapy setup is a clinical setup, this requires physician and radiation therapist to clarify location interest being treated against initial images, pathology and patient anatomy. Care was taken ensuring fields treated were geometrically accurate and properly positioned using daily verification simulation.  This process is also utilized to determine if any changes are necessary.   These steps are therefore medically necessary ensuring safe and effective administration of radiation. \\n\\nA high frequency ultrasound image was acquired today for two-dimensional evaluation of the tumor volume and response to treatment, in addition to geometric accuracy of field placement. The daily field placement is separate and distinct from the initial simulation and is an important task in providing safe administration of radiation therapy. Physician evaluation of the ultrasound tumor depth will be ongoing throughout the course of treatment and is deemed medically necessary in order to ensure the efficacy of treatment. Today’s image was evaluated for determination of continuation of treatment with the current plan or with necessary changes as appropriate. Additionally, the use of visualization and targeted assessment allows the patient to be able to see their cancer(s) progress, encouraging patient to complete full course of radiotherapy. \\n\\nLocation:  US Depth of 1.45 mm with +/- 0.18 mm change from previous imaging.  Repop: ++, ZAKI equivocal\\n\\nPer Dr. Robin, continued daily US guidance and clinical setup simulation is required for field placement, measurement of tumor depth, progress and acute effect monitoring. \\n

## 2020-11-23 ENCOUNTER — APPOINTMENT (OUTPATIENT)
Age: 65
Setting detail: DERMATOLOGY
End: 2020-11-25

## 2020-11-23 VITALS — TEMPERATURE: 98 F

## 2020-11-23 PROBLEM — C44.319 BASAL CELL CARCINOMA OF SKIN OF OTHER PARTS OF FACE: Status: ACTIVE | Noted: 2020-11-23

## 2020-11-23 PROBLEM — C44.329 SQUAMOUS CELL CARCINOMA OF SKIN OF OTHER PARTS OF FACE: Status: ACTIVE | Noted: 2020-11-23

## 2020-11-23 PROBLEM — D04.39 CARCINOMA IN SITU OF SKIN OF OTHER PARTS OF FACE: Status: ACTIVE | Noted: 2020-11-23

## 2020-11-23 PROCEDURE — 77280 THER RAD SIMULAJ FIELD SMPL: CPT

## 2020-11-23 PROCEDURE — G6001 ECHO GUIDANCE RADIOTHERAPY: HCPCS

## 2020-11-23 PROCEDURE — OTHER SUPERFICIAL RADIATION TREATMENT: OTHER

## 2020-11-23 PROCEDURE — OTHER FOLLOW UP FOR NEXT VISIT: OTHER

## 2020-11-23 PROCEDURE — 77401 RADIATION TX DELIVERY SUPFC: CPT

## 2020-11-23 PROCEDURE — OTHER TREATMENT REGIMEN: OTHER

## 2020-11-23 NOTE — PROCEDURE: SUPERFICIAL RADIATION TREATMENT
Time Dose Fractionation (Optional- Include Units If Applicable) Rx 2: 46 (Total 94)
Dose Per Fractionation In Cgy (Optional): 270.9
Fractions / Week Rx 3: 5
Bill For Simulation And Treatment Device Design: No
Time Dose Fractionation (Optional- Include Units If Applicable): 94
Patient Positioning: Side-lying (left)
Shielding Size (Optional- Include Units): 2.0 cm x 2.0 cm
Initial Radiation Treatment Planning (Will Render If Bill Simulation = Yes): Patient had a complete consultation regarding all applicable modalities for the treatment of their skin cancer and based on a variety of factors including the type of tumor, size, and location, the relevant medical history as well as local tissue factors, the functional status of the individual, the ability to perform necessary postoperative wound instructions and the need for simultaneous treatments as well as overall wound healing status, it was determined that the patient would begin radiation therapy treatment for skin cancer.  A full simulation and treatment device design was performed including the determination and formulation of appropriate simple and complex devices including lead shield of 0.762 mm thickness to form molded customized shielding to specifically correlate with the lesion size including treatment margin.  The custom lead shield is adequate to accommodate the appropriate applicator and provide adequate shielding around the treatment site.  The specific field applicator, shields, and devices both simple and complex as well as the specific patient setup is outlined below.  The patient was given a full consent for superficial radiation to both verbally and in writing and the full determination of patient's eligibility for treatment and selection is outlined on the patient eligibility and treatment selection form.  The specific superficial radiotherapy prescription was determined and was documented on the superficial radiotherapy prescription form.  A treatment calculation was also performed and documented on the treatment calculation form.  Based on the prescription, the patient was scheduled for a series of fractional treatments.
Day Of The Week Treatment Administered: Monday
Daily Fractionated Dose (Optional- Include Units) Rx 2: 264.18
Port Dimensions-X Axis In Cm: 2
Daily Fractionated Dose (Optional- Include Units): 268.62
Dimensions-X Axis In Cm: 1
Field Size (Applicator): 2.5 cm
Field Number: 3
Bill For Radiation Treatment: Yes
Simple Simulation Preamble Text Will Be Included With Simple Simulations (.......... Indications): Complex simulation was performed today for the following reasons:\\nPatient has custom shield designed for each SRT
Energy (Optional-Please Include Units): 50kV
Fractionation Number: 7
Depth (Optional-Please Include Units): 0.7
Functional Status: 0 (fully active)
Time Dose Fractionation (Optional- Include Units If Applicable): 96
Custom Shielding Afterword Text Will Not Be Included With Simple Simulations (X X Y Cm............): port to correlate with the lesion size, including treatment margin. The custom lead shield is adequate to accommodate the appropriate applicator and provide adequate shielding around the treatment site. Additional shielding (as noted below) is used to protect sensitive, normal tissues.
Computed Treatment Time In Min (Will Render The Same As Calculated Treatment Time If Left Blank): 0.37
Total Dose (Optional-Please Include Units): 5418. cGy
Treatment Time / Fractionation (Optional- Include Units): 0.43min
Depth (Optional-Please Include Units): 0.66
Information: Selecting Yes will display possible errors in your note based on the variables you have selected. This validation is only offered as a suggestion for you. PLEASE NOTE THAT THE VALIDATION TEXT WILL BE REMOVED WHEN YOU FINALIZE YOUR NOTE. IF YOU WANT TO FAX A PRELIMINARY NOTE YOU WILL NEED TO TOGGLE THIS TO 'NO' IF YOU DO NOT WANT IT IN YOUR FAXED NOTE.
Treatment Device Design After Initial Simulation Justification (Will Render If Bill For Treatment Devices = Yes): Patient is status post radiation simulation and is evaluated as to the use of additional devices for shielding and placement for radiation therapy.
Total Number Of Fractions: 20
Assessment: Appropriate reaction
Total Number Of Fractions Rx 2: 10
Total Dose (Optional-Please Include Units): 5372.4. cGy
Treatment Time / Fractionation (Optional- Include Units) Rx 2: .38
Total Dose (Optional-Please Include Units) Rx 2: 2641.8 (Total 5352.2)
Treatment Margins In Cm: 0.5
Detail Level: Detailed
Energy (Include Units): 70 kV
Treatment Time / Fractionation (Optional- Include Units): 0.37min
Energy (Optional-Please Include Units): 70kV
Intro Statement (Will Not Render If Left Blank): Per the request of Dr. Robin, patient was seen today for Superficial Radiation Therapy requiring simulation (CPT® 99834) in preparation for treatment of specific diseased site(s). Simulation is necessary to determine correct patient and treatment portal positioning, deliver safe and effective radiation therapy. Today’s setup was evaluated determining continuation of treatment with the current plan, or necessary changes as appropriate. All appropriate custom blocking and treatment parameters verified by the Radiation Therapist.\\nToday’s visit is for Simulation and planning for radiation therapy.  Question were answered and concerns were address.  Patient was evaluated based on listed criteria and is a suitable candidate to begin SRT.  \\n
Computed Treatment Time In Min (Will Render The Same As Calculated Treatment Time If Left Blank): 0.43
Total Number Of Fractions Rx 3: 15
Treatment Time In Min (Optional): .37
Limb Positioning Or Elevation: pillow
Treatment Time In Min (Optional): .43
Energy (Include Units): 50 kV
Shielding Size (Optional- Include Units) Rx 2: 2.0cmX2.0cm
Custom Shielding Preamble Text Will Not Be Included With Simple Simulations (.......... X X Y Cm): A lead shield of 0.762 mm thickness is utilized to form a molded, custom shield for intranasal shield.
Cumulative Dose In Cgy (Optional): 1896.3
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
Pathology Override (Pathology Will Render As Diagnosis Name If Left Blank): Basal Cell Carcinoma Nodular Type
Cumulative Dose In Cgy (Optional): 1880.34
Depth (Optional-Please Include Units): 1.65

## 2020-11-23 NOTE — PROCEDURE: TREATMENT REGIMEN
Detail Level: Zone
Plan: This patient has been treated today with superficial radiation therapy for non-melanoma skin cancer.    \\n\\nWritten informed consent has been previously obtained from this patient for this treatment.  This consent is documented in the patient’s chart.  The patient gave verbal consent to continue treatment today. \\n\\nThe patient was treated with a specific radiation dose and setup as prescribed by the provider listed on this visit note.  A Radiation Therapist performed administration of radiation. The treatment parameters and cumulative dose are indicated above.\\n  \\nPrior to administering the radiation, the patient underwent a verification simulation defining relevant normal and abnormal target anatomy and acquiring images and data necessary to develop optimal radiation treatment process for the patient. This process includes verification of the treatment port(s) and proper treatment positioning.  All treatment ports were photographed.  The patient’s customized lead blocking was confirmed.   Considering, superficial radiotherapy setup is a clinical setup, this requires physician and radiation therapist to clarify location interest being treated against initial images, pathology and patient anatomy. Care was taken ensuring fields treated were geometrically accurate and properly positioned using daily verification simulation.  This process is also utilized to determine if any changes are necessary.   These steps are therefore medically necessary ensuring safe and effective administration of radiation. \\n\\nA high frequency ultrasound image was acquired today for two-dimensional evaluation of the tumor volume and response to treatment, in addition to geometric accuracy of field placement. The daily field placement is separate and distinct from the initial simulation and is an important task in providing safe administration of radiation therapy. Physician evaluation of the ultrasound tumor depth will be ongoing throughout the course of treatment and is deemed medically necessary in order to ensure the efficacy of treatment. Today’s image was evaluated for determination of continuation of treatment with the current plan or with necessary changes as appropriate. Additionally, the use of visualization and targeted assessment allows the patient to be able to see their cancer(s) progress, encouraging patient to complete full course of radiotherapy. \\n\\nLocation:  US Depth of 1.03 mm with +/- 0.42 mm change from previous imaging.  Repop: ++, ZAKI equivocal\\n\\nPer Dr. Robin, continued daily US guidance and clinical setup simulation is required for field placement, measurement of tumor depth, progress and acute effect monitoring. \\n
Plan: This patient has been treated today with superficial radiation therapy for non-melanoma skin cancer.    \\n\\nWritten informed consent has been previously obtained from this patient for this treatment.  This consent is documented in the patient’s chart.  The patient gave verbal consent to continue treatment today. \\n\\nThe patient was treated with a specific radiation dose and setup as prescribed by the provider listed on this visit note.  A Radiation Therapist performed administration of radiation. The treatment parameters and cumulative dose are indicated above.\\n  \\nPrior to administering the radiation, the patient underwent a verification simulation defining relevant normal and abnormal target anatomy and acquiring images and data necessary to develop optimal radiation treatment process for the patient. This process includes verification of the treatment port(s) and proper treatment positioning.  All treatment ports were photographed.  The patient’s customized lead blocking was confirmed.   Considering, superficial radiotherapy setup is a clinical setup, this requires physician and radiation therapist to clarify location interest being treated against initial images, pathology and patient anatomy. Care was taken ensuring fields treated were geometrically accurate and properly positioned using daily verification simulation.  This process is also utilized to determine if any changes are necessary.   These steps are therefore medically necessary ensuring safe and effective administration of radiation. \\n\\nA high frequency ultrasound image was acquired today for two-dimensional evaluation of the tumor volume and response to treatment, in addition to geometric accuracy of field placement. The daily field placement is separate and distinct from the initial simulation and is an important task in providing safe administration of radiation therapy. Physician evaluation of the ultrasound tumor depth will be ongoing throughout the course of treatment and is deemed medically necessary in order to ensure the efficacy of treatment. Today’s image was evaluated for determination of continuation of treatment with the current plan or with necessary changes as appropriate. Additionally, the use of visualization and targeted assessment allows the patient to be able to see their cancer(s) progress, encouraging patient to complete full course of radiotherapy. \\n\\nLocation:  US Depth of 1.40 mm with +/- 0.43 mm change from previous imaging.  Repop: ++, ZAKI equivocal\\n\\nPer Dr. Robin, continued daily US guidance and clinical setup simulation is required for field placement, measurement of tumor depth, progress and acute effect monitoring. \\n
Plan: This patient has been treated today with superficial radiation therapy for non-melanoma skin cancer.    \\n\\nWritten informed consent has been previously obtained from this patient for this treatment.  This consent is documented in the patient’s chart.  The patient gave verbal consent to continue treatment today. \\n\\nThe patient was treated with a specific radiation dose and setup as prescribed by the provider listed on this visit note.  A Radiation Therapist performed administration of radiation. The treatment parameters and cumulative dose are indicated above.\\n  \\nPrior to administering the radiation, the patient underwent a verification simulation defining relevant normal and abnormal target anatomy and acquiring images and data necessary to develop optimal radiation treatment process for the patient. This process includes verification of the treatment port(s) and proper treatment positioning.  All treatment ports were photographed.  The patient’s customized lead blocking was confirmed.   Considering, superficial radiotherapy setup is a clinical setup, this requires physician and radiation therapist to clarify location interest being treated against initial images, pathology and patient anatomy. Care was taken ensuring fields treated were geometrically accurate and properly positioned using daily verification simulation.  This process is also utilized to determine if any changes are necessary.   These steps are therefore medically necessary ensuring safe and effective administration of radiation. \\n\\nA high frequency ultrasound image was acquired today for two-dimensional evaluation of the tumor volume and response to treatment, in addition to geometric accuracy of field placement. The daily field placement is separate and distinct from the initial simulation and is an important task in providing safe administration of radiation therapy. Physician evaluation of the ultrasound tumor depth will be ongoing throughout the course of treatment and is deemed medically necessary in order to ensure the efficacy of treatment. Today’s image was evaluated for determination of continuation of treatment with the current plan or with necessary changes as appropriate. Additionally, the use of visualization and targeted assessment allows the patient to be able to see their cancer(s) progress, encouraging patient to complete full course of radiotherapy. \\n\\nLocation:  US Depth of 0.73 mm with +/- 1.10 mm change from previous imaging.  Repop: ++, ZAKI equivocal\\n\\nPer Dr. Robin, continued daily US guidance and clinical setup simulation is required for field placement, measurement of tumor depth, progress and acute effect monitoring. \\n

## 2020-11-24 ENCOUNTER — APPOINTMENT (OUTPATIENT)
Age: 65
Setting detail: DERMATOLOGY
End: 2020-11-30

## 2020-11-24 VITALS — TEMPERATURE: 98 F

## 2020-11-24 PROBLEM — D04.39 CARCINOMA IN SITU OF SKIN OF OTHER PARTS OF FACE: Status: ACTIVE | Noted: 2020-11-24

## 2020-11-24 PROBLEM — C44.319 BASAL CELL CARCINOMA OF SKIN OF OTHER PARTS OF FACE: Status: ACTIVE | Noted: 2020-11-24

## 2020-11-24 PROBLEM — C44.329 SQUAMOUS CELL CARCINOMA OF SKIN OF OTHER PARTS OF FACE: Status: ACTIVE | Noted: 2020-11-24

## 2020-11-24 PROCEDURE — OTHER FOLLOW UP FOR NEXT VISIT: OTHER

## 2020-11-24 PROCEDURE — OTHER TREATMENT REGIMEN: OTHER

## 2020-11-24 PROCEDURE — 77401 RADIATION TX DELIVERY SUPFC: CPT

## 2020-11-24 PROCEDURE — OTHER SUPERFICIAL RADIATION TREATMENT: OTHER

## 2020-11-24 PROCEDURE — 77280 THER RAD SIMULAJ FIELD SMPL: CPT

## 2020-11-24 PROCEDURE — G6001 ECHO GUIDANCE RADIOTHERAPY: HCPCS

## 2020-11-24 NOTE — PROCEDURE: SUPERFICIAL RADIATION TREATMENT
Fractions / Week: 3
Bill For Dosimetry/Render Treatment Time Calculation In Note: No
Custom Shielding Preamble Text Will Not Be Included With Simple Simulations (.......... X X Y Cm): A lead shield of 0.762 mm thickness is utilized to form a molded, custom shield for intranasal shield.
Information: Selecting Yes will display possible errors in your note based on the variables you have selected. This validation is only offered as a suggestion for you. PLEASE NOTE THAT THE VALIDATION TEXT WILL BE REMOVED WHEN YOU FINALIZE YOUR NOTE. IF YOU WANT TO FAX A PRELIMINARY NOTE YOU WILL NEED TO TOGGLE THIS TO 'NO' IF YOU DO NOT WANT IT IN YOUR FAXED NOTE.
Treatment Device Design After Initial Simulation Justification (Will Render If Bill For Treatment Devices = Yes): Patient is status post radiation simulation and is evaluated as to the use of additional devices for shielding and placement for radiation therapy.
Treatment Time / Fractionation (Optional- Include Units) Rx 2: .38
Total Number Of Fractions Rx 4: 15
Bill For Radiation Treatment: Yes
Treatment Time / Fractionation (Optional- Include Units): 0.37min
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
Cumulative Dose In Cgy (Optional): 2167.2
Prescription Used: 1
Daily Fractionated Dose (Optional- Include Units) Rx 2: 264.18
Time Dose Fractionation (Optional- Include Units If Applicable) Rx 2: 46 (Total 94)
Total Number Of Fractions Rx 2: 10
Total Number Of Fractions: 20
Computed Treatment Time In Min (Will Render The Same As Calculated Treatment Time If Left Blank): 0.43
Fractionation Number: 8
Simple Simulation Afterword Text Will Be Included With Simple Simulations (Indications............): Patient had a complete consultation regarding all applicable modalities for the treatment of their skin cancer and based on a variety of factors including the type of tumor, size, and location, the relevant medical history as well as local tissue factors, the functional status of the individual, the ability to perform necessary postoperative wound instructions and the need for simultaneous treatments as well as overall wound healing status, it was determined that the patient would begin radiation therapy treatment for skin cancer.  A full simulation and treatment device design was performed including the determination and formulation of appropriate simple and complex devices including lead shield of 0.762 mm thickness to form molded customized shielding to specifically correlate with the lesion size including treatment margin.  The custom lead shield is adequate to accommodate the appropriate applicator and provide adequate shielding around the treatment site.  The specific field applicator, shields, and devices both simple and complex as well as the specific patient setup is outlined below.  The patient was given a full consent for superficial radiation to both verbally and in writing and the full determination of patient's eligibility for treatment and selection is outlined on the patient eligibility and treatment selection form.  The specific superficial radiotherapy prescription was determined and was documented on the superficial radiotherapy prescription form.  A treatment calculation was also performed and documented on the treatment calculation form.  Based on the prescription, the patient was scheduled for a series of fractional treatments.
Computed Treatment Time In Min (Will Render The Same As Calculated Treatment Time If Left Blank): 0.37
Depth (Optional-Please Include Units): 0.66
Assessment: Appropriate reaction
Energy (Optional-Please Include Units): 70kV
Fractionation Number (Evaluation): 5
Functional Status: 0 (fully active)
Field Size (Applicator): 2.5 cm
Energy (Optional-Please Include Units) Rx 2: 50kV
Simple Simulation Preamble Text Will Be Included With Simple Simulations (.......... Indications): Complex simulation was performed today for the following reasons:\\nPatient has custom shield designed for each SRT
Patient Positioning: Side-lying (left)
Depth (Optional-Please Include Units): 1.65
Daily Fractionated Dose (Optional- Include Units): 268.62
Dose Per Fractionation In Cgy (Optional): 270.9
Intro Statement (Will Not Render If Left Blank): Per the request of Dr. Robin, patient was seen today for Superficial Radiation Therapy requiring simulation (CPT® 00866) in preparation for treatment of specific diseased site(s). Simulation is necessary to determine correct patient and treatment portal positioning, deliver safe and effective radiation therapy. Today’s setup was evaluated determining continuation of treatment with the current plan, or necessary changes as appropriate. All appropriate custom blocking and treatment parameters verified by the Radiation Therapist.\\nToday’s visit is for Simulation and planning for radiation therapy.  Question were answered and concerns were address.  Patient was evaluated based on listed criteria and is a suitable candidate to begin SRT.  \\n
Treatment Time / Fractionation (Optional- Include Units): 0.43min
Treatment Margins In Cm: 0.5
Detail Level: Detailed
Custom Shielding Afterword Text Will Not Be Included With Simple Simulations (X X Y Cm............): port to correlate with the lesion size, including treatment margin. The custom lead shield is adequate to accommodate the appropriate applicator and provide adequate shielding around the treatment site. Additional shielding (as noted below) is used to protect sensitive, normal tissues.
Total Dose (Optional-Please Include Units): 5372.4. cGy
Total Dose (Optional-Please Include Units) Rx 2: 2641.8 (Total 5352.2)
Energy (Include Units): 50 kV
Day Of The Week Treatment Administered: Tuesday
Time Dose Fractionation (Optional- Include Units If Applicable): 96
Limb Positioning Or Elevation: pillow
Energy (Include Units): 70 kV
Shielding Size (Optional- Include Units) Rx 2: 2.0cmX2.0cm
Port Dimensions-Y Axis In Cm: 2
Shielding Size (Optional- Include Units): 2.0 cm x 2.0 cm
Depth (Optional-Please Include Units): 0.7
Pathology Override (Pathology Will Render As Diagnosis Name If Left Blank): Basal Cell Carcinoma Nodular Type
Total Dose (Optional-Please Include Units): 5418. cGy
Treatment Time In Min (Optional): .37
Treatment Time In Min (Optional): .43
Time Dose Fractionation (Optional- Include Units If Applicable): 94
Cumulative Dose In Cgy (Optional): 2167.20
Cumulative Dose In Cgy (Optional): 2148.96

## 2020-11-24 NOTE — PROCEDURE: TREATMENT REGIMEN
Plan: This patient has been treated today with superficial radiation therapy for non-melanoma skin cancer.    \\n\\nWritten informed consent has been previously obtained from this patient for this treatment.  This consent is documented in the patient’s chart.  The patient gave verbal consent to continue treatment today. \\n\\nThe patient was treated with a specific radiation dose and setup as prescribed by the provider listed on this visit note.  A Radiation Therapist performed administration of radiation. The treatment parameters and cumulative dose are indicated above.\\n  \\nPrior to administering the radiation, the patient underwent a verification simulation defining relevant normal and abnormal target anatomy and acquiring images and data necessary to develop optimal radiation treatment process for the patient. This process includes verification of the treatment port(s) and proper treatment positioning.  All treatment ports were photographed.  The patient’s customized lead blocking was confirmed.   Considering, superficial radiotherapy setup is a clinical setup, this requires physician and radiation therapist to clarify location interest being treated against initial images, pathology and patient anatomy. Care was taken ensuring fields treated were geometrically accurate and properly positioned using daily verification simulation.  This process is also utilized to determine if any changes are necessary.   These steps are therefore medically necessary ensuring safe and effective administration of radiation. \\n\\nA high frequency ultrasound image was acquired today for two-dimensional evaluation of the tumor volume and response to treatment, in addition to geometric accuracy of field placement. The daily field placement is separate and distinct from the initial simulation and is an important task in providing safe administration of radiation therapy. Physician evaluation of the ultrasound tumor depth will be ongoing throughout the course of treatment and is deemed medically necessary in order to ensure the efficacy of treatment. Today’s image was evaluated for determination of continuation of treatment with the current plan or with necessary changes as appropriate. Additionally, the use of visualization and targeted assessment allows the patient to be able to see their cancer(s) progress, encouraging patient to complete full course of radiotherapy. \\n\\nLocation:  US Depth of 1.79 mm with +/- 0.39 mm change from previous imaging.  Repop: ++, ZAKI equivocal\\n\\nPer Dr. Robin, continued daily US guidance and clinical setup simulation is required for field placement, measurement of tumor depth, progress and acute effect monitoring. \\n
Detail Level: Zone
Plan: This patient has been treated today with superficial radiation therapy for non-melanoma skin cancer.    \\n\\nWritten informed consent has been previously obtained from this patient for this treatment.  This consent is documented in the patient’s chart.  The patient gave verbal consent to continue treatment today. \\n\\nThe patient was treated with a specific radiation dose and setup as prescribed by the provider listed on this visit note.  A Radiation Therapist performed administration of radiation. The treatment parameters and cumulative dose are indicated above.\\n  \\nPrior to administering the radiation, the patient underwent a verification simulation defining relevant normal and abnormal target anatomy and acquiring images and data necessary to develop optimal radiation treatment process for the patient. This process includes verification of the treatment port(s) and proper treatment positioning.  All treatment ports were photographed.  The patient’s customized lead blocking was confirmed.   Considering, superficial radiotherapy setup is a clinical setup, this requires physician and radiation therapist to clarify location interest being treated against initial images, pathology and patient anatomy. Care was taken ensuring fields treated were geometrically accurate and properly positioned using daily verification simulation.  This process is also utilized to determine if any changes are necessary.   These steps are therefore medically necessary ensuring safe and effective administration of radiation. \\n\\nA high frequency ultrasound image was acquired today for two-dimensional evaluation of the tumor volume and response to treatment, in addition to geometric accuracy of field placement. The daily field placement is separate and distinct from the initial simulation and is an important task in providing safe administration of radiation therapy. Physician evaluation of the ultrasound tumor depth will be ongoing throughout the course of treatment and is deemed medically necessary in order to ensure the efficacy of treatment. Today’s image was evaluated for determination of continuation of treatment with the current plan or with necessary changes as appropriate. Additionally, the use of visualization and targeted assessment allows the patient to be able to see their cancer(s) progress, encouraging patient to complete full course of radiotherapy. \\n\\nLocation:  US Depth of 1.04 mm with +/- 0.01 mm change from previous imaging.  Repop: ++, ZAKI equivocal\\n\\nPer Dr. Robin, continued daily US guidance and clinical setup simulation is required for field placement, measurement of tumor depth, progress and acute effect monitoring. \\n
Otc Regimen: Pt put on break today with skin reaction.  Junaid ESPARZA, gave pt. prescription for Silvadene cream. apply BID
Otc Regimen: Pt put on break today.  Skin breakdown and blistering.  ISAIAS Lake gave pt prescription for Silvadene cream, apply BID\\nMK
Plan: This patient has been treated today with superficial radiation therapy for non-melanoma skin cancer.    \\n\\nWritten informed consent has been previously obtained from this patient for this treatment.  This consent is documented in the patient’s chart.  The patient gave verbal consent to continue treatment today. \\n\\nThe patient was treated with a specific radiation dose and setup as prescribed by the provider listed on this visit note.  A Radiation Therapist performed administration of radiation. The treatment parameters and cumulative dose are indicated above.\\n  \\nPrior to administering the radiation, the patient underwent a verification simulation defining relevant normal and abnormal target anatomy and acquiring images and data necessary to develop optimal radiation treatment process for the patient. This process includes verification of the treatment port(s) and proper treatment positioning.  All treatment ports were photographed.  The patient’s customized lead blocking was confirmed.   Considering, superficial radiotherapy setup is a clinical setup, this requires physician and radiation therapist to clarify location interest being treated against initial images, pathology and patient anatomy. Care was taken ensuring fields treated were geometrically accurate and properly positioned using daily verification simulation.  This process is also utilized to determine if any changes are necessary.   These steps are therefore medically necessary ensuring safe and effective administration of radiation. \\n\\nA high frequency ultrasound image was acquired today for two-dimensional evaluation of the tumor volume and response to treatment, in addition to geometric accuracy of field placement. The daily field placement is separate and distinct from the initial simulation and is an important task in providing safe administration of radiation therapy. Physician evaluation of the ultrasound tumor depth will be ongoing throughout the course of treatment and is deemed medically necessary in order to ensure the efficacy of treatment. Today’s image was evaluated for determination of continuation of treatment with the current plan or with necessary changes as appropriate. Additionally, the use of visualization and targeted assessment allows the patient to be able to see their cancer(s) progress, encouraging patient to complete full course of radiotherapy. \\n\\nLocation:  US Depth of 1.30 mm with +/- 0.57 mm change from previous imaging.  Repop: ++, ZAKI equivocal\\n\\nPer Dr. Robin, continued daily US guidance and clinical setup simulation is required for field placement, measurement of tumor depth, progress and acute effect monitoring. \\n

## 2020-11-30 ENCOUNTER — APPOINTMENT (OUTPATIENT)
Age: 65
Setting detail: DERMATOLOGY
End: 2020-12-01

## 2020-11-30 VITALS — TEMPERATURE: 79.6 F

## 2020-11-30 PROBLEM — D04.39 CARCINOMA IN SITU OF SKIN OF OTHER PARTS OF FACE: Status: ACTIVE | Noted: 2020-11-30

## 2020-11-30 PROBLEM — C44.329 SQUAMOUS CELL CARCINOMA OF SKIN OF OTHER PARTS OF FACE: Status: ACTIVE | Noted: 2020-11-30

## 2020-11-30 PROBLEM — C44.319 BASAL CELL CARCINOMA OF SKIN OF OTHER PARTS OF FACE: Status: ACTIVE | Noted: 2020-11-30

## 2020-11-30 PROCEDURE — OTHER TREATMENT REGIMEN: OTHER

## 2020-11-30 PROCEDURE — 77401 RADIATION TX DELIVERY SUPFC: CPT

## 2020-11-30 PROCEDURE — OTHER SUPERFICIAL RADIATION TREATMENT: OTHER

## 2020-11-30 PROCEDURE — OTHER FOLLOW UP FOR NEXT VISIT: OTHER

## 2020-11-30 PROCEDURE — 77280 THER RAD SIMULAJ FIELD SMPL: CPT

## 2020-11-30 PROCEDURE — G6001 ECHO GUIDANCE RADIOTHERAPY: HCPCS

## 2020-11-30 NOTE — PROCEDURE: TREATMENT REGIMEN
Plan: This patient has been treated today with superficial radiation therapy for non-melanoma skin cancer.    \\n\\nWritten informed consent has been previously obtained from this patient for this treatment.  This consent is documented in the patient’s chart.  The patient gave verbal consent to continue treatment today. \\n\\nThe patient was treated with a specific radiation dose and setup as prescribed by the provider listed on this visit note.  A Radiation Therapist performed administration of radiation. The treatment parameters and cumulative dose are indicated above.\\n  \\nPrior to administering the radiation, the patient underwent a verification simulation defining relevant normal and abnormal target anatomy and acquiring images and data necessary to develop optimal radiation treatment process for the patient. This process includes verification of the treatment port(s) and proper treatment positioning.  All treatment ports were photographed.  The patient’s customized lead blocking was confirmed.   Considering, superficial radiotherapy setup is a clinical setup, this requires physician and radiation therapist to clarify location interest being treated against initial images, pathology and patient anatomy. Care was taken ensuring fields treated were geometrically accurate and properly positioned using daily verification simulation.  This process is also utilized to determine if any changes are necessary.   These steps are therefore medically necessary ensuring safe and effective administration of radiation. \\n\\nA high frequency ultrasound image was acquired today for two-dimensional evaluation of the tumor volume and response to treatment, in addition to geometric accuracy of field placement. The daily field placement is separate and distinct from the initial simulation and is an important task in providing safe administration of radiation therapy. Physician evaluation of the ultrasound tumor depth will be ongoing throughout the course of treatment and is deemed medically necessary in order to ensure the efficacy of treatment. Today’s image was evaluated for determination of continuation of treatment with the current plan or with necessary changes as appropriate. Additionally, the use of visualization and targeted assessment allows the patient to be able to see their cancer(s) progress, encouraging patient to complete full course of radiotherapy. \\n\\nLocation:  US Depth of 1.26 mm with +/- 0.53 mm change from previous imaging.  Repop: ++, ZAIK equivocal\\n\\nPer Dr. Robin, continued daily US guidance and clinical setup simulation is required for field placement, measurement of tumor depth, progress and acute effect monitoring. \\n
Plan: This patient has been treated today with superficial radiation therapy for non-melanoma skin cancer.    \\n\\nWritten informed consent has been previously obtained from this patient for this treatment.  This consent is documented in the patient’s chart.  The patient gave verbal consent to continue treatment today. \\n\\nThe patient was treated with a specific radiation dose and setup as prescribed by the provider listed on this visit note.  A Radiation Therapist performed administration of radiation. The treatment parameters and cumulative dose are indicated above.\\n  \\nPrior to administering the radiation, the patient underwent a verification simulation defining relevant normal and abnormal target anatomy and acquiring images and data necessary to develop optimal radiation treatment process for the patient. This process includes verification of the treatment port(s) and proper treatment positioning.  All treatment ports were photographed.  The patient’s customized lead blocking was confirmed.   Considering, superficial radiotherapy setup is a clinical setup, this requires physician and radiation therapist to clarify location interest being treated against initial images, pathology and patient anatomy. Care was taken ensuring fields treated were geometrically accurate and properly positioned using daily verification simulation.  This process is also utilized to determine if any changes are necessary.   These steps are therefore medically necessary ensuring safe and effective administration of radiation. \\n\\nA high frequency ultrasound image was acquired today for two-dimensional evaluation of the tumor volume and response to treatment, in addition to geometric accuracy of field placement. The daily field placement is separate and distinct from the initial simulation and is an important task in providing safe administration of radiation therapy. Physician evaluation of the ultrasound tumor depth will be ongoing throughout the course of treatment and is deemed medically necessary in order to ensure the efficacy of treatment. Today’s image was evaluated for determination of continuation of treatment with the current plan or with necessary changes as appropriate. Additionally, the use of visualization and targeted assessment allows the patient to be able to see their cancer(s) progress, encouraging patient to complete full course of radiotherapy. \\n\\nLocation:  US Depth of 1.03 mm with +/- 0.27 mm change from previous imaging.  Repop: ++, ZAKI equivocal\\n\\nPer Dr. Robin, continued daily US guidance and clinical setup simulation is required for field placement, measurement of tumor depth, progress and acute effect monitoring. \\n
Plan: This patient has been treated today with superficial radiation therapy for non-melanoma skin cancer.    \\n\\nWritten informed consent has been previously obtained from this patient for this treatment.  This consent is documented in the patient’s chart.  The patient gave verbal consent to continue treatment today. \\n\\nThe patient was treated with a specific radiation dose and setup as prescribed by the provider listed on this visit note.  A Radiation Therapist performed administration of radiation. The treatment parameters and cumulative dose are indicated above.\\n  \\nPrior to administering the radiation, the patient underwent a verification simulation defining relevant normal and abnormal target anatomy and acquiring images and data necessary to develop optimal radiation treatment process for the patient. This process includes verification of the treatment port(s) and proper treatment positioning.  All treatment ports were photographed.  The patient’s customized lead blocking was confirmed.   Considering, superficial radiotherapy setup is a clinical setup, this requires physician and radiation therapist to clarify location interest being treated against initial images, pathology and patient anatomy. Care was taken ensuring fields treated were geometrically accurate and properly positioned using daily verification simulation.  This process is also utilized to determine if any changes are necessary.   These steps are therefore medically necessary ensuring safe and effective administration of radiation. \\n\\nA high frequency ultrasound image was acquired today for two-dimensional evaluation of the tumor volume and response to treatment, in addition to geometric accuracy of field placement. The daily field placement is separate and distinct from the initial simulation and is an important task in providing safe administration of radiation therapy. Physician evaluation of the ultrasound tumor depth will be ongoing throughout the course of treatment and is deemed medically necessary in order to ensure the efficacy of treatment. Today’s image was evaluated for determination of continuation of treatment with the current plan or with necessary changes as appropriate. Additionally, the use of visualization and targeted assessment allows the patient to be able to see their cancer(s) progress, encouraging patient to complete full course of radiotherapy. \\n\\nLocation:  US Depth of 1.29 mm with +/- 0.25 mm change from previous imaging.  Repop: ++, ZAKI equivocal\\n\\nPer Dr. Robin, continued daily US guidance and clinical setup simulation is required for field placement, measurement of tumor depth, progress and acute effect monitoring. \\n
Otc Regimen: Pt put on break today with skin reaction.  Junaid ESPARZA, gave pt. prescription for Silvadene cream. apply BID
Detail Level: Zone
Otc Regimen: Pt put on break today.  Skin breakdown and blistering.  ISAIAS Lake gave pt prescription for Silvadene cream, apply BID\\nMK

## 2020-11-30 NOTE — PROCEDURE: SUPERFICIAL RADIATION TREATMENT
Include Rx 4 When Rendering Additional Prescriptions: No
Detail Level: Detailed
Shielding Size (Optional- Include Units) Rx 2: 2.0cmX2.0cm
Field Size (Applicator): 2.5 cm
Computed Treatment Time In Min (Will Render The Same As Calculated Treatment Time If Left Blank): 0.43
Total Number Of Fractions Rx 4: 15
Shielding Size (Optional- Include Units): 2.0 cm x 2.0 cm
Fractionation Number: 9
Information: Selecting Yes will display possible errors in your note based on the variables you have selected. This validation is only offered as a suggestion for you. PLEASE NOTE THAT THE VALIDATION TEXT WILL BE REMOVED WHEN YOU FINALIZE YOUR NOTE. IF YOU WANT TO FAX A PRELIMINARY NOTE YOU WILL NEED TO TOGGLE THIS TO 'NO' IF YOU DO NOT WANT IT IN YOUR FAXED NOTE.
Dose / Tx In Cgy (Optional): 268.62
Custom Shielding Afterword Text Will Not Be Included With Simple Simulations (X X Y Cm............): port to correlate with the lesion size, including treatment margin. The custom lead shield is adequate to accommodate the appropriate applicator and provide adequate shielding around the treatment site. Additional shielding (as noted below) is used to protect sensitive, normal tissues.
Energy (Optional-Please Include Units): 70kV
Treatment Time In Min (Optional): .43
Dimensions-Y Axis In Cm: 1
Total Dose (Optional-Please Include Units): 5418. cGy
Limb Positioning Or Elevation: pillow
Energy (Optional-Please Include Units) Rx 2: 50kV
Depth (Optional-Please Include Units): 1.65
Treatment Margins In Cm: 0.5
Total Number Of Fractions: 20
Functional Status: 0 (fully active)
Field Number: 2
Fractions / Week Rx 2: 3
Total Dose (Optional-Please Include Units): 5372.4. cGy
Daily Fractionated Dose (Optional- Include Units): 270.9
Simple Simulation Preamble Text Will Be Included With Simple Simulations (.......... Indications): Complex simulation was performed today for the following reasons:\\nPatient has custom shield designed for each SRT
Time Dose Fractionation (Optional- Include Units If Applicable) Rx 2: 46 (Total 94)
Custom Shielding Preamble Text Will Not Be Included With Simple Simulations (.......... X X Y Cm): A lead shield of 0.762 mm thickness is utilized to form a molded, custom shield for intranasal shield.
Depth (Optional-Please Include Units): 0.66
Cumulative Dose In Cgy (Optional): 2438.1
Fractionation Number (Evaluation): 5
Bill For Radiation Treatment: Yes
Daily Fractionated Dose (Optional- Include Units) Rx 2: 264.18
Day Of The Week Treatment Administered: Monday
Initial Radiation Treatment Planning (Will Render If Bill Simulation = Yes): Patient had a complete consultation regarding all applicable modalities for the treatment of their skin cancer and based on a variety of factors including the type of tumor, size, and location, the relevant medical history as well as local tissue factors, the functional status of the individual, the ability to perform necessary postoperative wound instructions and the need for simultaneous treatments as well as overall wound healing status, it was determined that the patient would begin radiation therapy treatment for skin cancer.  A full simulation and treatment device design was performed including the determination and formulation of appropriate simple and complex devices including lead shield of 0.762 mm thickness to form molded customized shielding to specifically correlate with the lesion size including treatment margin.  The custom lead shield is adequate to accommodate the appropriate applicator and provide adequate shielding around the treatment site.  The specific field applicator, shields, and devices both simple and complex as well as the specific patient setup is outlined below.  The patient was given a full consent for superficial radiation to both verbally and in writing and the full determination of patient's eligibility for treatment and selection is outlined on the patient eligibility and treatment selection form.  The specific superficial radiotherapy prescription was determined and was documented on the superficial radiotherapy prescription form.  A treatment calculation was also performed and documented on the treatment calculation form.  Based on the prescription, the patient was scheduled for a series of fractional treatments.
Time Dose Fractionation (Optional- Include Units If Applicable): 96
Depth (Optional-Please Include Units): 0.7
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
Energy (Include Units): 70 kV
Cumulative Dose In Cgy (Optional): 7217.58
Patient Positioning: Side-lying (left)
Computed Treatment Time In Min (Will Render The Same As Calculated Treatment Time If Left Blank): 0.37
Treatment Time / Fractionation (Optional- Include Units) Rx 2: .38
Assessment: Appropriate reaction
Treatment Device Design After Initial Simulation Justification (Will Render If Bill For Treatment Devices = Yes): Patient is status post radiation simulation and is evaluated as to the use of additional devices for shielding and placement for radiation therapy.
Total Number Of Fractions Rx 2: 10
Total Dose (Optional-Please Include Units) Rx 2: 2641.8 (Total 5352.2)
Intro Statement (Will Not Render If Left Blank): Per the request of Dr. Robin, patient was seen today for Superficial Radiation Therapy requiring simulation (CPT® 33737) in preparation for treatment of specific diseased site(s). Simulation is necessary to determine correct patient and treatment portal positioning, deliver safe and effective radiation therapy. Today’s setup was evaluated determining continuation of treatment with the current plan, or necessary changes as appropriate. All appropriate custom blocking and treatment parameters verified by the Radiation Therapist.\\nToday’s visit is for Simulation and planning for radiation therapy.  Question were answered and concerns were address.  Patient was evaluated based on listed criteria and is a suitable candidate to begin SRT.  \\n
Energy (Include Units): 50 kV
Treatment Time / Fractionation (Optional- Include Units): 0.43min
Time Dose Fractionation (Optional- Include Units If Applicable): 94
Pathology Override (Pathology Will Render As Diagnosis Name If Left Blank): Basal Cell Carcinoma Nodular Type
Treatment Time In Min (Optional): .37
Treatment Time / Fractionation (Optional- Include Units): 0.37min

## 2020-12-02 ENCOUNTER — APPOINTMENT (OUTPATIENT)
Age: 65
Setting detail: DERMATOLOGY
End: 2020-12-07

## 2020-12-02 VITALS — TEMPERATURE: 79.9 F

## 2020-12-02 PROBLEM — C44.329 SQUAMOUS CELL CARCINOMA OF SKIN OF OTHER PARTS OF FACE: Status: ACTIVE | Noted: 2020-12-02

## 2020-12-02 PROBLEM — C44.319 BASAL CELL CARCINOMA OF SKIN OF OTHER PARTS OF FACE: Status: ACTIVE | Noted: 2020-12-02

## 2020-12-02 PROBLEM — D04.39 CARCINOMA IN SITU OF SKIN OF OTHER PARTS OF FACE: Status: ACTIVE | Noted: 2020-12-02

## 2020-12-02 PROCEDURE — OTHER FOLLOW UP FOR NEXT VISIT: OTHER

## 2020-12-02 PROCEDURE — 77401 RADIATION TX DELIVERY SUPFC: CPT

## 2020-12-02 PROCEDURE — 77280 THER RAD SIMULAJ FIELD SMPL: CPT

## 2020-12-02 PROCEDURE — OTHER TREATMENT REGIMEN: OTHER

## 2020-12-02 PROCEDURE — G6001 ECHO GUIDANCE RADIOTHERAPY: HCPCS

## 2020-12-02 PROCEDURE — OTHER SUPERFICIAL RADIATION TREATMENT: OTHER

## 2020-12-02 NOTE — PROCEDURE: TREATMENT REGIMEN
Plan: This patient has been treated today with superficial radiation therapy for non-melanoma skin cancer.    \\n\\nWritten informed consent has been previously obtained from this patient for this treatment.  This consent is documented in the patient’s chart.  The patient gave verbal consent to continue treatment today. \\n\\nThe patient was treated with a specific radiation dose and setup as prescribed by the provider listed on this visit note.  A Radiation Therapist performed administration of radiation. The treatment parameters and cumulative dose are indicated above.\\n  \\nPrior to administering the radiation, the patient underwent a verification simulation defining relevant normal and abnormal target anatomy and acquiring images and data necessary to develop optimal radiation treatment process for the patient. This process includes verification of the treatment port(s) and proper treatment positioning.  All treatment ports were photographed.  The patient’s customized lead blocking was confirmed.   Considering, superficial radiotherapy setup is a clinical setup, this requires physician and radiation therapist to clarify location interest being treated against initial images, pathology and patient anatomy. Care was taken ensuring fields treated were geometrically accurate and properly positioned using daily verification simulation.  This process is also utilized to determine if any changes are necessary.   These steps are therefore medically necessary ensuring safe and effective administration of radiation. \\n\\nA high frequency ultrasound image was acquired today for two-dimensional evaluation of the tumor volume and response to treatment, in addition to geometric accuracy of field placement. The daily field placement is separate and distinct from the initial simulation and is an important task in providing safe administration of radiation therapy. Physician evaluation of the ultrasound tumor depth will be ongoing throughout the course of treatment and is deemed medically necessary in order to ensure the efficacy of treatment. Today’s image was evaluated for determination of continuation of treatment with the current plan or with necessary changes as appropriate. Additionally, the use of visualization and targeted assessment allows the patient to be able to see their cancer(s) progress, encouraging patient to complete full course of radiotherapy. \\n\\nLocation:  US Depth of 1.22 mm with +/- 0.19 mm change from previous imaging.  Repop: ++, ZAKI equivocal\\n\\nPer Dr. Robin, continued daily US guidance and clinical setup simulation is required for field placement, measurement of tumor depth, progress and acute effect monitoring. \\n
Otc Regimen: Pt put on break today with skin reaction.  Junaid ESPARZA, gave pt. prescription for Silvadene cream. apply BID
Plan: This patient has been treated today with superficial radiation therapy for non-melanoma skin cancer.    \\n\\nWritten informed consent has been previously obtained from this patient for this treatment.  This consent is documented in the patient’s chart.  The patient gave verbal consent to continue treatment today. \\n\\nThe patient was treated with a specific radiation dose and setup as prescribed by the provider listed on this visit note.  A Radiation Therapist performed administration of radiation. The treatment parameters and cumulative dose are indicated above.\\n  \\nPrior to administering the radiation, the patient underwent a verification simulation defining relevant normal and abnormal target anatomy and acquiring images and data necessary to develop optimal radiation treatment process for the patient. This process includes verification of the treatment port(s) and proper treatment positioning.  All treatment ports were photographed.  The patient’s customized lead blocking was confirmed.   Considering, superficial radiotherapy setup is a clinical setup, this requires physician and radiation therapist to clarify location interest being treated against initial images, pathology and patient anatomy. Care was taken ensuring fields treated were geometrically accurate and properly positioned using daily verification simulation.  This process is also utilized to determine if any changes are necessary.   These steps are therefore medically necessary ensuring safe and effective administration of radiation. \\n\\nA high frequency ultrasound image was acquired today for two-dimensional evaluation of the tumor volume and response to treatment, in addition to geometric accuracy of field placement. The daily field placement is separate and distinct from the initial simulation and is an important task in providing safe administration of radiation therapy. Physician evaluation of the ultrasound tumor depth will be ongoing throughout the course of treatment and is deemed medically necessary in order to ensure the efficacy of treatment. Today’s image was evaluated for determination of continuation of treatment with the current plan or with necessary changes as appropriate. Additionally, the use of visualization and targeted assessment allows the patient to be able to see their cancer(s) progress, encouraging patient to complete full course of radiotherapy. \\n\\nLocation:  US Depth of 1.79 mm with +/- 0.53 mm change from previous imaging.  Repop: ++, ZAKI equivocal\\n\\nPer Dr. Robin, continued daily US guidance and clinical setup simulation is required for field placement, measurement of tumor depth, progress and acute effect monitoring. \\n
Plan: This patient has been treated today with superficial radiation therapy for non-melanoma skin cancer.    \\n\\nWritten informed consent has been previously obtained from this patient for this treatment.  This consent is documented in the patient’s chart.  The patient gave verbal consent to continue treatment today. \\n\\nThe patient was treated with a specific radiation dose and setup as prescribed by the provider listed on this visit note.  A Radiation Therapist performed administration of radiation. The treatment parameters and cumulative dose are indicated above.\\n  \\nPrior to administering the radiation, the patient underwent a verification simulation defining relevant normal and abnormal target anatomy and acquiring images and data necessary to develop optimal radiation treatment process for the patient. This process includes verification of the treatment port(s) and proper treatment positioning.  All treatment ports were photographed.  The patient’s customized lead blocking was confirmed.   Considering, superficial radiotherapy setup is a clinical setup, this requires physician and radiation therapist to clarify location interest being treated against initial images, pathology and patient anatomy. Care was taken ensuring fields treated were geometrically accurate and properly positioned using daily verification simulation.  This process is also utilized to determine if any changes are necessary.   These steps are therefore medically necessary ensuring safe and effective administration of radiation. \\n\\nA high frequency ultrasound image was acquired today for two-dimensional evaluation of the tumor volume and response to treatment, in addition to geometric accuracy of field placement. The daily field placement is separate and distinct from the initial simulation and is an important task in providing safe administration of radiation therapy. Physician evaluation of the ultrasound tumor depth will be ongoing throughout the course of treatment and is deemed medically necessary in order to ensure the efficacy of treatment. Today’s image was evaluated for determination of continuation of treatment with the current plan or with necessary changes as appropriate. Additionally, the use of visualization and targeted assessment allows the patient to be able to see their cancer(s) progress, encouraging patient to complete full course of radiotherapy. \\n\\nLocation:  US Depth of 1.07 mm with +/- 0.22 mm change from previous imaging.  Repop: ++, ZAKI equivocal\\n\\nPer Dr. Robin, continued daily US guidance and clinical setup simulation is required for field placement, measurement of tumor depth, progress and acute effect monitoring. \\n
Detail Level: Zone
Otc Regimen: Pt put on break today for the remaining part of this week.  Skin breaking down and bleeding ISAIAS Lake curretted the lesion today.  CAROLINE

## 2020-12-02 NOTE — PROCEDURE: SUPERFICIAL RADIATION TREATMENT
Energy (Include Units): 70 kV
Validate Note Data (See Information Below): No
Fractions / Week: 3
Field Size (Applicator): 2.5 cm
Energy (Optional-Please Include Units) Rx 2: 50kV
Cumulative Dose In Cgy (Optional): 2688.60
Patient Positioning: Side-lying (left)
Time Dose Fractionation (Optional- Include Units If Applicable) Rx 2: 46 (Total 94)
Daily Fractionated Dose (Optional- Include Units): 268.62
Time Dose Fractionation (Optional- Include Units If Applicable): 96
Initial Radiation Treatment Planning (Will Render If Bill Simulation = Yes): Patient had a complete consultation regarding all applicable modalities for the treatment of their skin cancer and based on a variety of factors including the type of tumor, size, and location, the relevant medical history as well as local tissue factors, the functional status of the individual, the ability to perform necessary postoperative wound instructions and the need for simultaneous treatments as well as overall wound healing status, it was determined that the patient would begin radiation therapy treatment for skin cancer.  A full simulation and treatment device design was performed including the determination and formulation of appropriate simple and complex devices including lead shield of 0.762 mm thickness to form molded customized shielding to specifically correlate with the lesion size including treatment margin.  The custom lead shield is adequate to accommodate the appropriate applicator and provide adequate shielding around the treatment site.  The specific field applicator, shields, and devices both simple and complex as well as the specific patient setup is outlined below.  The patient was given a full consent for superficial radiation to both verbally and in writing and the full determination of patient's eligibility for treatment and selection is outlined on the patient eligibility and treatment selection form.  The specific superficial radiotherapy prescription was determined and was documented on the superficial radiotherapy prescription form.  A treatment calculation was also performed and documented on the treatment calculation form.  Based on the prescription, the patient was scheduled for a series of fractional treatments.
Limb Positioning Or Elevation: pillow
Functional Status: 0 (fully active)
Pathology Override (Pathology Will Render As Diagnosis Name If Left Blank): Basal Cell Carcinoma Nodular Type
Assessment: Appropriate reaction
Total Dose (Optional-Please Include Units) Rx 2: 2641.8 (Total 5352.2)
Prescription Used: 1
Simple Simulation Preamble Text Will Be Included With Simple Simulations (.......... Indications): Complex simulation was performed today for the following reasons:\\nPatient has custom shield designed for each SRT
Cumulative Dose In Cgy (Optional): 2709.0
Custom Shielding Preamble Text Will Not Be Included With Simple Simulations (.......... X X Y Cm): A lead shield of 0.762 mm thickness is utilized to form a molded, custom shield for intranasal shield.
Shielding Size (Optional- Include Units) Rx 2: 2.0cmX2.0cm
Treatment Device Design After Initial Simulation Justification (Will Render If Bill For Treatment Devices = Yes): Patient is status post radiation simulation and is evaluated as to the use of additional devices for shielding and placement for radiation therapy.
Treatment Margins In Cm: 0.5
Custom Shielding Afterword Text Will Not Be Included With Simple Simulations (X X Y Cm............): port to correlate with the lesion size, including treatment margin. The custom lead shield is adequate to accommodate the appropriate applicator and provide adequate shielding around the treatment site. Additional shielding (as noted below) is used to protect sensitive, normal tissues.
Fractionation Number: 10
Depth (Optional-Please Include Units): 1.65
Fractions / Week Rx 3: 5
Dose / Tx In Cgy (Optional): 270.9
Total Number Of Fractions Rx 3: 15
Port Dimensions-Y Axis In Cm: 2
Bill For Radiation Treatment: Yes
Treatment Time / Fractionation (Optional- Include Units): 0.43min
Treatment Time / Fractionation (Optional- Include Units) Rx 2: .38
Daily Fractionated Dose (Optional- Include Units) Rx 2: 264.18
Total Number Of Fractions: 20
Information: Selecting Yes will display possible errors in your note based on the variables you have selected. This validation is only offered as a suggestion for you. PLEASE NOTE THAT THE VALIDATION TEXT WILL BE REMOVED WHEN YOU FINALIZE YOUR NOTE. IF YOU WANT TO FAX A PRELIMINARY NOTE YOU WILL NEED TO TOGGLE THIS TO 'NO' IF YOU DO NOT WANT IT IN YOUR FAXED NOTE.
Energy (Optional-Please Include Units): 70kV
Treatment Time / Fractionation (Optional- Include Units): 0.37min
Intro Statement (Will Not Render If Left Blank): Per the request of Dr. Robin, patient was seen today for Superficial Radiation Therapy requiring simulation (CPT® 37188) in preparation for treatment of specific diseased site(s). Simulation is necessary to determine correct patient and treatment portal positioning, deliver safe and effective radiation therapy. Today’s setup was evaluated determining continuation of treatment with the current plan, or necessary changes as appropriate. All appropriate custom blocking and treatment parameters verified by the Radiation Therapist.\\nToday’s visit is for Simulation and planning for radiation therapy.  Question were answered and concerns were address.  Patient was evaluated based on listed criteria and is a suitable candidate to begin SRT.  \\n
Treatment Time In Min (Optional): .43
Computed Treatment Time In Min (Will Render The Same As Calculated Treatment Time If Left Blank): 0.43
Day Of The Week Treatment Administered: Wednesday
Detail Level: Detailed
Shielding Size (Optional- Include Units): 2.0 cm x 2.0 cm
Total Dose (Optional-Please Include Units): 5372.4. cGy
Computed Treatment Time In Min (Will Render The Same As Calculated Treatment Time If Left Blank): 0.37
Total Dose (Optional-Please Include Units): 5418. cGy
Energy (Include Units): 50 kV
Treatment Time In Min (Optional): .37
Time Dose Fractionation (Optional- Include Units If Applicable): 94
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
Depth (Optional-Please Include Units): 0.66
Depth (Optional-Please Include Units): 0.7

## 2020-12-07 ENCOUNTER — APPOINTMENT (OUTPATIENT)
Age: 65
Setting detail: DERMATOLOGY
End: 2020-12-11

## 2020-12-07 VITALS — TEMPERATURE: 79.8 F

## 2020-12-07 PROBLEM — D04.39 CARCINOMA IN SITU OF SKIN OF OTHER PARTS OF FACE: Status: ACTIVE | Noted: 2020-12-07

## 2020-12-07 PROBLEM — C44.329 SQUAMOUS CELL CARCINOMA OF SKIN OF OTHER PARTS OF FACE: Status: ACTIVE | Noted: 2020-12-07

## 2020-12-07 PROBLEM — C44.319 BASAL CELL CARCINOMA OF SKIN OF OTHER PARTS OF FACE: Status: ACTIVE | Noted: 2020-12-07

## 2020-12-07 PROCEDURE — 77280 THER RAD SIMULAJ FIELD SMPL: CPT

## 2020-12-07 PROCEDURE — G6001 ECHO GUIDANCE RADIOTHERAPY: HCPCS

## 2020-12-07 PROCEDURE — OTHER FOLLOW UP FOR NEXT VISIT: OTHER

## 2020-12-07 PROCEDURE — 77427 RADIATION TX MANAGEMENT X5: CPT

## 2020-12-07 PROCEDURE — 77401 RADIATION TX DELIVERY SUPFC: CPT

## 2020-12-07 PROCEDURE — OTHER TREATMENT REGIMEN: OTHER

## 2020-12-07 PROCEDURE — OTHER SUPERFICIAL RADIATION TREATMENT: OTHER

## 2020-12-07 NOTE — PROCEDURE: TREATMENT REGIMEN
Detail Level: Zone
Otc Regimen: Pt put on break today for the remaining part of this week.  Skin breaking down and bleeding [place] curretted the lesion today.  CAROLINE
Plan: This patient has been treated today with superficial radiation therapy for non-melanoma skin cancer.    \\n\\nWritten informed consent has been previously obtained from this patient for this treatment.  This consent is documented in the patient’s chart.  The patient gave verbal consent to continue treatment today. \\n\\nThe patient was treated with a specific radiation dose and setup as prescribed by the provider listed on this visit note.  A Radiation Therapist performed administration of radiation. The treatment parameters and cumulative dose are indicated above.\\n  \\nPrior to administering the radiation, the patient underwent a verification simulation defining relevant normal and abnormal target anatomy and acquiring images and data necessary to develop optimal radiation treatment process for the patient. This process includes verification of the treatment port(s) and proper treatment positioning.  All treatment ports were photographed.  The patient’s customized lead blocking was confirmed.   Considering, superficial radiotherapy setup is a clinical setup, this requires physician and radiation therapist to clarify location interest being treated against initial images, pathology and patient anatomy. Care was taken ensuring fields treated were geometrically accurate and properly positioned using daily verification simulation.  This process is also utilized to determine if any changes are necessary.   These steps are therefore medically necessary ensuring safe and effective administration of radiation. \\n\\nA high frequency ultrasound image was acquired today for two-dimensional evaluation of the tumor volume and response to treatment, in addition to geometric accuracy of field placement. The daily field placement is separate and distinct from the initial simulation and is an important task in providing safe administration of radiation therapy. Physician evaluation of the ultrasound tumor depth will be ongoing throughout the course of treatment and is deemed medically necessary in order to ensure the efficacy of treatment. Today’s image was evaluated for determination of continuation of treatment with the current plan or with necessary changes as appropriate. Additionally, the use of visualization and targeted assessment allows the patient to be able to see their cancer(s) progress, encouraging patient to complete full course of radiotherapy. \\n\\nLocation:  US Depth of 0.67 mm with +/- 0.40 mm change from previous imaging.  Repop: ++, ZAKI equivocal\\n\\nPer Dr. Robin, continued daily US guidance and clinical setup simulation is required for field placement, measurement of tumor depth, progress and acute effect monitoring. \\n
Otc Regimen: Pt put on break today with skin reaction.  Junaid ESPARZA, gave pt. prescription for Silvadene cream. apply BID
Plan: This patient has been treated today with superficial radiation therapy for non-melanoma skin cancer.    \\n\\nWritten informed consent has been previously obtained from this patient for this treatment.  This consent is documented in the patient’s chart.  The patient gave verbal consent to continue treatment today. \\n\\nThe patient was treated with a specific radiation dose and setup as prescribed by the provider listed on this visit note.  A Radiation Therapist performed administration of radiation. The treatment parameters and cumulative dose are indicated above.\\n  \\nPrior to administering the radiation, the patient underwent a verification simulation defining relevant normal and abnormal target anatomy and acquiring images and data necessary to develop optimal radiation treatment process for the patient. This process includes verification of the treatment port(s) and proper treatment positioning.  All treatment ports were photographed.  The patient’s customized lead blocking was confirmed.   Considering, superficial radiotherapy setup is a clinical setup, this requires physician and radiation therapist to clarify location interest being treated against initial images, pathology and patient anatomy. Care was taken ensuring fields treated were geometrically accurate and properly positioned using daily verification simulation.  This process is also utilized to determine if any changes are necessary.   These steps are therefore medically necessary ensuring safe and effective administration of radiation. \\n\\nA high frequency ultrasound image was acquired today for two-dimensional evaluation of the tumor volume and response to treatment, in addition to geometric accuracy of field placement. The daily field placement is separate and distinct from the initial simulation and is an important task in providing safe administration of radiation therapy. Physician evaluation of the ultrasound tumor depth will be ongoing throughout the course of treatment and is deemed medically necessary in order to ensure the efficacy of treatment. Today’s image was evaluated for determination of continuation of treatment with the current plan or with necessary changes as appropriate. Additionally, the use of visualization and targeted assessment allows the patient to be able to see their cancer(s) progress, encouraging patient to complete full course of radiotherapy. \\n\\nLocation:  US Depth of 1.24 mm with +/- 0.55mm change from previous imaging.  Repop: ++, ZAKI equivocal\\n\\nPer Dr. Robin, continued daily US guidance and clinical setup simulation is required for field placement, measurement of tumor depth, progress and acute effect monitoring. \\n
Plan: This patient has been treated today with superficial radiation therapy for non-melanoma skin cancer.    \\n\\nWritten informed consent has been previously obtained from this patient for this treatment.  This consent is documented in the patient’s chart.  The patient gave verbal consent to continue treatment today. \\n\\nThe patient was treated with a specific radiation dose and setup as prescribed by the provider listed on this visit note.  A Radiation Therapist performed administration of radiation. The treatment parameters and cumulative dose are indicated above.\\n  \\nPrior to administering the radiation, the patient underwent a verification simulation defining relevant normal and abnormal target anatomy and acquiring images and data necessary to develop optimal radiation treatment process for the patient. This process includes verification of the treatment port(s) and proper treatment positioning.  All treatment ports were photographed.  The patient’s customized lead blocking was confirmed.   Considering, superficial radiotherapy setup is a clinical setup, this requires physician and radiation therapist to clarify location interest being treated against initial images, pathology and patient anatomy. Care was taken ensuring fields treated were geometrically accurate and properly positioned using daily verification simulation.  This process is also utilized to determine if any changes are necessary.   These steps are therefore medically necessary ensuring safe and effective administration of radiation. \\n\\nA high frequency ultrasound image was acquired today for two-dimensional evaluation of the tumor volume and response to treatment, in addition to geometric accuracy of field placement. The daily field placement is separate and distinct from the initial simulation and is an important task in providing safe administration of radiation therapy. Physician evaluation of the ultrasound tumor depth will be ongoing throughout the course of treatment and is deemed medically necessary in order to ensure the efficacy of treatment. Today’s image was evaluated for determination of continuation of treatment with the current plan or with necessary changes as appropriate. Additionally, the use of visualization and targeted assessment allows the patient to be able to see their cancer(s) progress, encouraging patient to complete full course of radiotherapy. \\n\\nLocation:  US Depth of 1.26 mm with +/- 0.04 mm change from previous imaging.  Repop: ++, ZAKI equivocal\\n\\nPer Dr. Robin, continued daily US guidance and clinical setup simulation is required for field placement, measurement of tumor depth, progress and acute effect monitoring. \\n

## 2020-12-07 NOTE — PROCEDURE: SUPERFICIAL RADIATION TREATMENT
Simple Simulation Afterword Text Will Be Included With Simple Simulations (Indications............): Patient had a complete consultation regarding all applicable modalities for the treatment of their skin cancer and based on a variety of factors including the type of tumor, size, and location, the relevant medical history as well as local tissue factors, the functional status of the individual, the ability to perform necessary postoperative wound instructions and the need for simultaneous treatments as well as overall wound healing status, it was determined that the patient would begin radiation therapy treatment for skin cancer.  A full simulation and treatment device design was performed including the determination and formulation of appropriate simple and complex devices including lead shield of 0.762 mm thickness to form molded customized shielding to specifically correlate with the lesion size including treatment margin.  The custom lead shield is adequate to accommodate the appropriate applicator and provide adequate shielding around the treatment site.  The specific field applicator, shields, and devices both simple and complex as well as the specific patient setup is outlined below.  The patient was given a full consent for superficial radiation to both verbally and in writing and the full determination of patient's eligibility for treatment and selection is outlined on the patient eligibility and treatment selection form.  The specific superficial radiotherapy prescription was determined and was documented on the superficial radiotherapy prescription form.  A treatment calculation was also performed and documented on the treatment calculation form.  Based on the prescription, the patient was scheduled for a series of fractional treatments.
Daily Fractionated Dose (Optional- Include Units) Rx 2: 264.18
Total Number Of Fractions: 20
Field Size (Applicator): 2.5 cm
Dimensions-Y Axis In Cm: 1
Fractions / Week: 3
Total Number Of Fractions Rx 2: 10
Computed Treatment Time In Min (Will Render The Same As Calculated Treatment Time If Left Blank): 0.43
Fractionation Number (Evaluation): 11
Treatment Time / Fractionation (Optional- Include Units): 0.37min
Validate Note Data (See Information Below): No
Treatment Time In Min (Optional): .43
Shielding Size (Optional- Include Units) Rx 2: 2.0cmX2.0cm
Assessment: Appropriate reaction
Depth (Optional-Please Include Units): 0.66
Fractions / Week Rx 4: 5
Intro Statement (Will Not Render If Left Blank): Per the request of Dr. Robin, patient was seen today for Superficial Radiation Therapy requiring simulation (CPT® 23604) in preparation for treatment of specific diseased site(s). Simulation is necessary to determine correct patient and treatment portal positioning, deliver safe and effective radiation therapy. Today’s setup was evaluated determining continuation of treatment with the current plan, or necessary changes as appropriate. All appropriate custom blocking and treatment parameters verified by the Radiation Therapist.\\nToday’s visit is for Simulation and planning for radiation therapy.  Question were answered and concerns were address.  Patient was evaluated based on listed criteria and is a suitable candidate to begin SRT.  \\n
Treatment Time In Min (Optional): .37
Treatment Device Design After Initial Simulation Justification (Will Render If Bill For Treatment Devices = Yes): Patient is status post radiation simulation and is evaluated as to the use of additional devices for shielding and placement for radiation therapy.
Treatment Margins In Cm: 0.5
Information: Selecting Yes will display possible errors in your note based on the variables you have selected. This validation is only offered as a suggestion for you. PLEASE NOTE THAT THE VALIDATION TEXT WILL BE REMOVED WHEN YOU FINALIZE YOUR NOTE. IF YOU WANT TO FAX A PRELIMINARY NOTE YOU WILL NEED TO TOGGLE THIS TO 'NO' IF YOU DO NOT WANT IT IN YOUR FAXED NOTE.
Total Number Of Fractions Rx 4: 15
Total Dose (Optional-Please Include Units): 5372.4. cGy
Day Of The Week Treatment Administered: Wednesday
Port Dimensions-X Axis In Cm: 2
Treatment Time / Fractionation (Optional- Include Units) Rx 2: .38
Patient Positioning: Side-lying (left)
Limb Positioning Or Elevation: pillow
Bill And Render Text From Evaluation And Management Tab (Will Bill [id number]): Yes
Comments: On Target, RTOG 1
Depth (Optional-Please Include Units): 1.65
Energy (Optional-Please Include Units): 70kV
Treatment Time / Fractionation (Optional- Include Units): 0.43min
Time Dose Fractionation (Optional- Include Units If Applicable) Rx 2: 46 (Total 94)
Simple Simulation Preamble Text Will Be Included With Simple Simulations (.......... Indications): Complex simulation was performed today for the following reasons:\\nPatient has custom shield designed for each SRT
Energy (Include Units): 50 kV
Cumulative Dose In Cgy (Optional): 2979.9
Daily Fractionated Dose (Optional- Include Units): 268.62
Daily Fractionated Dose (Optional- Include Units): 270.9
Energy (Optional-Please Include Units): 50kV
Total Dose (Optional-Please Include Units) Rx 2: 2641.8 (Total 5352.2)
Custom Shielding Afterword Text Will Not Be Included With Simple Simulations (X X Y Cm............): port to correlate with the lesion size, including treatment margin. The custom lead shield is adequate to accommodate the appropriate applicator and provide adequate shielding around the treatment site. Additional shielding (as noted below) is used to protect sensitive, normal tissues.
Day Of The Week Treatment Administered: Monday
Functional Status: 0 (fully active)
Time Dose Fractionation (Optional- Include Units If Applicable): 96
Detail Level: Detailed
Cumulative Dose In Cgy (Optional): 2954.82
Total Dose (Optional-Please Include Units): 5418. cGy
Other Assessment (Will Overide Current Assessment): RTOG 1
Custom Shielding Preamble Text Will Not Be Included With Simple Simulations (.......... X X Y Cm): A lead shield of 0.762 mm thickness is utilized to form a molded, custom shield for intranasal shield.
Energy (Include Units): 70 kV
Shielding Size (Optional- Include Units): 2.0 cm x 2.0 cm
Computed Treatment Time In Min (Will Render The Same As Calculated Treatment Time If Left Blank): 0.37
Time Dose Fractionation (Optional- Include Units If Applicable): 94
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
Depth (Optional-Please Include Units): 0.7
Pathology Override (Pathology Will Render As Diagnosis Name If Left Blank): Basal Cell Carcinoma Nodular Type

## 2020-12-09 ENCOUNTER — APPOINTMENT (OUTPATIENT)
Age: 65
Setting detail: DERMATOLOGY
End: 2020-12-11

## 2020-12-09 VITALS — TEMPERATURE: 98 F

## 2020-12-09 PROBLEM — C44.329 SQUAMOUS CELL CARCINOMA OF SKIN OF OTHER PARTS OF FACE: Status: ACTIVE | Noted: 2020-12-09

## 2020-12-09 PROBLEM — C44.319 BASAL CELL CARCINOMA OF SKIN OF OTHER PARTS OF FACE: Status: ACTIVE | Noted: 2020-12-09

## 2020-12-09 PROBLEM — D04.39 CARCINOMA IN SITU OF SKIN OF OTHER PARTS OF FACE: Status: ACTIVE | Noted: 2020-12-09

## 2020-12-09 PROCEDURE — OTHER TREATMENT REGIMEN: OTHER

## 2020-12-09 PROCEDURE — OTHER SUPERFICIAL RADIATION TREATMENT: OTHER

## 2020-12-09 PROCEDURE — 77401 RADIATION TX DELIVERY SUPFC: CPT

## 2020-12-09 PROCEDURE — G6001 ECHO GUIDANCE RADIOTHERAPY: HCPCS

## 2020-12-09 PROCEDURE — OTHER FOLLOW UP FOR NEXT VISIT: OTHER

## 2020-12-09 PROCEDURE — 77280 THER RAD SIMULAJ FIELD SMPL: CPT

## 2020-12-09 NOTE — PROCEDURE: SUPERFICIAL RADIATION TREATMENT
Port Dimensions-Y Axis In Cm: 2
Cumulative Dose In Cgy (Optional): 3223.44
Bill And Render Text From Evaluation And Management Tab (Will Bill 02113): No
Total Number Of Fractions: 20
Total Number Of Fractions Rx 2: 10
Bill For Radiation Treatment: Yes
Detail Level: Detailed
Field Size (Applicator): 2.5 cm
Dimensions-Y Axis In Cm: 1
Energy (Include Units): 70 kV
Fractionation Number: 12
Comments: On Target, RTOG 1
Initial Radiation Treatment Planning (Will Render If Bill Simulation = Yes): Patient had a complete consultation regarding all applicable modalities for the treatment of their skin cancer and based on a variety of factors including the type of tumor, size, and location, the relevant medical history as well as local tissue factors, the functional status of the individual, the ability to perform necessary postoperative wound instructions and the need for simultaneous treatments as well as overall wound healing status, it was determined that the patient would begin radiation therapy treatment for skin cancer.  A full simulation and treatment device design was performed including the determination and formulation of appropriate simple and complex devices including lead shield of 0.762 mm thickness to form molded customized shielding to specifically correlate with the lesion size including treatment margin.  The custom lead shield is adequate to accommodate the appropriate applicator and provide adequate shielding around the treatment site.  The specific field applicator, shields, and devices both simple and complex as well as the specific patient setup is outlined below.  The patient was given a full consent for superficial radiation to both verbally and in writing and the full determination of patient's eligibility for treatment and selection is outlined on the patient eligibility and treatment selection form.  The specific superficial radiotherapy prescription was determined and was documented on the superficial radiotherapy prescription form.  A treatment calculation was also performed and documented on the treatment calculation form.  Based on the prescription, the patient was scheduled for a series of fractional treatments.
Day Of The Week Treatment Administered: Wednesday
Treatment Time / Fractionation (Optional- Include Units) Rx 2: .38
Total Dose (Optional-Please Include Units): 5418. cGy
Assessment: Appropriate reaction
Shielding Size (Optional- Include Units): 2.0 cm x 2.0 cm
Simple Simulation Preamble Text Will Be Included With Simple Simulations (.......... Indications): Complex simulation was performed today for the following reasons:\\nPatient has custom shield designed for each SRT
Intro Statement (Will Not Render If Left Blank): Per the request of Dr. Robin, patient was seen today for Superficial Radiation Therapy requiring simulation (CPT® 85495) in preparation for treatment of specific diseased site(s). Simulation is necessary to determine correct patient and treatment portal positioning, deliver safe and effective radiation therapy. Today’s setup was evaluated determining continuation of treatment with the current plan, or necessary changes as appropriate. All appropriate custom blocking and treatment parameters verified by the Radiation Therapist.\\nToday’s visit is for Simulation and planning for radiation therapy.  Question were answered and concerns were address.  Patient was evaluated based on listed criteria and is a suitable candidate to begin SRT.  \\n
Custom Shielding Afterword Text Will Not Be Included With Simple Simulations (X X Y Cm............): port to correlate with the lesion size, including treatment margin. The custom lead shield is adequate to accommodate the appropriate applicator and provide adequate shielding around the treatment site. Additional shielding (as noted below) is used to protect sensitive, normal tissues.
Dose / Tx In Cgy (Optional): 268.62
Daily Fractionated Dose (Optional- Include Units) Rx 2: 264.18
Daily Fractionated Dose (Optional- Include Units): 270.9
Custom Shielding Preamble Text Will Not Be Included With Simple Simulations (.......... X X Y Cm): A lead shield of 0.762 mm thickness is utilized to form a molded, custom shield for intranasal shield.
Fractions / Week Rx 4: 5
Treatment Time / Fractionation (Optional- Include Units): 0.43min
Total Dose (Optional-Please Include Units): 5372.4. cGy
Depth (Optional-Please Include Units): 1.65
Information: Selecting Yes will display possible errors in your note based on the variables you have selected. This validation is only offered as a suggestion for you. PLEASE NOTE THAT THE VALIDATION TEXT WILL BE REMOVED WHEN YOU FINALIZE YOUR NOTE. IF YOU WANT TO FAX A PRELIMINARY NOTE YOU WILL NEED TO TOGGLE THIS TO 'NO' IF YOU DO NOT WANT IT IN YOUR FAXED NOTE.
Total Number Of Fractions Rx 3: 15
Patient Positioning: Side-lying (left)
Treatment Margins In Cm: 0.5
Pathology Override (Pathology Will Render As Diagnosis Name If Left Blank): Basal Cell Carcinoma Nodular Type
Limb Positioning Or Elevation: pillow
Depth (Optional-Please Include Units): 0.7
Total Dose (Optional-Please Include Units) Rx 2: 2641.8 (Total 5352.2)
Energy (Optional-Please Include Units): 70kV
Fractionation Number (Evaluation): 11
Treatment Device Design After Initial Simulation Justification (Will Render If Bill For Treatment Devices = Yes): Patient is status post radiation simulation and is evaluated as to the use of additional devices for shielding and placement for radiation therapy.
Treatment Time In Min (Optional): .43
Energy (Optional-Please Include Units): 50kV
Other Assessment (Will Overide Current Assessment): RTOG 1
Cumulative Dose In Cgy (Optional): 3250.80
Functional Status: 0 (fully active)
Shielding Size (Optional- Include Units) Rx 2: 2.0cmX2.0cm
Computed Treatment Time In Min (Will Render The Same As Calculated Treatment Time If Left Blank): 0.43
Time Dose Fractionation (Optional- Include Units If Applicable): 96
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
Energy (Include Units): 50 kV
Fractions / Week: 3
Time Dose Fractionation (Optional- Include Units If Applicable) Rx 2: 46 (Total 94)
Treatment Time / Fractionation (Optional- Include Units): 0.37min
Computed Treatment Time In Min (Will Render The Same As Calculated Treatment Time If Left Blank): 0.37
Treatment Time In Min (Optional): .37
Depth (Optional-Please Include Units): 0.66
Time Dose Fractionation (Optional- Include Units If Applicable): 94

## 2020-12-09 NOTE — PROCEDURE: TREATMENT REGIMEN
Plan: This patient has been treated today with superficial radiation therapy for non-melanoma skin cancer.    \\n\\nWritten informed consent has been previously obtained from this patient for this treatment.  This consent is documented in the patient’s chart.  The patient gave verbal consent to continue treatment today. \\n\\nThe patient was treated with a specific radiation dose and setup as prescribed by the provider listed on this visit note.  A Radiation Therapist performed administration of radiation. The treatment parameters and cumulative dose are indicated above.\\n  \\nPrior to administering the radiation, the patient underwent a verification simulation defining relevant normal and abnormal target anatomy and acquiring images and data necessary to develop optimal radiation treatment process for the patient. This process includes verification of the treatment port(s) and proper treatment positioning.  All treatment ports were photographed.  The patient’s customized lead blocking was confirmed.   Considering, superficial radiotherapy setup is a clinical setup, this requires physician and radiation therapist to clarify location interest being treated against initial images, pathology and patient anatomy. Care was taken ensuring fields treated were geometrically accurate and properly positioned using daily verification simulation.  This process is also utilized to determine if any changes are necessary.   These steps are therefore medically necessary ensuring safe and effective administration of radiation. \\n\\nA high frequency ultrasound image was acquired today for two-dimensional evaluation of the tumor volume and response to treatment, in addition to geometric accuracy of field placement. The daily field placement is separate and distinct from the initial simulation and is an important task in providing safe administration of radiation therapy. Physician evaluation of the ultrasound tumor depth will be ongoing throughout the course of treatment and is deemed medically necessary in order to ensure the efficacy of treatment. Today’s image was evaluated for determination of continuation of treatment with the current plan or with necessary changes as appropriate. Additionally, the use of visualization and targeted assessment allows the patient to be able to see their cancer(s) progress, encouraging patient to complete full course of radiotherapy. \\n\\nLocation:  US Depth of 0.90 mm with +/- 0.36 mm change from previous imaging.  Repop: ++, ZAKI equivocal\\n\\nPer Dr. Robin, continued daily US guidance and clinical setup simulation is required for field placement, measurement of tumor depth, progress and acute effect monitoring. \\n
Detail Level: Zone
Plan: This patient has been treated today with superficial radiation therapy for non-melanoma skin cancer.    \\n\\nWritten informed consent has been previously obtained from this patient for this treatment.  This consent is documented in the patient’s chart.  The patient gave verbal consent to continue treatment today. \\n\\nThe patient was treated with a specific radiation dose and setup as prescribed by the provider listed on this visit note.  A Radiation Therapist performed administration of radiation. The treatment parameters and cumulative dose are indicated above.\\n  \\nPrior to administering the radiation, the patient underwent a verification simulation defining relevant normal and abnormal target anatomy and acquiring images and data necessary to develop optimal radiation treatment process for the patient. This process includes verification of the treatment port(s) and proper treatment positioning.  All treatment ports were photographed.  The patient’s customized lead blocking was confirmed.   Considering, superficial radiotherapy setup is a clinical setup, this requires physician and radiation therapist to clarify location interest being treated against initial images, pathology and patient anatomy. Care was taken ensuring fields treated were geometrically accurate and properly positioned using daily verification simulation.  This process is also utilized to determine if any changes are necessary.   These steps are therefore medically necessary ensuring safe and effective administration of radiation. \\n\\nA high frequency ultrasound image was acquired today for two-dimensional evaluation of the tumor volume and response to treatment, in addition to geometric accuracy of field placement. The daily field placement is separate and distinct from the initial simulation and is an important task in providing safe administration of radiation therapy. Physician evaluation of the ultrasound tumor depth will be ongoing throughout the course of treatment and is deemed medically necessary in order to ensure the efficacy of treatment. Today’s image was evaluated for determination of continuation of treatment with the current plan or with necessary changes as appropriate. Additionally, the use of visualization and targeted assessment allows the patient to be able to see their cancer(s) progress, encouraging patient to complete full course of radiotherapy. \\n\\nLocation:  US Depth of 1.16 mm with +/- 0.49 mm change from previous imaging.  Repop: ++, ZAKI equivocal\\n\\nPer Dr. Robin, continued daily US guidance and clinical setup simulation is required for field placement, measurement of tumor depth, progress and acute effect monitoring. \\n
Otc Regimen: Pt put on break today for the remaining part of this week.  Skin breaking down and bleeding [place] curretted the lesion today.  CAROLINE
Plan: This patient has been treated today with superficial radiation therapy for non-melanoma skin cancer.    \\n\\nWritten informed consent has been previously obtained from this patient for this treatment.  This consent is documented in the patient’s chart.  The patient gave verbal consent to continue treatment today. \\n\\nThe patient was treated with a specific radiation dose and setup as prescribed by the provider listed on this visit note.  A Radiation Therapist performed administration of radiation. The treatment parameters and cumulative dose are indicated above.\\n  \\nPrior to administering the radiation, the patient underwent a verification simulation defining relevant normal and abnormal target anatomy and acquiring images and data necessary to develop optimal radiation treatment process for the patient. This process includes verification of the treatment port(s) and proper treatment positioning.  All treatment ports were photographed.  The patient’s customized lead blocking was confirmed.   Considering, superficial radiotherapy setup is a clinical setup, this requires physician and radiation therapist to clarify location interest being treated against initial images, pathology and patient anatomy. Care was taken ensuring fields treated were geometrically accurate and properly positioned using daily verification simulation.  This process is also utilized to determine if any changes are necessary.   These steps are therefore medically necessary ensuring safe and effective administration of radiation. \\n\\nA high frequency ultrasound image was acquired today for two-dimensional evaluation of the tumor volume and response to treatment, in addition to geometric accuracy of field placement. The daily field placement is separate and distinct from the initial simulation and is an important task in providing safe administration of radiation therapy. Physician evaluation of the ultrasound tumor depth will be ongoing throughout the course of treatment and is deemed medically necessary in order to ensure the efficacy of treatment. Today’s image was evaluated for determination of continuation of treatment with the current plan or with necessary changes as appropriate. Additionally, the use of visualization and targeted assessment allows the patient to be able to see their cancer(s) progress, encouraging patient to complete full course of radiotherapy. \\n\\nLocation:  US Depth of 1.19 mm with +/- 0.05 mm change from previous imaging.  Repop: ++, ZAKI equivocal\\n\\nPer Dr. Robin, continued daily US guidance and clinical setup simulation is required for field placement, measurement of tumor depth, progress and acute effect monitoring. \\n
Otc Regimen: Pt put on break today with skin reaction.  Junaid ESPARZA, gave pt. prescription for Silvadene cream. apply BID

## 2020-12-10 ENCOUNTER — APPOINTMENT (OUTPATIENT)
Age: 65
Setting detail: DERMATOLOGY
End: 2020-12-11

## 2020-12-10 ENCOUNTER — APPOINTMENT (OUTPATIENT)
Age: 65
Setting detail: DERMATOLOGY
End: 2020-12-23

## 2020-12-10 ENCOUNTER — APPOINTMENT (OUTPATIENT)
Age: 65
Setting detail: DERMATOLOGY
End: 2020-12-22

## 2020-12-10 PROBLEM — C44.329 SQUAMOUS CELL CARCINOMA OF SKIN OF OTHER PARTS OF FACE: Status: ACTIVE | Noted: 2020-12-10

## 2020-12-10 PROBLEM — D04.39 CARCINOMA IN SITU OF SKIN OF OTHER PARTS OF FACE: Status: ACTIVE | Noted: 2020-12-10

## 2020-12-10 PROBLEM — C44.319 BASAL CELL CARCINOMA OF SKIN OF OTHER PARTS OF FACE: Status: ACTIVE | Noted: 2020-12-10

## 2020-12-10 PROCEDURE — 77336 RADIATION PHYSICS CONSULT: CPT

## 2020-12-10 PROCEDURE — 77401 RADIATION TX DELIVERY SUPFC: CPT

## 2020-12-10 PROCEDURE — OTHER SUPERFICIAL RADIATION TREATMENT: OTHER

## 2020-12-10 PROCEDURE — OTHER TREATMENT REGIMEN: OTHER

## 2020-12-10 PROCEDURE — G6001 ECHO GUIDANCE RADIOTHERAPY: HCPCS

## 2020-12-10 PROCEDURE — 77280 THER RAD SIMULAJ FIELD SMPL: CPT

## 2020-12-10 PROCEDURE — OTHER FOLLOW UP FOR NEXT VISIT: OTHER

## 2020-12-10 NOTE — PROCEDURE: SUPERFICIAL RADIATION TREATMENT
Field Size (Applicator): 2.5 cm
Patient Positioning: Side-lying (left)
Prescription Used: 1
Fractions / Week Rx 2: 3
Fractionation Number: 13
Bill For Treatment Devices Only: No
Simple Simulation Preamble Text Will Be Included With Simple Simulations (.......... Indications): Complex simulation was performed today for the following reasons:\\nPatient has custom shield designed for each SRT
Shielding Size (Optional- Include Units) Rx 2: 2.0cmX2.0cm
Total Dose (Optional-Please Include Units): 5418. cGy
Simple Simulation Afterword Text Will Be Included With Simple Simulations (Indications............): Patient had a complete consultation regarding all applicable modalities for the treatment of their skin cancer and based on a variety of factors including the type of tumor, size, and location, the relevant medical history as well as local tissue factors, the functional status of the individual, the ability to perform necessary postoperative wound instructions and the need for simultaneous treatments as well as overall wound healing status, it was determined that the patient would begin radiation therapy treatment for skin cancer.  A full simulation and treatment device design was performed including the determination and formulation of appropriate simple and complex devices including lead shield of 0.762 mm thickness to form molded customized shielding to specifically correlate with the lesion size including treatment margin.  The custom lead shield is adequate to accommodate the appropriate applicator and provide adequate shielding around the treatment site.  The specific field applicator, shields, and devices both simple and complex as well as the specific patient setup is outlined below.  The patient was given a full consent for superficial radiation to both verbally and in writing and the full determination of patient's eligibility for treatment and selection is outlined on the patient eligibility and treatment selection form.  The specific superficial radiotherapy prescription was determined and was documented on the superficial radiotherapy prescription form.  A treatment calculation was also performed and documented on the treatment calculation form.  Based on the prescription, the patient was scheduled for a series of fractional treatments.
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
Dose / Tx In Cgy (Optional): 270.9
Energy (Optional-Please Include Units) Rx 2: 50kV
Limb Positioning Or Elevation: pillow
Fractionation Number (Evaluation): 11
Time Dose Fractionation (Optional- Include Units If Applicable): 94
Bill For Radiation Treatment: Yes
Computed Treatment Time In Min (Will Render The Same As Calculated Treatment Time If Left Blank): 0.43
Total Number Of Fractions Rx 2: 10
Fractions / Week Rx 3: 5
Treatment Time In Min (Optional): .43
Detail Level: Detailed
Depth (Optional-Please Include Units): 0.7
Total Dose (Optional-Please Include Units) Rx 2: 2641.8 (Total 5352.2)
Comments: On Target, RTOG 1
Total Number Of Fractions Rx 3: 15
Other Assessment (Will Overide Current Assessment): RTOG 1
Information: Selecting Yes will display possible errors in your note based on the variables you have selected. This validation is only offered as a suggestion for you. PLEASE NOTE THAT THE VALIDATION TEXT WILL BE REMOVED WHEN YOU FINALIZE YOUR NOTE. IF YOU WANT TO FAX A PRELIMINARY NOTE YOU WILL NEED TO TOGGLE THIS TO 'NO' IF YOU DO NOT WANT IT IN YOUR FAXED NOTE.
Cumulative Dose In Cgy (Optional): 3521.70
Depth (Optional-Please Include Units): 0.66
Custom Shielding Preamble Text Will Not Be Included With Simple Simulations (.......... X X Y Cm): A lead shield of 0.762 mm thickness is utilized to form a molded, custom shield for intranasal shield.
Pathology Override (Pathology Will Render As Diagnosis Name If Left Blank): Basal Cell Carcinoma Nodular Type
Day Of The Week Treatment Administered: Thursday
Energy (Optional-Please Include Units): 70kV
Intro Statement (Will Not Render If Left Blank): Per the request of Dr. Robin, patient was seen today for Superficial Radiation Therapy requiring simulation (CPT® 18775) in preparation for treatment of specific diseased site(s). Simulation is necessary to determine correct patient and treatment portal positioning, deliver safe and effective radiation therapy. Today’s setup was evaluated determining continuation of treatment with the current plan, or necessary changes as appropriate. All appropriate custom blocking and treatment parameters verified by the Radiation Therapist.\\nToday’s visit is for Simulation and planning for radiation therapy.  Question were answered and concerns were address.  Patient was evaluated based on listed criteria and is a suitable candidate to begin SRT.  \\n
Port Dimensions-X Axis In Cm: 2
Time Dose Fractionation (Optional- Include Units If Applicable) Rx 2: 46 (Total 94)
Treatment Time / Fractionation (Optional- Include Units): 0.37min
Shielding Size (Optional- Include Units): 2.0 cm x 2.0 cm
Treatment Time / Fractionation (Optional- Include Units): 0.43min
Treatment Device Design After Initial Simulation Justification (Will Render If Bill For Treatment Devices = Yes): Patient is status post radiation simulation and is evaluated as to the use of additional devices for shielding and placement for radiation therapy.
Assessment: Appropriate reaction
Energy (Include Units): 50 kV
Time Dose Fractionation (Optional- Include Units If Applicable): 96
Functional Status: 0 (fully active)
Daily Fractionated Dose (Optional- Include Units): 268.62
Daily Fractionated Dose (Optional- Include Units) Rx 2: 264.18
Treatment Margins In Cm: 0.5
Treatment Time / Fractionation (Optional- Include Units) Rx 2: .38
Computed Treatment Time In Min (Will Render The Same As Calculated Treatment Time If Left Blank): 0.37
Custom Shielding Afterword Text Will Not Be Included With Simple Simulations (X X Y Cm............): port to correlate with the lesion size, including treatment margin. The custom lead shield is adequate to accommodate the appropriate applicator and provide adequate shielding around the treatment site. Additional shielding (as noted below) is used to protect sensitive, normal tissues.
Depth (Optional-Please Include Units): 1.65
Treatment Time In Min (Optional): .37
Total Number Of Fractions: 20
Energy (Include Units): 70 kV
Total Dose (Optional-Please Include Units): 5372.4. cGy
Day Of The Week Treatment Administered: Friday
Cumulative Dose In Cgy (Optional): 3492.06

## 2020-12-10 NOTE — PROCEDURE: TREATMENT REGIMEN
Dementia    Hypertension
Detail Level: Zone
Plan: This patient has been treated today with superficial radiation therapy for non-melanoma skin cancer.    \\n\\nWritten informed consent has been previously obtained from this patient for this treatment.  This consent is documented in the patient’s chart.  The patient gave verbal consent to continue treatment today. \\n\\nThe patient was treated with a specific radiation dose and setup as prescribed by the provider listed on this visit note.  A Radiation Therapist performed administration of radiation. The treatment parameters and cumulative dose are indicated above.\\n  \\nPrior to administering the radiation, the patient underwent a verification simulation defining relevant normal and abnormal target anatomy and acquiring images and data necessary to develop optimal radiation treatment process for the patient. This process includes verification of the treatment port(s) and proper treatment positioning.  All treatment ports were photographed.  The patient’s customized lead blocking was confirmed.   Considering, superficial radiotherapy setup is a clinical setup, this requires physician and radiation therapist to clarify location interest being treated against initial images, pathology and patient anatomy. Care was taken ensuring fields treated were geometrically accurate and properly positioned using daily verification simulation.  This process is also utilized to determine if any changes are necessary.   These steps are therefore medically necessary ensuring safe and effective administration of radiation. \\n\\nA high frequency ultrasound image was acquired today for two-dimensional evaluation of the tumor volume and response to treatment, in addition to geometric accuracy of field placement. The daily field placement is separate and distinct from the initial simulation and is an important task in providing safe administration of radiation therapy. Physician evaluation of the ultrasound tumor depth will be ongoing throughout the course of treatment and is deemed medically necessary in order to ensure the efficacy of treatment. Today’s image was evaluated for determination of continuation of treatment with the current plan or with necessary changes as appropriate. Additionally, the use of visualization and targeted assessment allows the patient to be able to see their cancer(s) progress, encouraging patient to complete full course of radiotherapy. \\n\\nLocation:  US Depth of 0.96 mm with +/- 0.20 mm change from previous imaging.  Repop: ++, ZAKI equivocal\\n\\nPer Dr. Robin, continued daily US guidance and clinical setup simulation is required for field placement, measurement of tumor depth, progress and acute effect monitoring. \\n
Plan: This patient has been treated today with superficial radiation therapy for non-melanoma skin cancer.    \\n\\nWritten informed consent has been previously obtained from this patient for this treatment.  This consent is documented in the patient’s chart.  The patient gave verbal consent to continue treatment today. \\n\\nThe patient was treated with a specific radiation dose and setup as prescribed by the provider listed on this visit note.  A Radiation Therapist performed administration of radiation. The treatment parameters and cumulative dose are indicated above.\\n  \\nPrior to administering the radiation, the patient underwent a verification simulation defining relevant normal and abnormal target anatomy and acquiring images and data necessary to develop optimal radiation treatment process for the patient. This process includes verification of the treatment port(s) and proper treatment positioning.  All treatment ports were photographed.  The patient’s customized lead blocking was confirmed.   Considering, superficial radiotherapy setup is a clinical setup, this requires physician and radiation therapist to clarify location interest being treated against initial images, pathology and patient anatomy. Care was taken ensuring fields treated were geometrically accurate and properly positioned using daily verification simulation.  This process is also utilized to determine if any changes are necessary.   These steps are therefore medically necessary ensuring safe and effective administration of radiation. \\n\\nA high frequency ultrasound image was acquired today for two-dimensional evaluation of the tumor volume and response to treatment, in addition to geometric accuracy of field placement. The daily field placement is separate and distinct from the initial simulation and is an important task in providing safe administration of radiation therapy. Physician evaluation of the ultrasound tumor depth will be ongoing throughout the course of treatment and is deemed medically necessary in order to ensure the efficacy of treatment. Today’s image was evaluated for determination of continuation of treatment with the current plan or with necessary changes as appropriate. Additionally, the use of visualization and targeted assessment allows the patient to be able to see their cancer(s) progress, encouraging patient to complete full course of radiotherapy. \\n\\nLocation:  US Depth of 1.57 mm with +/- 0.38 mm change from previous imaging.  Repop: ++, ZAKI equivocal\\n\\nPer Dr. Robin, continued daily US guidance and clinical setup simulation is required for field placement, measurement of tumor depth, progress and acute effect monitoring. \\n
Plan: This patient has been treated today with superficial radiation therapy for non-melanoma skin cancer.    \\n\\nWritten informed consent has been previously obtained from this patient for this treatment.  This consent is documented in the patient’s chart.  The patient gave verbal consent to continue treatment today. \\n\\nThe patient was treated with a specific radiation dose and setup as prescribed by the provider listed on this visit note.  A Radiation Therapist performed administration of radiation. The treatment parameters and cumulative dose are indicated above.\\n  \\nPrior to administering the radiation, the patient underwent a verification simulation defining relevant normal and abnormal target anatomy and acquiring images and data necessary to develop optimal radiation treatment process for the patient. This process includes verification of the treatment port(s) and proper treatment positioning.  All treatment ports were photographed.  The patient’s customized lead blocking was confirmed.   Considering, superficial radiotherapy setup is a clinical setup, this requires physician and radiation therapist to clarify location interest being treated against initial images, pathology and patient anatomy. Care was taken ensuring fields treated were geometrically accurate and properly positioned using daily verification simulation.  This process is also utilized to determine if any changes are necessary.   These steps are therefore medically necessary ensuring safe and effective administration of radiation. \\n\\nA high frequency ultrasound image was acquired today for two-dimensional evaluation of the tumor volume and response to treatment, in addition to geometric accuracy of field placement. The daily field placement is separate and distinct from the initial simulation and is an important task in providing safe administration of radiation therapy. Physician evaluation of the ultrasound tumor depth will be ongoing throughout the course of treatment and is deemed medically necessary in order to ensure the efficacy of treatment. Today’s image was evaluated for determination of continuation of treatment with the current plan or with necessary changes as appropriate. Additionally, the use of visualization and targeted assessment allows the patient to be able to see their cancer(s) progress, encouraging patient to complete full course of radiotherapy. \\n\\nLocation:  US Depth of 1.74 mm with +/- 0.85 mm change from previous imaging.  Repop: ++, ZAKI equivocal\\n\\nPer Dr. Robin, continued daily US guidance and clinical setup simulation is required for field placement, measurement of tumor depth, progress and acute effect monitoring. \\n
Otc Regimen: Pt put on break today for the remaining part of this week.  Skin breaking down and bleeding ISAI[place] curretted the lesion today.  CAROLINE
Otc Regimen: Pt put on break today with skin reaction.  Junaid ESPARZA, gave pt. prescription for Silvadene cream. apply BID

## 2020-12-15 ENCOUNTER — APPOINTMENT (OUTPATIENT)
Age: 65
Setting detail: DERMATOLOGY
End: 2020-12-21

## 2020-12-15 VITALS — TEMPERATURE: 79.9 F

## 2020-12-15 PROBLEM — C44.329 SQUAMOUS CELL CARCINOMA OF SKIN OF OTHER PARTS OF FACE: Status: ACTIVE | Noted: 2020-12-15

## 2020-12-15 PROBLEM — D04.39 CARCINOMA IN SITU OF SKIN OF OTHER PARTS OF FACE: Status: ACTIVE | Noted: 2020-12-15

## 2020-12-15 PROBLEM — C44.319 BASAL CELL CARCINOMA OF SKIN OF OTHER PARTS OF FACE: Status: ACTIVE | Noted: 2020-12-15

## 2020-12-15 PROCEDURE — 77401 RADIATION TX DELIVERY SUPFC: CPT

## 2020-12-15 PROCEDURE — G6001 ECHO GUIDANCE RADIOTHERAPY: HCPCS

## 2020-12-15 PROCEDURE — OTHER FOLLOW UP FOR NEXT VISIT: OTHER

## 2020-12-15 PROCEDURE — OTHER SUPERFICIAL RADIATION TREATMENT: OTHER

## 2020-12-15 PROCEDURE — OTHER TREATMENT REGIMEN: OTHER

## 2020-12-15 PROCEDURE — 77280 THER RAD SIMULAJ FIELD SMPL: CPT

## 2020-12-15 NOTE — PROCEDURE: SUPERFICIAL RADIATION TREATMENT
Field Size (Applicator): 2.5 cm
Prescription Used: 1
Bill For Dosimetry/Render Treatment Time Calculation In Note: No
Patient Positioning: Side-lying (left)
Total Number Of Fractions Rx 3: 15
Fractions / Week: 3
Energy (Include Units): 70 kV
Fractions / Week Rx 4: 5
Treatment Margins In Cm: 0.5
Port Dimensions-X Axis In Cm: 2
Assessment: Appropriate reaction
Treatment Device Design After Initial Simulation Justification (Will Render If Bill For Treatment Devices = Yes): Patient is status post radiation simulation and is evaluated as to the use of additional devices for shielding and placement for radiation therapy.
Depth (Optional-Please Include Units): 0.7
Simple Simulation Afterword Text Will Be Included With Simple Simulations (Indications............): Patient had a complete consultation regarding all applicable modalities for the treatment of their skin cancer and based on a variety of factors including the type of tumor, size, and location, the relevant medical history as well as local tissue factors, the functional status of the individual, the ability to perform necessary postoperative wound instructions and the need for simultaneous treatments as well as overall wound healing status, it was determined that the patient would begin radiation therapy treatment for skin cancer.  A full simulation and treatment device design was performed including the determination and formulation of appropriate simple and complex devices including lead shield of 0.762 mm thickness to form molded customized shielding to specifically correlate with the lesion size including treatment margin.  The custom lead shield is adequate to accommodate the appropriate applicator and provide adequate shielding around the treatment site.  The specific field applicator, shields, and devices both simple and complex as well as the specific patient setup is outlined below.  The patient was given a full consent for superficial radiation to both verbally and in writing and the full determination of patient's eligibility for treatment and selection is outlined on the patient eligibility and treatment selection form.  The specific superficial radiotherapy prescription was determined and was documented on the superficial radiotherapy prescription form.  A treatment calculation was also performed and documented on the treatment calculation form.  Based on the prescription, the patient was scheduled for a series of fractional treatments.
Treatment Time / Fractionation (Optional- Include Units): 0.43min
Limb Positioning Or Elevation: pillow
Intro Statement (Will Not Render If Left Blank): Per the request of Dr. Robin, patient was seen today for Superficial Radiation Therapy requiring simulation (CPT® 60805) in preparation for treatment of specific diseased site(s). Simulation is necessary to determine correct patient and treatment portal positioning, deliver safe and effective radiation therapy. Today’s setup was evaluated determining continuation of treatment with the current plan, or necessary changes as appropriate. All appropriate custom blocking and treatment parameters verified by the Radiation Therapist.\\nToday’s visit is for Simulation and planning for radiation therapy.  Question were answered and concerns were address.  Patient was evaluated based on listed criteria and is a suitable candidate to begin SRT.  \\n
Total Dose (Optional-Please Include Units): 5418. cGy
Total Dose (Optional-Please Include Units) Rx 2: 2641.8 (Total 5352.2)
Fractionation Number: 14
Cumulative Dose In Cgy (Optional): 3792.6
Functional Status: 0 (fully active)
Total Number Of Fractions: 20
Fractionation Number (Evaluation): 11
Information: Selecting Yes will display possible errors in your note based on the variables you have selected. This validation is only offered as a suggestion for you. PLEASE NOTE THAT THE VALIDATION TEXT WILL BE REMOVED WHEN YOU FINALIZE YOUR NOTE. IF YOU WANT TO FAX A PRELIMINARY NOTE YOU WILL NEED TO TOGGLE THIS TO 'NO' IF YOU DO NOT WANT IT IN YOUR FAXED NOTE.
Time Dose Fractionation (Optional- Include Units If Applicable) Rx 2: 46 (Total 94)
Day Of The Week Treatment Administered: Tuesday
Shielding Size (Optional- Include Units) Rx 2: 2.0cmX2.0cm
Dose Per Fractionation In Cgy (Optional): 270.9
Shielding Size (Optional- Include Units): 2.0 cm x 2.0 cm
Custom Shielding Preamble Text Will Not Be Included With Simple Simulations (.......... X X Y Cm): A lead shield of 0.762 mm thickness is utilized to form a molded, custom shield for intranasal shield.
Treatment Time / Fractionation (Optional- Include Units) Rx 2: .38
Other Assessment (Will Overide Current Assessment): RTOG 1
Daily Fractionated Dose (Optional- Include Units) Rx 2: 264.18
Treatment Time In Min (Optional): .43
Energy (Optional-Please Include Units): 70kV
Pathology Override (Pathology Will Render As Diagnosis Name If Left Blank): Basal Cell Carcinoma Nodular Type
Time Dose Fractionation (Optional- Include Units If Applicable): 94
Computed Treatment Time In Min (Will Render The Same As Calculated Treatment Time If Left Blank): 0.43
Daily Fractionated Dose (Optional- Include Units): 268.62
Treatment Time In Min (Optional): .37
Comments: On Target, RTOG 1
Custom Shielding Afterword Text Will Not Be Included With Simple Simulations (X X Y Cm............): port to correlate with the lesion size, including treatment margin. The custom lead shield is adequate to accommodate the appropriate applicator and provide adequate shielding around the treatment site. Additional shielding (as noted below) is used to protect sensitive, normal tissues.
Depth (Optional-Please Include Units): 1.65
Energy (Optional-Please Include Units) Rx 2: 50kV
Cumulative Dose In Cgy (Optional): 3760.68
Total Number Of Fractions Rx 2: 10
Total Dose (Optional-Please Include Units): 5372.4. cGy
Cumulative Dose In Cgy (Optional): 3792.60
Treatment Time / Fractionation (Optional- Include Units): 0.37min
Bill For Radiation Treatment: Yes
Depth (Optional-Please Include Units): 0.66
Detail Level: Detailed
Simple Simulation Preamble Text Will Be Included With Simple Simulations (.......... Indications): Complex simulation was performed today for the following reasons:\\nPatient has custom shield designed for each SRT
Computed Treatment Time In Min (Will Render The Same As Calculated Treatment Time If Left Blank): 0.37
Time Dose Fractionation (Optional- Include Units If Applicable): 96
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
Energy (Include Units): 50 kV

## 2020-12-15 NOTE — PROCEDURE: TREATMENT REGIMEN
Detail Level: Zone
Otc Regimen: Pt put on break today for the remaining part of this week.  Skin breaking down and bleeding ISAIAS [place] curretted the lesion today.  CAROLINE
Plan: This patient has been treated today with superficial radiation therapy for non-melanoma skin cancer.    \\n\\nWritten informed consent has been previously obtained from this patient for this treatment.  This consent is documented in the patient’s chart.  The patient gave verbal consent to continue treatment today. \\n\\nThe patient was treated with a specific radiation dose and setup as prescribed by the provider listed on this visit note.  A Radiation Therapist performed administration of radiation. The treatment parameters and cumulative dose are indicated above.\\n  \\nPrior to administering the radiation, the patient underwent a verification simulation defining relevant normal and abnormal target anatomy and acquiring images and data necessary to develop optimal radiation treatment process for the patient. This process includes verification of the treatment port(s) and proper treatment positioning.  All treatment ports were photographed.  The patient’s customized lead blocking was confirmed.   Considering, superficial radiotherapy setup is a clinical setup, this requires physician and radiation therapist to clarify location interest being treated against initial images, pathology and patient anatomy. Care was taken ensuring fields treated were geometrically accurate and properly positioned using daily verification simulation.  This process is also utilized to determine if any changes are necessary.   These steps are therefore medically necessary ensuring safe and effective administration of radiation. \\n\\nA high frequency ultrasound image was acquired today for two-dimensional evaluation of the tumor volume and response to treatment, in addition to geometric accuracy of field placement. The daily field placement is separate and distinct from the initial simulation and is an important task in providing safe administration of radiation therapy. Physician evaluation of the ultrasound tumor depth will be ongoing throughout the course of treatment and is deemed medically necessary in order to ensure the efficacy of treatment. Today’s image was evaluated for determination of continuation of treatment with the current plan or with necessary changes as appropriate. Additionally, the use of visualization and targeted assessment allows the patient to be able to see their cancer(s) progress, encouraging patient to complete full course of radiotherapy. \\n\\nLocation:  US Depth of 1.39 mm with +/- 0.18 mm change from previous imaging.  Repop: ++, ZAKI equivocal\\n\\nPer Dr. Robin, continued daily US guidance and clinical setup simulation is required for field placement, measurement of tumor depth, progress and acute effect monitoring. \\n
Plan: This patient has been treated today with superficial radiation therapy for non-melanoma skin cancer.    \\n\\nWritten informed consent has been previously obtained from this patient for this treatment.  This consent is documented in the patient’s chart.  The patient gave verbal consent to continue treatment today. \\n\\nThe patient was treated with a specific radiation dose and setup as prescribed by the provider listed on this visit note.  A Radiation Therapist performed administration of radiation. The treatment parameters and cumulative dose are indicated above.\\n  \\nPrior to administering the radiation, the patient underwent a verification simulation defining relevant normal and abnormal target anatomy and acquiring images and data necessary to develop optimal radiation treatment process for the patient. This process includes verification of the treatment port(s) and proper treatment positioning.  All treatment ports were photographed.  The patient’s customized lead blocking was confirmed.   Considering, superficial radiotherapy setup is a clinical setup, this requires physician and radiation therapist to clarify location interest being treated against initial images, pathology and patient anatomy. Care was taken ensuring fields treated were geometrically accurate and properly positioned using daily verification simulation.  This process is also utilized to determine if any changes are necessary.   These steps are therefore medically necessary ensuring safe and effective administration of radiation. \\n\\nA high frequency ultrasound image was acquired today for two-dimensional evaluation of the tumor volume and response to treatment, in addition to geometric accuracy of field placement. The daily field placement is separate and distinct from the initial simulation and is an important task in providing safe administration of radiation therapy. Physician evaluation of the ultrasound tumor depth will be ongoing throughout the course of treatment and is deemed medically necessary in order to ensure the efficacy of treatment. Today’s image was evaluated for determination of continuation of treatment with the current plan or with necessary changes as appropriate. Additionally, the use of visualization and targeted assessment allows the patient to be able to see their cancer(s) progress, encouraging patient to complete full course of radiotherapy. \\n\\nLocation:  US Depth of 1.18 mm with +/- 0.22 mm change from previous imaging.  Repop: ++, ZAKI equivocal\\n\\nPer Dr. Robin, continued daily US guidance and clinical setup simulation is required for field placement, measurement of tumor depth, progress and acute effect monitoring. \\n
Otc Regimen: Pt put on break today with skin reaction.  Junaid ESPARZA, gave pt. prescription for Silvadene cream. apply BID
Plan: This patient has been treated today with superficial radiation therapy for non-melanoma skin cancer.    \\n\\nWritten informed consent has been previously obtained from this patient for this treatment.  This consent is documented in the patient’s chart.  The patient gave verbal consent to continue treatment today. \\n\\nThe patient was treated with a specific radiation dose and setup as prescribed by the provider listed on this visit note.  A Radiation Therapist performed administration of radiation. The treatment parameters and cumulative dose are indicated above.\\n  \\nPrior to administering the radiation, the patient underwent a verification simulation defining relevant normal and abnormal target anatomy and acquiring images and data necessary to develop optimal radiation treatment process for the patient. This process includes verification of the treatment port(s) and proper treatment positioning.  All treatment ports were photographed.  The patient’s customized lead blocking was confirmed.   Considering, superficial radiotherapy setup is a clinical setup, this requires physician and radiation therapist to clarify location interest being treated against initial images, pathology and patient anatomy. Care was taken ensuring fields treated were geometrically accurate and properly positioned using daily verification simulation.  This process is also utilized to determine if any changes are necessary.   These steps are therefore medically necessary ensuring safe and effective administration of radiation. \\n\\nA high frequency ultrasound image was acquired today for two-dimensional evaluation of the tumor volume and response to treatment, in addition to geometric accuracy of field placement. The daily field placement is separate and distinct from the initial simulation and is an important task in providing safe administration of radiation therapy. Physician evaluation of the ultrasound tumor depth will be ongoing throughout the course of treatment and is deemed medically necessary in order to ensure the efficacy of treatment. Today’s image was evaluated for determination of continuation of treatment with the current plan or with necessary changes as appropriate. Additionally, the use of visualization and targeted assessment allows the patient to be able to see their cancer(s) progress, encouraging patient to complete full course of radiotherapy. \\n\\nLocation:  US Depth of 1.56 mm with +/- 0.18 mm change from previous imaging.  Repop: ++, ZAKI equivocal\\n\\nPer Dr. Robin, continued daily US guidance and clinical setup simulation is required for field placement, measurement of tumor depth, progress and acute effect monitoring. \\n

## 2020-12-17 ENCOUNTER — APPOINTMENT (OUTPATIENT)
Age: 65
Setting detail: DERMATOLOGY
End: 2020-12-21

## 2020-12-17 VITALS — TEMPERATURE: 97.8 F

## 2020-12-17 PROBLEM — C44.319 BASAL CELL CARCINOMA OF SKIN OF OTHER PARTS OF FACE: Status: ACTIVE | Noted: 2020-12-17

## 2020-12-17 PROBLEM — C44.329 SQUAMOUS CELL CARCINOMA OF SKIN OF OTHER PARTS OF FACE: Status: ACTIVE | Noted: 2020-12-17

## 2020-12-17 PROBLEM — D04.39 CARCINOMA IN SITU OF SKIN OF OTHER PARTS OF FACE: Status: ACTIVE | Noted: 2020-12-17

## 2020-12-17 PROCEDURE — OTHER SUPERFICIAL RADIATION TREATMENT: OTHER

## 2020-12-17 PROCEDURE — 77401 RADIATION TX DELIVERY SUPFC: CPT

## 2020-12-17 PROCEDURE — G6001 ECHO GUIDANCE RADIOTHERAPY: HCPCS

## 2020-12-17 PROCEDURE — OTHER TREATMENT REGIMEN: OTHER

## 2020-12-17 PROCEDURE — 77427 RADIATION TX MANAGEMENT X5: CPT

## 2020-12-17 PROCEDURE — 77280 THER RAD SIMULAJ FIELD SMPL: CPT

## 2020-12-17 PROCEDURE — OTHER FOLLOW UP FOR NEXT VISIT: OTHER

## 2020-12-17 NOTE — PROCEDURE: TREATMENT REGIMEN
Detail Level: Zone
Otc Regimen: Pt put on break today for the remaining part of this week.  Skin breaking down and bleeding ISAIAS Lake curretted the lesion today.  CAROLINE
Plan: This patient has been treated today with superficial radiation therapy for non-melanoma skin cancer.    \\n\\nWritten informed consent has been previously obtained from this patient for this treatment.  This consent is documented in the patient’s chart.  The patient gave verbal consent to continue treatment today. \\n\\nThe patient was treated with a specific radiation dose and setup as prescribed by the provider listed on this visit note.  A Radiation Therapist performed administration of radiation. The treatment parameters and cumulative dose are indicated above.\\n  \\nPrior to administering the radiation, the patient underwent a verification simulation defining relevant normal and abnormal target anatomy and acquiring images and data necessary to develop optimal radiation treatment process for the patient. This process includes verification of the treatment port(s) and proper treatment positioning.  All treatment ports were photographed.  The patient’s customized lead blocking was confirmed.   Considering, superficial radiotherapy setup is a clinical setup, this requires physician and radiation therapist to clarify location interest being treated against initial images, pathology and patient anatomy. Care was taken ensuring fields treated were geometrically accurate and properly positioned using daily verification simulation.  This process is also utilized to determine if any changes are necessary.   These steps are therefore medically necessary ensuring safe and effective administration of radiation. \\n\\nA high frequency ultrasound image was acquired today for two-dimensional evaluation of the tumor volume and response to treatment, in addition to geometric accuracy of field placement. The daily field placement is separate and distinct from the initial simulation and is an important task in providing safe administration of radiation therapy. Physician evaluation of the ultrasound tumor depth will be ongoing throughout the course of treatment and is deemed medically necessary in order to ensure the efficacy of treatment. Today’s image was evaluated for determination of continuation of treatment with the current plan or with necessary changes as appropriate. Additionally, the use of visualization and targeted assessment allows the patient to be able to see their cancer(s) progress, encouraging patient to complete full course of radiotherapy. \\n\\nLocation:  US Depth of 0.77 mm with +/- 0.41 mm change from previous imaging.  Repop: ++, ZAKI equivocal\\n\\nPer Dr. Robin, continued daily US guidance and clinical setup simulation is required for field placement, measurement of tumor depth, progress and acute effect monitoring. \\n
Otc Regimen: Pt put on break today with skin reaction.  Junaid ESPARZA, gave pt. prescription for Silvadene cream. apply BID
Plan: This patient has been treated today with superficial radiation therapy for non-melanoma skin cancer.    \\n\\nWritten informed consent has been previously obtained from this patient for this treatment.  This consent is documented in the patient’s chart.  The patient gave verbal consent to continue treatment today. \\n\\nThe patient was treated with a specific radiation dose and setup as prescribed by the provider listed on this visit note.  A Radiation Therapist performed administration of radiation. The treatment parameters and cumulative dose are indicated above.\\n  \\nPrior to administering the radiation, the patient underwent a verification simulation defining relevant normal and abnormal target anatomy and acquiring images and data necessary to develop optimal radiation treatment process for the patient. This process includes verification of the treatment port(s) and proper treatment positioning.  All treatment ports were photographed.  The patient’s customized lead blocking was confirmed.   Considering, superficial radiotherapy setup is a clinical setup, this requires physician and radiation therapist to clarify location interest being treated against initial images, pathology and patient anatomy. Care was taken ensuring fields treated were geometrically accurate and properly positioned using daily verification simulation.  This process is also utilized to determine if any changes are necessary.   These steps are therefore medically necessary ensuring safe and effective administration of radiation. \\n\\nA high frequency ultrasound image was acquired today for two-dimensional evaluation of the tumor volume and response to treatment, in addition to geometric accuracy of field placement. The daily field placement is separate and distinct from the initial simulation and is an important task in providing safe administration of radiation therapy. Physician evaluation of the ultrasound tumor depth will be ongoing throughout the course of treatment and is deemed medically necessary in order to ensure the efficacy of treatment. Today’s image was evaluated for determination of continuation of treatment with the current plan or with necessary changes as appropriate. Additionally, the use of visualization and targeted assessment allows the patient to be able to see their cancer(s) progress, encouraging patient to complete full course of radiotherapy. \\n\\nLocation:  US Depth of 1.11 mm with +/- 0.28 mm change from previous imaging.  Repop: ++, ZAKI equivocal\\n\\nPer Dr. Robin, continued daily US guidance and clinical setup simulation is required for field placement, measurement of tumor depth, progress and acute effect monitoring. \\n
Plan: This patient has been treated today with superficial radiation therapy for non-melanoma skin cancer.    \\n\\nWritten informed consent has been previously obtained from this patient for this treatment.  This consent is documented in the patient’s chart.  The patient gave verbal consent to continue treatment today. \\n\\nThe patient was treated with a specific radiation dose and setup as prescribed by the provider listed on this visit note.  A Radiation Therapist performed administration of radiation. The treatment parameters and cumulative dose are indicated above.\\n  \\nPrior to administering the radiation, the patient underwent a verification simulation defining relevant normal and abnormal target anatomy and acquiring images and data necessary to develop optimal radiation treatment process for the patient. This process includes verification of the treatment port(s) and proper treatment positioning.  All treatment ports were photographed.  The patient’s customized lead blocking was confirmed.   Considering, superficial radiotherapy setup is a clinical setup, this requires physician and radiation therapist to clarify location interest being treated against initial images, pathology and patient anatomy. Care was taken ensuring fields treated were geometrically accurate and properly positioned using daily verification simulation.  This process is also utilized to determine if any changes are necessary.   These steps are therefore medically necessary ensuring safe and effective administration of radiation. \\n\\nA high frequency ultrasound image was acquired today for two-dimensional evaluation of the tumor volume and response to treatment, in addition to geometric accuracy of field placement. The daily field placement is separate and distinct from the initial simulation and is an important task in providing safe administration of radiation therapy. Physician evaluation of the ultrasound tumor depth will be ongoing throughout the course of treatment and is deemed medically necessary in order to ensure the efficacy of treatment. Today’s image was evaluated for determination of continuation of treatment with the current plan or with necessary changes as appropriate. Additionally, the use of visualization and targeted assessment allows the patient to be able to see their cancer(s) progress, encouraging patient to complete full course of radiotherapy. \\n\\nLocation:  US Depth of 1.48 mm with +/- 0.08 mm change from previous imaging.  Repop: ++, ZAKI equivocal\\n\\nPer Dr. Robin, continued daily US guidance and clinical setup simulation is required for field placement, measurement of tumor depth, progress and acute effect monitoring. \\n

## 2020-12-17 NOTE — PROCEDURE: SUPERFICIAL RADIATION TREATMENT
Computed Treatment Time In Min (Will Render The Same As Calculated Treatment Time If Left Blank): 0.43
Total Dose (Optional-Please Include Units) Rx 2: 2641.8 (Total 5352.2)
Bill And Render Text From Evaluation And Management Tab (Will Bill 12639): Yes
Bill For Dosimetry/Render Decay And Dose Adjustment Calculation In Note: No
Patient Positioning: Side-lying (left)
Total Number Of Fractions Rx 2: 10
Fractionation Number (Evaluation): 15
Energy (Optional-Please Include Units) Rx 2: 50kV
Pathology Override (Pathology Will Render As Diagnosis Name If Left Blank): Basal Cell Carcinoma Nodular Type
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
Custom Shielding Preamble Text Will Not Be Included With Simple Simulations (.......... X X Y Cm): A lead shield of 0.762 mm thickness is utilized to form a molded, custom shield for intranasal shield.
Treatment Time / Fractionation (Optional- Include Units): 0.43min
Day Of The Week Treatment Administered: Thursday
Dose Per Fractionation In Cgy (Optional): 270.9
Number Of Treatment Days: 1
Functional Status: 0 (fully active)
Custom Shielding Afterword Text Will Not Be Included With Simple Simulations (X X Y Cm............): port to correlate with the lesion size, including treatment margin. The custom lead shield is adequate to accommodate the appropriate applicator and provide adequate shielding around the treatment site. Additional shielding (as noted below) is used to protect sensitive, normal tissues.
Treatment Time / Fractionation (Optional- Include Units): 0.37min
Treatment Device Design After Initial Simulation Justification (Will Render If Bill For Treatment Devices = Yes): Patient is status post radiation simulation and is evaluated as to the use of additional devices for shielding and placement for radiation therapy.
Information: Selecting Yes will display possible errors in your note based on the variables you have selected. This validation is only offered as a suggestion for you. PLEASE NOTE THAT THE VALIDATION TEXT WILL BE REMOVED WHEN YOU FINALIZE YOUR NOTE. IF YOU WANT TO FAX A PRELIMINARY NOTE YOU WILL NEED TO TOGGLE THIS TO 'NO' IF YOU DO NOT WANT IT IN YOUR FAXED NOTE.
Port Dimensions-Y Axis In Cm: 2
Total Dose (Optional-Please Include Units): 5418. cGy
Simple Simulation Afterword Text Will Be Included With Simple Simulations (Indications............): Patient had a complete consultation regarding all applicable modalities for the treatment of their skin cancer and based on a variety of factors including the type of tumor, size, and location, the relevant medical history as well as local tissue factors, the functional status of the individual, the ability to perform necessary postoperative wound instructions and the need for simultaneous treatments as well as overall wound healing status, it was determined that the patient would begin radiation therapy treatment for skin cancer.  A full simulation and treatment device design was performed including the determination and formulation of appropriate simple and complex devices including lead shield of 0.762 mm thickness to form molded customized shielding to specifically correlate with the lesion size including treatment margin.  The custom lead shield is adequate to accommodate the appropriate applicator and provide adequate shielding around the treatment site.  The specific field applicator, shields, and devices both simple and complex as well as the specific patient setup is outlined below.  The patient was given a full consent for superficial radiation to both verbally and in writing and the full determination of patient's eligibility for treatment and selection is outlined on the patient eligibility and treatment selection form.  The specific superficial radiotherapy prescription was determined and was documented on the superficial radiotherapy prescription form.  A treatment calculation was also performed and documented on the treatment calculation form.  Based on the prescription, the patient was scheduled for a series of fractional treatments.
Shielding Size (Optional- Include Units): 2.0 cm x 2.0 cm
Fractions / Week Rx 3: 5
Field Size (Applicator): 2.5 cm
Energy (Include Units): 50 kV
Daily Fractionated Dose (Optional- Include Units): 268.62
Cumulative Dose In Cgy (Optional): 4063.5
Fractions / Week: 3
Total Number Of Fractions: 20
Shielding Size (Optional- Include Units) Rx 2: 2.0cmX2.0cm
Cumulative Dose In Cgy (Optional): 4063.50
Simple Simulation Preamble Text Will Be Included With Simple Simulations (.......... Indications): Complex simulation was performed today for the following reasons:\\nPatient has custom shield designed for each SRT
Intro Statement (Will Not Render If Left Blank): Per the request of Dr. Robin, patient was seen today for Superficial Radiation Therapy requiring simulation (CPT® 22002) in preparation for treatment of specific diseased site(s). Simulation is necessary to determine correct patient and treatment portal positioning, deliver safe and effective radiation therapy. Today’s setup was evaluated determining continuation of treatment with the current plan, or necessary changes as appropriate. All appropriate custom blocking and treatment parameters verified by the Radiation Therapist.\\nToday’s visit is for Simulation and planning for radiation therapy.  Question were answered and concerns were address.  Patient was evaluated based on listed criteria and is a suitable candidate to begin SRT.  \\n
Total Dose (Optional-Please Include Units): 5372.4. cGy
Depth (Optional-Please Include Units): 1.65
Limb Positioning Or Elevation: pillow
Treatment Time In Min (Optional): .37
Assessment: Appropriate reaction
Comments: On Target, RTOG 1
Time Dose Fractionation (Optional- Include Units If Applicable): 96
Energy (Optional-Please Include Units): 70kV
Detail Level: Detailed
Computed Treatment Time In Min (Will Render The Same As Calculated Treatment Time If Left Blank): 0.37
Time Dose Fractionation (Optional- Include Units If Applicable) Rx 2: 46 (Total 94)
Energy (Include Units): 70 kV
Daily Fractionated Dose (Optional- Include Units) Rx 2: 264.18
Depth (Optional-Please Include Units): 0.7
Treatment Margins In Cm: 0.5
Treatment Time In Min (Optional): .43
Other Assessment (Will Overide Current Assessment): RTOG 1
Treatment Time / Fractionation (Optional- Include Units) Rx 2: .38
Depth (Optional-Please Include Units): 0.66
Time Dose Fractionation (Optional- Include Units If Applicable): 94

## 2020-12-18 ENCOUNTER — APPOINTMENT (OUTPATIENT)
Age: 65
Setting detail: DERMATOLOGY
End: 2020-12-21

## 2020-12-18 PROBLEM — C44.319 BASAL CELL CARCINOMA OF SKIN OF OTHER PARTS OF FACE: Status: ACTIVE | Noted: 2020-12-18

## 2020-12-18 PROBLEM — C44.329 SQUAMOUS CELL CARCINOMA OF SKIN OF OTHER PARTS OF FACE: Status: ACTIVE | Noted: 2020-12-18

## 2020-12-18 PROBLEM — D04.39 CARCINOMA IN SITU OF SKIN OF OTHER PARTS OF FACE: Status: ACTIVE | Noted: 2020-12-18

## 2020-12-18 PROCEDURE — OTHER TREATMENT REGIMEN: OTHER

## 2020-12-18 PROCEDURE — OTHER SUPERFICIAL RADIATION TREATMENT: OTHER

## 2020-12-18 PROCEDURE — 77401 RADIATION TX DELIVERY SUPFC: CPT

## 2020-12-18 PROCEDURE — G6001 ECHO GUIDANCE RADIOTHERAPY: HCPCS

## 2020-12-18 PROCEDURE — OTHER FOLLOW UP FOR NEXT VISIT: OTHER

## 2020-12-18 PROCEDURE — 77280 THER RAD SIMULAJ FIELD SMPL: CPT

## 2020-12-18 NOTE — PROCEDURE: TREATMENT REGIMEN
Detail Level: Zone
Plan: This patient has been treated today with superficial radiation therapy for non-melanoma skin cancer.    \\n\\nWritten informed consent has been previously obtained from this patient for this treatment.  This consent is documented in the patient’s chart.  The patient gave verbal consent to continue treatment today. \\n\\nThe patient was treated with a specific radiation dose and setup as prescribed by the provider listed on this visit note.  A Radiation Therapist performed administration of radiation. The treatment parameters and cumulative dose are indicated above.\\n  \\nPrior to administering the radiation, the patient underwent a verification simulation defining relevant normal and abnormal target anatomy and acquiring images and data necessary to develop optimal radiation treatment process for the patient. This process includes verification of the treatment port(s) and proper treatment positioning.  All treatment ports were photographed.  The patient’s customized lead blocking was confirmed.   Considering, superficial radiotherapy setup is a clinical setup, this requires physician and radiation therapist to clarify location interest being treated against initial images, pathology and patient anatomy. Care was taken ensuring fields treated were geometrically accurate and properly positioned using daily verification simulation.  This process is also utilized to determine if any changes are necessary.   These steps are therefore medically necessary ensuring safe and effective administration of radiation. \\n\\nA high frequency ultrasound image was acquired today for two-dimensional evaluation of the tumor volume and response to treatment, in addition to geometric accuracy of field placement. The daily field placement is separate and distinct from the initial simulation and is an important task in providing safe administration of radiation therapy. Physician evaluation of the ultrasound tumor depth will be ongoing throughout the course of treatment and is deemed medically necessary in order to ensure the efficacy of treatment. Today’s image was evaluated for determination of continuation of treatment with the current plan or with necessary changes as appropriate. Additionally, the use of visualization and targeted assessment allows the patient to be able to see their cancer(s) progress, encouraging patient to complete full course of radiotherapy. \\n\\nLocation:  US Depth of 1.31 mm with +/- 0.17 mm change from previous imaging.  Repop: ++, ZAKI equivocal\\n\\nPer Dr. Robin, continued daily US guidance and clinical setup simulation is required for field placement, measurement of tumor depth, progress and acute effect monitoring. \\n
Otc Regimen: Pt put on break today with skin reaction.  Junaid ESPARZA, gave pt. prescription for Silvadene cream. apply BID
Plan: This patient has been treated today with superficial radiation therapy for non-melanoma skin cancer.    \\n\\nWritten informed consent has been previously obtained from this patient for this treatment.  This consent is documented in the patient’s chart.  The patient gave verbal consent to continue treatment today. \\n\\nThe patient was treated with a specific radiation dose and setup as prescribed by the provider listed on this visit note.  A Radiation Therapist performed administration of radiation. The treatment parameters and cumulative dose are indicated above.\\n  \\nPrior to administering the radiation, the patient underwent a verification simulation defining relevant normal and abnormal target anatomy and acquiring images and data necessary to develop optimal radiation treatment process for the patient. This process includes verification of the treatment port(s) and proper treatment positioning.  All treatment ports were photographed.  The patient’s customized lead blocking was confirmed.   Considering, superficial radiotherapy setup is a clinical setup, this requires physician and radiation therapist to clarify location interest being treated against initial images, pathology and patient anatomy. Care was taken ensuring fields treated were geometrically accurate and properly positioned using daily verification simulation.  This process is also utilized to determine if any changes are necessary.   These steps are therefore medically necessary ensuring safe and effective administration of radiation. \\n\\nA high frequency ultrasound image was acquired today for two-dimensional evaluation of the tumor volume and response to treatment, in addition to geometric accuracy of field placement. The daily field placement is separate and distinct from the initial simulation and is an important task in providing safe administration of radiation therapy. Physician evaluation of the ultrasound tumor depth will be ongoing throughout the course of treatment and is deemed medically necessary in order to ensure the efficacy of treatment. Today’s image was evaluated for determination of continuation of treatment with the current plan or with necessary changes as appropriate. Additionally, the use of visualization and targeted assessment allows the patient to be able to see their cancer(s) progress, encouraging patient to complete full course of radiotherapy. \\n\\nLocation:  US Depth of 1.07 mm with +/- 0.04 mm change from previous imaging.  Repop: ++, ZAKI equivocal\\n\\nPer Dr. Robin, continued daily US guidance and clinical setup simulation is required for field placement, measurement of tumor depth, progress and acute effect monitoring. \\n
Plan: This patient has been treated today with superficial radiation therapy for non-melanoma skin cancer.    \\n\\nWritten informed consent has been previously obtained from this patient for this treatment.  This consent is documented in the patient’s chart.  The patient gave verbal consent to continue treatment today. \\n\\nThe patient was treated with a specific radiation dose and setup as prescribed by the provider listed on this visit note.  A Radiation Therapist performed administration of radiation. The treatment parameters and cumulative dose are indicated above.\\n  \\nPrior to administering the radiation, the patient underwent a verification simulation defining relevant normal and abnormal target anatomy and acquiring images and data necessary to develop optimal radiation treatment process for the patient. This process includes verification of the treatment port(s) and proper treatment positioning.  All treatment ports were photographed.  The patient’s customized lead blocking was confirmed.   Considering, superficial radiotherapy setup is a clinical setup, this requires physician and radiation therapist to clarify location interest being treated against initial images, pathology and patient anatomy. Care was taken ensuring fields treated were geometrically accurate and properly positioned using daily verification simulation.  This process is also utilized to determine if any changes are necessary.   These steps are therefore medically necessary ensuring safe and effective administration of radiation. \\n\\nA high frequency ultrasound image was acquired today for two-dimensional evaluation of the tumor volume and response to treatment, in addition to geometric accuracy of field placement. The daily field placement is separate and distinct from the initial simulation and is an important task in providing safe administration of radiation therapy. Physician evaluation of the ultrasound tumor depth will be ongoing throughout the course of treatment and is deemed medically necessary in order to ensure the efficacy of treatment. Today’s image was evaluated for determination of continuation of treatment with the current plan or with necessary changes as appropriate. Additionally, the use of visualization and targeted assessment allows the patient to be able to see their cancer(s) progress, encouraging patient to complete full course of radiotherapy. \\n\\nLocation:  US Depth of 0.83 mm with +/- 0.06 mm change from previous imaging.  Repop: ++, ZAKI equivocal\\n\\nPer Dr. Robin, continued daily US guidance and clinical setup simulation is required for field placement, measurement of tumor depth, progress and acute effect monitoring. \\n
Otc Regimen: Pt put on break today for the remaining part of this week.  Skin breaking down and bleeding ISAIAS [place] curretted the lesion today.  CAROLINE

## 2020-12-21 ENCOUNTER — APPOINTMENT (OUTPATIENT)
Age: 65
Setting detail: DERMATOLOGY
End: 2020-12-25

## 2020-12-21 PROBLEM — C44.319 BASAL CELL CARCINOMA OF SKIN OF OTHER PARTS OF FACE: Status: ACTIVE | Noted: 2020-12-21

## 2020-12-21 PROBLEM — C44.329 SQUAMOUS CELL CARCINOMA OF SKIN OF OTHER PARTS OF FACE: Status: ACTIVE | Noted: 2020-12-21

## 2020-12-21 PROBLEM — D04.39 CARCINOMA IN SITU OF SKIN OF OTHER PARTS OF FACE: Status: ACTIVE | Noted: 2020-12-21

## 2020-12-21 PROCEDURE — OTHER TREATMENT REGIMEN: OTHER

## 2020-12-21 PROCEDURE — OTHER FOLLOW UP FOR NEXT VISIT: OTHER

## 2020-12-21 PROCEDURE — G6001 ECHO GUIDANCE RADIOTHERAPY: HCPCS

## 2020-12-21 PROCEDURE — 77280 THER RAD SIMULAJ FIELD SMPL: CPT

## 2020-12-21 PROCEDURE — OTHER SUPERFICIAL RADIATION TREATMENT: OTHER

## 2020-12-21 PROCEDURE — 77401 RADIATION TX DELIVERY SUPFC: CPT

## 2020-12-21 NOTE — PROCEDURE: SUPERFICIAL RADIATION TREATMENT
Field Number: 3
Intro Statement (Will Not Render If Left Blank): Per the request of Dr. Robin, patient was seen today for Superficial Radiation Therapy requiring simulation (CPT® 35335) in preparation for treatment of specific diseased site(s). Simulation is necessary to determine correct patient and treatment portal positioning, deliver safe and effective radiation therapy. Today’s setup was evaluated determining continuation of treatment with the current plan, or necessary changes as appropriate. All appropriate custom blocking and treatment parameters verified by the Radiation Therapist.\\nToday’s visit is for Simulation and planning for radiation therapy.  Question were answered and concerns were address.  Patient was evaluated based on listed criteria and is a suitable candidate to begin SRT.  \\n
Daily Fractionated Dose (Optional- Include Units) Rx 2: 264.18
Malcolm For Simulation Without Treatment Device Design (Simple Simulation): No
Port Dimensions-Y Axis In Cm: 2
Functional Status: 0 (fully active)
Field Size (Applicator): 2.5 cm
Patient Positioning: Side-lying (left)
Total Dose (Optional-Please Include Units) Rx 2: 2641.8 (Total 5352.2)
Assessment: Appropriate reaction
Depth (Optional-Please Include Units): 0.66
Time Dose Fractionation (Optional- Include Units If Applicable) Rx 2: 46 (Total 94)
Number Of Treatment Days: 1
Initial Radiation Treatment Planning (Will Render If Bill Simulation = Yes): Patient had a complete consultation regarding all applicable modalities for the treatment of their skin cancer and based on a variety of factors including the type of tumor, size, and location, the relevant medical history as well as local tissue factors, the functional status of the individual, the ability to perform necessary postoperative wound instructions and the need for simultaneous treatments as well as overall wound healing status, it was determined that the patient would begin radiation therapy treatment for skin cancer.  A full simulation and treatment device design was performed including the determination and formulation of appropriate simple and complex devices including lead shield of 0.762 mm thickness to form molded customized shielding to specifically correlate with the lesion size including treatment margin.  The custom lead shield is adequate to accommodate the appropriate applicator and provide adequate shielding around the treatment site.  The specific field applicator, shields, and devices both simple and complex as well as the specific patient setup is outlined below.  The patient was given a full consent for superficial radiation to both verbally and in writing and the full determination of patient's eligibility for treatment and selection is outlined on the patient eligibility and treatment selection form.  The specific superficial radiotherapy prescription was determined and was documented on the superficial radiotherapy prescription form.  A treatment calculation was also performed and documented on the treatment calculation form.  Based on the prescription, the patient was scheduled for a series of fractional treatments.
Fractionation Number (Evaluation): 15
Total Dose (Optional-Please Include Units): 5418. cGy
Time Dose Fractionation (Optional- Include Units If Applicable): 94
Detail Level: Detailed
Simple Simulation Preamble Text Will Be Included With Simple Simulations (.......... Indications): Complex simulation was performed today for the following reasons:\\nPatient has custom shield designed for each SRT
Treatment Time In Min (Optional): .37
Energy (Include Units): 70 kV
Daily Fractionated Dose (Optional- Include Units): 268.62
Treatment Margins In Cm: 0.5
Fractions / Week Rx 3: 5
Depth (Optional-Please Include Units): 1.65
Energy (Optional-Please Include Units): 70kV
Treatment Device Design After Initial Simulation Justification (Will Render If Bill For Treatment Devices = Yes): Patient is status post radiation simulation and is evaluated as to the use of additional devices for shielding and placement for radiation therapy.
Shielding Size (Optional- Include Units) Rx 2: 2.0cmX2.0cm
Custom Shielding Preamble Text Will Not Be Included With Simple Simulations (.......... X X Y Cm): A lead shield of 0.762 mm thickness is utilized to form a molded, custom shield for intranasal shield.
Day Of The Week Treatment Administered: Monday
Pathology Override (Pathology Will Render As Diagnosis Name If Left Blank): Basal Cell Carcinoma Nodular Type
Dose Per Fractionation In Cgy (Optional): 270.9
Total Number Of Fractions: 20
Energy (Include Units): 50 kV
Comments: On Target, RTOG 1
Cumulative Dose In Cgy (Optional): 4334.4
Energy (Optional-Please Include Units) Rx 2: 50kV
Shielding Size (Optional- Include Units): 2.0 cm x 2.0 cm
Bill For Radiation Treatment: Yes
Time Dose Fractionation (Optional- Include Units If Applicable): 96
Treatment Time In Min (Optional): .43
Information: Selecting Yes will display possible errors in your note based on the variables you have selected. This validation is only offered as a suggestion for you. PLEASE NOTE THAT THE VALIDATION TEXT WILL BE REMOVED WHEN YOU FINALIZE YOUR NOTE. IF YOU WANT TO FAX A PRELIMINARY NOTE YOU WILL NEED TO TOGGLE THIS TO 'NO' IF YOU DO NOT WANT IT IN YOUR FAXED NOTE.
Treatment Time / Fractionation (Optional- Include Units): 0.43min
Other Assessment (Will Overide Current Assessment): RTOG 1
Treatment Time / Fractionation (Optional- Include Units) Rx 2: .38
Treatment Time / Fractionation (Optional- Include Units): 0.37min
Day Of The Week Treatment Administered: Thursday
Day Of The Week Treatment Administered: Friday
Total Number Of Fractions Rx 2: 10
Custom Shielding Afterword Text Will Not Be Included With Simple Simulations (X X Y Cm............): port to correlate with the lesion size, including treatment margin. The custom lead shield is adequate to accommodate the appropriate applicator and provide adequate shielding around the treatment site. Additional shielding (as noted below) is used to protect sensitive, normal tissues.
Computed Treatment Time In Min (Will Render The Same As Calculated Treatment Time If Left Blank): 0.43
Limb Positioning Or Elevation: pillow
Fractionation Number: 17
Cumulative Dose In Cgy (Optional): 4605.30
Total Dose (Optional-Please Include Units): 5372.4. cGy
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
Cumulative Dose In Cgy (Optional): 4566.54
Computed Treatment Time In Min (Will Render The Same As Calculated Treatment Time If Left Blank): 0.37
Depth (Optional-Please Include Units): 0.7
Cumulative Dose In Cgy (Optional): 4297.92
Fractionation Number: 16

## 2020-12-22 ENCOUNTER — APPOINTMENT (OUTPATIENT)
Age: 65
Setting detail: DERMATOLOGY
End: 2021-01-07

## 2020-12-22 ENCOUNTER — APPOINTMENT (OUTPATIENT)
Age: 65
Setting detail: DERMATOLOGY
End: 2020-12-25

## 2020-12-22 PROBLEM — D04.39 CARCINOMA IN SITU OF SKIN OF OTHER PARTS OF FACE: Status: ACTIVE | Noted: 2020-12-22

## 2020-12-22 PROBLEM — C44.329 SQUAMOUS CELL CARCINOMA OF SKIN OF OTHER PARTS OF FACE: Status: ACTIVE | Noted: 2020-12-22

## 2020-12-22 PROBLEM — C44.319 BASAL CELL CARCINOMA OF SKIN OF OTHER PARTS OF FACE: Status: ACTIVE | Noted: 2020-12-22

## 2020-12-22 PROCEDURE — OTHER SUPERFICIAL RADIATION TREATMENT: OTHER

## 2020-12-22 PROCEDURE — OTHER TREATMENT REGIMEN: OTHER

## 2020-12-22 PROCEDURE — 77280 THER RAD SIMULAJ FIELD SMPL: CPT

## 2020-12-22 PROCEDURE — G6001 ECHO GUIDANCE RADIOTHERAPY: HCPCS

## 2020-12-22 PROCEDURE — 77336 RADIATION PHYSICS CONSULT: CPT

## 2020-12-22 PROCEDURE — OTHER FOLLOW UP FOR NEXT VISIT: OTHER

## 2020-12-22 PROCEDURE — 77401 RADIATION TX DELIVERY SUPFC: CPT

## 2020-12-22 NOTE — PROCEDURE: SUPERFICIAL RADIATION TREATMENT
Fractions / Week Rx 2: 3
Patient Positioning: Side-lying (left)
Treatment Margins In Cm: 0.5
Render Prescriptions In Note?: No
Port Dimensions-Y Axis In Cm: 2
Energy (Include Units): 70 kV
Field Size (Applicator): 2.5 cm
Prescription Used: 1
Total Dose (Optional-Please Include Units): 5418. cGy
Treatment Time / Fractionation (Optional- Include Units) Rx 2: .38
Intro Statement (Will Not Render If Left Blank): Per the request of Dr. Robin, patient was seen today for Superficial Radiation Therapy requiring simulation (CPT® 48386) in preparation for treatment of specific diseased site(s). Simulation is necessary to determine correct patient and treatment portal positioning, deliver safe and effective radiation therapy. Today’s setup was evaluated determining continuation of treatment with the current plan, or necessary changes as appropriate. All appropriate custom blocking and treatment parameters verified by the Radiation Therapist.\\nToday’s visit is for Simulation and planning for radiation therapy.  Question were answered and concerns were address.  Patient was evaluated based on listed criteria and is a suitable candidate to begin SRT.  \\n
Dose / Tx In Cgy (Optional): 268.62
Treatment Device Design After Initial Simulation Justification (Will Render If Bill For Treatment Devices = Yes): Patient is status post radiation simulation and is evaluated as to the use of additional devices for shielding and placement for radiation therapy.
Energy (Optional-Please Include Units): 50kV
Depth (Optional-Please Include Units): 1.65
Total Dose (Optional-Please Include Units) Rx 2: 2641.8 (Total 5352.2)
Fractionation Number: 18
Energy (Optional-Please Include Units): 70kV
Fractions / Week Rx 3: 5
Other Assessment (Will Overide Current Assessment): RTOG 1
Cumulative Dose In Cgy (Optional): 4876.2
Total Number Of Fractions: 20
Daily Fractionated Dose (Optional- Include Units) Rx 2: 264.18
Comments: On Target, RTOG 1
Time Dose Fractionation (Optional- Include Units If Applicable): 94
Total Number Of Fractions Rx 2: 10
Custom Shielding Preamble Text Will Not Be Included With Simple Simulations (.......... X X Y Cm): A lead shield of 0.762 mm thickness is utilized to form a molded, custom shield for intranasal shield.
Fractionation Number (Evaluation): 15
Depth (Optional-Please Include Units): 0.66
Simple Simulation Preamble Text Will Be Included With Simple Simulations (.......... Indications): Complex simulation was performed today for the following reasons:\\nPatient has custom shield designed for each SRT
Simple Simulation Afterword Text Will Be Included With Simple Simulations (Indications............): Patient had a complete consultation regarding all applicable modalities for the treatment of their skin cancer and based on a variety of factors including the type of tumor, size, and location, the relevant medical history as well as local tissue factors, the functional status of the individual, the ability to perform necessary postoperative wound instructions and the need for simultaneous treatments as well as overall wound healing status, it was determined that the patient would begin radiation therapy treatment for skin cancer.  A full simulation and treatment device design was performed including the determination and formulation of appropriate simple and complex devices including lead shield of 0.762 mm thickness to form molded customized shielding to specifically correlate with the lesion size including treatment margin.  The custom lead shield is adequate to accommodate the appropriate applicator and provide adequate shielding around the treatment site.  The specific field applicator, shields, and devices both simple and complex as well as the specific patient setup is outlined below.  The patient was given a full consent for superficial radiation to both verbally and in writing and the full determination of patient's eligibility for treatment and selection is outlined on the patient eligibility and treatment selection form.  The specific superficial radiotherapy prescription was determined and was documented on the superficial radiotherapy prescription form.  A treatment calculation was also performed and documented on the treatment calculation form.  Based on the prescription, the patient was scheduled for a series of fractional treatments.
Treatment Time In Min (Optional): .37
Shielding Size (Optional- Include Units): 2.0 cm x 2.0 cm
Cumulative Dose In Cgy (Optional): 4835.16
Dose / Tx In Cgy (Optional): 270.9
Information: Selecting Yes will display possible errors in your note based on the variables you have selected. This validation is only offered as a suggestion for you. PLEASE NOTE THAT THE VALIDATION TEXT WILL BE REMOVED WHEN YOU FINALIZE YOUR NOTE. IF YOU WANT TO FAX A PRELIMINARY NOTE YOU WILL NEED TO TOGGLE THIS TO 'NO' IF YOU DO NOT WANT IT IN YOUR FAXED NOTE.
Bill For Radiation Treatment: Yes
Time Dose Fractionation (Optional- Include Units If Applicable) Rx 2: 46 (Total 94)
Day Of The Week Treatment Administered: Tuesday
Time Dose Fractionation (Optional- Include Units If Applicable): 96
Limb Positioning Or Elevation: pillow
Computed Treatment Time In Min (Will Render The Same As Calculated Treatment Time If Left Blank): 0.43
Treatment Time In Min (Optional): .43
Treatment Time / Fractionation (Optional- Include Units): 0.37min
Functional Status: 0 (fully active)
Detail Level: Detailed
Computed Treatment Time In Min (Will Render The Same As Calculated Treatment Time If Left Blank): 0.37
Treatment Time / Fractionation (Optional- Include Units): 0.43min
Shielding Size (Optional- Include Units) Rx 2: 2.0cmX2.0cm
Total Dose (Optional-Please Include Units): 5372.4. cGy
Pathology Override (Pathology Will Render As Diagnosis Name If Left Blank): Basal Cell Carcinoma Nodular Type
Assessment: Appropriate reaction
Custom Shielding Afterword Text Will Not Be Included With Simple Simulations (X X Y Cm............): port to correlate with the lesion size, including treatment margin. The custom lead shield is adequate to accommodate the appropriate applicator and provide adequate shielding around the treatment site. Additional shielding (as noted below) is used to protect sensitive, normal tissues.
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
Depth (Optional-Please Include Units): 0.7
Energy (Include Units): 50 kV

## 2020-12-22 NOTE — PROCEDURE: TREATMENT REGIMEN
Otc Regimen: Pt put on break today with skin reaction.  Junaid ESPARZA, gave pt. prescription for Silvadene cream. apply BID
Detail Level: Zone
Plan: This patient has been treated today with superficial radiation therapy for non-melanoma skin cancer.    \\n\\nWritten informed consent has been previously obtained from this patient for this treatment.  This consent is documented in the patient’s chart.  The patient gave verbal consent to continue treatment today. \\n\\nThe patient was treated with a specific radiation dose and setup as prescribed by the provider listed on this visit note.  A Radiation Therapist performed administration of radiation. The treatment parameters and cumulative dose are indicated above.\\n  \\nPrior to administering the radiation, the patient underwent a verification simulation defining relevant normal and abnormal target anatomy and acquiring images and data necessary to develop optimal radiation treatment process for the patient. This process includes verification of the treatment port(s) and proper treatment positioning.  All treatment ports were photographed.  The patient’s customized lead blocking was confirmed.   Considering, superficial radiotherapy setup is a clinical setup, this requires physician and radiation therapist to clarify location interest being treated against initial images, pathology and patient anatomy. Care was taken ensuring fields treated were geometrically accurate and properly positioned using daily verification simulation.  This process is also utilized to determine if any changes are necessary.   These steps are therefore medically necessary ensuring safe and effective administration of radiation. \\n\\nA high frequency ultrasound image was acquired today for two-dimensional evaluation of the tumor volume and response to treatment, in addition to geometric accuracy of field placement. The daily field placement is separate and distinct from the initial simulation and is an important task in providing safe administration of radiation therapy. Physician evaluation of the ultrasound tumor depth will be ongoing throughout the course of treatment and is deemed medically necessary in order to ensure the efficacy of treatment. Today’s image was evaluated for determination of continuation of treatment with the current plan or with necessary changes as appropriate. Additionally, the use of visualization and targeted assessment allows the patient to be able to see their cancer(s) progress, encouraging patient to complete full course of radiotherapy. \\n\\nLocation:  US Depth of 1.23 mm with +/- 0.19 mm change from previous imaging.  Repop: ++, ZAKI equivocal\\n\\nPer Dr. Robin, continued daily US guidance and clinical setup simulation is required for field placement, measurement of tumor depth, progress and acute effect monitoring. \\n
Plan: This patient has been treated today with superficial radiation therapy for non-melanoma skin cancer.    \\n\\nWritten informed consent has been previously obtained from this patient for this treatment.  This consent is documented in the patient’s chart.  The patient gave verbal consent to continue treatment today. \\n\\nThe patient was treated with a specific radiation dose and setup as prescribed by the provider listed on this visit note.  A Radiation Therapist performed administration of radiation. The treatment parameters and cumulative dose are indicated above.\\n  \\nPrior to administering the radiation, the patient underwent a verification simulation defining relevant normal and abnormal target anatomy and acquiring images and data necessary to develop optimal radiation treatment process for the patient. This process includes verification of the treatment port(s) and proper treatment positioning.  All treatment ports were photographed.  The patient’s customized lead blocking was confirmed.   Considering, superficial radiotherapy setup is a clinical setup, this requires physician and radiation therapist to clarify location interest being treated against initial images, pathology and patient anatomy. Care was taken ensuring fields treated were geometrically accurate and properly positioned using daily verification simulation.  This process is also utilized to determine if any changes are necessary.   These steps are therefore medically necessary ensuring safe and effective administration of radiation. \\n\\nA high frequency ultrasound image was acquired today for two-dimensional evaluation of the tumor volume and response to treatment, in addition to geometric accuracy of field placement. The daily field placement is separate and distinct from the initial simulation and is an important task in providing safe administration of radiation therapy. Physician evaluation of the ultrasound tumor depth will be ongoing throughout the course of treatment and is deemed medically necessary in order to ensure the efficacy of treatment. Today’s image was evaluated for determination of continuation of treatment with the current plan or with necessary changes as appropriate. Additionally, the use of visualization and targeted assessment allows the patient to be able to see their cancer(s) progress, encouraging patient to complete full course of radiotherapy. \\n\\nLocation:  US Depth of 0.71 mm with +/- 0.25 mm change from previous imaging.  Repop: ++, ZAKI equivocal\\n\\nPer Dr. Robin, continued daily US guidance and clinical setup simulation is required for field placement, measurement of tumor depth, progress and acute effect monitoring. \\n
Plan: This patient has been treated today with superficial radiation therapy for non-melanoma skin cancer.    \\n\\nWritten informed consent has been previously obtained from this patient for this treatment.  This consent is documented in the patient’s chart.  The patient gave verbal consent to continue treatment today. \\n\\nThe patient was treated with a specific radiation dose and setup as prescribed by the provider listed on this visit note.  A Radiation Therapist performed administration of radiation. The treatment parameters and cumulative dose are indicated above.\\n  \\nPrior to administering the radiation, the patient underwent a verification simulation defining relevant normal and abnormal target anatomy and acquiring images and data necessary to develop optimal radiation treatment process for the patient. This process includes verification of the treatment port(s) and proper treatment positioning.  All treatment ports were photographed.  The patient’s customized lead blocking was confirmed.   Considering, superficial radiotherapy setup is a clinical setup, this requires physician and radiation therapist to clarify location interest being treated against initial images, pathology and patient anatomy. Care was taken ensuring fields treated were geometrically accurate and properly positioned using daily verification simulation.  This process is also utilized to determine if any changes are necessary.   These steps are therefore medically necessary ensuring safe and effective administration of radiation. \\n\\nA high frequency ultrasound image was acquired today for two-dimensional evaluation of the tumor volume and response to treatment, in addition to geometric accuracy of field placement. The daily field placement is separate and distinct from the initial simulation and is an important task in providing safe administration of radiation therapy. Physician evaluation of the ultrasound tumor depth will be ongoing throughout the course of treatment and is deemed medically necessary in order to ensure the efficacy of treatment. Today’s image was evaluated for determination of continuation of treatment with the current plan or with necessary changes as appropriate. Additionally, the use of visualization and targeted assessment allows the patient to be able to see their cancer(s) progress, encouraging patient to complete full course of radiotherapy. \\n\\nLocation:  US Depth of 1.15 mm with +/- 0.31 mm change from previous imaging.  Repop: ++, ZAKI equivocal\\n\\nPer Dr. Robin, continued daily US guidance and clinical setup simulation is required for field placement, measurement of tumor depth, progress and acute effect monitoring. \\n
Otc Regimen: Pt put on break today for the remaining part of this week.  Skin breaking down and bleeding [place] curretted the lesion today.  CAROLINE

## 2020-12-28 ENCOUNTER — APPOINTMENT (OUTPATIENT)
Age: 65
Setting detail: DERMATOLOGY
End: 2020-12-30

## 2020-12-28 VITALS — TEMPERATURE: 79.6 F

## 2020-12-28 PROBLEM — D04.39 CARCINOMA IN SITU OF SKIN OF OTHER PARTS OF FACE: Status: ACTIVE | Noted: 2020-12-28

## 2020-12-28 PROBLEM — C44.319 BASAL CELL CARCINOMA OF SKIN OF OTHER PARTS OF FACE: Status: ACTIVE | Noted: 2020-12-28

## 2020-12-28 PROBLEM — C44.329 SQUAMOUS CELL CARCINOMA OF SKIN OF OTHER PARTS OF FACE: Status: ACTIVE | Noted: 2020-12-28

## 2020-12-28 PROCEDURE — 77401 RADIATION TX DELIVERY SUPFC: CPT

## 2020-12-28 PROCEDURE — G6001 ECHO GUIDANCE RADIOTHERAPY: HCPCS

## 2020-12-28 PROCEDURE — OTHER FOLLOW UP FOR NEXT VISIT: OTHER

## 2020-12-28 PROCEDURE — OTHER TREATMENT REGIMEN: OTHER

## 2020-12-28 PROCEDURE — OTHER SUPERFICIAL RADIATION TREATMENT: OTHER

## 2020-12-28 PROCEDURE — 77280 THER RAD SIMULAJ FIELD SMPL: CPT

## 2020-12-28 NOTE — PROCEDURE: TREATMENT REGIMEN
Detail Level: Zone
Otc Regimen: Pt put on break today with skin reaction.  Junaid ESPARZA, gave pt. prescription for Silvadene cream. apply BID
Plan: This patient has been treated today with superficial radiation therapy for non-melanoma skin cancer.    \\n\\nWritten informed consent has been previously obtained from this patient for this treatment.  This consent is documented in the patient’s chart.  The patient gave verbal consent to continue treatment today. \\n\\nThe patient was treated with a specific radiation dose and setup as prescribed by the provider listed on this visit note.  A Radiation Therapist performed administration of radiation. The treatment parameters and cumulative dose are indicated above.\\n  \\nPrior to administering the radiation, the patient underwent a verification simulation defining relevant normal and abnormal target anatomy and acquiring images and data necessary to develop optimal radiation treatment process for the patient. This process includes verification of the treatment port(s) and proper treatment positioning.  All treatment ports were photographed.  The patient’s customized lead blocking was confirmed.   Considering, superficial radiotherapy setup is a clinical setup, this requires physician and radiation therapist to clarify location interest being treated against initial images, pathology and patient anatomy. Care was taken ensuring fields treated were geometrically accurate and properly positioned using daily verification simulation.  This process is also utilized to determine if any changes are necessary.   These steps are therefore medically necessary ensuring safe and effective administration of radiation. \\n\\nA high frequency ultrasound image was acquired today for two-dimensional evaluation of the tumor volume and response to treatment, in addition to geometric accuracy of field placement. The daily field placement is separate and distinct from the initial simulation and is an important task in providing safe administration of radiation therapy. Physician evaluation of the ultrasound tumor depth will be ongoing throughout the course of treatment and is deemed medically necessary in order to ensure the efficacy of treatment. Today’s image was evaluated for determination of continuation of treatment with the current plan or with necessary changes as appropriate. Additionally, the use of visualization and targeted assessment allows the patient to be able to see their cancer(s) progress, encouraging patient to complete full course of radiotherapy. \\n\\nLocation:  US Depth of 1.02 mm with +/- 0.21 mm change from previous imaging.  Repop: ++, ZAKI equivocal\\n\\nPer Dr. Robin, continued daily US guidance and clinical setup simulation is required for field placement, measurement of tumor depth, progress and acute effect monitoring. \\n
Plan: This patient has been treated today with superficial radiation therapy for non-melanoma skin cancer.    \\n\\nWritten informed consent has been previously obtained from this patient for this treatment.  This consent is documented in the patient’s chart.  The patient gave verbal consent to continue treatment today. \\n\\nThe patient was treated with a specific radiation dose and setup as prescribed by the provider listed on this visit note.  A Radiation Therapist performed administration of radiation. The treatment parameters and cumulative dose are indicated above.\\n  \\nPrior to administering the radiation, the patient underwent a verification simulation defining relevant normal and abnormal target anatomy and acquiring images and data necessary to develop optimal radiation treatment process for the patient. This process includes verification of the treatment port(s) and proper treatment positioning.  All treatment ports were photographed.  The patient’s customized lead blocking was confirmed.   Considering, superficial radiotherapy setup is a clinical setup, this requires physician and radiation therapist to clarify location interest being treated against initial images, pathology and patient anatomy. Care was taken ensuring fields treated were geometrically accurate and properly positioned using daily verification simulation.  This process is also utilized to determine if any changes are necessary.   These steps are therefore medically necessary ensuring safe and effective administration of radiation. \\n\\nA high frequency ultrasound image was acquired today for two-dimensional evaluation of the tumor volume and response to treatment, in addition to geometric accuracy of field placement. The daily field placement is separate and distinct from the initial simulation and is an important task in providing safe administration of radiation therapy. Physician evaluation of the ultrasound tumor depth will be ongoing throughout the course of treatment and is deemed medically necessary in order to ensure the efficacy of treatment. Today’s image was evaluated for determination of continuation of treatment with the current plan or with necessary changes as appropriate. Additionally, the use of visualization and targeted assessment allows the patient to be able to see their cancer(s) progress, encouraging patient to complete full course of radiotherapy. \\n\\nLocation:  US Depth of 1.18 mm with +/- 0.03 mm change from previous imaging.  Repop: ++, ZAKI equivocal\\n\\nPer Dr. Robin, continued daily US guidance and clinical setup simulation is required for field placement, measurement of tumor depth, progress and acute effect monitoring. \\n
Otc Regimen: Pt put on break today for the remaining part of this week.  Skin breaking down and bleeding [place] curretted the lesion today.  CAROLINE
Plan: This patient has been treated today with superficial radiation therapy for non-melanoma skin cancer.    \\n\\nWritten informed consent has been previously obtained from this patient for this treatment.  This consent is documented in the patient’s chart.  The patient gave verbal consent to continue treatment today. \\n\\nThe patient was treated with a specific radiation dose and setup as prescribed by the provider listed on this visit note.  A Radiation Therapist performed administration of radiation. The treatment parameters and cumulative dose are indicated above.\\n  \\nPrior to administering the radiation, the patient underwent a verification simulation defining relevant normal and abnormal target anatomy and acquiring images and data necessary to develop optimal radiation treatment process for the patient. This process includes verification of the treatment port(s) and proper treatment positioning.  All treatment ports were photographed.  The patient’s customized lead blocking was confirmed.   Considering, superficial radiotherapy setup is a clinical setup, this requires physician and radiation therapist to clarify location interest being treated against initial images, pathology and patient anatomy. Care was taken ensuring fields treated were geometrically accurate and properly positioned using daily verification simulation.  This process is also utilized to determine if any changes are necessary.   These steps are therefore medically necessary ensuring safe and effective administration of radiation. \\n\\nA high frequency ultrasound image was acquired today for two-dimensional evaluation of the tumor volume and response to treatment, in addition to geometric accuracy of field placement. The daily field placement is separate and distinct from the initial simulation and is an important task in providing safe administration of radiation therapy. Physician evaluation of the ultrasound tumor depth will be ongoing throughout the course of treatment and is deemed medically necessary in order to ensure the efficacy of treatment. Today’s image was evaluated for determination of continuation of treatment with the current plan or with necessary changes as appropriate. Additionally, the use of visualization and targeted assessment allows the patient to be able to see their cancer(s) progress, encouraging patient to complete full course of radiotherapy. \\n\\nLocation:  US Depth of 0.82 mm with +/- 0.11 mm change from previous imaging.  Repop: ++, ZAKI equivocal\\n\\nPer Dr. Robin, continued daily US guidance and clinical setup simulation is required for field placement, measurement of tumor depth, progress and acute effect monitoring. \\n

## 2020-12-28 NOTE — PROCEDURE: SUPERFICIAL RADIATION TREATMENT
Treatment Time / Fractionation (Optional- Include Units) Rx 2: .38
Prescription Used: 1
Daily Fractionated Dose (Optional- Include Units) Rx 2: 264.18
Bill For Dosimetry/Render Treatment Time Calculation In Note: No
Limb Positioning Or Elevation: pillow
Cumulative Dose In Cgy (Optional): 5147.1
Simple Simulation Preamble Text Will Be Included With Simple Simulations (.......... Indications): Complex simulation was performed today for the following reasons:\\nPatient has custom shield designed for each SRT
Computed Treatment Time In Min (Will Render The Same As Calculated Treatment Time If Left Blank): 0.37
Simple Simulation Afterword Text Will Be Included With Simple Simulations (Indications............): Patient had a complete consultation regarding all applicable modalities for the treatment of their skin cancer and based on a variety of factors including the type of tumor, size, and location, the relevant medical history as well as local tissue factors, the functional status of the individual, the ability to perform necessary postoperative wound instructions and the need for simultaneous treatments as well as overall wound healing status, it was determined that the patient would begin radiation therapy treatment for skin cancer.  A full simulation and treatment device design was performed including the determination and formulation of appropriate simple and complex devices including lead shield of 0.762 mm thickness to form molded customized shielding to specifically correlate with the lesion size including treatment margin.  The custom lead shield is adequate to accommodate the appropriate applicator and provide adequate shielding around the treatment site.  The specific field applicator, shields, and devices both simple and complex as well as the specific patient setup is outlined below.  The patient was given a full consent for superficial radiation to both verbally and in writing and the full determination of patient's eligibility for treatment and selection is outlined on the patient eligibility and treatment selection form.  The specific superficial radiotherapy prescription was determined and was documented on the superficial radiotherapy prescription form.  A treatment calculation was also performed and documented on the treatment calculation form.  Based on the prescription, the patient was scheduled for a series of fractional treatments.
Energy (Optional-Please Include Units): 70kV
Pathology Override (Pathology Will Render As Diagnosis Name If Left Blank): Basal Cell Carcinoma Nodular Type
Treatment Time In Min (Optional): .37
Custom Shielding Afterword Text Will Not Be Included With Simple Simulations (X X Y Cm............): port to correlate with the lesion size, including treatment margin. The custom lead shield is adequate to accommodate the appropriate applicator and provide adequate shielding around the treatment site. Additional shielding (as noted below) is used to protect sensitive, normal tissues.
Total Number Of Fractions Rx 4: 15
Detail Level: Detailed
Field Number: 2
Dose / Tx In Cgy (Optional): 268.62
Intro Statement (Will Not Render If Left Blank): Per the request of Dr. Robin, patient was seen today for Superficial Radiation Therapy requiring simulation (CPT® 82826) in preparation for treatment of specific diseased site(s). Simulation is necessary to determine correct patient and treatment portal positioning, deliver safe and effective radiation therapy. Today’s setup was evaluated determining continuation of treatment with the current plan, or necessary changes as appropriate. All appropriate custom blocking and treatment parameters verified by the Radiation Therapist.\\nToday’s visit is for Simulation and planning for radiation therapy.  Question were answered and concerns were address.  Patient was evaluated based on listed criteria and is a suitable candidate to begin SRT.  \\n
Energy (Optional-Please Include Units): 50kV
Day Of The Week Treatment Administered: Monday
Shielding Size (Optional- Include Units): 2.0 cm x 2.0 cm
Time Dose Fractionation (Optional- Include Units If Applicable) Rx 2: 46 (Total 94)
Computed Treatment Time In Min (Will Render The Same As Calculated Treatment Time If Left Blank): 0.43
Fractions / Week Rx 3: 5
Dose / Tx In Cgy (Optional): 270.9
Total Number Of Fractions: 20
Functional Status: 0 (fully active)
Total Dose (Optional-Please Include Units) Rx 2: 2641.8 (Total 5352.2)
Total Dose (Optional-Please Include Units): 5418. cGy
Fractionation Number: 19
Treatment Time / Fractionation (Optional- Include Units): 0.43min
Energy (Include Units): 70 kV
Fractions / Week: 3
Cumulative Dose In Cgy (Optional): 5103.78
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
Information: Selecting Yes will display possible errors in your note based on the variables you have selected. This validation is only offered as a suggestion for you. PLEASE NOTE THAT THE VALIDATION TEXT WILL BE REMOVED WHEN YOU FINALIZE YOUR NOTE. IF YOU WANT TO FAX A PRELIMINARY NOTE YOU WILL NEED TO TOGGLE THIS TO 'NO' IF YOU DO NOT WANT IT IN YOUR FAXED NOTE.
Total Number Of Fractions Rx 2: 10
Comments: On Target, RTOG 1
Field Size (Applicator): 2.5 cm
Treatment Margins In Cm: 0.5
Treatment Device Design After Initial Simulation Justification (Will Render If Bill For Treatment Devices = Yes): Patient is status post radiation simulation and is evaluated as to the use of additional devices for shielding and placement for radiation therapy.
Assessment: Appropriate reaction
Depth (Optional-Please Include Units): 0.7
Shielding Size (Optional- Include Units) Rx 2: 2.0cmX2.0cm
Depth (Optional-Please Include Units): 1.65
Patient Positioning: Side-lying (left)
Bill For Radiation Treatment: Yes
Treatment Time In Min (Optional): .43
Time Dose Fractionation (Optional- Include Units If Applicable): 96
Treatment Time / Fractionation (Optional- Include Units): 0.37min
Custom Shielding Preamble Text Will Not Be Included With Simple Simulations (.......... X X Y Cm): A lead shield of 0.762 mm thickness is utilized to form a molded, custom shield for intranasal shield.
Other Assessment (Will Overide Current Assessment): RTOG 1
Depth (Optional-Please Include Units): 0.66
Time Dose Fractionation (Optional- Include Units If Applicable): 94
Total Dose (Optional-Please Include Units): 5372.4. cGy
Energy (Include Units): 50 kV

## 2020-12-29 ENCOUNTER — APPOINTMENT (OUTPATIENT)
Age: 65
Setting detail: DERMATOLOGY
End: 2020-12-30

## 2020-12-29 VITALS — TEMPERATURE: 98 F

## 2020-12-29 PROBLEM — C44.319 BASAL CELL CARCINOMA OF SKIN OF OTHER PARTS OF FACE: Status: ACTIVE | Noted: 2020-12-29

## 2020-12-29 PROBLEM — C44.329 SQUAMOUS CELL CARCINOMA OF SKIN OF OTHER PARTS OF FACE: Status: ACTIVE | Noted: 2020-12-29

## 2020-12-29 PROBLEM — D04.39 CARCINOMA IN SITU OF SKIN OF OTHER PARTS OF FACE: Status: ACTIVE | Noted: 2020-12-29

## 2020-12-29 PROCEDURE — 77280 THER RAD SIMULAJ FIELD SMPL: CPT

## 2020-12-29 PROCEDURE — 77427 RADIATION TX MANAGEMENT X5: CPT

## 2020-12-29 PROCEDURE — OTHER FOLLOW UP FOR NEXT VISIT: OTHER

## 2020-12-29 PROCEDURE — OTHER TREATMENT REGIMEN: OTHER

## 2020-12-29 PROCEDURE — 77401 RADIATION TX DELIVERY SUPFC: CPT

## 2020-12-29 PROCEDURE — OTHER SUPERFICIAL RADIATION TREATMENT: OTHER

## 2020-12-29 PROCEDURE — G6001 ECHO GUIDANCE RADIOTHERAPY: HCPCS

## 2020-12-29 NOTE — PROCEDURE: SUPERFICIAL RADIATION TREATMENT
Fractions / Week Rx 4: 5
Functional Status: 0 (fully active)
Dose Per Fractionation In Cgy (Optional): 270.9
Bill For Dosimetry/Render Decay And Dose Adjustment Calculation In Note: No
Treatment Time In Min (Optional): .43
Detail Level: Detailed
Dimensions-Y Axis In Cm: 1
Depth (Optional-Please Include Units): 1.65
Fractions / Week: 3
Field Size (Applicator) Rx 2: 2.5 cm
Comments: On Target, RTOG 1
Energy (Optional-Please Include Units): 50kV
Initial Radiation Treatment Planning (Will Render If Bill Simulation = Yes): Patient had a complete consultation regarding all applicable modalities for the treatment of their skin cancer and based on a variety of factors including the type of tumor, size, and location, the relevant medical history as well as local tissue factors, the functional status of the individual, the ability to perform necessary postoperative wound instructions and the need for simultaneous treatments as well as overall wound healing status, it was determined that the patient would begin radiation therapy treatment for skin cancer.  A full simulation and treatment device design was performed including the determination and formulation of appropriate simple and complex devices including lead shield of 0.762 mm thickness to form molded customized shielding to specifically correlate with the lesion size including treatment margin.  The custom lead shield is adequate to accommodate the appropriate applicator and provide adequate shielding around the treatment site.  The specific field applicator, shields, and devices both simple and complex as well as the specific patient setup is outlined below.  The patient was given a full consent for superficial radiation to both verbally and in writing and the full determination of patient's eligibility for treatment and selection is outlined on the patient eligibility and treatment selection form.  The specific superficial radiotherapy prescription was determined and was documented on the superficial radiotherapy prescription form.  A treatment calculation was also performed and documented on the treatment calculation form.  Based on the prescription, the patient was scheduled for a series of fractional treatments.
Information: Selecting Yes will display possible errors in your note based on the variables you have selected. This validation is only offered as a suggestion for you. PLEASE NOTE THAT THE VALIDATION TEXT WILL BE REMOVED WHEN YOU FINALIZE YOUR NOTE. IF YOU WANT TO FAX A PRELIMINARY NOTE YOU WILL NEED TO TOGGLE THIS TO 'NO' IF YOU DO NOT WANT IT IN YOUR FAXED NOTE.
Total Number Of Fractions Rx 3: 15
Time Dose Fractionation (Optional- Include Units If Applicable): 96
Fractionation Number: 20
Assessment: Appropriate reaction
Treatment Margins In Cm: 0.5
Total Dose (Optional-Please Include Units): 5372.4. cGy
Total Dose (Optional-Please Include Units) Rx 2: 2641.8 (Total 5352.2)
Simple Simulation Preamble Text Will Be Included With Simple Simulations (.......... Indications): Complex simulation was performed today for the following reasons:\\nPatient has custom shield designed for each SRT
Other Assessment (Will Overide Current Assessment): RTOG 1
Bill For Radiation Treatment: Yes
Energy (Optional-Please Include Units): 70kV
Field Number: 2
Intro Statement (Will Not Render If Left Blank): Per the request of Dr. Robin, patient was seen today for Superficial Radiation Therapy requiring simulation (CPT® 72748) in preparation for treatment of specific diseased site(s). Simulation is necessary to determine correct patient and treatment portal positioning, deliver safe and effective radiation therapy. Today’s setup was evaluated determining continuation of treatment with the current plan, or necessary changes as appropriate. All appropriate custom blocking and treatment parameters verified by the Radiation Therapist.\\nToday’s visit is for Simulation and planning for radiation therapy.  Question were answered and concerns were address.  Patient was evaluated based on listed criteria and is a suitable candidate to begin SRT.  \\n
Time Dose Fractionation (Optional- Include Units If Applicable) Rx 2: 46 (Total 94)
Depth (Optional-Please Include Units): 0.66
Total Number Of Fractions Rx 2: 10
Custom Shielding Afterword Text Will Not Be Included With Simple Simulations (X X Y Cm............): port to correlate with the lesion size, including treatment margin. The custom lead shield is adequate to accommodate the appropriate applicator and provide adequate shielding around the treatment site. Additional shielding (as noted below) is used to protect sensitive, normal tissues.
Day Of The Week Treatment Administered: Tuesday
Treatment Time / Fractionation (Optional- Include Units): 0.43min
Cumulative Dose In Cgy (Optional): 5418.0
Treatment Time In Min (Optional): .37
Daily Fractionated Dose (Optional- Include Units): 268.62
Energy (Include Units): 50 kV
Custom Shielding Preamble Text Will Not Be Included With Simple Simulations (.......... X X Y Cm): A lead shield of 0.762 mm thickness is utilized to form a molded, custom shield for intranasal shield.
Computed Treatment Time In Min (Will Render The Same As Calculated Treatment Time If Left Blank): 0.43
Daily Fractionated Dose (Optional- Include Units) Rx 2: 264.18
Shielding Size (Optional- Include Units) Rx 2: 2.0cmX2.0cm
Time Dose Fractionation (Optional- Include Units If Applicable): 94
Treatment Device Design After Initial Simulation Justification (Will Render If Bill For Treatment Devices = Yes): Patient is status post radiation simulation and is evaluated as to the use of additional devices for shielding and placement for radiation therapy.
Limb Positioning Or Elevation: pillow
Cumulative Dose In Cgy (Optional): 5372.40
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
Patient Positioning: Side-lying (left)
Depth (Optional-Please Include Units): 0.7
Computed Treatment Time In Min (Will Render The Same As Calculated Treatment Time If Left Blank): 0.37
Treatment Time / Fractionation (Optional- Include Units) Rx 2: .38
Total Dose (Optional-Please Include Units): 5418. cGy
Treatment Time / Fractionation (Optional- Include Units): 0.37min
Shielding Size (Optional- Include Units): 2.0 cm x 2.0 cm
Energy (Include Units): 70 kV
Pathology Override (Pathology Will Render As Diagnosis Name If Left Blank): Basal Cell Carcinoma Nodular Type

## 2020-12-29 NOTE — PROCEDURE: TREATMENT REGIMEN
Plan: This patient has been treated today with superficial radiation therapy for non-melanoma skin cancer.    \\n\\nWritten informed consent has been previously obtained from this patient for this treatment.  This consent is documented in the patient’s chart.  The patient gave verbal consent to continue treatment today. \\n\\nThe patient was treated with a specific radiation dose and setup as prescribed by the provider listed on this visit note.  A Radiation Therapist performed administration of radiation. The treatment parameters and cumulative dose are indicated above.\\n  \\nPrior to administering the radiation, the patient underwent a verification simulation defining relevant normal and abnormal target anatomy and acquiring images and data necessary to develop optimal radiation treatment process for the patient. This process includes verification of the treatment port(s) and proper treatment positioning.  All treatment ports were photographed.  The patient’s customized lead blocking was confirmed.   Considering, superficial radiotherapy setup is a clinical setup, this requires physician and radiation therapist to clarify location interest being treated against initial images, pathology and patient anatomy. Care was taken ensuring fields treated were geometrically accurate and properly positioned using daily verification simulation.  This process is also utilized to determine if any changes are necessary.   These steps are therefore medically necessary ensuring safe and effective administration of radiation. \\n\\nA high frequency ultrasound image was acquired today for two-dimensional evaluation of the tumor volume and response to treatment, in addition to geometric accuracy of field placement. The daily field placement is separate and distinct from the initial simulation and is an important task in providing safe administration of radiation therapy. Physician evaluation of the ultrasound tumor depth will be ongoing throughout the course of treatment and is deemed medically necessary in order to ensure the efficacy of treatment. Today’s image was evaluated for determination of continuation of treatment with the current plan or with necessary changes as appropriate. Additionally, the use of visualization and targeted assessment allows the patient to be able to see their cancer(s) progress, encouraging patient to complete full course of radiotherapy. \\n\\nLocation:  US Depth of 0.82 mm with +/- 0.11 mm change from previous imaging.  Repop: ++, ZAKI equivocal\\n\\nPer Dr. Robin, continued daily US guidance and clinical setup simulation is required for field placement, measurement of tumor depth, progress and acute effect monitoring. \\n
Plan: This patient has been treated today with superficial radiation therapy for non-melanoma skin cancer.    \\n\\nWritten informed consent has been previously obtained from this patient for this treatment.  This consent is documented in the patient’s chart.  The patient gave verbal consent to continue treatment today. \\n\\nThe patient was treated with a specific radiation dose and setup as prescribed by the provider listed on this visit note.  A Radiation Therapist performed administration of radiation. The treatment parameters and cumulative dose are indicated above.\\n  \\nPrior to administering the radiation, the patient underwent a verification simulation defining relevant normal and abnormal target anatomy and acquiring images and data necessary to develop optimal radiation treatment process for the patient. This process includes verification of the treatment port(s) and proper treatment positioning.  All treatment ports were photographed.  The patient’s customized lead blocking was confirmed.   Considering, superficial radiotherapy setup is a clinical setup, this requires physician and radiation therapist to clarify location interest being treated against initial images, pathology and patient anatomy. Care was taken ensuring fields treated were geometrically accurate and properly positioned using daily verification simulation.  This process is also utilized to determine if any changes are necessary.   These steps are therefore medically necessary ensuring safe and effective administration of radiation. \\n\\nA high frequency ultrasound image was acquired today for two-dimensional evaluation of the tumor volume and response to treatment, in addition to geometric accuracy of field placement. The daily field placement is separate and distinct from the initial simulation and is an important task in providing safe administration of radiation therapy. Physician evaluation of the ultrasound tumor depth will be ongoing throughout the course of treatment and is deemed medically necessary in order to ensure the efficacy of treatment. Today’s image was evaluated for determination of continuation of treatment with the current plan or with necessary changes as appropriate. Additionally, the use of visualization and targeted assessment allows the patient to be able to see their cancer(s) progress, encouraging patient to complete full course of radiotherapy. \\n\\nLocation:  US Depth of 1.18 mm with +/- 0.16 mm change from previous imaging.  Repop: ++, ZAKI equivocal\\n\\nPer Dr. Robin, continued daily US guidance and clinical setup simulation is required for field placement, measurement of tumor depth, progress and acute effect monitoring. \\n
Plan: This patient has been treated today with superficial radiation therapy for non-melanoma skin cancer.    \\n\\nWritten informed consent has been previously obtained from this patient for this treatment.  This consent is documented in the patient’s chart.  The patient gave verbal consent to continue treatment today. \\n\\nThe patient was treated with a specific radiation dose and setup as prescribed by the provider listed on this visit note.  A Radiation Therapist performed administration of radiation. The treatment parameters and cumulative dose are indicated above.\\n  \\nPrior to administering the radiation, the patient underwent a verification simulation defining relevant normal and abnormal target anatomy and acquiring images and data necessary to develop optimal radiation treatment process for the patient. This process includes verification of the treatment port(s) and proper treatment positioning.  All treatment ports were photographed.  The patient’s customized lead blocking was confirmed.   Considering, superficial radiotherapy setup is a clinical setup, this requires physician and radiation therapist to clarify location interest being treated against initial images, pathology and patient anatomy. Care was taken ensuring fields treated were geometrically accurate and properly positioned using daily verification simulation.  This process is also utilized to determine if any changes are necessary.   These steps are therefore medically necessary ensuring safe and effective administration of radiation. \\n\\nA high frequency ultrasound image was acquired today for two-dimensional evaluation of the tumor volume and response to treatment, in addition to geometric accuracy of field placement. The daily field placement is separate and distinct from the initial simulation and is an important task in providing safe administration of radiation therapy. Physician evaluation of the ultrasound tumor depth will be ongoing throughout the course of treatment and is deemed medically necessary in order to ensure the efficacy of treatment. Today’s image was evaluated for determination of continuation of treatment with the current plan or with necessary changes as appropriate. Additionally, the use of visualization and targeted assessment allows the patient to be able to see their cancer(s) progress, encouraging patient to complete full course of radiotherapy. \\n\\nLocation:  US Depth of 0.95 mm with +/- 0.07 mm change from previous imaging.  Repop: ++, ZAKI equivocal\\n\\nPer Dr. Robin, continued daily US guidance and clinical setup simulation is required for field placement, measurement of tumor depth, progress and acute effect monitoring. \\n
Detail Level: Zone
Otc Regimen: Pt put on break today with skin reaction.  Junaid ESPARZA, gave pt. prescription for Silvadene cream. apply BID
Otc Regimen: Pt put on break today for the remaining part of this week.  Skin breaking down and bleeding ISAIAS [place] curretted the lesion today.  CAROLINE

## 2021-02-02 ENCOUNTER — APPOINTMENT (OUTPATIENT)
Age: 66
Setting detail: DERMATOLOGY
End: 2021-02-05

## 2021-02-02 PROBLEM — C44.329 SQUAMOUS CELL CARCINOMA OF SKIN OF OTHER PARTS OF FACE: Status: ACTIVE | Noted: 2021-02-02

## 2021-02-02 PROBLEM — C44.319 BASAL CELL CARCINOMA OF SKIN OF OTHER PARTS OF FACE: Status: ACTIVE | Noted: 2021-02-02

## 2021-02-02 PROBLEM — D04.39 CARCINOMA IN SITU OF SKIN OF OTHER PARTS OF FACE: Status: ACTIVE | Noted: 2021-02-02

## 2021-02-02 PROCEDURE — OTHER SUPERFICIAL RADIATION TREATMENT: OTHER

## 2021-02-02 PROCEDURE — G6001 ECHO GUIDANCE RADIOTHERAPY: HCPCS

## 2021-02-02 PROCEDURE — OTHER FOLLOW UP FOR NEXT VISIT: OTHER

## 2021-02-02 PROCEDURE — 99212 OFFICE O/P EST SF 10 MIN: CPT

## 2021-02-02 PROCEDURE — OTHER TREATMENT REGIMEN: OTHER

## 2021-02-02 NOTE — PROCEDURE: TREATMENT REGIMEN
Plan: 6 week Follow-up with u/s\\n\\nPer the request of Dr. Robin, patient was seen today for Superficial Radiation Therapy management.  A high frequency ultrasound image was acquired prior to evaluation today for three-dimensional evaluation of tumor volume and response to treatment (CPT® ). \\n\\nApproximately 6 weeks after finishing SRT, evaluation and management of SRT based on prescription and cumulative dose for reaction and response to radiation reveals adequate healing and response with pleasing aesthetics results.  No evidence of cancerous lesion on or around treatment site when inspected visually or by ultrasound. RTOG = 0\\n\\n\\n
Detail Level: Zone
Otc Regimen: Pt put on break today with skin reaction.  Junaid ESPARZA, gave pt. prescription for Silvadene cream. apply BID
Otc Regimen: Pt put on break today for the remaining part of this week.  Skin breaking down and bleeding ISAIAS [place] curretted the lesion today.  CAROLINE
Plan: \\n6 week Follow-up with u/s\\n\\nPer the request of Dr. Robin, patient was seen today for Superficial Radiation Therapy management.  A high frequency ultrasound image was acquired prior to evaluation today for three-dimensional evaluation of tumor volume and response to treatment (CPT® ). \\n\\nApproximately 6 weeks after finishing SRT, evaluation and management of SRT based on prescription and cumulative dose for reaction and response to radiation reveals adequate healing and response with pleasing aesthetics results.  No evidence of cancerous lesion on or around treatment site when inspected visually or by ultrasound. RTOG = 0\\n\\n\\n

## 2021-02-02 NOTE — PROCEDURE: SUPERFICIAL RADIATION TREATMENT
Include Rx 4 When Rendering Additional Prescriptions: No
Assessment: Appropriate reaction
Daily Fractionated Dose (Optional- Include Units) Rx 2: 264.18
Total Number Of Fractions Rx 4: 15
Functional Status: 0 (fully active)
Time Dose Fractionation (Optional- Include Units If Applicable): 96
Treatment Time / Fractionation (Optional- Include Units) Rx 2: .38
Computed Treatment Time In Min (Will Render The Same As Calculated Treatment Time If Left Blank): 0.37
Comments: RTOG 0
Fractionation Number: 20
Bill And Render Text From Evaluation And Management Tab (Will Bill [id number]): Yes
Fractions / Week Rx 4: 5
Daily Fractionated Dose (Optional- Include Units): 270.9
Time Dose Fractionation (Optional- Include Units If Applicable) Rx 2: 46 (Total 94)
Shielding Size (Optional- Include Units): 2.0 cm x 2.0 cm
Field Size (Applicator) Rx 2: 2.5 cm
Fractions / Week: 3
Computed Treatment Time In Min (Will Render The Same As Calculated Treatment Time If Left Blank): 0.43
Intro Statement (Will Not Render If Left Blank): No sign of disease on six week follow up after SRT.
Energy (Include Units): 50 kV
Treatment Device Design After Initial Simulation Justification (Will Render If Bill For Treatment Devices = Yes): Patient is status post radiation simulation and is evaluated as to the use of additional devices for shielding and placement for radiation therapy.
Day Of The Week Treatment Administered: Tuesday
Dose / Tx In Cgy (Optional): 268.62
Initial Radiation Treatment Planning (Will Render If Bill Simulation = Yes): Patient had a complete consultation regarding all applicable modalities for the treatment of their skin cancer and based on a variety of factors including the type of tumor, size, and location, the relevant medical history as well as local tissue factors, the functional status of the individual, the ability to perform necessary postoperative wound instructions and the need for simultaneous treatments as well as overall wound healing status, it was determined that the patient would begin radiation therapy treatment for skin cancer.  A full simulation and treatment device design was performed including the determination and formulation of appropriate simple and complex devices including lead shield of 0.762 mm thickness to form molded customized shielding to specifically correlate with the lesion size including treatment margin.  The custom lead shield is adequate to accommodate the appropriate applicator and provide adequate shielding around the treatment site.  The specific field applicator, shields, and devices both simple and complex as well as the specific patient setup is outlined below.  The patient was given a full consent for superficial radiation to both verbally and in writing and the full determination of patient's eligibility for treatment and selection is outlined on the patient eligibility and treatment selection form.  The specific superficial radiotherapy prescription was determined and was documented on the superficial radiotherapy prescription form.  A treatment calculation was also performed and documented on the treatment calculation form.  Based on the prescription, the patient was scheduled for a series of fractional treatments.
Total Dose (Optional-Please Include Units): 5418. cGy
Information: Selecting Yes will display possible errors in your note based on the variables you have selected. This validation is only offered as a suggestion for you. PLEASE NOTE THAT THE VALIDATION TEXT WILL BE REMOVED WHEN YOU FINALIZE YOUR NOTE. IF YOU WANT TO FAX A PRELIMINARY NOTE YOU WILL NEED TO TOGGLE THIS TO 'NO' IF YOU DO NOT WANT IT IN YOUR FAXED NOTE.
Custom Shielding Preamble Text Will Not Be Included With Simple Simulations (.......... X X Y Cm): A lead shield of 0.762 mm thickness is utilized to form a molded, custom shield for intranasal shield.
Total Dose (Optional-Please Include Units) Rx 2: 2641.8 (Total 5352.2)
Energy (Include Units): 70 kV
Custom Shielding Afterword Text Will Not Be Included With Simple Simulations (X X Y Cm............): port to correlate with the lesion size, including treatment margin. The custom lead shield is adequate to accommodate the appropriate applicator and provide adequate shielding around the treatment site. Additional shielding (as noted below) is used to protect sensitive, normal tissues.
Simple Simulation Preamble Text Will Be Included With Simple Simulations (.......... Indications): Complex simulation was performed today for the following reasons:\\nPatient has custom shield designed for each SRT
Port Dimensions-X Axis In Cm: 2
Detail Level: Detailed
Treatment Time In Min (Optional): .37
Prescription Used: 1
Energy (Optional-Please Include Units): 70kV
Treatment Time / Fractionation (Optional- Include Units): 0.43min
Patient Positioning: Side-lying (left)
Shielding Size (Optional- Include Units) Rx 2: 2.0cmX2.0cm
Depth (Optional-Please Include Units): 1.65
Energy (Optional-Please Include Units) Rx 2: 50kV
Time Dose Fractionation (Optional- Include Units If Applicable): 94
Total Number Of Fractions Rx 2: 10
Treatment Time In Min (Optional): .43
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
Pathology Override (Pathology Will Render As Diagnosis Name If Left Blank): Basal Cell Carcinoma Nodular Type
Treatment Time / Fractionation (Optional- Include Units): 0.37min
Limb Positioning Or Elevation: pillow
Cumulative Dose In Cgy (Optional): 5372.40
Depth (Optional-Please Include Units): 0.7
Treatment Margins In Cm: 0.5
Total Dose (Optional-Please Include Units): 5372.4. cGy
Depth (Optional-Please Include Units): 0.66
Cumulative Dose In Cgy (Optional): 5418.0

## 2021-04-01 ENCOUNTER — APPOINTMENT (OUTPATIENT)
Age: 66
Setting detail: DERMATOLOGY
End: 2021-04-02

## 2021-04-01 VITALS — TEMPERATURE: 98.1 F

## 2021-04-01 DIAGNOSIS — L82.1 OTHER SEBORRHEIC KERATOSIS: ICD-10-CM

## 2021-04-01 DIAGNOSIS — L90.5 SCAR CONDITIONS AND FIBROSIS OF SKIN: ICD-10-CM

## 2021-04-01 DIAGNOSIS — L57.0 ACTINIC KERATOSIS: ICD-10-CM

## 2021-04-01 DIAGNOSIS — D485 NEOPLASM OF UNCERTAIN BEHAVIOR OF SKIN: ICD-10-CM

## 2021-04-01 DIAGNOSIS — Z85.828 PERSONAL HISTORY OF OTHER MALIGNANT NEOPLASM OF SKIN: ICD-10-CM

## 2021-04-01 PROBLEM — D48.5 NEOPLASM OF UNCERTAIN BEHAVIOR OF SKIN: Status: ACTIVE | Noted: 2021-04-01

## 2021-04-01 PROCEDURE — OTHER BIOPSY BY SHAVE METHOD: OTHER

## 2021-04-01 PROCEDURE — 99213 OFFICE O/P EST LOW 20 MIN: CPT | Mod: 25

## 2021-04-01 PROCEDURE — OTHER COUNSELING: OTHER

## 2021-04-01 PROCEDURE — OTHER MIPS QUALITY: OTHER

## 2021-04-01 PROCEDURE — 17003 DESTRUCT PREMALG LES 2-14: CPT

## 2021-04-01 PROCEDURE — 17000 DESTRUCT PREMALG LESION: CPT | Mod: 59

## 2021-04-01 PROCEDURE — OTHER LIQUID NITROGEN: OTHER

## 2021-04-01 PROCEDURE — OTHER REASSURANCE: OTHER

## 2021-04-01 PROCEDURE — 11102 TANGNTL BX SKIN SINGLE LES: CPT

## 2021-04-01 ASSESSMENT — LOCATION DETAILED DESCRIPTION DERM
LOCATION DETAILED: RIGHT SUPERIOR CRUS OF ANTIHELIX
LOCATION DETAILED: INFERIOR THORACIC SPINE
LOCATION DETAILED: LEFT POSTERIOR SHOULDER
LOCATION DETAILED: RIGHT INFERIOR CENTRAL MALAR CHEEK
LOCATION DETAILED: LEFT INFERIOR LATERAL FOREHEAD
LOCATION DETAILED: RIGHT MEDIAL UPPER BACK
LOCATION DETAILED: LEFT PROXIMAL CALF
LOCATION DETAILED: LEFT LATERAL MALAR CHEEK
LOCATION DETAILED: RIGHT INFERIOR LATERAL MALAR CHEEK
LOCATION DETAILED: LEFT CENTRAL MALAR CHEEK
LOCATION DETAILED: LEFT LATERAL EYEBROW
LOCATION DETAILED: RIGHT CENTRAL FRONTAL SCALP
LOCATION DETAILED: RIGHT INFERIOR FOREHEAD
LOCATION DETAILED: RIGHT CENTRAL MALAR CHEEK

## 2021-04-01 ASSESSMENT — LOCATION ZONE DERM
LOCATION ZONE: EAR
LOCATION ZONE: ARM
LOCATION ZONE: FACE
LOCATION ZONE: TRUNK
LOCATION ZONE: SCALP
LOCATION ZONE: LEG

## 2021-04-01 ASSESSMENT — LOCATION SIMPLE DESCRIPTION DERM
LOCATION SIMPLE: RIGHT SCALP
LOCATION SIMPLE: RIGHT FOREHEAD
LOCATION SIMPLE: LEFT EYEBROW
LOCATION SIMPLE: RIGHT CHEEK
LOCATION SIMPLE: RIGHT EAR
LOCATION SIMPLE: LEFT CHEEK
LOCATION SIMPLE: LEFT SHOULDER
LOCATION SIMPLE: LEFT CALF
LOCATION SIMPLE: UPPER BACK
LOCATION SIMPLE: RIGHT UPPER BACK
LOCATION SIMPLE: LEFT FOREHEAD

## 2021-04-01 NOTE — PROCEDURE: BIOPSY BY SHAVE METHOD
Biopsy Method: 10 blade
Validate Note Data (See Information Below): No
Information: Selecting Yes will display possible errors in your note based on the variables you have selected. This validation is only offered as a suggestion for you. PLEASE NOTE THAT THE VALIDATION TEXT WILL BE REMOVED WHEN YOU FINALIZE YOUR NOTE. IF YOU WANT TO FAX A PRELIMINARY NOTE YOU WILL NEED TO TOGGLE THIS TO 'NO' IF YOU DO NOT WANT IT IN YOUR FAXED NOTE.
Billing Type: Third-Party Bill
Depth Of Biopsy: dermis
Biopsy Type: H and E
Size Of Lesion In Cm: 0.6
Additional Anesthesia Volume In Cc (Will Not Render If 0): 0
Was A Bandage Applied: Yes
Type Of Destruction Used: Curettage
Electrodesiccation Text: The wound bed was treated with electrodesiccation after the biopsy was performed.
Curettage Text: The wound bed was treated with curettage after the biopsy was performed.
Cryotherapy Text: The wound bed was treated with cryotherapy after the biopsy was performed.
Hemostasis: Electrocautery
Consent: Written consent was obtained and risks were reviewed including but not limited to scarring, infection, bleeding, scabbing, incomplete removal, nerve damage and allergy to anesthesia.  Clinical evaluation reveals changes suspicious for rule out provided in pathology requisition.  Intent of procedure is to obtain tissue sample for histopathic examination.  A skin biopsy is considered a necessary and appropriate to clarify the diagnosis.
Notification Instructions: Patient will be notified of biopsy results. However, patient instructed to call the office if not contacted within 2 weeks.
Dressing: bandage
Anesthesia Volume In Cc: 0.5
Post-Care Instructions: I reviewed with the patient in detail post-care instructions. Patient is to keep the biopsy site dry overnight, and then apply bacitracin twice daily until healed. Patient may apply hydrogen peroxide soaks to remove any crusting.
Wound Care: Vaseline
Electrodesiccation And Curettage Text: The wound bed was treated with electrodesiccation and curettage after the biopsy was performed.
Anesthesia Type: 1% lidocaine with epinephrine
Detail Level: Detailed
Silver Nitrate Text: The wound bed was treated with silver nitrate after the biopsy was performed.

## 2021-04-01 NOTE — PROCEDURE: MIPS QUALITY
Quality 110: Preventive Care And Screening: Influenza Immunization: Influenza Immunization Administered during Influenza season
Quality 226: Preventive Care And Screening: Tobacco Use: Screening And Cessation Intervention: Patient screened for tobacco use and is an ex/non-smoker
Detail Level: Detailed
Quality 47: Advance Care Plan: Advance Care Planning discussed and documented; advance care plan or surrogate decision maker documented in the medical record.
Quality 111:Pneumonia Vaccination Status For Older Adults: Pneumococcal Vaccination not Administered or Previously Received, Reason not Otherwise Specified

## 2021-04-29 ENCOUNTER — APPOINTMENT (OUTPATIENT)
Age: 66
Setting detail: DERMATOLOGY
End: 2021-04-30

## 2021-04-29 VITALS — TEMPERATURE: 98.7 F

## 2021-04-29 DIAGNOSIS — D485 NEOPLASM OF UNCERTAIN BEHAVIOR OF SKIN: ICD-10-CM

## 2021-04-29 DIAGNOSIS — L57.0 ACTINIC KERATOSIS: ICD-10-CM

## 2021-04-29 PROBLEM — D48.5 NEOPLASM OF UNCERTAIN BEHAVIOR OF SKIN: Status: ACTIVE | Noted: 2021-04-29

## 2021-04-29 PROCEDURE — 17000 DESTRUCT PREMALG LESION: CPT | Mod: 59

## 2021-04-29 PROCEDURE — 11102 TANGNTL BX SKIN SINGLE LES: CPT

## 2021-04-29 PROCEDURE — OTHER MIPS QUALITY: OTHER

## 2021-04-29 PROCEDURE — OTHER BIOPSY BY SHAVE METHOD: OTHER

## 2021-04-29 PROCEDURE — OTHER COUNSELING: OTHER

## 2021-04-29 PROCEDURE — 17003 DESTRUCT PREMALG LES 2-14: CPT

## 2021-04-29 PROCEDURE — OTHER LIQUID NITROGEN: OTHER

## 2021-04-29 ASSESSMENT — LOCATION DETAILED DESCRIPTION DERM
LOCATION DETAILED: RIGHT SUPERIOR PREAURICULAR CHEEK
LOCATION DETAILED: RIGHT POSTERIOR NECK
LOCATION DETAILED: LEFT LATERAL FOREHEAD
LOCATION DETAILED: LEFT INFERIOR LATERAL FOREHEAD
LOCATION DETAILED: LEFT INFERIOR TEMPLE
LOCATION DETAILED: LEFT SUPERIOR HELIX

## 2021-04-29 ASSESSMENT — LOCATION ZONE DERM
LOCATION ZONE: NECK
LOCATION ZONE: EAR
LOCATION ZONE: FACE

## 2021-04-29 ASSESSMENT — LOCATION SIMPLE DESCRIPTION DERM
LOCATION SIMPLE: LEFT FOREHEAD
LOCATION SIMPLE: LEFT EAR
LOCATION SIMPLE: POSTERIOR NECK
LOCATION SIMPLE: RIGHT CHEEK
LOCATION SIMPLE: LEFT TEMPLE

## 2021-04-29 NOTE — PROCEDURE: BIOPSY BY SHAVE METHOD
Hide Additional Size Dimension?: No
Information: Selecting Yes will display possible errors in your note based on the variables you have selected. This validation is only offered as a suggestion for you. PLEASE NOTE THAT THE VALIDATION TEXT WILL BE REMOVED WHEN YOU FINALIZE YOUR NOTE. IF YOU WANT TO FAX A PRELIMINARY NOTE YOU WILL NEED TO TOGGLE THIS TO 'NO' IF YOU DO NOT WANT IT IN YOUR FAXED NOTE.
Biopsy Type: H and E
Depth Of Biopsy: dermis
Consent: Written consent was obtained and risks were reviewed including but not limited to scarring, infection, bleeding, scabbing, incomplete removal, nerve damage and allergy to anesthesia.  Clinical evaluation reveals changes suspicious for rule out provided in pathology requisition.  Intent of procedure is to obtain tissue sample for histopathic examination.  A skin biopsy is considered a necessary and appropriate to clarify the diagnosis.
Cryotherapy Text: The wound bed was treated with cryotherapy after the biopsy was performed.
Wound Care: Vaseline
Size Of Lesion In Cm: 0.6
Type Of Destruction Used: Curettage
Electrodesiccation And Curettage Text: The wound bed was treated with electrodesiccation and curettage after the biopsy was performed.
Billing Type: Third-Party Bill
Electrodesiccation Text: The wound bed was treated with electrodesiccation after the biopsy was performed.
Anesthesia Volume In Cc: 0.5
Dressing: bandage
Silver Nitrate Text: The wound bed was treated with silver nitrate after the biopsy was performed.
Post-Care Instructions: I reviewed with the patient in detail post-care instructions. Patient is to keep the biopsy site dry overnight, and then apply bacitracin twice daily until healed. Patient may apply hydrogen peroxide soaks to remove any crusting.
Was A Bandage Applied: Yes
Detail Level: Detailed
Hemostasis: Electrocautery
Anesthesia Type: 1% lidocaine with epinephrine
Additional Anesthesia Volume In Cc (Will Not Render If 0): 0
Biopsy Method: 10 blade
Notification Instructions: Patient will be notified of biopsy results. However, patient instructed to call the office if not contacted within 2 weeks.
Curettage Text: The wound bed was treated with curettage after the biopsy was performed.

## 2021-04-29 NOTE — PROCEDURE: MIPS QUALITY
Quality 110: Preventive Care And Screening: Influenza Immunization: Influenza Immunization Administered during Influenza season
Quality 226: Preventive Care And Screening: Tobacco Use: Screening And Cessation Intervention: Patient screened for tobacco use and is an ex/non-smoker
Quality 47: Advance Care Plan: Advance Care Planning discussed and documented; advance care plan or surrogate decision maker documented in the medical record.
Quality 111:Pneumonia Vaccination Status For Older Adults: Pneumococcal Vaccination not Administered or Previously Received, Reason not Otherwise Specified
Detail Level: Detailed

## 2021-06-08 ENCOUNTER — APPOINTMENT (OUTPATIENT)
Age: 66
Setting detail: DERMATOLOGY
End: 2021-06-09

## 2021-06-08 VITALS — TEMPERATURE: 97.8 F

## 2021-06-08 PROBLEM — C44.41 BASAL CELL CARCINOMA OF SKIN OF SCALP AND NECK: Status: ACTIVE | Noted: 2021-06-08

## 2021-06-08 PROCEDURE — OTHER MIPS QUALITY: OTHER

## 2021-06-08 PROCEDURE — 11622 EXC S/N/H/F/G MAL+MRG 1.1-2: CPT

## 2021-06-08 PROCEDURE — OTHER EXCISION: OTHER

## 2021-06-08 PROCEDURE — 12042 INTMD RPR N-HF/GENIT2.6-7.5: CPT

## 2021-06-08 NOTE — PROCEDURE: MIPS QUALITY
Quality 226: Preventive Care And Screening: Tobacco Use: Screening And Cessation Intervention: Patient screened for tobacco use and is an ex/non-smoker
Detail Level: Detailed
Quality 110: Preventive Care And Screening: Influenza Immunization: Influenza Immunization Administered during Influenza season
Quality 47: Advance Care Plan: Advance Care Planning discussed and documented; advance care plan or surrogate decision maker documented in the medical record.
Quality 111:Pneumonia Vaccination Status For Older Adults: Pneumococcal Vaccination not Administered or Previously Received, Reason not Otherwise Specified

## 2021-06-08 NOTE — PROCEDURE: EXCISION
Alar Island Pedicle Flap Text: The defect edges were debeveled with a #15 scalpel blade.  Given the location of the defect, shape of the defect and the proximity to the alar rim an island pedicle advancement flap was deemed most appropriate.  Using a sterile surgical marker, an appropriate advancement flap was drawn incorporating the defect, outlining the appropriate donor tissue and placing the expected incisions within the nasal ala running parallel to the alar rim. The area thus outlined was incised with a #15 scalpel blade.  The skin margins were undermined minimally to an appropriate distance in all directions around the primary defect and laterally outward around the island pedicle utilizing iris scissors.  There was minimal undermining beneath the pedicle flap.
Skin Substitute Text: The defect edges were debeveled with a #15 scalpel blade.  Given the location of the defect, shape of the defect and the proximity to free margins a skin substitute graft was deemed most appropriate.  The graft material was trimmed to fit the size of the defect. The graft was then placed in the primary defect and oriented appropriately.
Complex Repair And V-Y Plasty Text: The defect edges were debeveled with a #15 scalpel blade.  The primary defect was closed partially with a complex linear closure.  Given the location of the remaining defect, shape of the defect and the proximity to free margins a V-Y plasty was deemed most appropriate for complete closure of the defect.  Using a sterile surgical marker, an appropriate advancement flap was drawn incorporating the defect and placing the expected incisions within the relaxed skin tension lines where possible.    The area thus outlined was incised deep to adipose tissue with a #15 scalpel blade.  The skin margins were undermined to an appropriate distance in all directions utilizing iris scissors.
Elliptical Excision Additional Text (Leave Blank If You Do Not Want): The margin was drawn around the clinically apparent lesion.  An elliptical shape was then drawn on the skin incorporating the lesion and margins.  Incisions were then made along these lines to the appropriate tissue plane and the lesion was extirpated.
Hemigard Intro: Due to skin fragility and wound tension, it was decided to use HEMIGARD adhesive retention suture devices to permit a linear closure. The skin was cleaned and dried for a 6cm distance away from the wound. Excessive hair, if present, was removed to allow for adhesion.
Was An Eye Clamp Used?: No
Render Post-Care Instructions In Note?: yes
Partial Purse String (Intermediate) Text: Given the location of the defect and the characteristics of the surrounding skin an intermediate purse string closure was deemed most appropriate.  Undermining was performed circumferentially around the surgical defect.  A purse string suture was then placed and tightened. Wound tension of the circular defect prevented complete closure of the wound.
Advancement Flap (Double) Text: The defect edges were debeveled with a #15 scalpel blade.  Given the location of the defect and the proximity to free margins a double advancement flap was deemed most appropriate.  Using a sterile surgical marker, the appropriate advancement flaps were drawn incorporating the defect and placing the expected incisions within the relaxed skin tension lines where possible.    The area thus outlined was incised deep to adipose tissue with a #15 scalpel blade.  The skin margins were undermined to an appropriate distance in all directions utilizing iris scissors.
Crescentic Complex Repair Preamble Text (Leave Blank If You Do Not Want): Extensive wide undermining was performed.
Anesthesia Type: 1% lidocaine with epinephrine
Skin Substitute Units (Will Override Primary Defect Units If Greater Than 0): 0
O-T Plasty Text: The defect edges were debeveled with a #15 scalpel blade.  Given the location of the defect, shape of the defect and the proximity to free margins an O-T plasty was deemed most appropriate.  Using a sterile surgical marker, an appropriate O-T plasty was drawn incorporating the defect and placing the expected incisions within the relaxed skin tension lines where possible.    The area thus outlined was incised deep to adipose tissue with a #15 scalpel blade.  The skin margins were undermined to an appropriate distance in all directions utilizing iris scissors.
Complex Repair And Xenograft Text: The defect edges were debeveled with a #15 scalpel blade.  The primary defect was closed partially with a complex linear closure.  Given the location of the defect, shape of the defect and the proximity to free margins a xenograft was deemed most appropriate to repair the remaining defect.  The graft was trimmed to fit the size of the remaining defect.  The graft was then placed in the primary defect, oriented appropriately, and sutured into place.
Crescentic Advancement Flap Text: The defect edges were debeveled with a #15 scalpel blade.  Given the location of the defect and the proximity to free margins a crescentic advancement flap was deemed most appropriate.  Using a sterile surgical marker, the appropriate advancement flap was drawn incorporating the defect and placing the expected incisions within the relaxed skin tension lines where possible.    The area thus outlined was incised deep to adipose tissue with a #15 scalpel blade.  The skin margins were undermined to an appropriate distance in all directions utilizing iris scissors.
Mercedes Flap Text: The defect edges were debeveled with a #15 scalpel blade.  Given the location of the defect, shape of the defect and the proximity to free margins a Mercedes flap was deemed most appropriate.  Using a sterile surgical marker, an appropriate advancement flap was drawn incorporating the defect and placing the expected incisions within the relaxed skin tension lines where possible. The area thus outlined was incised deep to adipose tissue with a #15 scalpel blade.  The skin margins were undermined to an appropriate distance in all directions utilizing iris scissors.
Orbicularis Oris Muscle Flap Text: The defect edges were debeveled with a #15 scalpel blade.  Given that the defect affected the competency of the oral sphincter an orbicularis oris muscle flap was deemed most appropriate to restore this competency and normal muscle function.  Using a sterile surgical marker, an appropriate flap was drawn incorporating the defect. The area thus outlined was incised with a #15 scalpel blade.
Rotation Flap Text: The defect edges were debeveled with a #15 scalpel blade.  Given the location of the defect, shape of the defect and the proximity to free margins a rotation flap was deemed most appropriate.  Using a sterile surgical marker, an appropriate rotation flap was drawn incorporating the defect and placing the expected incisions within the relaxed skin tension lines where possible.    The area thus outlined was incised deep to adipose tissue with a #15 scalpel blade.  The skin margins were undermined to an appropriate distance in all directions utilizing iris scissors.
Intermediate / Complex Repair - Final Wound Length In Cm: 3.1
Information: Selecting Yes will display possible errors in your note based on the variables you have selected. This validation is only offered as a suggestion for you. PLEASE NOTE THAT THE VALIDATION TEXT WILL BE REMOVED WHEN YOU FINALIZE YOUR NOTE. IF YOU WANT TO FAX A PRELIMINARY NOTE YOU WILL NEED TO TOGGLE THIS TO 'NO' IF YOU DO NOT WANT IT IN YOUR FAXED NOTE.
Lazy S Intermediate Repair Preamble Text (Leave Blank If You Do Not Want): Undermining was performed with blunt dissection.
Bilobed Transposition Flap Text: The defect edges were debeveled with a #15 scalpel blade.  Given the location of the defect and the proximity to free margins a bilobed transposition flap was deemed most appropriate.  Using a sterile surgical marker, an appropriate bilobe flap drawn around the defect.    The area thus outlined was incised deep to adipose tissue with a #15 scalpel blade.  The skin margins were undermined to an appropriate distance in all directions utilizing iris scissors.
Suturegard Retention Suture: 2-0 Nylon
W Plasty Text: The lesion was extirpated to the level of the fat with a #15 scalpel blade.  Given the location of the defect, shape of the defect and the proximity to free margins a W-plasty was deemed most appropriate for repair.  Using a sterile surgical marker, the appropriate transposition arms of the W-plasty were drawn incorporating the defect and placing the expected incisions within the relaxed skin tension lines where possible.    The area thus outlined was incised deep to adipose tissue with a #15 scalpel blade.  The skin margins were undermined to an appropriate distance in all directions utilizing iris scissors.  The opposing transposition arms were then transposed into place in opposite direction and anchored with interrupted buried subcutaneous sutures.
Complex Repair And Melolabial Flap Text: The defect edges were debeveled with a #15 scalpel blade.  The primary defect was closed partially with a complex linear closure.  Given the location of the remaining defect, shape of the defect and the proximity to free margins a melolabial flap was deemed most appropriate for complete closure of the defect.  Using a sterile surgical marker, an appropriate advancement flap was drawn incorporating the defect and placing the expected incisions within the relaxed skin tension lines where possible.    The area thus outlined was incised deep to adipose tissue with a #15 scalpel blade.  The skin margins were undermined to an appropriate distance in all directions utilizing iris scissors.
Wound Care: Bacitracin
Dermal Autograft Text: The defect edges were debeveled with a #15 scalpel blade.  Given the location of the defect, shape of the defect and the proximity to free margins a dermal autograft was deemed most appropriate.  Using a sterile surgical marker, the primary defect shape was transferred to the donor site. The area thus outlined was incised deep to adipose tissue with a #15 scalpel blade.  The harvested graft was then trimmed of adipose and epidermal tissue until only dermis was left.  The skin graft was then placed in the primary defect and oriented appropriately.
Path Notes (To The Dermatopathologist): Please check margins.
Hatchet Flap Text: The defect edges were debeveled with a #15 scalpel blade.  Given the location of the defect, shape of the defect and the proximity to free margins a hatchet flap was deemed most appropriate.  Using a sterile surgical marker, an appropriate hatchet flap was drawn incorporating the defect and placing the expected incisions within the relaxed skin tension lines where possible.    The area thus outlined was incised deep to adipose tissue with a #15 scalpel blade.  The skin margins were undermined to an appropriate distance in all directions utilizing iris scissors.
Size Of Margin In Cm: 0.3
Perilesional Excision Additional Text (Leave Blank If You Do Not Want): The margin was drawn around the clinically apparent lesion. Incisions were then made along these lines to the appropriate tissue plane and the lesion was extirpated.
Nasalis-Muscle-Based Myocutaneous Island Pedicle Flap Text: Using a #15 blade, an incision was made around the donor flap to the level of the nasalis muscle. Wide lateral undermining was then performed in both the subcutaneous plane above the nasalis muscle, and in a submuscular plane just above periosteum. This allowed the formation of a free nasalis muscle axial pedicle (based on the angular artery) which was still attached to the actual cutaneous flap, increasing its mobility and vascular viability. Hemostasis was obtained with pinpoint electrocoagulation. The flap was mobilized into position and the pivotal anchor points positioned and stabilized with buried interrupted sutures. Subcutaneous and dermal tissues were closed in a multilayered fashion with sutures. Tissue redundancies were excised, and the epidermal edges were apposed without significant tension and sutured with sutures.
Undermining Type: Entire Wound
Complex Repair And Z Plasty Text: The defect edges were debeveled with a #15 scalpel blade.  The primary defect was closed partially with a complex linear closure.  Given the location of the remaining defect, shape of the defect and the proximity to free margins a Z plasty was deemed most appropriate for complete closure of the defect.  Using a sterile surgical marker, an appropriate advancement flap was drawn incorporating the defect and placing the expected incisions within the relaxed skin tension lines where possible.    The area thus outlined was incised deep to adipose tissue with a #15 scalpel blade.  The skin margins were undermined to an appropriate distance in all directions utilizing iris scissors.
Complex Repair And Modified Advancement Flap Text: The defect edges were debeveled with a #15 scalpel blade.  The primary defect was closed partially with a complex linear closure.  Given the location of the remaining defect, shape of the defect and the proximity to free margins a modified advancement flap was deemed most appropriate for complete closure of the defect.  Using a sterile surgical marker, an appropriate advancement flap was drawn incorporating the defect and placing the expected incisions within the relaxed skin tension lines where possible.    The area thus outlined was incised deep to adipose tissue with a #15 scalpel blade.  The skin margins were undermined to an appropriate distance in all directions utilizing iris scissors.
Paramedian Forehead Flap Text: A decision was made to reconstruct the defect utilizing an interpolation axial flap and a staged reconstruction.  A telfa template was made of the defect.  This telfa template was then used to outline the paramedian forehead pedicle flap.  The donor area for the pedicle flap was then injected with anesthesia.  The flap was excised through the skin and subcutaneous tissue down to the layer of the underlying musculature.  The pedicle flap was carefully excised within this deep plane to maintain its blood supply.  The edges of the donor site were undermined.   The donor site was closed in a primary fashion.  The pedicle was then rotated into position and sutured.  Once the tube was sutured into place, adequate blood supply was confirmed with blanching and refill.  The pedicle was then wrapped with xeroform gauze and dressed appropriately with a telfa and gauze bandage to ensure continued blood supply and protect the attached pedicle.
Cheek-To-Nose Interpolation Flap Text: A decision was made to reconstruct the defect utilizing an interpolation axial flap and a staged reconstruction.  A telfa template was made of the defect.  This telfa template was then used to outline the Cheek-To-Nose Interpolation flap.  The donor area for the pedicle flap was then injected with anesthesia.  The flap was excised through the skin and subcutaneous tissue down to the layer of the underlying musculature.  The interpolation flap was carefully excised within this deep plane to maintain its blood supply.  The edges of the donor site were undermined.   The donor site was closed in a primary fashion.  The pedicle was then rotated into position and sutured.  Once the tube was sutured into place, adequate blood supply was confirmed with blanching and refill.  The pedicle was then wrapped with xeroform gauze and dressed appropriately with a telfa and gauze bandage to ensure continued blood supply and protect the attached pedicle.
Helical Rim Advancement Flap Text: The defect edges were debeveled with a #15 blade scalpel.  Given the location of the defect and the proximity to free margins (helical rim) a double helical rim advancement flap was deemed most appropriate.  Using a sterile surgical marker, the appropriate advancement flaps were drawn incorporating the defect and placing the expected incisions between the helical rim and antihelix where possible.  The area thus outlined was incised through and through with a #15 scalpel blade.  With a skin hook and iris scissors, the flaps were gently and sharply undermined and freed up.
Number Of Hemigard Strips Per Side: 1
Partial Purse String (Simple) Text: Given the location of the defect and the characteristics of the surrounding skin a simple purse string closure was deemed most appropriate.  Undermining was performed circumferentially around the surgical defect.  A purse string suture was then placed and tightened. Wound tension of the circular defect prevented complete closure of the wound.
Burow's Advancement Flap Text: The defect edges were debeveled with a #15 scalpel blade.  Given the location of the defect and the proximity to free margins a Burow's advancement flap was deemed most appropriate.  Using a sterile surgical marker, the appropriate advancement flap was drawn incorporating the defect and placing the expected incisions within the relaxed skin tension lines where possible.    The area thus outlined was incised deep to adipose tissue with a #15 scalpel blade.  The skin margins were undermined to an appropriate distance in all directions utilizing iris scissors.
Double O-Z Flap Text: The defect edges were debeveled with a #15 scalpel blade.  Given the location of the defect, shape of the defect and the proximity to free margins a Double O-Z flap was deemed most appropriate.  Using a sterile surgical marker, an appropriate transposition flap was drawn incorporating the defect and placing the expected incisions within the relaxed skin tension lines where possible. The area thus outlined was incised deep to adipose tissue with a #15 scalpel blade.  The skin margins were undermined to an appropriate distance in all directions utilizing iris scissors.
Cartilage Graft Text: The defect edges were debeveled with a #15 scalpel blade.  Given the location of the defect, shape of the defect, the fact the defect involved a full thickness cartilage defect a cartilage graft was deemed most appropriate.  An appropriate donor site was identified, cleansed, and anesthetized. The cartilage graft was then harvested and transferred to the recipient site, oriented appropriately and then sutured into place.  The secondary defect was then repaired using a primary closure.
Complex Repair And M Plasty Text: The defect edges were debeveled with a #15 scalpel blade.  The primary defect was closed partially with a complex linear closure.  Given the location of the remaining defect, shape of the defect and the proximity to free margins an M plasty was deemed most appropriate for complete closure of the defect.  Using a sterile surgical marker, an appropriate advancement flap was drawn incorporating the defect and placing the expected incisions within the relaxed skin tension lines where possible.    The area thus outlined was incised deep to adipose tissue with a #15 scalpel blade.  The skin margins were undermined to an appropriate distance in all directions utilizing iris scissors.
Epidermal Closure Graft Donor Site (Optional): simple interrupted
Suturegard Intro: Intraoperative tissue expansion was performed, utilizing the SUTUREGARD device, in order to reduce wound tension.
Saucerization Excision Additional Text (Leave Blank If You Do Not Want): The margin was drawn around the clinically apparent lesion.  Incisions were then made along these lines, in a tangential fashion, to the appropriate tissue plane and the lesion was extirpated.
Retention Suture Bite Size: 3 mm
O-Z Plasty Text: The defect edges were debeveled with a #15 scalpel blade.  Given the location of the defect, shape of the defect and the proximity to free margins an O-Z plasty (double transposition flap) was deemed most appropriate.  Using a sterile surgical marker, the appropriate transposition flaps were drawn incorporating the defect and placing the expected incisions within the relaxed skin tension lines where possible.    The area thus outlined was incised deep to adipose tissue with a #15 scalpel blade.  The skin margins were undermined to an appropriate distance in all directions utilizing iris scissors.  Hemostasis was achieved with electrocautery.  The flaps were then transposed into place, one clockwise and the other counterclockwise, and anchored with interrupted buried subcutaneous sutures.
Complex Repair And Skin Substitute Graft Text: The defect edges were debeveled with a #15 scalpel blade.  The primary defect was closed partially with a complex linear closure.  Given the location of the remaining defect, shape of the defect and the proximity to free margins a skin substitute graft was deemed most appropriate to repair the remaining defect.  The graft was trimmed to fit the size of the remaining defect.  The graft was then placed in the primary defect, oriented appropriately, and sutured into place.
Xenograft Text: The defect edges were debeveled with a #15 scalpel blade.  Given the location of the defect, shape of the defect and the proximity to free margins a xenograft was deemed most appropriate.  The graft was then trimmed to fit the size of the defect.  The graft was then placed in the primary defect and oriented appropriately.
Repair Performed By Another Provider Text (Leave Blank If You Do Not Want): After the tissue was excised the defect was repaired by another provider.
Additional Anesthesia Volume In Cc: 6
Epidermal Sutures: 4-0 Nylon
Complex Repair And O-T Advancement Flap Text: The defect edges were debeveled with a #15 scalpel blade.  The primary defect was closed partially with a complex linear closure.  Given the location of the remaining defect, shape of the defect and the proximity to free margins an O-T advancement flap was deemed most appropriate for complete closure of the defect.  Using a sterile surgical marker, an appropriate advancement flap was drawn incorporating the defect and placing the expected incisions within the relaxed skin tension lines where possible.    The area thus outlined was incised deep to adipose tissue with a #15 scalpel blade.  The skin margins were undermined to an appropriate distance in all directions utilizing iris scissors.
Ear Star Wedge Flap Text: The defect edges were debeveled with a #15 blade scalpel.  Given the location of the defect and the proximity to free margins (helical rim) an ear star wedge flap was deemed most appropriate.  Using a sterile surgical marker, the appropriate flap was drawn incorporating the defect and placing the expected incisions between the helical rim and antihelix where possible.  The area thus outlined was incised through and through with a #15 scalpel blade.
Melolabial Interpolation Flap Text: A decision was made to reconstruct the defect utilizing an interpolation axial flap and a staged reconstruction.  A telfa template was made of the defect.  This telfa template was then used to outline the melolabial interpolation flap.  The donor area for the pedicle flap was then injected with anesthesia.  The flap was excised through the skin and subcutaneous tissue down to the layer of the underlying musculature.  The pedicle flap was carefully excised within this deep plane to maintain its blood supply.  The edges of the donor site were undermined.   The donor site was closed in a primary fashion.  The pedicle was then rotated into position and sutured.  Once the tube was sutured into place, adequate blood supply was confirmed with blanching and refill.  The pedicle was then wrapped with xeroform gauze and dressed appropriately with a telfa and gauze bandage to ensure continued blood supply and protect the attached pedicle.
Ftsg Text: The defect edges were debeveled with a #15 scalpel blade.  Given the location of the defect, shape of the defect and the proximity to free margins a full thickness skin graft was deemed most appropriate.  Using a sterile surgical marker, the primary defect shape was transferred to the donor site. The area thus outlined was incised deep to adipose tissue with a #15 scalpel blade.  The harvested graft was then trimmed of adipose tissue until only dermis and epidermis was left.  The skin margins of the secondary defect were undermined to an appropriate distance in all directions utilizing iris scissors.  The secondary defect was closed with interrupted buried subcutaneous sutures.  The skin edges were then re-apposed with running  sutures.  The skin graft was then placed in the primary defect and oriented appropriately.
Rhombic Flap Text: The defect edges were debeveled with a #15 scalpel blade.  Given the location of the defect and the proximity to free margins a rhombic flap was deemed most appropriate.  Using a sterile surgical marker, an appropriate rhombic flap was drawn incorporating the defect.    The area thus outlined was incised deep to adipose tissue with a #15 scalpel blade.  The skin margins were undermined to an appropriate distance in all directions utilizing iris scissors.
Complex Repair And Rotation Flap Text: The defect edges were debeveled with a #15 scalpel blade.  The primary defect was closed partially with a complex linear closure.  Given the location of the remaining defect, shape of the defect and the proximity to free margins a rotation flap was deemed most appropriate for complete closure of the defect.  Using a sterile surgical marker, an appropriate advancement flap was drawn incorporating the defect and placing the expected incisions within the relaxed skin tension lines where possible.    The area thus outlined was incised deep to adipose tissue with a #15 scalpel blade.  The skin margins were undermined to an appropriate distance in all directions utilizing iris scissors.
Double Island Pedicle Flap Text: The defect edges were debeveled with a #15 scalpel blade.  Given the location of the defect, shape of the defect and the proximity to free margins a double island pedicle advancement flap was deemed most appropriate.  Using a sterile surgical marker, an appropriate advancement flap was drawn incorporating the defect, outlining the appropriate donor tissue and placing the expected incisions within the relaxed skin tension lines where possible.    The area thus outlined was incised deep to adipose tissue with a #15 scalpel blade.  The skin margins were undermined to an appropriate distance in all directions around the primary defect and laterally outward around the island pedicle utilizing iris scissors.  There was minimal undermining beneath the pedicle flap.
Complex Repair And Dorsal Nasal Flap Text: The defect edges were debeveled with a #15 scalpel blade.  The primary defect was closed partially with a complex linear closure.  Given the location of the remaining defect, shape of the defect and the proximity to free margins a dorsal nasal flap was deemed most appropriate for complete closure of the defect.  Using a sterile surgical marker, an appropriate flap was drawn incorporating the defect and placing the expected incisions within the relaxed skin tension lines where possible.    The area thus outlined was incised deep to adipose tissue with a #15 scalpel blade.  The skin margins were undermined to an appropriate distance in all directions utilizing iris scissors.
Trilobed Flap Text: The defect edges were debeveled with a #15 scalpel blade.  Given the location of the defect and the proximity to free margins a trilobed flap was deemed most appropriate.  Using a sterile surgical marker, an appropriate trilobed flap drawn around the defect.    The area thus outlined was incised deep to adipose tissue with a #15 scalpel blade.  The skin margins were undermined to an appropriate distance in all directions utilizing iris scissors.
Spiral Flap Text: The defect edges were debeveled with a #15 scalpel blade.  Given the location of the defect, shape of the defect and the proximity to free margins a spiral flap was deemed most appropriate.  Using a sterile surgical marker, an appropriate rotation flap was drawn incorporating the defect and placing the expected incisions within the relaxed skin tension lines where possible. The area thus outlined was incised deep to adipose tissue with a #15 scalpel blade.  The skin margins were undermined to an appropriate distance in all directions utilizing iris scissors.
Melolabial Transposition Flap Text: The defect edges were debeveled with a #15 scalpel blade.  Given the location of the defect and the proximity to free margins a melolabial flap was deemed most appropriate.  Using a sterile surgical marker, an appropriate melolabial transposition flap was drawn incorporating the defect.    The area thus outlined was incised deep to adipose tissue with a #15 scalpel blade.  The skin margins were undermined to an appropriate distance in all directions utilizing iris scissors.
Z Plasty Text: The lesion was extirpated to the level of the fat with a #15 scalpel blade.  Given the location of the defect, shape of the defect and the proximity to free margins a Z-plasty was deemed most appropriate for repair.  Using a sterile surgical marker, the appropriate transposition arms of the Z-plasty were drawn incorporating the defect and placing the expected incisions within the relaxed skin tension lines where possible.    The area thus outlined was incised deep to adipose tissue with a #15 scalpel blade.  The skin margins were undermined to an appropriate distance in all directions utilizing iris scissors.  The opposing transposition arms were then transposed into place in opposite direction and anchored with interrupted buried subcutaneous sutures.
Interpolation Flap Text: A decision was made to reconstruct the defect utilizing an interpolation axial flap and a staged reconstruction.  A telfa template was made of the defect.  This telfa template was then used to outline the interpolation flap.  The donor area for the pedicle flap was then injected with anesthesia.  The flap was excised through the skin and subcutaneous tissue down to the layer of the underlying musculature.  The interpolation flap was carefully excised within this deep plane to maintain its blood supply.  The edges of the donor site were undermined.   The donor site was closed in a primary fashion.  The pedicle was then rotated into position and sutured.  Once the tube was sutured into place, adequate blood supply was confirmed with blanching and refill.  The pedicle was then wrapped with xeroform gauze and dressed appropriately with a telfa and gauze bandage to ensure continued blood supply and protect the attached pedicle.
Bilateral Helical Rim Advancement Flap Text: The defect edges were debeveled with a #15 blade scalpel.  Given the location of the defect and the proximity to free margins (helical rim) a bilateral helical rim advancement flap was deemed most appropriate.  Using a sterile surgical marker, the appropriate advancement flaps were drawn incorporating the defect and placing the expected incisions between the helical rim and antihelix where possible.  The area thus outlined was incised through and through with a #15 scalpel blade.  With a skin hook and iris scissors, the flaps were gently and sharply undermined and freed up.
Deep Sutures: 4-0 Maxon
Lip Wedge Excision Repair Text: Given the location of the defect and the proximity to free margins a full thickness wedge repair was deemed most appropriate.  Using a sterile surgical marker, the appropriate repair was drawn incorporating the defect and placing the expected incisions perpendicular to the vermilion border.  The vermilion border was also meticulously outlined to ensure appropriate reapproximation during the repair.  The area thus outlined was incised through and through with a #15 scalpel blade.  The muscularis and dermis were reaproximated with deep sutures following hemostasis. Care was taken to realign the vermilion border before proceeding with the superficial closure.  Once the vermilion was realigned the superfical and mucosal closure was finished.
Debridement Text: The wound edges were debrided prior to proceeding with the closure to facilitate wound healing.
Detail Level: Detailed
A-T Advancement Flap Text: The defect edges were debeveled with a #15 scalpel blade.  Given the location of the defect, shape of the defect and the proximity to free margins an A-T advancement flap was deemed most appropriate.  Using a sterile surgical marker, an appropriate advancement flap was drawn incorporating the defect and placing the expected incisions within the relaxed skin tension lines where possible.    The area thus outlined was incised deep to adipose tissue with a #15 scalpel blade.  The skin margins were undermined to an appropriate distance in all directions utilizing iris scissors.
Excision Depth: adipose tissue
Tissue Cultured Epidermal Autograft Text: The defect edges were debeveled with a #15 scalpel blade.  Given the location of the defect, shape of the defect and the proximity to free margins a tissue cultured epidermal autograft was deemed most appropriate.  The graft was then trimmed to fit the size of the defect.  The graft was then placed in the primary defect and oriented appropriately.
Positioning (Leave Blank If You Do Not Want): The patient was placed in a comfortable position exposing the surgical site.
Eye Clamp Note Details: An eye clamp was used during the procedure.
Complex Repair And Split-Thickness Skin Graft Text: The defect edges were debeveled with a #15 scalpel blade.  The primary defect was closed partially with a complex linear closure.  Given the location of the defect, shape of the defect and the proximity to free margins a split thickness skin graft was deemed most appropriate to repair the remaining defect.  The graft was trimmed to fit the size of the remaining defect.  The graft was then placed in the primary defect, oriented appropriately, and sutured into place.
Complex Repair And A-T Advancement Flap Text: The defect edges were debeveled with a #15 scalpel blade.  The primary defect was closed partially with a complex linear closure.  Given the location of the remaining defect, shape of the defect and the proximity to free margins an A-T advancement flap was deemed most appropriate for complete closure of the defect.  Using a sterile surgical marker, an appropriate advancement flap was drawn incorporating the defect and placing the expected incisions within the relaxed skin tension lines where possible.    The area thus outlined was incised deep to adipose tissue with a #15 scalpel blade.  The skin margins were undermined to an appropriate distance in all directions utilizing iris scissors.
Modified Advancement Flap Text: The defect edges were debeveled with a #15 scalpel blade.  Given the location of the defect, shape of the defect and the proximity to free margins a modified advancement flap was deemed most appropriate.  Using a sterile surgical marker, an appropriate advancement flap was drawn incorporating the defect and placing the expected incisions within the relaxed skin tension lines where possible.    The area thus outlined was incised deep to adipose tissue with a #15 scalpel blade.  The skin margins were undermined to an appropriate distance in all directions utilizing iris scissors.
Complex Repair And Ftsg Text: The defect edges were debeveled with a #15 scalpel blade.  The primary defect was closed partially with a complex linear closure.  Given the location of the defect, shape of the defect and the proximity to free margins a full thickness skin graft was deemed most appropriate to repair the remaining defect.  The graft was trimmed to fit the size of the remaining defect.  The graft was then placed in the primary defect, oriented appropriately, and sutured into place.
No Repair - Repaired With Adjacent Surgical Defect Text (Leave Blank If You Do Not Want): After the excision the defect was repaired concurrently with another surgical defect which was in close approximation.
Banner Transposition Flap Text: The defect edges were debeveled with a #15 scalpel blade.  Given the location of the defect and the proximity to free margins a Banner transposition flap was deemed most appropriate.  Using a sterile surgical marker, an appropriate flap drawn around the defect. The area thus outlined was incised deep to adipose tissue with a #15 scalpel blade.  The skin margins were undermined to an appropriate distance in all directions utilizing iris scissors.
Mastoid Interpolation Flap Text: A decision was made to reconstruct the defect utilizing an interpolation axial flap and a staged reconstruction.  A telfa template was made of the defect.  This telfa template was then used to outline the mastoid interpolation flap.  The donor area for the pedicle flap was then injected with anesthesia.  The flap was excised through the skin and subcutaneous tissue down to the layer of the underlying musculature.  The pedicle flap was carefully excised within this deep plane to maintain its blood supply.  The edges of the donor site were undermined.   The donor site was closed in a primary fashion.  The pedicle was then rotated into position and sutured.  Once the tube was sutured into place, adequate blood supply was confirmed with blanching and refill.  The pedicle was then wrapped with xeroform gauze and dressed appropriately with a telfa and gauze bandage to ensure continued blood supply and protect the attached pedicle.
Complex Repair And Rhombic Flap Text: The defect edges were debeveled with a #15 scalpel blade.  The primary defect was closed partially with a complex linear closure.  Given the location of the remaining defect, shape of the defect and the proximity to free margins a rhombic flap was deemed most appropriate for complete closure of the defect.  Using a sterile surgical marker, an appropriate advancement flap was drawn incorporating the defect and placing the expected incisions within the relaxed skin tension lines where possible.    The area thus outlined was incised deep to adipose tissue with a #15 scalpel blade.  The skin margins were undermined to an appropriate distance in all directions utilizing iris scissors.
Complex Repair And Double M Plasty Text: The defect edges were debeveled with a #15 scalpel blade.  The primary defect was closed partially with a complex linear closure.  Given the location of the remaining defect, shape of the defect and the proximity to free margins a double M plasty was deemed most appropriate for complete closure of the defect.  Using a sterile surgical marker, an appropriate advancement flap was drawn incorporating the defect and placing the expected incisions within the relaxed skin tension lines where possible.    The area thus outlined was incised deep to adipose tissue with a #15 scalpel blade.  The skin margins were undermined to an appropriate distance in all directions utilizing iris scissors.
Star Wedge Flap Text: The defect edges were debeveled with a #15 scalpel blade.  Given the location of the defect, shape of the defect and the proximity to free margins a star wedge flap was deemed most appropriate.  Using a sterile surgical marker, an appropriate rotation flap was drawn incorporating the defect and placing the expected incisions within the relaxed skin tension lines where possible. The area thus outlined was incised deep to adipose tissue with a #15 scalpel blade.  The skin margins were undermined to an appropriate distance in all directions utilizing iris scissors.
Nostril Rim Text: The closure involved the nostril rim.
Island Pedicle Flap-Requiring Vessel Identification Text: The defect edges were debeveled with a #15 scalpel blade.  Given the location of the defect, shape of the defect and the proximity to free margins an island pedicle advancement flap was deemed most appropriate.  Using a sterile surgical marker, an appropriate advancement flap was drawn, based on the axial vessel mentioned above, incorporating the defect, outlining the appropriate donor tissue and placing the expected incisions within the relaxed skin tension lines where possible.    The area thus outlined was incised deep to adipose tissue with a #15 scalpel blade.  The skin margins were undermined to an appropriate distance in all directions around the primary defect and laterally outward around the island pedicle utilizing iris scissors.  There was minimal undermining beneath the pedicle flap.
Complex Repair And Single Advancement Flap Text: The defect edges were debeveled with a #15 scalpel blade.  The primary defect was closed partially with a complex linear closure.  Given the location of the remaining defect, shape of the defect and the proximity to free margins a single advancement flap was deemed most appropriate for complete closure of the defect.  Using a sterile surgical marker, an appropriate advancement flap was drawn incorporating the defect and placing the expected incisions within the relaxed skin tension lines where possible.    The area thus outlined was incised deep to adipose tissue with a #15 scalpel blade.  The skin margins were undermined to an appropriate distance in all directions utilizing iris scissors.
Chonodrocutaneous Helical Advancement Flap Text: The defect edges were debeveled with a #15 scalpel blade.  Given the location of the defect and the proximity to free margins a chondrocutaneous helical advancement flap was deemed most appropriate.  Using a sterile surgical marker, the appropriate advancement flap was drawn incorporating the defect and placing the expected incisions within the relaxed skin tension lines where possible.    The area thus outlined was incised deep to adipose tissue with a #15 scalpel blade.  The skin margins were undermined to an appropriate distance in all directions utilizing iris scissors.
Rhomboid Transposition Flap Text: The defect edges were debeveled with a #15 scalpel blade.  Given the location of the defect and the proximity to free margins a rhomboid transposition flap was deemed most appropriate.  Using a sterile surgical marker, an appropriate rhomboid flap was drawn incorporating the defect.    The area thus outlined was incised deep to adipose tissue with a #15 scalpel blade.  The skin margins were undermined to an appropriate distance in all directions utilizing iris scissors.
V-Y Flap Text: The defect edges were debeveled with a #15 scalpel blade.  Given the location of the defect, shape of the defect and the proximity to free margins a V-Y flap was deemed most appropriate.  Using a sterile surgical marker, an appropriate advancement flap was drawn incorporating the defect and placing the expected incisions within the relaxed skin tension lines where possible.    The area thus outlined was incised deep to adipose tissue with a #15 scalpel blade.  The skin margins were undermined to an appropriate distance in all directions utilizing iris scissors.
Peng Advancement Flap Text: The defect edges were debeveled with a #15 scalpel blade.  Given the location of the defect, shape of the defect and the proximity to free margins a Peng advancement flap was deemed most appropriate.  Using a sterile surgical marker, an appropriate advancement flap was drawn incorporating the defect and placing the expected incisions within the relaxed skin tension lines where possible. The area thus outlined was incised deep to adipose tissue with a #15 scalpel blade.  The skin margins were undermined to an appropriate distance in all directions utilizing iris scissors.
Purse String (Intermediate) Text: Given the location of the defect and the characteristics of the surrounding skin a pursestring intermediate closure was deemed most appropriate.  Undermining was performed circumfirentially around the surgical defect.  A purstring suture was then placed and tightened.
Complex Repair And O-L Flap Text: The defect edges were debeveled with a #15 scalpel blade.  The primary defect was closed partially with a complex linear closure.  Given the location of the remaining defect, shape of the defect and the proximity to free margins an O-L flap was deemed most appropriate for complete closure of the defect.  Using a sterile surgical marker, an appropriate flap was drawn incorporating the defect and placing the expected incisions within the relaxed skin tension lines where possible.    The area thus outlined was incised deep to adipose tissue with a #15 scalpel blade.  The skin margins were undermined to an appropriate distance in all directions utilizing iris scissors.
Post-Care Instructions: I reviewed with the patient in detail post-care instructions. Patient is not to engage in any heavy lifting, exercise, or swimming for the next 14 days. Should the patient develop any fevers, chills, bleeding, severe pain patient will contact the office immediately.
Split-Thickness Skin Graft Text: The defect edges were debeveled with a #15 scalpel blade.  Given the location of the defect, shape of the defect and the proximity to free margins a split thickness skin graft was deemed most appropriate.  Using a sterile surgical marker, the primary defect shape was transferred to the donor site. The split thickness graft was then harvested.  The skin graft was then placed in the primary defect and oriented appropriately.
Hemigard Postcare Instructions: The HEMIGARD strips are to remain completely dry for at least 5-7 days.
Graft Donor Site Bandage (Optional-Leave Blank If You Don't Want In Note): Steri-strips and a pressure bandage were applied to the donor site.
Length To Time In Minutes Device Was In Place: 10
Complex Repair And Epidermal Autograft Text: The defect edges were debeveled with a #15 scalpel blade.  The primary defect was closed partially with a complex linear closure.  Given the location of the defect, shape of the defect and the proximity to free margins an epidermal autograft was deemed most appropriate to repair the remaining defect.  The graft was trimmed to fit the size of the remaining defect.  The graft was then placed in the primary defect, oriented appropriately, and sutured into place.
Scalpel Size: 10 blade
Excision Method: Fusiform
Zygomaticofacial Flap Text: Given the location of the defect, shape of the defect and the proximity to free margins a zygomaticofacial flap was deemed most appropriate for repair.  Using a sterile surgical marker, the appropriate flap was drawn incorporating the defect and placing the expected incisions within the relaxed skin tension lines where possible. The area thus outlined was incised deep to adipose tissue with a #15 scalpel blade with preservation of a vascular pedicle.  The skin margins were undermined to an appropriate distance in all directions utilizing iris scissors.  The flap was then placed into the defect and anchored with interrupted buried subcutaneous sutures.
Dressing: dry sterile dressing
Accession #: [id number]
Helical Rim Text: The closure involved the helical rim.
Dorsal Nasal Flap Text: The defect edges were debeveled with a #15 scalpel blade.  Given the location of the defect and the proximity to free margins a dorsal nasal flap was deemed most appropriate.  Using a sterile surgical marker, an appropriate dorsal nasal flap was drawn around the defect.    The area thus outlined was incised deep to adipose tissue with a #15 scalpel blade.  The skin margins were undermined to an appropriate distance in all directions utilizing iris scissors.
Composite Graft Text: The defect edges were debeveled with a #15 scalpel blade.  Given the location of the defect, shape of the defect, the proximity to free margins and the fact the defect was full thickness a composite graft was deemed most appropriate.  The defect was outline and then transferred to the donor site.  A full thickness graft was then excised from the donor site. The graft was then placed in the primary defect, oriented appropriately and then sutured into place.  The secondary defect was then repaired using a primary closure.
Double O-Z Plasty Text: The defect edges were debeveled with a #15 scalpel blade.  Given the location of the defect, shape of the defect and the proximity to free margins a Double O-Z plasty (double transposition flap) was deemed most appropriate.  Using a sterile surgical marker, the appropriate transposition flaps were drawn incorporating the defect and placing the expected incisions within the relaxed skin tension lines where possible. The area thus outlined was incised deep to adipose tissue with a #15 scalpel blade.  The skin margins were undermined to an appropriate distance in all directions utilizing iris scissors.  Hemostasis was achieved with electrocautery.  The flaps were then transposed into place, one clockwise and the other counterclockwise, and anchored with interrupted buried subcutaneous sutures.
Pre-Excision Curettage Text (Leave Blank If You Do Not Want): Prior to drawing the surgical margin the visible lesion was removed with electrodesiccation and curettage to clearly define the lesion size.
O-T Advancement Flap Text: The defect edges were debeveled with a #15 scalpel blade.  Given the location of the defect, shape of the defect and the proximity to free margins an O-T advancement flap was deemed most appropriate.  Using a sterile surgical marker, an appropriate advancement flap was drawn incorporating the defect and placing the expected incisions within the relaxed skin tension lines where possible.    The area thus outlined was incised deep to adipose tissue with a #15 scalpel blade.  The skin margins were undermined to an appropriate distance in all directions utilizing iris scissors.
Mucosal Advancement Flap Text: Given the location of the defect, shape of the defect and the proximity to free margins a mucosal advancement flap was deemed most appropriate. Incisions were made with a 15 blade scalpel in the appropriate fashion along the cutaneous vermilion border and the mucosal lip. The remaining actinically damaged mucosal tissue was excised.  The mucosal advancement flap was then elevated to the gingival sulcus with care taken to preserve the neurovascular structures and advanced into the primary defect. Care was taken to ensure that precise realignment of the vermilion border was achieved.
Slit Excision Additional Text (Leave Blank If You Do Not Want): A linear line was drawn on the skin overlying the lesion. An incision was made slowly until the lesion was visualized.  Once visualized, the lesion was removed with blunt dissection.
Vermilion Border Text: The closure involved the vermilion border.
Home Suture Removal Text: Patient was provided a home suture removal kit and will remove their sutures at home.  If they have any questions or difficulties they will call the office.
Estimated Blood Loss (Cc): minimal
Complex Repair And Transposition Flap Text: The defect edges were debeveled with a #15 scalpel blade.  The primary defect was closed partially with a complex linear closure.  Given the location of the remaining defect, shape of the defect and the proximity to free margins a transposition flap was deemed most appropriate for complete closure of the defect.  Using a sterile surgical marker, an appropriate advancement flap was drawn incorporating the defect and placing the expected incisions within the relaxed skin tension lines where possible.    The area thus outlined was incised deep to adipose tissue with a #15 scalpel blade.  The skin margins were undermined to an appropriate distance in all directions utilizing iris scissors.
Epidermal Closure: interrupted subcuticular
Complex Repair And W Plasty Text: The defect edges were debeveled with a #15 scalpel blade.  The primary defect was closed partially with a complex linear closure.  Given the location of the remaining defect, shape of the defect and the proximity to free margins a W plasty was deemed most appropriate for complete closure of the defect.  Using a sterile surgical marker, an appropriate advancement flap was drawn incorporating the defect and placing the expected incisions within the relaxed skin tension lines where possible.    The area thus outlined was incised deep to adipose tissue with a #15 scalpel blade.  The skin margins were undermined to an appropriate distance in all directions utilizing iris scissors.
Purse String (Simple) Text: Given the location of the defect and the characteristics of the surrounding skin a purse string simple closure was deemed most appropriate.  Undermining was performed circumferentially around the surgical defect.  A purse string suture was then placed and tightened.
Complex Repair And Double Advancement Flap Text: The defect edges were debeveled with a #15 scalpel blade.  The primary defect was closed partially with a complex linear closure.  Given the location of the remaining defect, shape of the defect and the proximity to free margins a double advancement flap was deemed most appropriate for complete closure of the defect.  Using a sterile surgical marker, an appropriate advancement flap was drawn incorporating the defect and placing the expected incisions within the relaxed skin tension lines where possible.    The area thus outlined was incised deep to adipose tissue with a #15 scalpel blade.  The skin margins were undermined to an appropriate distance in all directions utilizing iris scissors.
Posterior Auricular Interpolation Flap Text: A decision was made to reconstruct the defect utilizing an interpolation axial flap and a staged reconstruction.  A telfa template was made of the defect.  This telfa template was then used to outline the posterior auricular interpolation flap.  The donor area for the pedicle flap was then injected with anesthesia.  The flap was excised through the skin and subcutaneous tissue down to the layer of the underlying musculature.  The pedicle flap was carefully excised within this deep plane to maintain its blood supply.  The edges of the donor site were undermined.   The donor site was closed in a primary fashion.  The pedicle was then rotated into position and sutured.  Once the tube was sutured into place, adequate blood supply was confirmed with blanching and refill.  The pedicle was then wrapped with xeroform gauze and dressed appropriately with a telfa and gauze bandage to ensure continued blood supply and protect the attached pedicle.
Burow's Graft Text: The defect edges were debeveled with a #15 scalpel blade.  Given the location of the defect, shape of the defect, the proximity to free margins and the presence of a standing cone deformity a Burow's skin graft was deemed most appropriate. The standing cone was removed and this tissue was then trimmed to the shape of the primary defect. The adipose tissue was also removed until only dermis and epidermis were left.  The skin margins of the secondary defect were undermined to an appropriate distance in all directions utilizing iris scissors.  The secondary defect was closed with interrupted buried subcutaneous sutures.  The skin edges were then re-apposed with running  sutures.  The skin graft was then placed in the primary defect and oriented appropriately.
Size Of Lesion In Cm: 1.4
Retention Suture Text: Retention sutures were placed to support the closure and prevent dehiscence.
Island Pedicle Flap With Canthal Suspension Text: The defect edges were debeveled with a #15 scalpel blade.  Given the location of the defect, shape of the defect and the proximity to free margins an island pedicle advancement flap was deemed most appropriate.  Using a sterile surgical marker, an appropriate advancement flap was drawn incorporating the defect, outlining the appropriate donor tissue and placing the expected incisions within the relaxed skin tension lines where possible. The area thus outlined was incised deep to adipose tissue with a #15 scalpel blade.  The skin margins were undermined to an appropriate distance in all directions around the primary defect and laterally outward around the island pedicle utilizing iris scissors.  There was minimal undermining beneath the pedicle flap. A suspension suture was placed in the canthal tendon to prevent tension and prevent ectropion.
Fusiform Excision Additional Text (Leave Blank If You Do Not Want): The margin was drawn around the clinically apparent lesion.  A fusiform shape was then drawn on the skin incorporating the lesion and margins.  Incisions were then made along these lines to the appropriate tissue plane and the lesion was extirpated.
H Plasty Text: Given the location of the defect, shape of the defect and the proximity to free margins a H-plasty was deemed most appropriate for repair.  Using a sterile surgical marker, the appropriate advancement arms of the H-plasty were drawn incorporating the defect and placing the expected incisions within the relaxed skin tension lines where possible. The area thus outlined was incised deep to adipose tissue with a #15 scalpel blade. The skin margins were undermined to an appropriate distance in all directions utilizing iris scissors.  The opposing advancement arms were then advanced into place in opposite direction and anchored with interrupted buried subcutaneous sutures.
O-Z Flap Text: The defect edges were debeveled with a #15 scalpel blade.  Given the location of the defect, shape of the defect and the proximity to free margins an O-Z flap was deemed most appropriate.  Using a sterile surgical marker, an appropriate transposition flap was drawn incorporating the defect and placing the expected incisions within the relaxed skin tension lines where possible. The area thus outlined was incised deep to adipose tissue with a #15 scalpel blade.  The skin margins were undermined to an appropriate distance in all directions utilizing iris scissors.
Advancement-Rotation Flap Text: The defect edges were debeveled with a #15 scalpel blade.  Given the location of the defect, shape of the defect and the proximity to free margins an advancement-rotation flap was deemed most appropriate.  Using a sterile surgical marker, an appropriate flap was drawn incorporating the defect and placing the expected incisions within the relaxed skin tension lines where possible. The area thus outlined was incised deep to adipose tissue with a #15 scalpel blade.  The skin margins were undermined to an appropriate distance in all directions utilizing iris scissors.
Where Do You Want The Question To Include Opioid Counseling Located?: Case Summary Tab
Complex Repair And Tissue Cultured Epidermal Autograft Text: The defect edges were debeveled with a #15 scalpel blade.  The primary defect was closed partially with a complex linear closure.  Given the location of the defect, shape of the defect and the proximity to free margins an tissue cultured epidermal autograft was deemed most appropriate to repair the remaining defect.  The graft was trimmed to fit the size of the remaining defect.  The graft was then placed in the primary defect, oriented appropriately, and sutured into place.
Advancement Flap (Single) Text: The defect edges were debeveled with a #15 scalpel blade.  Given the location of the defect and the proximity to free margins a single advancement flap was deemed most appropriate.  Using a sterile surgical marker, an appropriate advancement flap was drawn incorporating the defect and placing the expected incisions within the relaxed skin tension lines where possible.    The area thus outlined was incised deep to adipose tissue with a #15 scalpel blade.  The skin margins were undermined to an appropriate distance in all directions utilizing iris scissors.
Complex Repair And Bilobe Flap Text: The defect edges were debeveled with a #15 scalpel blade.  The primary defect was closed partially with a complex linear closure.  Given the location of the remaining defect, shape of the defect and the proximity to free margins a bilobe flap was deemed most appropriate for complete closure of the defect.  Using a sterile surgical marker, an appropriate advancement flap was drawn incorporating the defect and placing the expected incisions within the relaxed skin tension lines where possible.    The area thus outlined was incised deep to adipose tissue with a #15 scalpel blade.  The skin margins were undermined to an appropriate distance in all directions utilizing iris scissors.
Nasal Turnover Hinge Flap Text: The defect edges were debeveled with a #15 scalpel blade.  Given the size, depth, location of the defect and the defect being full thickness a nasal turnover hinge flap was deemed most appropriate.  Using a sterile surgical marker, an appropriate hinge flap was drawn incorporating the defect. The area thus outlined was incised with a #15 scalpel blade. The flap was designed to recreate the nasal mucosal lining and the alar rim. The skin margins were undermined to an appropriate distance in all directions utilizing iris scissors.
V-Y Plasty Text: The defect edges were debeveled with a #15 scalpel blade.  Given the location of the defect, shape of the defect and the proximity to free margins an V-Y advancement flap was deemed most appropriate.  Using a sterile surgical marker, an appropriate advancement flap was drawn incorporating the defect and placing the expected incisions within the relaxed skin tension lines where possible.    The area thus outlined was incised deep to adipose tissue with a #15 scalpel blade.  The skin margins were undermined to an appropriate distance in all directions utilizing iris scissors.
Excisional Biopsy Additional Text (Leave Blank If You Do Not Want): The margin was drawn around the clinically apparent lesion. An elliptical shape was then drawn on the skin incorporating the lesion and margins.  Incisions were then made along these lines to the appropriate tissue plane and the lesion was extirpated.
Suturegard Body: The suture ends were repeatedly re-tightened and re-clamped to achieve the desired tissue expansion.
Transposition Flap Text: The defect edges were debeveled with a #15 scalpel blade.  Given the location of the defect and the proximity to free margins a transposition flap was deemed most appropriate.  Using a sterile surgical marker, an appropriate transposition flap was drawn incorporating the defect.    The area thus outlined was incised deep to adipose tissue with a #15 scalpel blade.  The skin margins were undermined to an appropriate distance in all directions utilizing iris scissors.
O-L Flap Text: The defect edges were debeveled with a #15 scalpel blade.  Given the location of the defect, shape of the defect and the proximity to free margins an O-L flap was deemed most appropriate.  Using a sterile surgical marker, an appropriate advancement flap was drawn incorporating the defect and placing the expected incisions within the relaxed skin tension lines where possible.    The area thus outlined was incised deep to adipose tissue with a #15 scalpel blade.  The skin margins were undermined to an appropriate distance in all directions utilizing iris scissors.
Complex Repair And Dermal Autograft Text: The defect edges were debeveled with a #15 scalpel blade.  The primary defect was closed partially with a complex linear closure.  Given the location of the defect, shape of the defect and the proximity to free margins an dermal autograft was deemed most appropriate to repair the remaining defect.  The graft was trimmed to fit the size of the remaining defect.  The graft was then placed in the primary defect, oriented appropriately, and sutured into place.
Consent was obtained from the patient. The risks and benefits to therapy were discussed in detail. Specifically, the risks of infection, scarring, bleeding, prolonged wound healing, incomplete removal, allergy to anesthesia, nerve injury and recurrence were addressed. Prior to the procedure, the treatment site was clearly identified and confirmed by the patient. All components of Universal Protocol/PAUSE Rule completed.
Hemostasis: Electrocautery
Bilobed Flap Text: The defect edges were debeveled with a #15 scalpel blade.  Given the location of the defect and the proximity to free margins a bilobe flap was deemed most appropriate.  Using a sterile surgical marker, an appropriate bilobe flap drawn around the defect.    The area thus outlined was incised deep to adipose tissue with a #15 scalpel blade.  The skin margins were undermined to an appropriate distance in all directions utilizing iris scissors.
Muscle Hinge Flap Text: The defect edges were debeveled with a #15 scalpel blade.  Given the size, depth and location of the defect and the proximity to free margins a muscle hinge flap was deemed most appropriate.  Using a sterile surgical marker, an appropriate hinge flap was drawn incorporating the defect. The area thus outlined was incised with a #15 scalpel blade.  The skin margins were undermined to an appropriate distance in all directions utilizing iris scissors.
Bi-Rhombic Flap Text: The defect edges were debeveled with a #15 scalpel blade.  Given the location of the defect and the proximity to free margins a bi-rhombic flap was deemed most appropriate.  Using a sterile surgical marker, an appropriate rhombic flap was drawn incorporating the defect. The area thus outlined was incised deep to adipose tissue with a #15 scalpel blade.  The skin margins were undermined to an appropriate distance in all directions utilizing iris scissors.
Suture Removal: 7 days
Repair Type: Intermediate
Complex Repair And Burow's Graft Text: The defect edges were debeveled with a #15 scalpel blade.  The primary defect was closed partially with a complex linear closure.  Given the location of the defect, shape of the defect, the proximity to free margins and the presence of a standing cone deformity a Burow's graft was deemed most appropriate to repair the remaining defect.  The graft was trimmed to fit the size of the remaining defect.  The graft was then placed in the primary defect, oriented appropriately, and sutured into place.
Keystone Flap Text: The defect edges were debeveled with a #15 scalpel blade.  Given the location of the defect, shape of the defect a keystone flap was deemed most appropriate.  Using a sterile surgical marker, an appropriate keystone flap was drawn incorporating the defect, outlining the appropriate donor tissue and placing the expected incisions within the relaxed skin tension lines where possible. The area thus outlined was incised deep to adipose tissue with a #15 scalpel blade.  The skin margins were undermined to an appropriate distance in all directions around the primary defect and laterally outward around the flap utilizing iris scissors.
Billing Type: Third-Party Bill
Island Pedicle Flap Text: The defect edges were debeveled with a #15 scalpel blade.  Given the location of the defect, shape of the defect and the proximity to free margins an island pedicle advancement flap was deemed most appropriate.  Using a sterile surgical marker, an appropriate advancement flap was drawn incorporating the defect, outlining the appropriate donor tissue and placing the expected incisions within the relaxed skin tension lines where possible.    The area thus outlined was incised deep to adipose tissue with a #15 scalpel blade.  The skin margins were undermined to an appropriate distance in all directions around the primary defect and laterally outward around the island pedicle utilizing iris scissors.  There was minimal undermining beneath the pedicle flap.
Curvilinear Excision Additional Text (Leave Blank If You Do Not Want): The margin was drawn around the clinically apparent lesion.  A curvilinear shape was then drawn on the skin incorporating the lesion and margins.  Incisions were then made along these lines to the appropriate tissue plane and the lesion was extirpated.
Epidermal Autograft Text: The defect edges were debeveled with a #15 scalpel blade.  Given the location of the defect, shape of the defect and the proximity to free margins an epidermal autograft was deemed most appropriate.  Using a sterile surgical marker, the primary defect shape was transferred to the donor site. The epidermal graft was then harvested.  The skin graft was then placed in the primary defect and oriented appropriately.
Cheek Interpolation Flap Text: A decision was made to reconstruct the defect utilizing an interpolation axial flap and a staged reconstruction.  A telfa template was made of the defect.  This telfa template was then used to outline the Cheek Interpolation flap.  The donor area for the pedicle flap was then injected with anesthesia.  The flap was excised through the skin and subcutaneous tissue down to the layer of the underlying musculature.  The interpolation flap was carefully excised within this deep plane to maintain its blood supply.  The edges of the donor site were undermined.   The donor site was closed in a primary fashion.  The pedicle was then rotated into position and sutured.  Once the tube was sutured into place, adequate blood supply was confirmed with blanching and refill.  The pedicle was then wrapped with xeroform gauze and dressed appropriately with a telfa and gauze bandage to ensure continued blood supply and protect the attached pedicle.

## 2021-06-17 ENCOUNTER — APPOINTMENT (OUTPATIENT)
Age: 66
Setting detail: DERMATOLOGY
End: 2021-06-18

## 2021-06-17 VITALS — TEMPERATURE: 98.2 F

## 2021-06-17 PROBLEM — C44.41 BASAL CELL CARCINOMA OF SKIN OF SCALP AND NECK: Status: ACTIVE | Noted: 2021-06-17

## 2021-06-17 PROCEDURE — OTHER MIPS QUALITY: OTHER

## 2021-06-17 PROCEDURE — 99024 POSTOP FOLLOW-UP VISIT: CPT

## 2021-06-17 PROCEDURE — OTHER PRESCRIPTION: OTHER

## 2021-06-17 PROCEDURE — OTHER SUTURE REMOVAL (GLOBAL PERIOD): OTHER

## 2021-06-17 RX ORDER — MUPIROCIN 20 MG/G
1 OINTMENT TOPICAL BID
Qty: 1 | Refills: 3 | Status: ERX | COMMUNITY
Start: 2021-06-17

## 2021-06-17 NOTE — PROCEDURE: MIPS QUALITY
Quality 110: Preventive Care And Screening: Influenza Immunization: Influenza Immunization Administered during Influenza season
Quality 47: Advance Care Plan: Advance Care Planning discussed and documented; advance care plan or surrogate decision maker documented in the medical record.
Quality 111:Pneumonia Vaccination Status For Older Adults: Pneumococcal Vaccination not Administered or Previously Received, Reason not Otherwise Specified
Detail Level: Detailed
Quality 226: Preventive Care And Screening: Tobacco Use: Screening And Cessation Intervention: Patient screened for tobacco use and is an ex/non-smoker

## 2021-06-17 NOTE — PROCEDURE: SUTURE REMOVAL (GLOBAL PERIOD)
Add 49968 Cpt? (Important Note: In 2017 The Use Of 55203 Is Being Tracked By Cms To Determine Future Global Period Reimbursement For Global Periods): yes
Detail Level: Detailed

## 2021-06-24 ENCOUNTER — APPOINTMENT (OUTPATIENT)
Age: 66
Setting detail: DERMATOLOGY
End: 2021-06-25

## 2021-06-24 VITALS — TEMPERATURE: 98 F

## 2021-06-24 PROBLEM — C44.41 BASAL CELL CARCINOMA OF SKIN OF SCALP AND NECK: Status: ACTIVE | Noted: 2021-06-24

## 2021-06-24 PROCEDURE — OTHER POST-OP WOUND CHECK: OTHER

## 2021-06-24 PROCEDURE — OTHER MIPS QUALITY: OTHER

## 2021-06-24 PROCEDURE — 99024 POSTOP FOLLOW-UP VISIT: CPT

## 2021-06-24 NOTE — PROCEDURE: MIPS QUALITY
Quality 226: Preventive Care And Screening: Tobacco Use: Screening And Cessation Intervention: Patient screened for tobacco use and is an ex/non-smoker
Quality 111:Pneumonia Vaccination Status For Older Adults: Pneumococcal Vaccination not Administered or Previously Received, Reason not Otherwise Specified
Quality 47: Advance Care Plan: Advance Care Planning discussed and documented; advance care plan or surrogate decision maker documented in the medical record.
Quality 110: Preventive Care And Screening: Influenza Immunization: Influenza Immunization Administered during Influenza season
Detail Level: Detailed

## 2021-06-24 NOTE — PROCEDURE: POST-OP WOUND CHECK
Wound Assessment Override (Optional): continue mupirocin
Add 10953 Cpt? (Important Note: In 2017 The Use Of 61635 Is Being Tracked By Cms To Determine Future Global Period Reimbursement For Global Periods): yes
Detail Level: Detailed

## 2021-07-07 ENCOUNTER — APPOINTMENT (OUTPATIENT)
Age: 66
Setting detail: DERMATOLOGY
End: 2021-07-08

## 2021-07-07 VITALS — TEMPERATURE: 97.8 F

## 2021-07-07 DIAGNOSIS — Z48.817 ENCOUNTER FOR SURGICAL AFTERCARE FOLLOWING SURGERY ON THE SKIN AND SUBCUTANEOUS TISSUE: ICD-10-CM

## 2021-07-07 PROCEDURE — OTHER MIPS QUALITY: OTHER

## 2021-07-07 PROCEDURE — 99024 POSTOP FOLLOW-UP VISIT: CPT

## 2021-07-07 PROCEDURE — OTHER POST-OP WOUND CHECK: OTHER

## 2021-07-07 ASSESSMENT — LOCATION SIMPLE DESCRIPTION DERM: LOCATION SIMPLE: POSTERIOR NECK

## 2021-07-07 ASSESSMENT — LOCATION ZONE DERM: LOCATION ZONE: NECK

## 2021-07-07 ASSESSMENT — LOCATION DETAILED DESCRIPTION DERM: LOCATION DETAILED: RIGHT LATERAL NECK

## 2021-07-07 NOTE — PROCEDURE: POST-OP WOUND CHECK
Add 13973 Cpt? (Important Note: In 2017 The Use Of 92561 Is Being Tracked By Cms To Determine Future Global Period Reimbursement For Global Periods): yes
Detail Level: Detailed

## 2021-10-21 NOTE — PROCEDURE: LIQUID NITROGEN
Render Note In Bullet Format When Appropriate: No
Number Of Freeze-Thaw Cycles: 1 freeze-thaw cycle
Consent: The patient's consent was obtained including but not limited to risks of crusting, scabbing, blistering, scarring, darker or lighter pigmentary change, recurrence, incomplete removal and infection.
yes
Detail Level: Detailed
Post-Care Instructions: I reviewed with the patient in detail post-care instructions. Patient is to wear sunprotection, and avoid picking at any of the treated lesions. Pt may apply Vaseline to crusted or scabbing areas.
Duration Of Freeze Thaw-Cycle (Seconds): 5

## 2021-11-12 NOTE — PROCEDURE: TREATMENT REGIMEN
Detail Level: Zone
Otc Regimen: Pt put on break today for the remaining part of this week.  Skin breaking down and bleeding IS[place] curretted the lesion today.  CAROLINE
Otc Regimen: Pt put on break today with skin reaction.  Junaid ESPARZA, gave pt. prescription for Silvadene cream. apply BID
Plan: This patient has been treated today with superficial radiation therapy for non-melanoma skin cancer.    \\n\\nWritten informed consent has been previously obtained from this patient for this treatment.  This consent is documented in the patient’s chart.  The patient gave verbal consent to continue treatment today. \\n\\nThe patient was treated with a specific radiation dose and setup as prescribed by the provider listed on this visit note.  A Radiation Therapist performed administration of radiation. The treatment parameters and cumulative dose are indicated above.\\n  \\nPrior to administering the radiation, the patient underwent a verification simulation defining relevant normal and abnormal target anatomy and acquiring images and data necessary to develop optimal radiation treatment process for the patient. This process includes verification of the treatment port(s) and proper treatment positioning.  All treatment ports were photographed.  The patient’s customized lead blocking was confirmed.   Considering, superficial radiotherapy setup is a clinical setup, this requires physician and radiation therapist to clarify location interest being treated against initial images, pathology and patient anatomy. Care was taken ensuring fields treated were geometrically accurate and properly positioned using daily verification simulation.  This process is also utilized to determine if any changes are necessary.   These steps are therefore medically necessary ensuring safe and effective administration of radiation. \\n\\nA high frequency ultrasound image was acquired today for two-dimensional evaluation of the tumor volume and response to treatment, in addition to geometric accuracy of field placement. The daily field placement is separate and distinct from the initial simulation and is an important task in providing safe administration of radiation therapy. Physician evaluation of the ultrasound tumor depth will be ongoing throughout the course of treatment and is deemed medically necessary in order to ensure the efficacy of treatment. Today’s image was evaluated for determination of continuation of treatment with the current plan or with necessary changes as appropriate. Additionally, the use of visualization and targeted assessment allows the patient to be able to see their cancer(s) progress, encouraging patient to complete full course of radiotherapy. \\n\\nLocation:  US Depth of 0.83 mm with +/- 0.06 mm change from previous imaging.  Repop: ++, ZAKI equivocal\\n\\nPer Dr. Robin, continued daily US guidance and clinical setup simulation is required for field placement, measurement of tumor depth, progress and acute effect monitoring. \\n
Plan: This patient has been treated today with superficial radiation therapy for non-melanoma skin cancer.    \\n\\nWritten informed consent has been previously obtained from this patient for this treatment.  This consent is documented in the patient’s chart.  The patient gave verbal consent to continue treatment today. \\n\\nThe patient was treated with a specific radiation dose and setup as prescribed by the provider listed on this visit note.  A Radiation Therapist performed administration of radiation. The treatment parameters and cumulative dose are indicated above.\\n  \\nPrior to administering the radiation, the patient underwent a verification simulation defining relevant normal and abnormal target anatomy and acquiring images and data necessary to develop optimal radiation treatment process for the patient. This process includes verification of the treatment port(s) and proper treatment positioning.  All treatment ports were photographed.  The patient’s customized lead blocking was confirmed.   Considering, superficial radiotherapy setup is a clinical setup, this requires physician and radiation therapist to clarify location interest being treated against initial images, pathology and patient anatomy. Care was taken ensuring fields treated were geometrically accurate and properly positioned using daily verification simulation.  This process is also utilized to determine if any changes are necessary.   These steps are therefore medically necessary ensuring safe and effective administration of radiation. \\n\\nA high frequency ultrasound image was acquired today for two-dimensional evaluation of the tumor volume and response to treatment, in addition to geometric accuracy of field placement. The daily field placement is separate and distinct from the initial simulation and is an important task in providing safe administration of radiation therapy. Physician evaluation of the ultrasound tumor depth will be ongoing throughout the course of treatment and is deemed medically necessary in order to ensure the efficacy of treatment. Today’s image was evaluated for determination of continuation of treatment with the current plan or with necessary changes as appropriate. Additionally, the use of visualization and targeted assessment allows the patient to be able to see their cancer(s) progress, encouraging patient to complete full course of radiotherapy. \\n\\nLocation:  US Depth of 0.84 mm with +/- 0.23 mm change from previous imaging.  Repop: ++, ZAKI equivocal\\n\\nPer Dr. Robin, continued daily US guidance and clinical setup simulation is required for field placement, measurement of tumor depth, progress and acute effect monitoring. \\n
no
Plan: This patient has been treated today with superficial radiation therapy for non-melanoma skin cancer.    \\n\\nWritten informed consent has been previously obtained from this patient for this treatment.  This consent is documented in the patient’s chart.  The patient gave verbal consent to continue treatment today. \\n\\nThe patient was treated with a specific radiation dose and setup as prescribed by the provider listed on this visit note.  A Radiation Therapist performed administration of radiation. The treatment parameters and cumulative dose are indicated above.\\n  \\nPrior to administering the radiation, the patient underwent a verification simulation defining relevant normal and abnormal target anatomy and acquiring images and data necessary to develop optimal radiation treatment process for the patient. This process includes verification of the treatment port(s) and proper treatment positioning.  All treatment ports were photographed.  The patient’s customized lead blocking was confirmed.   Considering, superficial radiotherapy setup is a clinical setup, this requires physician and radiation therapist to clarify location interest being treated against initial images, pathology and patient anatomy. Care was taken ensuring fields treated were geometrically accurate and properly positioned using daily verification simulation.  This process is also utilized to determine if any changes are necessary.   These steps are therefore medically necessary ensuring safe and effective administration of radiation. \\n\\nA high frequency ultrasound image was acquired today for two-dimensional evaluation of the tumor volume and response to treatment, in addition to geometric accuracy of field placement. The daily field placement is separate and distinct from the initial simulation and is an important task in providing safe administration of radiation therapy. Physician evaluation of the ultrasound tumor depth will be ongoing throughout the course of treatment and is deemed medically necessary in order to ensure the efficacy of treatment. Today’s image was evaluated for determination of continuation of treatment with the current plan or with necessary changes as appropriate. Additionally, the use of visualization and targeted assessment allows the patient to be able to see their cancer(s) progress, encouraging patient to complete full course of radiotherapy. \\n\\nLocation:  US Depth of 1.07 mm with +/- 0.04 mm change from previous imaging.  Repop: ++, ZAKI equivocal\\n\\nPer Dr. Robin, continued daily US guidance and clinical setup simulation is required for field placement, measurement of tumor depth, progress and acute effect monitoring. \\n
Plan: This patient has been treated today with superficial radiation therapy for non-melanoma skin cancer.    \\n\\nWritten informed consent has been previously obtained from this patient for this treatment.  This consent is documented in the patient’s chart.  The patient gave verbal consent to continue treatment today. \\n\\nThe patient was treated with a specific radiation dose and setup as prescribed by the provider listed on this visit note.  A Radiation Therapist performed administration of radiation. The treatment parameters and cumulative dose are indicated above.\\n  \\nPrior to administering the radiation, the patient underwent a verification simulation defining relevant normal and abnormal target anatomy and acquiring images and data necessary to develop optimal radiation treatment process for the patient. This process includes verification of the treatment port(s) and proper treatment positioning.  All treatment ports were photographed.  The patient’s customized lead blocking was confirmed.   Considering, superficial radiotherapy setup is a clinical setup, this requires physician and radiation therapist to clarify location interest being treated against initial images, pathology and patient anatomy. Care was taken ensuring fields treated were geometrically accurate and properly positioned using daily verification simulation.  This process is also utilized to determine if any changes are necessary.   These steps are therefore medically necessary ensuring safe and effective administration of radiation. \\n\\nA high frequency ultrasound image was acquired today for two-dimensional evaluation of the tumor volume and response to treatment, in addition to geometric accuracy of field placement. The daily field placement is separate and distinct from the initial simulation and is an important task in providing safe administration of radiation therapy. Physician evaluation of the ultrasound tumor depth will be ongoing throughout the course of treatment and is deemed medically necessary in order to ensure the efficacy of treatment. Today’s image was evaluated for determination of continuation of treatment with the current plan or with necessary changes as appropriate. Additionally, the use of visualization and targeted assessment allows the patient to be able to see their cancer(s) progress, encouraging patient to complete full course of radiotherapy. \\n\\nLocation:  US Depth of 1.04 mm with +/- 0.27 mm change from previous imaging.  Repop: ++, ZAKI equivocal\\n\\nPer Dr. Robin, continued daily US guidance and clinical setup simulation is required for field placement, measurement of tumor depth, progress and acute effect monitoring. \\n
Plan: This patient has been treated today with superficial radiation therapy for non-melanoma skin cancer.    \\n\\nWritten informed consent has been previously obtained from this patient for this treatment.  This consent is documented in the patient’s chart.  The patient gave verbal consent to continue treatment today. \\n\\nThe patient was treated with a specific radiation dose and setup as prescribed by the provider listed on this visit note.  A Radiation Therapist performed administration of radiation. The treatment parameters and cumulative dose are indicated above.\\n  \\nPrior to administering the radiation, the patient underwent a verification simulation defining relevant normal and abnormal target anatomy and acquiring images and data necessary to develop optimal radiation treatment process for the patient. This process includes verification of the treatment port(s) and proper treatment positioning.  All treatment ports were photographed.  The patient’s customized lead blocking was confirmed.   Considering, superficial radiotherapy setup is a clinical setup, this requires physician and radiation therapist to clarify location interest being treated against initial images, pathology and patient anatomy. Care was taken ensuring fields treated were geometrically accurate and properly positioned using daily verification simulation.  This process is also utilized to determine if any changes are necessary.   These steps are therefore medically necessary ensuring safe and effective administration of radiation. \\n\\nA high frequency ultrasound image was acquired today for two-dimensional evaluation of the tumor volume and response to treatment, in addition to geometric accuracy of field placement. The daily field placement is separate and distinct from the initial simulation and is an important task in providing safe administration of radiation therapy. Physician evaluation of the ultrasound tumor depth will be ongoing throughout the course of treatment and is deemed medically necessary in order to ensure the efficacy of treatment. Today’s image was evaluated for determination of continuation of treatment with the current plan or with necessary changes as appropriate. Additionally, the use of visualization and targeted assessment allows the patient to be able to see their cancer(s) progress, encouraging patient to complete full course of radiotherapy. \\n\\nLocation:  US Depth of 1.31 mm with +/- 0.17 mm change from previous imaging.  Repop: ++, ZAKI equivocal\\n\\nPer Dr. Robin, continued daily US guidance and clinical setup simulation is required for field placement, measurement of tumor depth, progress and acute effect monitoring. \\n
Plan: This patient has been treated today with superficial radiation therapy for non-melanoma skin cancer.    \\n\\nWritten informed consent has been previously obtained from this patient for this treatment.  This consent is documented in the patient’s chart.  The patient gave verbal consent to continue treatment today. \\n\\nThe patient was treated with a specific radiation dose and setup as prescribed by the provider listed on this visit note.  A Radiation Therapist performed administration of radiation. The treatment parameters and cumulative dose are indicated above.\\n  \\nPrior to administering the radiation, the patient underwent a verification simulation defining relevant normal and abnormal target anatomy and acquiring images and data necessary to develop optimal radiation treatment process for the patient. This process includes verification of the treatment port(s) and proper treatment positioning.  All treatment ports were photographed.  The patient’s customized lead blocking was confirmed.   Considering, superficial radiotherapy setup is a clinical setup, this requires physician and radiation therapist to clarify location interest being treated against initial images, pathology and patient anatomy. Care was taken ensuring fields treated were geometrically accurate and properly positioned using daily verification simulation.  This process is also utilized to determine if any changes are necessary.   These steps are therefore medically necessary ensuring safe and effective administration of radiation. \\n\\nA high frequency ultrasound image was acquired today for two-dimensional evaluation of the tumor volume and response to treatment, in addition to geometric accuracy of field placement. The daily field placement is separate and distinct from the initial simulation and is an important task in providing safe administration of radiation therapy. Physician evaluation of the ultrasound tumor depth will be ongoing throughout the course of treatment and is deemed medically necessary in order to ensure the efficacy of treatment. Today’s image was evaluated for determination of continuation of treatment with the current plan or with necessary changes as appropriate. Additionally, the use of visualization and targeted assessment allows the patient to be able to see their cancer(s) progress, encouraging patient to complete full course of radiotherapy. \\n\\nLocation:  US Depth of 0.96 mm with +/- 0.13 mm change from previous imaging.  Repop: ++, ZAKI equivocal\\n\\nPer Dr. Robin, continued daily US guidance and clinical setup simulation is required for field placement, measurement of tumor depth, progress and acute effect monitoring. \\n

## 2022-01-28 ENCOUNTER — APPOINTMENT (OUTPATIENT)
Age: 67
Setting detail: DERMATOLOGY
End: 2022-01-31

## 2022-01-28 VITALS — TEMPERATURE: 98.7 F

## 2022-01-28 DIAGNOSIS — L90.5 SCAR CONDITIONS AND FIBROSIS OF SKIN: ICD-10-CM

## 2022-01-28 DIAGNOSIS — L57.0 ACTINIC KERATOSIS: ICD-10-CM

## 2022-01-28 DIAGNOSIS — L82.1 OTHER SEBORRHEIC KERATOSIS: ICD-10-CM

## 2022-01-28 DIAGNOSIS — Z85.828 PERSONAL HISTORY OF OTHER MALIGNANT NEOPLASM OF SKIN: ICD-10-CM

## 2022-01-28 DIAGNOSIS — D485 NEOPLASM OF UNCERTAIN BEHAVIOR OF SKIN: ICD-10-CM

## 2022-01-28 PROBLEM — D48.5 NEOPLASM OF UNCERTAIN BEHAVIOR OF SKIN: Status: ACTIVE | Noted: 2022-01-28

## 2022-01-28 PROCEDURE — OTHER LIQUID NITROGEN: OTHER

## 2022-01-28 PROCEDURE — OTHER COUNSELING: OTHER

## 2022-01-28 PROCEDURE — OTHER MIPS QUALITY: OTHER

## 2022-01-28 PROCEDURE — 17003 DESTRUCT PREMALG LES 2-14: CPT

## 2022-01-28 PROCEDURE — 17000 DESTRUCT PREMALG LESION: CPT | Mod: 59

## 2022-01-28 PROCEDURE — 11102 TANGNTL BX SKIN SINGLE LES: CPT

## 2022-01-28 PROCEDURE — 99213 OFFICE O/P EST LOW 20 MIN: CPT | Mod: 25

## 2022-01-28 PROCEDURE — OTHER SEPARATE AND IDENTIFIABLE DOCUMENTATION: OTHER

## 2022-01-28 PROCEDURE — OTHER RECOMMENDATIONS: OTHER

## 2022-01-28 PROCEDURE — OTHER BIOPSY BY SHAVE METHOD: OTHER

## 2022-01-28 PROCEDURE — OTHER REASSURANCE: OTHER

## 2022-01-28 ASSESSMENT — LOCATION ZONE DERM
LOCATION ZONE: NECK
LOCATION ZONE: TRUNK
LOCATION ZONE: HAND
LOCATION ZONE: ARM
LOCATION ZONE: SCALP
LOCATION ZONE: EAR
LOCATION ZONE: LEG
LOCATION ZONE: FACE

## 2022-01-28 ASSESSMENT — LOCATION DETAILED DESCRIPTION DERM
LOCATION DETAILED: RIGHT INFERIOR FOREHEAD
LOCATION DETAILED: LEFT SUPERIOR LATERAL NECK
LOCATION DETAILED: INFERIOR THORACIC SPINE
LOCATION DETAILED: LEFT DISTAL DORSAL FOREARM
LOCATION DETAILED: LEFT CENTRAL LATERAL NECK
LOCATION DETAILED: RIGHT SUPERIOR HELIX
LOCATION DETAILED: LEFT PROXIMAL CALF
LOCATION DETAILED: LEFT CENTRAL POSTAURICULAR SKIN
LOCATION DETAILED: LEFT ULNAR DORSAL HAND
LOCATION DETAILED: RIGHT CENTRAL ANTERIOR NECK
LOCATION DETAILED: RIGHT MEDIAL UPPER BACK
LOCATION DETAILED: RIGHT CENTRAL FRONTAL SCALP
LOCATION DETAILED: LEFT POSTERIOR SHOULDER

## 2022-01-28 ASSESSMENT — LOCATION SIMPLE DESCRIPTION DERM
LOCATION SIMPLE: LEFT SHOULDER
LOCATION SIMPLE: RIGHT EAR
LOCATION SIMPLE: SCALP
LOCATION SIMPLE: UPPER BACK
LOCATION SIMPLE: RIGHT UPPER BACK
LOCATION SIMPLE: LEFT FOREARM
LOCATION SIMPLE: RIGHT FOREHEAD
LOCATION SIMPLE: LEFT CALF
LOCATION SIMPLE: NECK
LOCATION SIMPLE: LEFT HAND
LOCATION SIMPLE: RIGHT SCALP

## 2022-01-28 NOTE — PROCEDURE: RECOMMENDATIONS
Recommendations (Free Text): Shave biopsy check margins
Render Risk Assessment In Note?: no
Detail Level: Zone
Recommendation Preamble: The following recommendations were made during the visit:

## 2022-01-28 NOTE — PROCEDURE: MIPS QUALITY
Detail Level: Detailed
Quality 110: Preventive Care And Screening: Influenza Immunization: Influenza Immunization Administered during Influenza season
Quality 47: Advance Care Plan: Advance Care Planning discussed and documented; advance care plan or surrogate decision maker documented in the medical record.
Quality 111:Pneumonia Vaccination Status For Older Adults: Pneumococcal Vaccination not Administered or Previously Received, Reason not Otherwise Specified
Quality 226: Preventive Care And Screening: Tobacco Use: Screening And Cessation Intervention: Patient screened for tobacco use and is an ex/non-smoker

## 2022-01-28 NOTE — PROCEDURE: LIQUID NITROGEN
Render Post-Care Instructions In Note?: no
Show Aperture Variable?: Yes
Duration Of Freeze Thaw-Cycle (Seconds): 5
Number Of Freeze-Thaw Cycles: 1 freeze-thaw cycle
Detail Level: Detailed
Post-Care Instructions: I reviewed with the patient in detail post-care instructions. Patient is to wear sunprotection, and avoid picking at any of the treated lesions. Pt may apply Vaseline to crusted or scabbing areas.
Consent: The patient's consent was obtained including but not limited to risks of crusting, scabbing, blistering, scarring, darker or lighter pigmentary change, recurrence, incomplete removal and infection.

## 2022-01-28 NOTE — PROCEDURE: BIOPSY BY SHAVE METHOD
Cryotherapy Text: The wound bed was treated with cryotherapy after the biopsy was performed.
Size Of Lesion In Cm: 0.4
Validate That Anesthesia Is Not 0: No
Anesthesia Type: 1% lidocaine with epinephrine
Information: Selecting Yes will display possible errors in your note based on the variables you have selected. This validation is only offered as a suggestion for you. PLEASE NOTE THAT THE VALIDATION TEXT WILL BE REMOVED WHEN YOU FINALIZE YOUR NOTE. IF YOU WANT TO FAX A PRELIMINARY NOTE YOU WILL NEED TO TOGGLE THIS TO 'NO' IF YOU DO NOT WANT IT IN YOUR FAXED NOTE.
Additional Anesthesia Volume In Cc (Will Not Render If 0): 0
Notification Instructions: Patient will be notified of biopsy results. However, patient instructed to call the office if not contacted within 2 weeks.
Wound Care: Vaseline
X Size Of Lesion In Cm: 0.5
Billing Type: Third-Party Bill
Depth Of Biopsy: dermis
Type Of Destruction Used: Curettage
Electrodesiccation And Curettage Text: The wound bed was treated with electrodesiccation and curettage after the biopsy was performed.
Dressing: bandage
Consent: Written consent was obtained and risks were reviewed including but not limited to scarring, infection, bleeding, scabbing, incomplete removal, nerve damage and allergy to anesthesia.  Clinical evaluation reveals changes suspicious for rule out provided in pathology requisition.  Intent of procedure is to obtain tissue sample for histopathic examination.  A skin biopsy is considered a necessary and appropriate to clarify the diagnosis.
Electrodesiccation Text: The wound bed was treated with electrodesiccation after the biopsy was performed.
Silver Nitrate Text: The wound bed was treated with silver nitrate after the biopsy was performed.
Biopsy Type: H and E
Hemostasis: Electrocautery
Biopsy Method: 10 blade
Detail Level: Detailed
Post-Care Instructions: I reviewed with the patient in detail post-care instructions. Patient is to keep the biopsy site dry overnight, and then apply bacitracin twice daily until healed. Patient may apply hydrogen peroxide soaks to remove any crusting.
Was A Bandage Applied: Yes
Curettage Text: The wound bed was treated with curettage after the biopsy was performed.

## 2022-03-09 ENCOUNTER — APPOINTMENT (OUTPATIENT)
Age: 67
Setting detail: DERMATOLOGY
End: 2022-03-10

## 2022-03-09 VITALS — TEMPERATURE: 98 F

## 2022-03-09 PROBLEM — C44.42 SQUAMOUS CELL CARCINOMA OF SKIN OF SCALP AND NECK: Status: ACTIVE | Noted: 2022-03-09

## 2022-03-09 PROCEDURE — OTHER EXCISION: OTHER

## 2022-03-09 PROCEDURE — OTHER MIPS QUALITY: OTHER

## 2022-03-09 PROCEDURE — 12042 INTMD RPR N-HF/GENIT2.6-7.5: CPT

## 2022-03-09 PROCEDURE — 11623 EXC S/N/H/F/G MAL+MRG 2.1-3: CPT

## 2022-03-09 NOTE — PROCEDURE: EXCISION
Patient Will Remove Sutures At Home?: No
Anesthesia Type: 1% lidocaine with epinephrine
Primary Defect Width (In Cm): 0
Rotation Flap Text: The defect edges were debeveled with a #15 scalpel blade.  Given the location of the defect, shape of the defect and the proximity to free margins a rotation flap was deemed most appropriate.  Using a sterile surgical marker, an appropriate rotation flap was drawn incorporating the defect and placing the expected incisions within the relaxed skin tension lines where possible.    The area thus outlined was incised deep to adipose tissue with a #15 scalpel blade.  The skin margins were undermined to an appropriate distance in all directions utilizing iris scissors.
Banner Transposition Flap Text: The defect edges were debeveled with a #15 scalpel blade.  Given the location of the defect and the proximity to free margins a Banner transposition flap was deemed most appropriate.  Using a sterile surgical marker, an appropriate flap drawn around the defect. The area thus outlined was incised deep to adipose tissue with a #15 scalpel blade.  The skin margins were undermined to an appropriate distance in all directions utilizing iris scissors.
Intermediate Repair Preamble Text (Leave Blank If You Do Not Want): Undermining was performed with blunt dissection.
Rhomboid Transposition Flap Text: The defect edges were debeveled with a #15 scalpel blade.  Given the location of the defect and the proximity to free margins a rhomboid transposition flap was deemed most appropriate.  Using a sterile surgical marker, an appropriate rhomboid flap was drawn incorporating the defect.    The area thus outlined was incised deep to adipose tissue with a #15 scalpel blade.  The skin margins were undermined to an appropriate distance in all directions utilizing iris scissors.
Mustarde Flap Text: The defect edges were debeveled with a #15 scalpel blade.  Given the size, depth and location of the defect and the proximity to free margins a Mustarde flap was deemed most appropriate.  Using a sterile surgical marker, an appropriate flap was drawn incorporating the defect. The area thus outlined was incised with a #15 scalpel blade.  The skin margins were undermined to an appropriate distance in all directions utilizing iris scissors.
Posterior Auricular Interpolation Flap Text: A decision was made to reconstruct the defect utilizing an interpolation axial flap and a staged reconstruction.  A telfa template was made of the defect.  This telfa template was then used to outline the posterior auricular interpolation flap.  The donor area for the pedicle flap was then injected with anesthesia.  The flap was excised through the skin and subcutaneous tissue down to the layer of the underlying musculature.  The pedicle flap was carefully excised within this deep plane to maintain its blood supply.  The edges of the donor site were undermined.   The donor site was closed in a primary fashion.  The pedicle was then rotated into position and sutured.  Once the tube was sutured into place, adequate blood supply was confirmed with blanching and refill.  The pedicle was then wrapped with xeroform gauze and dressed appropriately with a telfa and gauze bandage to ensure continued blood supply and protect the attached pedicle.
Complex Repair And V-Y Plasty Text: The defect edges were debeveled with a #15 scalpel blade.  The primary defect was closed partially with a complex linear closure.  Given the location of the remaining defect, shape of the defect and the proximity to free margins a V-Y plasty was deemed most appropriate for complete closure of the defect.  Using a sterile surgical marker, an appropriate advancement flap was drawn incorporating the defect and placing the expected incisions within the relaxed skin tension lines where possible.    The area thus outlined was incised deep to adipose tissue with a #15 scalpel blade.  The skin margins were undermined to an appropriate distance in all directions utilizing iris scissors.
Complex Repair And Tissue Cultured Epidermal Autograft Text: The defect edges were debeveled with a #15 scalpel blade.  The primary defect was closed partially with a complex linear closure.  Given the location of the defect, shape of the defect and the proximity to free margins an tissue cultured epidermal autograft was deemed most appropriate to repair the remaining defect.  The graft was trimmed to fit the size of the remaining defect.  The graft was then placed in the primary defect, oriented appropriately, and sutured into place.
Show Date Of Previous Biopsy Variable: Yes
Additional Anesthesia Volume In Cc: 6
Crescentic Complex Repair Preamble Text (Leave Blank If You Do Not Want): Extensive wide undermining was performed.
Retention Suture Bite Size: 3 mm
V-Y Plasty Text: The defect edges were debeveled with a #15 scalpel blade.  Given the location of the defect, shape of the defect and the proximity to free margins an V-Y advancement flap was deemed most appropriate.  Using a sterile surgical marker, an appropriate advancement flap was drawn incorporating the defect and placing the expected incisions within the relaxed skin tension lines where possible.    The area thus outlined was incised deep to adipose tissue with a #15 scalpel blade.  The skin margins were undermined to an appropriate distance in all directions utilizing iris scissors.
Complex Repair And Bilobe Flap Text: The defect edges were debeveled with a #15 scalpel blade.  The primary defect was closed partially with a complex linear closure.  Given the location of the remaining defect, shape of the defect and the proximity to free margins a bilobe flap was deemed most appropriate for complete closure of the defect.  Using a sterile surgical marker, an appropriate advancement flap was drawn incorporating the defect and placing the expected incisions within the relaxed skin tension lines where possible.    The area thus outlined was incised deep to adipose tissue with a #15 scalpel blade.  The skin margins were undermined to an appropriate distance in all directions utilizing iris scissors.
Muscle Hinge Flap Text: The defect edges were debeveled with a #15 scalpel blade.  Given the size, depth and location of the defect and the proximity to free margins a muscle hinge flap was deemed most appropriate.  Using a sterile surgical marker, an appropriate hinge flap was drawn incorporating the defect. The area thus outlined was incised with a #15 scalpel blade.  The skin margins were undermined to an appropriate distance in all directions utilizing iris scissors.
Melolabial Interpolation Flap Text: A decision was made to reconstruct the defect utilizing an interpolation axial flap and a staged reconstruction.  A telfa template was made of the defect.  This telfa template was then used to outline the melolabial interpolation flap.  The donor area for the pedicle flap was then injected with anesthesia.  The flap was excised through the skin and subcutaneous tissue down to the layer of the underlying musculature.  The pedicle flap was carefully excised within this deep plane to maintain its blood supply.  The edges of the donor site were undermined.   The donor site was closed in a primary fashion.  The pedicle was then rotated into position and sutured.  Once the tube was sutured into place, adequate blood supply was confirmed with blanching and refill.  The pedicle was then wrapped with xeroform gauze and dressed appropriately with a telfa and gauze bandage to ensure continued blood supply and protect the attached pedicle.
Complex Repair And Dermal Autograft Text: The defect edges were debeveled with a #15 scalpel blade.  The primary defect was closed partially with a complex linear closure.  Given the location of the defect, shape of the defect and the proximity to free margins an dermal autograft was deemed most appropriate to repair the remaining defect.  The graft was trimmed to fit the size of the remaining defect.  The graft was then placed in the primary defect, oriented appropriately, and sutured into place.
Adjacent Tissue Transfer Text: The defect edges were debeveled with a #15 scalpel blade.  Given the location of the defect and the proximity to free margins an adjacent tissue transfer was deemed most appropriate.  Using a sterile surgical marker, an appropriate flap was drawn incorporating the defect and placing the expected incisions within the relaxed skin tension lines where possible.    The area thus outlined was incised deep to adipose tissue with a #15 scalpel blade.  The skin margins were undermined to an appropriate distance in all directions utilizing iris scissors.
Dorsal Nasal Flap Text: The defect edges were debeveled with a #15 scalpel blade.  Given the location of the defect and the proximity to free margins a dorsal nasal flap was deemed most appropriate.  Using a sterile surgical marker, an appropriate dorsal nasal flap was drawn around the defect.    The area thus outlined was incised deep to adipose tissue with a #15 scalpel blade.  The skin margins were undermined to an appropriate distance in all directions utilizing iris scissors.
Hatchet Flap Text: The defect edges were debeveled with a #15 scalpel blade.  Given the location of the defect, shape of the defect and the proximity to free margins a hatchet flap was deemed most appropriate.  Using a sterile surgical marker, an appropriate hatchet flap was drawn incorporating the defect and placing the expected incisions within the relaxed skin tension lines where possible.    The area thus outlined was incised deep to adipose tissue with a #15 scalpel blade.  The skin margins were undermined to an appropriate distance in all directions utilizing iris scissors.
Double O-Z Plasty Text: The defect edges were debeveled with a #15 scalpel blade.  Given the location of the defect, shape of the defect and the proximity to free margins a Double O-Z plasty (double transposition flap) was deemed most appropriate.  Using a sterile surgical marker, the appropriate transposition flaps were drawn incorporating the defect and placing the expected incisions within the relaxed skin tension lines where possible. The area thus outlined was incised deep to adipose tissue with a #15 scalpel blade.  The skin margins were undermined to an appropriate distance in all directions utilizing iris scissors.  Hemostasis was achieved with electrocautery.  The flaps were then transposed into place, one clockwise and the other counterclockwise, and anchored with interrupted buried subcutaneous sutures.
Complex Repair And Dorsal Nasal Flap Text: The defect edges were debeveled with a #15 scalpel blade.  The primary defect was closed partially with a complex linear closure.  Given the location of the remaining defect, shape of the defect and the proximity to free margins a dorsal nasal flap was deemed most appropriate for complete closure of the defect.  Using a sterile surgical marker, an appropriate flap was drawn incorporating the defect and placing the expected incisions within the relaxed skin tension lines where possible.    The area thus outlined was incised deep to adipose tissue with a #15 scalpel blade.  The skin margins were undermined to an appropriate distance in all directions utilizing iris scissors.
Rhombic Flap Text: The defect edges were debeveled with a #15 scalpel blade.  Given the location of the defect and the proximity to free margins a rhombic flap was deemed most appropriate.  Using a sterile surgical marker, an appropriate rhombic flap was drawn incorporating the defect.    The area thus outlined was incised deep to adipose tissue with a #15 scalpel blade.  The skin margins were undermined to an appropriate distance in all directions utilizing iris scissors.
Composite Graft Text: The defect edges were debeveled with a #15 scalpel blade.  Given the location of the defect, shape of the defect, the proximity to free margins and the fact the defect was full thickness a composite graft was deemed most appropriate.  The defect was outline and then transferred to the donor site.  A full thickness graft was then excised from the donor site. The graft was then placed in the primary defect, oriented appropriately and then sutured into place.  The secondary defect was then repaired using a primary closure.
V-Y Flap Text: The defect edges were debeveled with a #15 scalpel blade.  Given the location of the defect, shape of the defect and the proximity to free margins a V-Y flap was deemed most appropriate.  Using a sterile surgical marker, an appropriate advancement flap was drawn incorporating the defect and placing the expected incisions within the relaxed skin tension lines where possible.    The area thus outlined was incised deep to adipose tissue with a #15 scalpel blade.  The skin margins were undermined to an appropriate distance in all directions utilizing iris scissors.
Helical Rim Text: The closure involved the helical rim.
Size Of Lesion In Cm: 1.7
Mastoid Interpolation Flap Text: A decision was made to reconstruct the defect utilizing an interpolation axial flap and a staged reconstruction.  A telfa template was made of the defect.  This telfa template was then used to outline the mastoid interpolation flap.  The donor area for the pedicle flap was then injected with anesthesia.  The flap was excised through the skin and subcutaneous tissue down to the layer of the underlying musculature.  The pedicle flap was carefully excised within this deep plane to maintain its blood supply.  The edges of the donor site were undermined.   The donor site was closed in a primary fashion.  The pedicle was then rotated into position and sutured.  Once the tube was sutured into place, adequate blood supply was confirmed with blanching and refill.  The pedicle was then wrapped with xeroform gauze and dressed appropriately with a telfa and gauze bandage to ensure continued blood supply and protect the attached pedicle.
Advancement Flap (Single) Text: The defect edges were debeveled with a #15 scalpel blade.  Given the location of the defect and the proximity to free margins a single advancement flap was deemed most appropriate.  Using a sterile surgical marker, an appropriate advancement flap was drawn incorporating the defect and placing the expected incisions within the relaxed skin tension lines where possible.    The area thus outlined was incised deep to adipose tissue with a #15 scalpel blade.  The skin margins were undermined to an appropriate distance in all directions utilizing iris scissors.
Purse String (Intermediate) Text: Given the location of the defect and the characteristics of the surrounding skin a pursestring intermediate closure was deemed most appropriate.  Undermining was performed circumfirentially around the surgical defect.  A purstring suture was then placed and tightened.
Double Island Pedicle Flap Text: The defect edges were debeveled with a #15 scalpel blade.  Given the location of the defect, shape of the defect and the proximity to free margins a double island pedicle advancement flap was deemed most appropriate.  Using a sterile surgical marker, an appropriate advancement flap was drawn incorporating the defect, outlining the appropriate donor tissue and placing the expected incisions within the relaxed skin tension lines where possible.    The area thus outlined was incised deep to adipose tissue with a #15 scalpel blade.  The skin margins were undermined to an appropriate distance in all directions around the primary defect and laterally outward around the island pedicle utilizing iris scissors.  There was minimal undermining beneath the pedicle flap.
Complex Repair And Double Advancement Flap Text: The defect edges were debeveled with a #15 scalpel blade.  The primary defect was closed partially with a complex linear closure.  Given the location of the remaining defect, shape of the defect and the proximity to free margins a double advancement flap was deemed most appropriate for complete closure of the defect.  Using a sterile surgical marker, an appropriate advancement flap was drawn incorporating the defect and placing the expected incisions within the relaxed skin tension lines where possible.    The area thus outlined was incised deep to adipose tissue with a #15 scalpel blade.  The skin margins were undermined to an appropriate distance in all directions utilizing iris scissors.
Retention Suture Text: Retention sutures were placed to support the closure and prevent dehiscence.
Epidermal Sutures: 4-0 Nylon
Ftsg Text: The defect edges were debeveled with a #15 scalpel blade.  Given the location of the defect, shape of the defect and the proximity to free margins a full thickness skin graft was deemed most appropriate.  Using a sterile surgical marker, the primary defect shape was transferred to the donor site. The area thus outlined was incised deep to adipose tissue with a #15 scalpel blade.  The harvested graft was then trimmed of adipose tissue until only dermis and epidermis was left.  The skin margins of the secondary defect were undermined to an appropriate distance in all directions utilizing iris scissors.  The secondary defect was closed with interrupted buried subcutaneous sutures.  The skin edges were then re-apposed with running  sutures.  The skin graft was then placed in the primary defect and oriented appropriately.
Dermal Autograft Text: The defect edges were debeveled with a #15 scalpel blade.  Given the location of the defect, shape of the defect and the proximity to free margins a dermal autograft was deemed most appropriate.  Using a sterile surgical marker, the primary defect shape was transferred to the donor site. The area thus outlined was incised deep to adipose tissue with a #15 scalpel blade.  The harvested graft was then trimmed of adipose and epidermal tissue until only dermis was left.  The skin graft was then placed in the primary defect and oriented appropriately.
Chonodrocutaneous Helical Advancement Flap Text: The defect edges were debeveled with a #15 scalpel blade.  Given the location of the defect and the proximity to free margins a chondrocutaneous helical advancement flap was deemed most appropriate.  Using a sterile surgical marker, the appropriate advancement flap was drawn incorporating the defect and placing the expected incisions within the relaxed skin tension lines where possible.    The area thus outlined was incised deep to adipose tissue with a #15 scalpel blade.  The skin margins were undermined to an appropriate distance in all directions utilizing iris scissors.
Deep Sutures: 4-0 PDO
Lip Wedge Excision Repair Text: Given the location of the defect and the proximity to free margins a full thickness wedge repair was deemed most appropriate.  Using a sterile surgical marker, the appropriate repair was drawn incorporating the defect and placing the expected incisions perpendicular to the vermilion border.  The vermilion border was also meticulously outlined to ensure appropriate reapproximation during the repair.  The area thus outlined was incised through and through with a #15 scalpel blade.  The muscularis and dermis were reaproximated with deep sutures following hemostasis. Care was taken to realign the vermilion border before proceeding with the superficial closure.  Once the vermilion was realigned the superfical and mucosal closure was finished.
Island Pedicle Flap Text: The defect edges were debeveled with a #15 scalpel blade.  Given the location of the defect, shape of the defect and the proximity to free margins an island pedicle advancement flap was deemed most appropriate.  Using a sterile surgical marker, an appropriate advancement flap was drawn incorporating the defect, outlining the appropriate donor tissue and placing the expected incisions within the relaxed skin tension lines where possible.    The area thus outlined was incised deep to adipose tissue with a #15 scalpel blade.  The skin margins were undermined to an appropriate distance in all directions around the primary defect and laterally outward around the island pedicle utilizing iris scissors.  There was minimal undermining beneath the pedicle flap.
Estimated Blood Loss (Cc): minimal
Complex Repair And Xenograft Text: The defect edges were debeveled with a #15 scalpel blade.  The primary defect was closed partially with a complex linear closure.  Given the location of the defect, shape of the defect and the proximity to free margins a xenograft was deemed most appropriate to repair the remaining defect.  The graft was trimmed to fit the size of the remaining defect.  The graft was then placed in the primary defect, oriented appropriately, and sutured into place.
Excision Method: Fusiform
Where Do You Want The Question To Include Opioid Counseling Located?: Case Summary Tab
Complex Repair And M Plasty Text: The defect edges were debeveled with a #15 scalpel blade.  The primary defect was closed partially with a complex linear closure.  Given the location of the remaining defect, shape of the defect and the proximity to free margins an M plasty was deemed most appropriate for complete closure of the defect.  Using a sterile surgical marker, an appropriate advancement flap was drawn incorporating the defect and placing the expected incisions within the relaxed skin tension lines where possible.    The area thus outlined was incised deep to adipose tissue with a #15 scalpel blade.  The skin margins were undermined to an appropriate distance in all directions utilizing iris scissors.
Keystone Flap Text: The defect edges were debeveled with a #15 scalpel blade.  Given the location of the defect, shape of the defect a keystone flap was deemed most appropriate.  Using a sterile surgical marker, an appropriate keystone flap was drawn incorporating the defect, outlining the appropriate donor tissue and placing the expected incisions within the relaxed skin tension lines where possible. The area thus outlined was incised deep to adipose tissue with a #15 scalpel blade.  The skin margins were undermined to an appropriate distance in all directions around the primary defect and laterally outward around the flap utilizing iris scissors.
Fusiform Excision Additional Text (Leave Blank If You Do Not Want): The margin was drawn around the clinically apparent lesion.  A fusiform shape was then drawn on the skin incorporating the lesion and margins.  Incisions were then made along these lines to the appropriate tissue plane and the lesion was extirpated.
Crescentic Advancement Flap Text: The defect edges were debeveled with a #15 scalpel blade.  Given the location of the defect and the proximity to free margins a crescentic advancement flap was deemed most appropriate.  Using a sterile surgical marker, the appropriate advancement flap was drawn incorporating the defect and placing the expected incisions within the relaxed skin tension lines where possible.    The area thus outlined was incised deep to adipose tissue with a #15 scalpel blade.  The skin margins were undermined to an appropriate distance in all directions utilizing iris scissors.
Nasalis-Muscle-Based Myocutaneous Island Pedicle Flap Text: Using a #15 blade, an incision was made around the donor flap to the level of the nasalis muscle. Wide lateral undermining was then performed in both the subcutaneous plane above the nasalis muscle, and in a submuscular plane just above periosteum. This allowed the formation of a free nasalis muscle axial pedicle (based on the angular artery) which was still attached to the actual cutaneous flap, increasing its mobility and vascular viability. Hemostasis was obtained with pinpoint electrocoagulation. The flap was mobilized into position and the pivotal anchor points positioned and stabilized with buried interrupted sutures. Subcutaneous and dermal tissues were closed in a multilayered fashion with sutures. Tissue redundancies were excised, and the epidermal edges were apposed without significant tension and sutured with sutures.
Split-Thickness Skin Graft Text: The defect edges were debeveled with a #15 scalpel blade.  Given the location of the defect, shape of the defect and the proximity to free margins a split thickness skin graft was deemed most appropriate.  Using a sterile surgical marker, the primary defect shape was transferred to the donor site. The split thickness graft was then harvested.  The skin graft was then placed in the primary defect and oriented appropriately.
Suturegard Intro: Intraoperative tissue expansion was performed, utilizing the SUTUREGARD device, in order to reduce wound tension.
Partial Purse String (Intermediate) Text: Given the location of the defect and the characteristics of the surrounding skin an intermediate purse string closure was deemed most appropriate.  Undermining was performed circumferentially around the surgical defect.  A purse string suture was then placed and tightened. Wound tension of the circular defect prevented complete closure of the wound.
Mercedes Flap Text: The defect edges were debeveled with a #15 scalpel blade.  Given the location of the defect, shape of the defect and the proximity to free margins a Mercedes flap was deemed most appropriate.  Using a sterile surgical marker, an appropriate advancement flap was drawn incorporating the defect and placing the expected incisions within the relaxed skin tension lines where possible. The area thus outlined was incised deep to adipose tissue with a #15 scalpel blade.  The skin margins were undermined to an appropriate distance in all directions utilizing iris scissors.
Wound Care: Bacitracin
Staged Advancement Flap Text: The defect edges were debeveled with a #15 scalpel blade.  Given the location of the defect, shape of the defect and the proximity to free margins a staged advancement flap was deemed most appropriate.  Using a sterile surgical marker, an appropriate advancement flap was drawn incorporating the defect and placing the expected incisions within the relaxed skin tension lines where possible. The area thus outlined was incised deep to adipose tissue with a #15 scalpel blade.  The skin margins were undermined to an appropriate distance in all directions utilizing iris scissors.
Bi-Rhombic Flap Text: The defect edges were debeveled with a #15 scalpel blade.  Given the location of the defect and the proximity to free margins a bi-rhombic flap was deemed most appropriate.  Using a sterile surgical marker, an appropriate rhombic flap was drawn incorporating the defect. The area thus outlined was incised deep to adipose tissue with a #15 scalpel blade.  The skin margins were undermined to an appropriate distance in all directions utilizing iris scissors.
Elliptical Excision Additional Text (Leave Blank If You Do Not Want): The margin was drawn around the clinically apparent lesion.  An elliptical shape was then drawn on the skin incorporating the lesion and margins.  Incisions were then made along these lines to the appropriate tissue plane and the lesion was extirpated.
Nostril Rim Text: The closure involved the nostril rim.
Epidermal Closure Graft Donor Site (Optional): simple interrupted
Bilobed Flap Text: The defect edges were debeveled with a #15 scalpel blade.  Given the location of the defect and the proximity to free margins a bilobe flap was deemed most appropriate.  Using a sterile surgical marker, an appropriate bilobe flap drawn around the defect.    The area thus outlined was incised deep to adipose tissue with a #15 scalpel blade.  The skin margins were undermined to an appropriate distance in all directions utilizing iris scissors.
Curvilinear Excision Additional Text (Leave Blank If You Do Not Want): The margin was drawn around the clinically apparent lesion.  A curvilinear shape was then drawn on the skin incorporating the lesion and margins.  Incisions were then made along these lines to the appropriate tissue plane and the lesion was extirpated.
Accession #: [id number]
Spiral Flap Text: The defect edges were debeveled with a #15 scalpel blade.  Given the location of the defect, shape of the defect and the proximity to free margins a spiral flap was deemed most appropriate.  Using a sterile surgical marker, an appropriate rotation flap was drawn incorporating the defect and placing the expected incisions within the relaxed skin tension lines where possible. The area thus outlined was incised deep to adipose tissue with a #15 scalpel blade.  The skin margins were undermined to an appropriate distance in all directions utilizing iris scissors.
Paramedian Forehead Flap Text: A decision was made to reconstruct the defect utilizing an interpolation axial flap and a staged reconstruction.  A telfa template was made of the defect.  This telfa template was then used to outline the paramedian forehead pedicle flap.  The donor area for the pedicle flap was then injected with anesthesia.  The flap was excised through the skin and subcutaneous tissue down to the layer of the underlying musculature.  The pedicle flap was carefully excised within this deep plane to maintain its blood supply.  The edges of the donor site were undermined.   The donor site was closed in a primary fashion.  The pedicle was then rotated into position and sutured.  Once the tube was sutured into place, adequate blood supply was confirmed with blanching and refill.  The pedicle was then wrapped with xeroform gauze and dressed appropriately with a telfa and gauze bandage to ensure continued blood supply and protect the attached pedicle.
Advancement Flap (Double) Text: The defect edges were debeveled with a #15 scalpel blade.  Given the location of the defect and the proximity to free margins a double advancement flap was deemed most appropriate.  Using a sterile surgical marker, the appropriate advancement flaps were drawn incorporating the defect and placing the expected incisions within the relaxed skin tension lines where possible.    The area thus outlined was incised deep to adipose tissue with a #15 scalpel blade.  The skin margins were undermined to an appropriate distance in all directions utilizing iris scissors.
Purse String (Simple) Text: Given the location of the defect and the characteristics of the surrounding skin a purse string simple closure was deemed most appropriate.  Undermining was performed circumferentially around the surgical defect.  A purse string suture was then placed and tightened.
Path Notes (To The Dermatopathologist): Please check margins.
Complex Repair And Modified Advancement Flap Text: The defect edges were debeveled with a #15 scalpel blade.  The primary defect was closed partially with a complex linear closure.  Given the location of the remaining defect, shape of the defect and the proximity to free margins a modified advancement flap was deemed most appropriate for complete closure of the defect.  Using a sterile surgical marker, an appropriate advancement flap was drawn incorporating the defect and placing the expected incisions within the relaxed skin tension lines where possible.    The area thus outlined was incised deep to adipose tissue with a #15 scalpel blade.  The skin margins were undermined to an appropriate distance in all directions utilizing iris scissors.
Advancement-Rotation Flap Text: The defect edges were debeveled with a #15 scalpel blade.  Given the location of the defect, shape of the defect and the proximity to free margins an advancement-rotation flap was deemed most appropriate.  Using a sterile surgical marker, an appropriate flap was drawn incorporating the defect and placing the expected incisions within the relaxed skin tension lines where possible. The area thus outlined was incised deep to adipose tissue with a #15 scalpel blade.  The skin margins were undermined to an appropriate distance in all directions utilizing iris scissors.
Island Pedicle Flap-Requiring Vessel Identification Text: The defect edges were debeveled with a #15 scalpel blade.  Given the location of the defect, shape of the defect and the proximity to free margins an island pedicle advancement flap was deemed most appropriate.  Using a sterile surgical marker, an appropriate advancement flap was drawn, based on the axial vessel mentioned above, incorporating the defect, outlining the appropriate donor tissue and placing the expected incisions within the relaxed skin tension lines where possible.    The area thus outlined was incised deep to adipose tissue with a #15 scalpel blade.  The skin margins were undermined to an appropriate distance in all directions around the primary defect and laterally outward around the island pedicle utilizing iris scissors.  There was minimal undermining beneath the pedicle flap.
Epidermal Autograft Text: The defect edges were debeveled with a #15 scalpel blade.  Given the location of the defect, shape of the defect and the proximity to free margins an epidermal autograft was deemed most appropriate.  Using a sterile surgical marker, the primary defect shape was transferred to the donor site. The epidermal graft was then harvested.  The skin graft was then placed in the primary defect and oriented appropriately.
Vermilion Border Text: The closure involved the vermilion border.
Nasal Turnover Hinge Flap Text: The defect edges were debeveled with a #15 scalpel blade.  Given the size, depth, location of the defect and the defect being full thickness a nasal turnover hinge flap was deemed most appropriate.  Using a sterile surgical marker, an appropriate hinge flap was drawn incorporating the defect. The area thus outlined was incised with a #15 scalpel blade. The flap was designed to recreate the nasal mucosal lining and the alar rim. The skin margins were undermined to an appropriate distance in all directions utilizing iris scissors.
H Plasty Text: Given the location of the defect, shape of the defect and the proximity to free margins a H-plasty was deemed most appropriate for repair.  Using a sterile surgical marker, the appropriate advancement arms of the H-plasty were drawn incorporating the defect and placing the expected incisions within the relaxed skin tension lines where possible. The area thus outlined was incised deep to adipose tissue with a #15 scalpel blade. The skin margins were undermined to an appropriate distance in all directions utilizing iris scissors.  The opposing advancement arms were then advanced into place in opposite direction and anchored with interrupted buried subcutaneous sutures.
Complex Repair And Melolabial Flap Text: The defect edges were debeveled with a #15 scalpel blade.  The primary defect was closed partially with a complex linear closure.  Given the location of the remaining defect, shape of the defect and the proximity to free margins a melolabial flap was deemed most appropriate for complete closure of the defect.  Using a sterile surgical marker, an appropriate advancement flap was drawn incorporating the defect and placing the expected incisions within the relaxed skin tension lines where possible.    The area thus outlined was incised deep to adipose tissue with a #15 scalpel blade.  The skin margins were undermined to an appropriate distance in all directions utilizing iris scissors.
Detail Level: Detailed
Burow's Advancement Flap Text: The defect edges were debeveled with a #15 scalpel blade.  Given the location of the defect and the proximity to free margins a Burow's advancement flap was deemed most appropriate.  Using a sterile surgical marker, the appropriate advancement flap was drawn incorporating the defect and placing the expected incisions within the relaxed skin tension lines where possible.    The area thus outlined was incised deep to adipose tissue with a #15 scalpel blade.  The skin margins were undermined to an appropriate distance in all directions utilizing iris scissors.
Excision Depth: adipose tissue
Post-Care Instructions: I reviewed with the patient in detail post-care instructions. Patient is not to engage in any heavy lifting, exercise, or swimming for the next 14 days. Should the patient develop any fevers, chills, bleeding, severe pain patient will contact the office immediately.
Eye Clamp Note Details: An eye clamp was used during the procedure.
Intermediate / Complex Repair - Final Wound Length In Cm: 3.2
Complex Repair And Ftsg Text: The defect edges were debeveled with a #15 scalpel blade.  The primary defect was closed partially with a complex linear closure.  Given the location of the defect, shape of the defect and the proximity to free margins a full thickness skin graft was deemed most appropriate to repair the remaining defect.  The graft was trimmed to fit the size of the remaining defect.  The graft was then placed in the primary defect, oriented appropriately, and sutured into place.
Island Pedicle Flap With Canthal Suspension Text: The defect edges were debeveled with a #15 scalpel blade.  Given the location of the defect, shape of the defect and the proximity to free margins an island pedicle advancement flap was deemed most appropriate.  Using a sterile surgical marker, an appropriate advancement flap was drawn incorporating the defect, outlining the appropriate donor tissue and placing the expected incisions within the relaxed skin tension lines where possible. The area thus outlined was incised deep to adipose tissue with a #15 scalpel blade.  The skin margins were undermined to an appropriate distance in all directions around the primary defect and laterally outward around the island pedicle utilizing iris scissors.  There was minimal undermining beneath the pedicle flap. A suspension suture was placed in the canthal tendon to prevent tension and prevent ectropion.
Excisional Biopsy Additional Text (Leave Blank If You Do Not Want): The margin was drawn around the clinically apparent lesion. An elliptical shape was then drawn on the skin incorporating the lesion and margins.  Incisions were then made along these lines to the appropriate tissue plane and the lesion was extirpated.
Helical Rim Advancement Flap Text: The defect edges were debeveled with a #15 blade scalpel.  Given the location of the defect and the proximity to free margins (helical rim) a double helical rim advancement flap was deemed most appropriate.  Using a sterile surgical marker, the appropriate advancement flaps were drawn incorporating the defect and placing the expected incisions between the helical rim and antihelix where possible.  The area thus outlined was incised through and through with a #15 scalpel blade.  With a skin hook and iris scissors, the flaps were gently and sharply undermined and freed up.
Epidermal Closure: interrupted subcuticular
Graft Donor Site Bandage (Optional-Leave Blank If You Don't Want In Note): Steri-strips and a pressure bandage were applied to the donor site.
Partial Purse String (Simple) Text: Given the location of the defect and the characteristics of the surrounding skin a simple purse string closure was deemed most appropriate.  Undermining was performed circumferentially around the surgical defect.  A purse string suture was then placed and tightened. Wound tension of the circular defect prevented complete closure of the wound.
Z Plasty Text: The lesion was extirpated to the level of the fat with a #15 scalpel blade.  Given the location of the defect, shape of the defect and the proximity to free margins a Z-plasty was deemed most appropriate for repair.  Using a sterile surgical marker, the appropriate transposition arms of the Z-plasty were drawn incorporating the defect and placing the expected incisions within the relaxed skin tension lines where possible.    The area thus outlined was incised deep to adipose tissue with a #15 scalpel blade.  The skin margins were undermined to an appropriate distance in all directions utilizing iris scissors.  The opposing transposition arms were then transposed into place in opposite direction and anchored with interrupted buried subcutaneous sutures.
Complex Repair And Skin Substitute Graft Text: The defect edges were debeveled with a #15 scalpel blade.  The primary defect was closed partially with a complex linear closure.  Given the location of the remaining defect, shape of the defect and the proximity to free margins a skin substitute graft was deemed most appropriate to repair the remaining defect.  The graft was trimmed to fit the size of the remaining defect.  The graft was then placed in the primary defect, oriented appropriately, and sutured into place.
O-T Advancement Flap Text: The defect edges were debeveled with a #15 scalpel blade.  Given the location of the defect, shape of the defect and the proximity to free margins an O-T advancement flap was deemed most appropriate.  Using a sterile surgical marker, an appropriate advancement flap was drawn incorporating the defect and placing the expected incisions within the relaxed skin tension lines where possible.    The area thus outlined was incised deep to adipose tissue with a #15 scalpel blade.  The skin margins were undermined to an appropriate distance in all directions utilizing iris scissors.
Suturegard Body: The suture ends were repeatedly re-tightened and re-clamped to achieve the desired tissue expansion.
Orbicularis Oris Muscle Flap Text: The defect edges were debeveled with a #15 scalpel blade.  Given that the defect affected the competency of the oral sphincter an orbicularis oris muscle flap was deemed most appropriate to restore this competency and normal muscle function.  Using a sterile surgical marker, an appropriate flap was drawn incorporating the defect. The area thus outlined was incised with a #15 scalpel blade.
Zygomaticofacial Flap Text: Given the location of the defect, shape of the defect and the proximity to free margins a zygomaticofacial flap was deemed most appropriate for repair.  Using a sterile surgical marker, the appropriate flap was drawn incorporating the defect and placing the expected incisions within the relaxed skin tension lines where possible. The area thus outlined was incised deep to adipose tissue with a #15 scalpel blade with preservation of a vascular pedicle.  The skin margins were undermined to an appropriate distance in all directions utilizing iris scissors.  The flap was then placed into the defect and anchored with interrupted buried subcutaneous sutures.
Perilesional Excision Additional Text (Leave Blank If You Do Not Want): The margin was drawn around the clinically apparent lesion. Incisions were then made along these lines to the appropriate tissue plane and the lesion was extirpated.
Undermining Type: Entire Wound
Mucosal Advancement Flap Text: Given the location of the defect, shape of the defect and the proximity to free margins a mucosal advancement flap was deemed most appropriate. Incisions were made with a 15 blade scalpel in the appropriate fashion along the cutaneous vermilion border and the mucosal lip. The remaining actinically damaged mucosal tissue was excised.  The mucosal advancement flap was then elevated to the gingival sulcus with care taken to preserve the neurovascular structures and advanced into the primary defect. Care was taken to ensure that precise realignment of the vermilion border was achieved.
Star Wedge Flap Text: The defect edges were debeveled with a #15 scalpel blade.  Given the location of the defect, shape of the defect and the proximity to free margins a star wedge flap was deemed most appropriate.  Using a sterile surgical marker, an appropriate rotation flap was drawn incorporating the defect and placing the expected incisions within the relaxed skin tension lines where possible. The area thus outlined was incised deep to adipose tissue with a #15 scalpel blade.  The skin margins were undermined to an appropriate distance in all directions utilizing iris scissors.
Burow's Graft Text: The defect edges were debeveled with a #15 scalpel blade.  Given the location of the defect, shape of the defect, the proximity to free margins and the presence of a standing cone deformity a Burow's skin graft was deemed most appropriate. The standing cone was removed and this tissue was then trimmed to the shape of the primary defect. The adipose tissue was also removed until only dermis and epidermis were left.  The skin margins of the secondary defect were undermined to an appropriate distance in all directions utilizing iris scissors.  The secondary defect was closed with interrupted buried subcutaneous sutures.  The skin edges were then re-apposed with running  sutures.  The skin graft was then placed in the primary defect and oriented appropriately.
Hemigard Intro: Due to skin fragility and wound tension, it was decided to use HEMIGARD adhesive retention suture devices to permit a linear closure. The skin was cleaned and dried for a 6cm distance away from the wound. Excessive hair, if present, was removed to allow for adhesion.
Saucerization Depth: dermis and superficial adipose tissue
Consent was obtained from the patient. The risks and benefits to therapy were discussed in detail. Specifically, the risks of infection, scarring, bleeding, prolonged wound healing, incomplete removal, allergy to anesthesia, nerve injury and recurrence were addressed. Prior to the procedure, the treatment site was clearly identified and confirmed by the patient. All components of Universal Protocol/PAUSE Rule completed.
Length To Time In Minutes Device Was In Place: 10
W Plasty Text: The lesion was extirpated to the level of the fat with a #15 scalpel blade.  Given the location of the defect, shape of the defect and the proximity to free margins a W-plasty was deemed most appropriate for repair.  Using a sterile surgical marker, the appropriate transposition arms of the W-plasty were drawn incorporating the defect and placing the expected incisions within the relaxed skin tension lines where possible.    The area thus outlined was incised deep to adipose tissue with a #15 scalpel blade.  The skin margins were undermined to an appropriate distance in all directions utilizing iris scissors.  The opposing transposition arms were then transposed into place in opposite direction and anchored with interrupted buried subcutaneous sutures.
Complex Repair And Rotation Flap Text: The defect edges were debeveled with a #15 scalpel blade.  The primary defect was closed partially with a complex linear closure.  Given the location of the remaining defect, shape of the defect and the proximity to free margins a rotation flap was deemed most appropriate for complete closure of the defect.  Using a sterile surgical marker, an appropriate advancement flap was drawn incorporating the defect and placing the expected incisions within the relaxed skin tension lines where possible.    The area thus outlined was incised deep to adipose tissue with a #15 scalpel blade.  The skin margins were undermined to an appropriate distance in all directions utilizing iris scissors.
Complex Repair And Double M Plasty Text: The defect edges were debeveled with a #15 scalpel blade.  The primary defect was closed partially with a complex linear closure.  Given the location of the remaining defect, shape of the defect and the proximity to free margins a double M plasty was deemed most appropriate for complete closure of the defect.  Using a sterile surgical marker, an appropriate advancement flap was drawn incorporating the defect and placing the expected incisions within the relaxed skin tension lines where possible.    The area thus outlined was incised deep to adipose tissue with a #15 scalpel blade.  The skin margins were undermined to an appropriate distance in all directions utilizing iris scissors.
Hemigard Retention Suture: 2-0 Nylon
Complex Repair And Burow's Graft Text: The defect edges were debeveled with a #15 scalpel blade.  The primary defect was closed partially with a complex linear closure.  Given the location of the defect, shape of the defect, the proximity to free margins and the presence of a standing cone deformity a Burow's graft was deemed most appropriate to repair the remaining defect.  The graft was trimmed to fit the size of the remaining defect.  The graft was then placed in the primary defect, oriented appropriately, and sutured into place.
Information: Selecting Yes will display possible errors in your note based on the variables you have selected. This validation is only offered as a suggestion for you. PLEASE NOTE THAT THE VALIDATION TEXT WILL BE REMOVED WHEN YOU FINALIZE YOUR NOTE. IF YOU WANT TO FAX A PRELIMINARY NOTE YOU WILL NEED TO TOGGLE THIS TO 'NO' IF YOU DO NOT WANT IT IN YOUR FAXED NOTE.
Saucerization Excision Additional Text (Leave Blank If You Do Not Want): The margin was drawn around the clinically apparent lesion.  Incisions were then made along these lines, in a tangential fashion, to the appropriate tissue plane and the lesion was extirpated.
Modified Advancement Flap Text: The defect edges were debeveled with a #15 scalpel blade.  Given the location of the defect, shape of the defect and the proximity to free margins a modified advancement flap was deemed most appropriate.  Using a sterile surgical marker, an appropriate advancement flap was drawn incorporating the defect and placing the expected incisions within the relaxed skin tension lines where possible.    The area thus outlined was incised deep to adipose tissue with a #15 scalpel blade.  The skin margins were undermined to an appropriate distance in all directions utilizing iris scissors.
Home Suture Removal Text: Patient was provided a home suture removal kit and will remove their sutures at home.  If they have any questions or difficulties they will call the office.
Complex Repair And Split-Thickness Skin Graft Text: The defect edges were debeveled with a #15 scalpel blade.  The primary defect was closed partially with a complex linear closure.  Given the location of the defect, shape of the defect and the proximity to free margins a split thickness skin graft was deemed most appropriate to repair the remaining defect.  The graft was trimmed to fit the size of the remaining defect.  The graft was then placed in the primary defect, oriented appropriately, and sutured into place.
Suture Removal: 14 days
Size Of Margin In Cm: 0.3
Complex Repair And A-T Advancement Flap Text: The defect edges were debeveled with a #15 scalpel blade.  The primary defect was closed partially with a complex linear closure.  Given the location of the remaining defect, shape of the defect and the proximity to free margins an A-T advancement flap was deemed most appropriate for complete closure of the defect.  Using a sterile surgical marker, an appropriate advancement flap was drawn incorporating the defect and placing the expected incisions within the relaxed skin tension lines where possible.    The area thus outlined was incised deep to adipose tissue with a #15 scalpel blade.  The skin margins were undermined to an appropriate distance in all directions utilizing iris scissors.
O-T Plasty Text: The defect edges were debeveled with a #15 scalpel blade.  Given the location of the defect, shape of the defect and the proximity to free margins an O-T plasty was deemed most appropriate.  Using a sterile surgical marker, an appropriate O-T plasty was drawn incorporating the defect and placing the expected incisions within the relaxed skin tension lines where possible.    The area thus outlined was incised deep to adipose tissue with a #15 scalpel blade.  The skin margins were undermined to an appropriate distance in all directions utilizing iris scissors.
Slit Excision Additional Text (Leave Blank If You Do Not Want): A linear line was drawn on the skin overlying the lesion. An incision was made slowly until the lesion was visualized.  Once visualized, the lesion was removed with blunt dissection.
Skin Substitute Text: The defect edges were debeveled with a #15 scalpel blade.  Given the location of the defect, shape of the defect and the proximity to free margins a skin substitute graft was deemed most appropriate.  The graft material was trimmed to fit the size of the defect. The graft was then placed in the primary defect and oriented appropriately.
Debridement Text: The wound edges were debrided prior to proceeding with the closure to facilitate wound healing.
Bilobed Transposition Flap Text: The defect edges were debeveled with a #15 scalpel blade.  Given the location of the defect and the proximity to free margins a bilobed transposition flap was deemed most appropriate.  Using a sterile surgical marker, an appropriate bilobe flap drawn around the defect.    The area thus outlined was incised deep to adipose tissue with a #15 scalpel blade.  The skin margins were undermined to an appropriate distance in all directions utilizing iris scissors.
A-T Advancement Flap Text: The defect edges were debeveled with a #15 scalpel blade.  Given the location of the defect, shape of the defect and the proximity to free margins an A-T advancement flap was deemed most appropriate.  Using a sterile surgical marker, an appropriate advancement flap was drawn incorporating the defect and placing the expected incisions within the relaxed skin tension lines where possible.    The area thus outlined was incised deep to adipose tissue with a #15 scalpel blade.  The skin margins were undermined to an appropriate distance in all directions utilizing iris scissors.
Complex Repair And Z Plasty Text: The defect edges were debeveled with a #15 scalpel blade.  The primary defect was closed partially with a complex linear closure.  Given the location of the remaining defect, shape of the defect and the proximity to free margins a Z plasty was deemed most appropriate for complete closure of the defect.  Using a sterile surgical marker, an appropriate advancement flap was drawn incorporating the defect and placing the expected incisions within the relaxed skin tension lines where possible.    The area thus outlined was incised deep to adipose tissue with a #15 scalpel blade.  The skin margins were undermined to an appropriate distance in all directions utilizing iris scissors.
Xenograft Text: The defect edges were debeveled with a #15 scalpel blade.  Given the location of the defect, shape of the defect and the proximity to free margins a xenograft was deemed most appropriate.  The graft was then trimmed to fit the size of the defect.  The graft was then placed in the primary defect and oriented appropriately.
Positioning (Leave Blank If You Do Not Want): The patient was placed in a comfortable position exposing the surgical site.
Number Of Hemigard Strips Per Side: 1
Cheek-To-Nose Interpolation Flap Text: A decision was made to reconstruct the defect utilizing an interpolation axial flap and a staged reconstruction.  A telfa template was made of the defect.  This telfa template was then used to outline the Cheek-To-Nose Interpolation flap.  The donor area for the pedicle flap was then injected with anesthesia.  The flap was excised through the skin and subcutaneous tissue down to the layer of the underlying musculature.  The interpolation flap was carefully excised within this deep plane to maintain its blood supply.  The edges of the donor site were undermined.   The donor site was closed in a primary fashion.  The pedicle was then rotated into position and sutured.  Once the tube was sutured into place, adequate blood supply was confirmed with blanching and refill.  The pedicle was then wrapped with xeroform gauze and dressed appropriately with a telfa and gauze bandage to ensure continued blood supply and protect the attached pedicle.
Repair Performed By Another Provider Text (Leave Blank If You Do Not Want): After the tissue was excised the defect was repaired by another provider.
Complex Repair And O-L Flap Text: The defect edges were debeveled with a #15 scalpel blade.  The primary defect was closed partially with a complex linear closure.  Given the location of the remaining defect, shape of the defect and the proximity to free margins an O-L flap was deemed most appropriate for complete closure of the defect.  Using a sterile surgical marker, an appropriate flap was drawn incorporating the defect and placing the expected incisions within the relaxed skin tension lines where possible.    The area thus outlined was incised deep to adipose tissue with a #15 scalpel blade.  The skin margins were undermined to an appropriate distance in all directions utilizing iris scissors.
Pre-Excision Curettage Text (Leave Blank If You Do Not Want): Prior to drawing the surgical margin the visible lesion was removed with electrodesiccation and curettage to clearly define the lesion size.
Ear Star Wedge Flap Text: The defect edges were debeveled with a #15 blade scalpel.  Given the location of the defect and the proximity to free margins (helical rim) an ear star wedge flap was deemed most appropriate.  Using a sterile surgical marker, the appropriate flap was drawn incorporating the defect and placing the expected incisions between the helical rim and antihelix where possible.  The area thus outlined was incised through and through with a #15 scalpel blade.
Complex Repair And Transposition Flap Text: The defect edges were debeveled with a #15 scalpel blade.  The primary defect was closed partially with a complex linear closure.  Given the location of the remaining defect, shape of the defect and the proximity to free margins a transposition flap was deemed most appropriate for complete closure of the defect.  Using a sterile surgical marker, an appropriate advancement flap was drawn incorporating the defect and placing the expected incisions within the relaxed skin tension lines where possible.    The area thus outlined was incised deep to adipose tissue with a #15 scalpel blade.  The skin margins were undermined to an appropriate distance in all directions utilizing iris scissors.
Double O-Z Flap Text: The defect edges were debeveled with a #15 scalpel blade.  Given the location of the defect, shape of the defect and the proximity to free margins a Double O-Z flap was deemed most appropriate.  Using a sterile surgical marker, an appropriate transposition flap was drawn incorporating the defect and placing the expected incisions within the relaxed skin tension lines where possible. The area thus outlined was incised deep to adipose tissue with a #15 scalpel blade.  The skin margins were undermined to an appropriate distance in all directions utilizing iris scissors.
Interpolation Flap Text: A decision was made to reconstruct the defect utilizing an interpolation axial flap and a staged reconstruction.  A telfa template was made of the defect.  This telfa template was then used to outline the interpolation flap.  The donor area for the pedicle flap was then injected with anesthesia.  The flap was excised through the skin and subcutaneous tissue down to the layer of the underlying musculature.  The interpolation flap was carefully excised within this deep plane to maintain its blood supply.  The edges of the donor site were undermined.   The donor site was closed in a primary fashion.  The pedicle was then rotated into position and sutured.  Once the tube was sutured into place, adequate blood supply was confirmed with blanching and refill.  The pedicle was then wrapped with xeroform gauze and dressed appropriately with a telfa and gauze bandage to ensure continued blood supply and protect the attached pedicle.
No Repair - Repaired With Adjacent Surgical Defect Text (Leave Blank If You Do Not Want): After the excision the defect was repaired concurrently with another surgical defect which was in close approximation.
O-Z Plasty Text: The defect edges were debeveled with a #15 scalpel blade.  Given the location of the defect, shape of the defect and the proximity to free margins an O-Z plasty (double transposition flap) was deemed most appropriate.  Using a sterile surgical marker, the appropriate transposition flaps were drawn incorporating the defect and placing the expected incisions within the relaxed skin tension lines where possible.    The area thus outlined was incised deep to adipose tissue with a #15 scalpel blade.  The skin margins were undermined to an appropriate distance in all directions utilizing iris scissors.  Hemostasis was achieved with electrocautery.  The flaps were then transposed into place, one clockwise and the other counterclockwise, and anchored with interrupted buried subcutaneous sutures.
Cheek Interpolation Flap Text: A decision was made to reconstruct the defect utilizing an interpolation axial flap and a staged reconstruction.  A telfa template was made of the defect.  This telfa template was then used to outline the Cheek Interpolation flap.  The donor area for the pedicle flap was then injected with anesthesia.  The flap was excised through the skin and subcutaneous tissue down to the layer of the underlying musculature.  The interpolation flap was carefully excised within this deep plane to maintain its blood supply.  The edges of the donor site were undermined.   The donor site was closed in a primary fashion.  The pedicle was then rotated into position and sutured.  Once the tube was sutured into place, adequate blood supply was confirmed with blanching and refill.  The pedicle was then wrapped with xeroform gauze and dressed appropriately with a telfa and gauze bandage to ensure continued blood supply and protect the attached pedicle.
Complex Repair And Rhombic Flap Text: The defect edges were debeveled with a #15 scalpel blade.  The primary defect was closed partially with a complex linear closure.  Given the location of the remaining defect, shape of the defect and the proximity to free margins a rhombic flap was deemed most appropriate for complete closure of the defect.  Using a sterile surgical marker, an appropriate advancement flap was drawn incorporating the defect and placing the expected incisions within the relaxed skin tension lines where possible.    The area thus outlined was incised deep to adipose tissue with a #15 scalpel blade.  The skin margins were undermined to an appropriate distance in all directions utilizing iris scissors.
Trilobed Flap Text: The defect edges were debeveled with a #15 scalpel blade.  Given the location of the defect and the proximity to free margins a trilobed flap was deemed most appropriate.  Using a sterile surgical marker, an appropriate trilobed flap drawn around the defect.    The area thus outlined was incised deep to adipose tissue with a #15 scalpel blade.  The skin margins were undermined to an appropriate distance in all directions utilizing iris scissors.
Billing Type: Third-Party Bill
Tissue Cultured Epidermal Autograft Text: The defect edges were debeveled with a #15 scalpel blade.  Given the location of the defect, shape of the defect and the proximity to free margins a tissue cultured epidermal autograft was deemed most appropriate.  The graft was then trimmed to fit the size of the defect.  The graft was then placed in the primary defect and oriented appropriately.
Hemigard Postcare Instructions: The HEMIGARD strips are to remain completely dry for at least 5-7 days.
Scalpel Size: 10 blade
Complex Repair And W Plasty Text: The defect edges were debeveled with a #15 scalpel blade.  The primary defect was closed partially with a complex linear closure.  Given the location of the remaining defect, shape of the defect and the proximity to free margins a W plasty was deemed most appropriate for complete closure of the defect.  Using a sterile surgical marker, an appropriate advancement flap was drawn incorporating the defect and placing the expected incisions within the relaxed skin tension lines where possible.    The area thus outlined was incised deep to adipose tissue with a #15 scalpel blade.  The skin margins were undermined to an appropriate distance in all directions utilizing iris scissors.
O-L Flap Text: The defect edges were debeveled with a #15 scalpel blade.  Given the location of the defect, shape of the defect and the proximity to free margins an O-L flap was deemed most appropriate.  Using a sterile surgical marker, an appropriate advancement flap was drawn incorporating the defect and placing the expected incisions within the relaxed skin tension lines where possible.    The area thus outlined was incised deep to adipose tissue with a #15 scalpel blade.  The skin margins were undermined to an appropriate distance in all directions utilizing iris scissors.
Dressing: dry sterile dressing
Melolabial Transposition Flap Text: The defect edges were debeveled with a #15 scalpel blade.  Given the location of the defect and the proximity to free margins a melolabial flap was deemed most appropriate.  Using a sterile surgical marker, an appropriate melolabial transposition flap was drawn incorporating the defect.    The area thus outlined was incised deep to adipose tissue with a #15 scalpel blade.  The skin margins were undermined to an appropriate distance in all directions utilizing iris scissors.
Repair Type: Intermediate
Peng Advancement Flap Text: The defect edges were debeveled with a #15 scalpel blade.  Given the location of the defect, shape of the defect and the proximity to free margins a Peng advancement flap was deemed most appropriate.  Using a sterile surgical marker, an appropriate advancement flap was drawn incorporating the defect and placing the expected incisions within the relaxed skin tension lines where possible. The area thus outlined was incised deep to adipose tissue with a #15 scalpel blade.  The skin margins were undermined to an appropriate distance in all directions utilizing iris scissors.
Cartilage Graft Text: The defect edges were debeveled with a #15 scalpel blade.  Given the location of the defect, shape of the defect, the fact the defect involved a full thickness cartilage defect a cartilage graft was deemed most appropriate.  An appropriate donor site was identified, cleansed, and anesthetized. The cartilage graft was then harvested and transferred to the recipient site, oriented appropriately and then sutured into place.  The secondary defect was then repaired using a primary closure.
Hemostasis: Electrocautery
Transposition Flap Text: The defect edges were debeveled with a #15 scalpel blade.  Given the location of the defect and the proximity to free margins a transposition flap was deemed most appropriate.  Using a sterile surgical marker, an appropriate transposition flap was drawn incorporating the defect.    The area thus outlined was incised deep to adipose tissue with a #15 scalpel blade.  The skin margins were undermined to an appropriate distance in all directions utilizing iris scissors.
Complex Repair And O-T Advancement Flap Text: The defect edges were debeveled with a #15 scalpel blade.  The primary defect was closed partially with a complex linear closure.  Given the location of the remaining defect, shape of the defect and the proximity to free margins an O-T advancement flap was deemed most appropriate for complete closure of the defect.  Using a sterile surgical marker, an appropriate advancement flap was drawn incorporating the defect and placing the expected incisions within the relaxed skin tension lines where possible.    The area thus outlined was incised deep to adipose tissue with a #15 scalpel blade.  The skin margins were undermined to an appropriate distance in all directions utilizing iris scissors.
Bilateral Helical Rim Advancement Flap Text: The defect edges were debeveled with a #15 blade scalpel.  Given the location of the defect and the proximity to free margins (helical rim) a bilateral helical rim advancement flap was deemed most appropriate.  Using a sterile surgical marker, the appropriate advancement flaps were drawn incorporating the defect and placing the expected incisions between the helical rim and antihelix where possible.  The area thus outlined was incised through and through with a #15 scalpel blade.  With a skin hook and iris scissors, the flaps were gently and sharply undermined and freed up.
Complex Repair And Epidermal Autograft Text: The defect edges were debeveled with a #15 scalpel blade.  The primary defect was closed partially with a complex linear closure.  Given the location of the defect, shape of the defect and the proximity to free margins an epidermal autograft was deemed most appropriate to repair the remaining defect.  The graft was trimmed to fit the size of the remaining defect.  The graft was then placed in the primary defect, oriented appropriately, and sutured into place.
O-Z Flap Text: The defect edges were debeveled with a #15 scalpel blade.  Given the location of the defect, shape of the defect and the proximity to free margins an O-Z flap was deemed most appropriate.  Using a sterile surgical marker, an appropriate transposition flap was drawn incorporating the defect and placing the expected incisions within the relaxed skin tension lines where possible. The area thus outlined was incised deep to adipose tissue with a #15 scalpel blade.  The skin margins were undermined to an appropriate distance in all directions utilizing iris scissors.
Alar Island Pedicle Flap Text: The defect edges were debeveled with a #15 scalpel blade.  Given the location of the defect, shape of the defect and the proximity to the alar rim an island pedicle advancement flap was deemed most appropriate.  Using a sterile surgical marker, an appropriate advancement flap was drawn incorporating the defect, outlining the appropriate donor tissue and placing the expected incisions within the nasal ala running parallel to the alar rim. The area thus outlined was incised with a #15 scalpel blade.  The skin margins were undermined minimally to an appropriate distance in all directions around the primary defect and laterally outward around the island pedicle utilizing iris scissors.  There was minimal undermining beneath the pedicle flap.
Complex Repair And Single Advancement Flap Text: The defect edges were debeveled with a #15 scalpel blade.  The primary defect was closed partially with a complex linear closure.  Given the location of the remaining defect, shape of the defect and the proximity to free margins a single advancement flap was deemed most appropriate for complete closure of the defect.  Using a sterile surgical marker, an appropriate advancement flap was drawn incorporating the defect and placing the expected incisions within the relaxed skin tension lines where possible.    The area thus outlined was incised deep to adipose tissue with a #15 scalpel blade.  The skin margins were undermined to an appropriate distance in all directions utilizing iris scissors.

## 2022-03-09 NOTE — PROCEDURE: MIPS QUALITY
[facility] called again, stating they have not received any notes stating patient is unable to use cane or walker.      [phone]
Quality 47: Advance Care Plan: Advance Care Planning discussed and documented; advance care plan or surrogate decision maker documented in the medical record.
Quality 111:Pneumonia Vaccination Status For Older Adults: Pneumococcal Vaccination not Administered or Previously Received, Reason not Otherwise Specified
Quality 110: Preventive Care And Screening: Influenza Immunization: Influenza Immunization Administered during Influenza season
Detail Level: Detailed
Quality 226: Preventive Care And Screening: Tobacco Use: Screening And Cessation Intervention: Patient screened for tobacco use and is an ex/non-smoker

## 2022-03-15 NOTE — PROCEDURE: LIQUID NITROGEN
Detail Level: Detailed
Post-Care Instructions: I reviewed with the patient in detail post-care instructions. Patient is to wear sunprotection, and avoid picking at any of the treated lesions. Pt may apply Vaseline to crusted or scabbing areas.
Render Post-Care Instructions In Note?: no
Consent: The patient's consent was obtained including but not limited to risks of crusting, scabbing, blistering, scarring, darker or lighter pigmentary change, recurrence, incomplete removal and infection.
Duration Of Freeze Thaw-Cycle (Seconds): 5
Nasal Turnover Hinge Flap Text: The defect edges were debeveled with a #15 scalpel blade.  Given the size, depth, location of the defect and the defect being full thickness a nasal turnover hinge flap was deemed most appropriate.  Using a sterile surgical marker, an appropriate hinge flap was drawn incorporating the defect. The area thus outlined was incised with a #15 scalpel blade. The flap was designed to recreate the nasal mucosal lining and the alar rim. The skin margins were undermined to an appropriate distance in all directions utilizing iris scissors.
Number Of Freeze-Thaw Cycles: 1 freeze-thaw cycle

## 2022-03-22 ENCOUNTER — APPOINTMENT (OUTPATIENT)
Age: 67
Setting detail: DERMATOLOGY
End: 2022-04-20

## 2022-03-22 VITALS — TEMPERATURE: 98.7 F

## 2022-03-22 DIAGNOSIS — Z48.02 ENCOUNTER FOR REMOVAL OF SUTURES: ICD-10-CM

## 2022-03-22 PROCEDURE — OTHER MIPS QUALITY: OTHER

## 2022-03-22 PROCEDURE — 99024 POSTOP FOLLOW-UP VISIT: CPT

## 2022-03-22 PROCEDURE — OTHER SUTURE REMOVAL (GLOBAL PERIOD): OTHER

## 2022-03-22 ASSESSMENT — LOCATION ZONE DERM: LOCATION ZONE: NECK

## 2022-03-22 ASSESSMENT — LOCATION DETAILED DESCRIPTION DERM: LOCATION DETAILED: RIGHT CENTRAL ANTERIOR NECK

## 2022-03-22 ASSESSMENT — LOCATION SIMPLE DESCRIPTION DERM: LOCATION SIMPLE: NECK

## 2022-03-22 NOTE — PROCEDURE: SUTURE REMOVAL (GLOBAL PERIOD)
Detail Level: Detailed
Add 82099 Cpt? (Important Note: In 2017 The Use Of 53998 Is Being Tracked By Cms To Determine Future Global Period Reimbursement For Global Periods): yes

## 2022-03-22 NOTE — PROCEDURE: MIPS QUALITY
Quality 111:Pneumonia Vaccination Status For Older Adults: Pneumococcal Vaccination not Administered or Previously Received, Reason not Otherwise Specified
Quality 47: Advance Care Plan: Advance Care Planning discussed and documented; advance care plan or surrogate decision maker documented in the medical record.
Quality 110: Preventive Care And Screening: Influenza Immunization: Influenza Immunization Administered during Influenza season
Detail Level: Detailed
Quality 226: Preventive Care And Screening: Tobacco Use: Screening And Cessation Intervention: Patient screened for tobacco use and is an ex/non-smoker

## 2022-08-02 ENCOUNTER — APPOINTMENT (OUTPATIENT)
Age: 67
Setting detail: DERMATOLOGY
End: 2022-08-03

## 2022-08-02 VITALS — TEMPERATURE: 98.7 F

## 2022-08-02 DIAGNOSIS — L57.8 OTHER SKIN CHANGES DUE TO CHRONIC EXPOSURE TO NONIONIZING RADIATION: ICD-10-CM

## 2022-08-02 DIAGNOSIS — L82.1 OTHER SEBORRHEIC KERATOSIS: ICD-10-CM

## 2022-08-02 DIAGNOSIS — D22 MELANOCYTIC NEVI: ICD-10-CM

## 2022-08-02 DIAGNOSIS — L57.0 ACTINIC KERATOSIS: ICD-10-CM

## 2022-08-02 DIAGNOSIS — L81.4 OTHER MELANIN HYPERPIGMENTATION: ICD-10-CM

## 2022-08-02 DIAGNOSIS — L73.8 OTHER SPECIFIED FOLLICULAR DISORDERS: ICD-10-CM

## 2022-08-02 PROBLEM — D22.5 MELANOCYTIC NEVI OF TRUNK: Status: ACTIVE | Noted: 2022-08-02

## 2022-08-02 PROCEDURE — OTHER MIPS QUALITY: OTHER

## 2022-08-02 PROCEDURE — 99213 OFFICE O/P EST LOW 20 MIN: CPT | Mod: 25

## 2022-08-02 PROCEDURE — OTHER TREATMENT REGIMEN: OTHER

## 2022-08-02 PROCEDURE — 17000 DESTRUCT PREMALG LESION: CPT

## 2022-08-02 PROCEDURE — OTHER COUNSELING: OTHER

## 2022-08-02 PROCEDURE — OTHER LIQUID NITROGEN: OTHER

## 2022-08-02 PROCEDURE — 17003 DESTRUCT PREMALG LES 2-14: CPT

## 2022-08-02 ASSESSMENT — LOCATION DETAILED DESCRIPTION DERM
LOCATION DETAILED: RIGHT SUPERIOR LATERAL UPPER BACK
LOCATION DETAILED: LEFT LATERAL ABDOMEN
LOCATION DETAILED: LEFT INFERIOR ANTERIOR NECK
LOCATION DETAILED: RIGHT CENTRAL TEMPLE
LOCATION DETAILED: LEFT CENTRAL ZYGOMA
LOCATION DETAILED: LEFT CENTRAL FRONTAL SCALP
LOCATION DETAILED: LEFT SUPERIOR FOREHEAD
LOCATION DETAILED: RIGHT SUPERIOR HELIX
LOCATION DETAILED: RIGHT CENTRAL MALAR CHEEK
LOCATION DETAILED: LEFT CENTRAL MALAR CHEEK
LOCATION DETAILED: LEFT INFERIOR LATERAL MIDBACK
LOCATION DETAILED: LEFT SUPERIOR HELIX
LOCATION DETAILED: LEFT INFERIOR LATERAL FOREHEAD
LOCATION DETAILED: RIGHT INFERIOR LATERAL MIDBACK
LOCATION DETAILED: LEFT ANTERIOR SHOULDER

## 2022-08-02 ASSESSMENT — LOCATION SIMPLE DESCRIPTION DERM
LOCATION SIMPLE: ABDOMEN
LOCATION SIMPLE: RIGHT CHEEK
LOCATION SIMPLE: RIGHT EAR
LOCATION SIMPLE: LEFT SHOULDER
LOCATION SIMPLE: RIGHT UPPER BACK
LOCATION SIMPLE: LEFT ZYGOMA
LOCATION SIMPLE: LEFT CHEEK
LOCATION SIMPLE: RIGHT TEMPLE
LOCATION SIMPLE: LEFT LOWER BACK
LOCATION SIMPLE: LEFT FOREHEAD
LOCATION SIMPLE: LEFT ANTERIOR NECK
LOCATION SIMPLE: LEFT EAR
LOCATION SIMPLE: RIGHT LOWER BACK
LOCATION SIMPLE: SCALP

## 2022-08-02 ASSESSMENT — LOCATION ZONE DERM
LOCATION ZONE: SCALP
LOCATION ZONE: EAR
LOCATION ZONE: ARM
LOCATION ZONE: NECK
LOCATION ZONE: FACE
LOCATION ZONE: TRUNK

## 2022-08-02 NOTE — PROCEDURE: MIPS QUALITY
Quality 47: Advance Care Plan: Advance Care Planning discussed and documented; advance care plan or surrogate decision maker documented in the medical record.
Detail Level: Detailed
Quality 111:Pneumonia Vaccination Status For Older Adults: Pneumococcal Vaccination not Administered or Previously Received, Reason not Otherwise Specified
Quality 226: Preventive Care And Screening: Tobacco Use: Screening And Cessation Intervention: Patient screened for tobacco use and is an ex/non-smoker
Quality 110: Preventive Care And Screening: Influenza Immunization: Influenza Immunization Administered during Influenza season

## 2022-08-02 NOTE — PROCEDURE: LIQUID NITROGEN
Post-Care Instructions: I reviewed with the patient in detail post-care instructions. Patient is to wear sunprotection, and avoid picking at any of the treated lesions. Pt may apply Vaseline to crusted or scabbing areas.
Render Note In Bullet Format When Appropriate: No
Consent: The patient's consent was obtained including but not limited to risks of crusting, scabbing, blistering, scarring, darker or lighter pigmentary change, recurrence, incomplete removal and infection.
Detail Level: Detailed
Number Of Freeze-Thaw Cycles: 1 freeze-thaw cycle
Show Aperture Variable?: Yes
Duration Of Freeze Thaw-Cycle (Seconds): 5

## 2022-11-29 NOTE — PROCEDURE: SUPERFICIAL RADIATION TREATMENT
Daily Fractionated Dose (Optional- Include Units): 268.62
Treatment Time In Min (Optional): .37
Field Number: 1
Total Number Of Fractions Rx 2: 10
Fractions / Week Rx 3: 5
Render Additional Prescriptions In Note?: No
Time Dose Fractionation (Optional- Include Units If Applicable) Rx 2: 46 (Total 94)
Daily Fractionated Dose (Optional- Include Units) Rx 2: 264.18
Intro Statement (Will Not Render If Left Blank): Per the request of Dr. Robin, patient was seen today for Superficial Radiation Therapy requiring simulation (CPT® 89180) in preparation for treatment of specific diseased site(s). Simulation is necessary to determine correct patient and treatment portal positioning, deliver safe and effective radiation therapy. Today’s setup was evaluated determining continuation of treatment with the current plan, or necessary changes as appropriate. All appropriate custom blocking and treatment parameters verified by the Radiation Therapist.\\nToday’s visit is for Simulation and planning for radiation therapy.  Question were answered and concerns were address.  Patient was evaluated based on listed criteria and is a suitable candidate to begin SRT.  \\n
Patient Positioning: Side-lying (left)
Assessment: Appropriate reaction
Daily Fractionated Dose (Optional- Include Units): 270.9
Custom Shielding Preamble Text Will Not Be Included With Simple Simulations (.......... X X Y Cm): A lead shield of 0.762 mm thickness is utilized to form a molded, custom shield for intranasal shield.
Treatment Margins In Cm: 0.5
Detail Level: Detailed
Comments: On Target, RTOG 1
Computed Treatment Time In Min (Will Render The Same As Calculated Treatment Time If Left Blank): 0.37
Simple Simulation Afterword Text Will Be Included With Simple Simulations (Indications............): Patient had a complete consultation regarding all applicable modalities for the treatment of their skin cancer and based on a variety of factors including the type of tumor, size, and location, the relevant medical history as well as local tissue factors, the functional status of the individual, the ability to perform necessary postoperative wound instructions and the need for simultaneous treatments as well as overall wound healing status, it was determined that the patient would begin radiation therapy treatment for skin cancer.  A full simulation and treatment device design was performed including the determination and formulation of appropriate simple and complex devices including lead shield of 0.762 mm thickness to form molded customized shielding to specifically correlate with the lesion size including treatment margin.  The custom lead shield is adequate to accommodate the appropriate applicator and provide adequate shielding around the treatment site.  The specific field applicator, shields, and devices both simple and complex as well as the specific patient setup is outlined below.  The patient was given a full consent for superficial radiation to both verbally and in writing and the full determination of patient's eligibility for treatment and selection is outlined on the patient eligibility and treatment selection form.  The specific superficial radiotherapy prescription was determined and was documented on the superficial radiotherapy prescription form.  A treatment calculation was also performed and documented on the treatment calculation form.  Based on the prescription, the patient was scheduled for a series of fractional treatments.
Field Size (Applicator) Rx 2: 2.5 cm
Computed Treatment Time In Min (Will Render The Same As Calculated Treatment Time If Left Blank): 0.43
Simple Simulation Preamble Text Will Be Included With Simple Simulations (.......... Indications): Complex simulation was performed today for the following reasons:\\nPatient has custom shield designed for each SRT
Limb Positioning Or Elevation: pillow
Time Dose Fractionation (Optional- Include Units If Applicable): 96
Adult
Energy (Optional-Please Include Units) Rx 2: 50kV
Field Number: 2
Energy (Optional-Please Include Units): 70kV
Treatment Time In Min (Optional): .43
Fractions / Week Rx 2: 3
Treatment Device Design After Initial Simulation Justification (Will Render If Bill For Treatment Devices = Yes): Patient is status post radiation simulation and is evaluated as to the use of additional devices for shielding and placement for radiation therapy.
Treatment Time / Fractionation (Optional- Include Units): 0.43min
Total Dose (Optional-Please Include Units) Rx 2: 2641.8 (Total 5352.2)
Cumulative Dose In Cgy (Optional): 4334.4
Total Number Of Fractions: 20
Day Of The Week Treatment Administered: Friday
Shielding Size (Optional- Include Units) Rx 2: 2.0cmX2.0cm
Fractionation Number: 16
Information: Selecting Yes will display possible errors in your note based on the variables you have selected. This validation is only offered as a suggestion for you. PLEASE NOTE THAT THE VALIDATION TEXT WILL BE REMOVED WHEN YOU FINALIZE YOUR NOTE. IF YOU WANT TO FAX A PRELIMINARY NOTE YOU WILL NEED TO TOGGLE THIS TO 'NO' IF YOU DO NOT WANT IT IN YOUR FAXED NOTE.
Total Dose (Optional-Please Include Units): 5372.4. cGy
Bill For Radiation Treatment: Yes
Treatment Time / Fractionation (Optional- Include Units) Rx 2: .38
Depth (Optional-Please Include Units): 1.65
Total Number Of Fractions Rx 4: 15
Other Assessment (Will Overide Current Assessment): RTOG 1
Shielding Size (Optional- Include Units): 2.0 cm x 2.0 cm
Custom Shielding Afterword Text Will Not Be Included With Simple Simulations (X X Y Cm............): port to correlate with the lesion size, including treatment margin. The custom lead shield is adequate to accommodate the appropriate applicator and provide adequate shielding around the treatment site. Additional shielding (as noted below) is used to protect sensitive, normal tissues.
Day Of The Week Treatment Administered: Thursday
Treatment Time / Fractionation (Optional- Include Units): 0.37min
Depth (Optional-Please Include Units): 0.66
Depth (Optional-Please Include Units): 0.7
Functional Status: 0 (fully active)
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
Cumulative Dose In Cgy (Optional): 4297.92
Total Dose (Optional-Please Include Units): 5418. cGy
Energy (Include Units): 50 kV
Energy (Include Units): 70 kV
Pathology Override (Pathology Will Render As Diagnosis Name If Left Blank): Basal Cell Carcinoma Nodular Type
Time Dose Fractionation (Optional- Include Units If Applicable): 94

## 2023-02-13 ENCOUNTER — APPOINTMENT (OUTPATIENT)
Age: 68
Setting detail: DERMATOLOGY
End: 2023-02-14

## 2023-02-13 VITALS — TEMPERATURE: 98.7 F

## 2023-02-13 DIAGNOSIS — L81.4 OTHER MELANIN HYPERPIGMENTATION: ICD-10-CM

## 2023-02-13 DIAGNOSIS — D18.0 HEMANGIOMA: ICD-10-CM

## 2023-02-13 DIAGNOSIS — D485 NEOPLASM OF UNCERTAIN BEHAVIOR OF SKIN: ICD-10-CM

## 2023-02-13 DIAGNOSIS — L57.0 ACTINIC KERATOSIS: ICD-10-CM

## 2023-02-13 DIAGNOSIS — L82.1 OTHER SEBORRHEIC KERATOSIS: ICD-10-CM

## 2023-02-13 DIAGNOSIS — L73.8 OTHER SPECIFIED FOLLICULAR DISORDERS: ICD-10-CM

## 2023-02-13 PROBLEM — D48.5 NEOPLASM OF UNCERTAIN BEHAVIOR OF SKIN: Status: ACTIVE | Noted: 2023-02-13

## 2023-02-13 PROBLEM — D18.01 HEMANGIOMA OF SKIN AND SUBCUTANEOUS TISSUE: Status: ACTIVE | Noted: 2023-02-13

## 2023-02-13 PROCEDURE — OTHER COUNSELING: OTHER

## 2023-02-13 PROCEDURE — OTHER TREATMENT REGIMEN: OTHER

## 2023-02-13 PROCEDURE — 17003 DESTRUCT PREMALG LES 2-14: CPT

## 2023-02-13 PROCEDURE — 17000 DESTRUCT PREMALG LESION: CPT | Mod: 59

## 2023-02-13 PROCEDURE — OTHER MIPS QUALITY: OTHER

## 2023-02-13 PROCEDURE — OTHER LIQUID NITROGEN: OTHER

## 2023-02-13 PROCEDURE — OTHER SEPARATE AND IDENTIFIABLE DOCUMENTATION: OTHER

## 2023-02-13 PROCEDURE — 99213 OFFICE O/P EST LOW 20 MIN: CPT | Mod: 25

## 2023-02-13 PROCEDURE — 11102 TANGNTL BX SKIN SINGLE LES: CPT

## 2023-02-13 PROCEDURE — OTHER BIOPSY BY SHAVE METHOD: OTHER

## 2023-02-13 ASSESSMENT — LOCATION SIMPLE DESCRIPTION DERM
LOCATION SIMPLE: LEFT FOREHEAD
LOCATION SIMPLE: LEFT CHEEK
LOCATION SIMPLE: RIGHT CHEEK
LOCATION SIMPLE: POSTERIOR NECK
LOCATION SIMPLE: RIGHT EAR
LOCATION SIMPLE: RIGHT UPPER BACK
LOCATION SIMPLE: LEFT SHOULDER
LOCATION SIMPLE: LEFT ZYGOMA

## 2023-02-13 ASSESSMENT — LOCATION ZONE DERM
LOCATION ZONE: TRUNK
LOCATION ZONE: FACE
LOCATION ZONE: ARM
LOCATION ZONE: NECK
LOCATION ZONE: EAR

## 2023-02-13 ASSESSMENT — LOCATION DETAILED DESCRIPTION DERM
LOCATION DETAILED: LEFT SUPERIOR FOREHEAD
LOCATION DETAILED: LEFT CENTRAL MALAR CHEEK
LOCATION DETAILED: RIGHT CENTRAL BUCCAL CHEEK
LOCATION DETAILED: RIGHT INFERIOR POSTERIOR NECK
LOCATION DETAILED: LEFT ANTERIOR SHOULDER
LOCATION DETAILED: LEFT MEDIAL ZYGOMA
LOCATION DETAILED: RIGHT SUPERIOR UPPER BACK
LOCATION DETAILED: RIGHT SUPERIOR HELIX
LOCATION DETAILED: LEFT SUPERIOR CENTRAL MALAR CHEEK

## 2023-02-13 NOTE — PROCEDURE: MIPS QUALITY
Detail Level: Detailed
Quality 110: Preventive Care And Screening: Influenza Immunization: Influenza Immunization Administered during Influenza season
Quality 111:Pneumonia Vaccination Status For Older Adults: Pneumococcal Vaccination not Administered or Previously Received, Reason not Otherwise Specified
Quality 226: Preventive Care And Screening: Tobacco Use: Screening And Cessation Intervention: Patient screened for tobacco use and is an ex/non-smoker
Quality 47: Advance Care Plan: Advance Care Planning discussed and documented; advance care plan or surrogate decision maker documented in the medical record.

## 2023-02-13 NOTE — PROCEDURE: LIQUID NITROGEN
Consent: The patient's consent was obtained including but not limited to risks of crusting, scabbing, blistering, scarring, darker or lighter pigmentary change, recurrence, incomplete removal and infection.
Render Post-Care Instructions In Note?: no
Show Applicator Variable?: Yes
Detail Level: Detailed
Number Of Freeze-Thaw Cycles: 1 freeze-thaw cycle
Duration Of Freeze Thaw-Cycle (Seconds): 5
Post-Care Instructions: I reviewed with the patient in detail post-care instructions. Patient is to wear sunprotection, and avoid picking at any of the treated lesions. Pt may apply Vaseline to crusted or scabbing areas.

## 2023-02-13 NOTE — PROCEDURE: BIOPSY BY SHAVE METHOD
Hide Second Anesthesia?: No
Size Of Lesion In Cm: 0.4
Anesthesia Volume In Cc: 0.5
Notification Instructions: Patient will be notified of biopsy results. However, patient instructed to call the office if not contacted within 2 weeks.
Post-Care Instructions: I reviewed with the patient in detail post-care instructions. Patient is to keep the biopsy site dry overnight, and then apply bacitracin twice daily until healed. Patient may apply hydrogen peroxide soaks to remove any crusting.
Electrodesiccation Text: The wound bed was treated with electrodesiccation after the biopsy was performed.
Type Of Destruction Used: Curettage
Information: Selecting Yes will display possible errors in your note based on the variables you have selected. This validation is only offered as a suggestion for you. PLEASE NOTE THAT THE VALIDATION TEXT WILL BE REMOVED WHEN YOU FINALIZE YOUR NOTE. IF YOU WANT TO FAX A PRELIMINARY NOTE YOU WILL NEED TO TOGGLE THIS TO 'NO' IF YOU DO NOT WANT IT IN YOUR FAXED NOTE.
Biopsy Method: 10 blade
Anesthesia Type: 1% lidocaine with epinephrine
Electrodesiccation And Curettage Text: The wound bed was treated with electrodesiccation and curettage after the biopsy was performed.
Dressing: bandage
Depth Of Biopsy: dermis
X Size Of Lesion In Cm: 0
Curettage Text: The wound bed was treated with curettage after the biopsy was performed.
Billing Type: Third-Party Bill
Was A Bandage Applied: Yes
Consent: Written consent was obtained and risks were reviewed including but not limited to scarring, infection, bleeding, scabbing, nerve damage and allergy to anesthesia. Intent of procedure is to obtain tissue sample for histopathic examination.  A skin biopsy is considered a necessary and appropriate to clarify the diagnosis.
Hemostasis: Electrocautery
Wound Care: Vaseline
Biopsy Type: H and E
Cryotherapy Text: The wound bed was treated with cryotherapy after the biopsy was performed.
Detail Level: Detailed
Silver Nitrate Text: The wound bed was treated with silver nitrate after the biopsy was performed.

## 2023-03-15 ENCOUNTER — APPOINTMENT (OUTPATIENT)
Age: 68
Setting detail: DERMATOLOGY
End: 2023-03-16

## 2023-03-15 VITALS — TEMPERATURE: 98.7 F

## 2023-03-15 PROBLEM — C44.41 BASAL CELL CARCINOMA OF SKIN OF SCALP AND NECK: Status: ACTIVE | Noted: 2023-03-15

## 2023-03-15 PROCEDURE — 11623 EXC S/N/H/F/G MAL+MRG 2.1-3: CPT

## 2023-03-15 PROCEDURE — 12042 INTMD RPR N-HF/GENIT2.6-7.5: CPT

## 2023-03-15 PROCEDURE — OTHER MIPS QUALITY: OTHER

## 2023-03-15 PROCEDURE — OTHER EXCISION: OTHER

## 2023-03-15 NOTE — PROCEDURE: MIPS QUALITY
Quality 111:Pneumonia Vaccination Status For Older Adults: Pneumococcal Vaccination not Administered or Previously Received, Reason not Otherwise Specified
Quality 47: Advance Care Plan: Advance Care Planning discussed and documented; advance care plan or surrogate decision maker documented in the medical record.
Detail Level: Detailed
Quality 110: Preventive Care And Screening: Influenza Immunization: Influenza Immunization Administered during Influenza season
Quality 226: Preventive Care And Screening: Tobacco Use: Screening And Cessation Intervention: Patient screened for tobacco use and is an ex/non-smoker

## 2023-08-14 ENCOUNTER — APPOINTMENT (OUTPATIENT)
Age: 68
Setting detail: DERMATOLOGY
End: 2023-08-15

## 2023-08-14 VITALS — TEMPERATURE: 98 F

## 2023-08-14 DIAGNOSIS — L81.4 OTHER MELANIN HYPERPIGMENTATION: ICD-10-CM

## 2023-08-14 DIAGNOSIS — D485 NEOPLASM OF UNCERTAIN BEHAVIOR OF SKIN: ICD-10-CM

## 2023-08-14 DIAGNOSIS — L82.1 OTHER SEBORRHEIC KERATOSIS: ICD-10-CM

## 2023-08-14 DIAGNOSIS — D18.0 HEMANGIOMA: ICD-10-CM

## 2023-08-14 DIAGNOSIS — L73.8 OTHER SPECIFIED FOLLICULAR DISORDERS: ICD-10-CM

## 2023-08-14 DIAGNOSIS — L57.0 ACTINIC KERATOSIS: ICD-10-CM

## 2023-08-14 PROBLEM — D48.5 NEOPLASM OF UNCERTAIN BEHAVIOR OF SKIN: Status: ACTIVE | Noted: 2023-08-14

## 2023-08-14 PROBLEM — D18.01 HEMANGIOMA OF SKIN AND SUBCUTANEOUS TISSUE: Status: ACTIVE | Noted: 2023-08-14

## 2023-08-14 PROCEDURE — OTHER RECOMMENDATIONS: OTHER

## 2023-08-14 PROCEDURE — OTHER MIPS QUALITY: OTHER

## 2023-08-14 PROCEDURE — OTHER COUNSELING: OTHER

## 2023-08-14 PROCEDURE — OTHER BIOPSY BY SHAVE METHOD: OTHER

## 2023-08-14 PROCEDURE — 99214 OFFICE O/P EST MOD 30 MIN: CPT | Mod: 25

## 2023-08-14 PROCEDURE — 11102 TANGNTL BX SKIN SINGLE LES: CPT

## 2023-08-14 PROCEDURE — 11103 TANGNTL BX SKIN EA SEP/ADDL: CPT

## 2023-08-14 PROCEDURE — OTHER SEPARATE AND IDENTIFIABLE DOCUMENTATION: OTHER

## 2023-08-14 ASSESSMENT — LOCATION DETAILED DESCRIPTION DERM
LOCATION DETAILED: RIGHT CENTRAL MANDIBULAR CHEEK
LOCATION DETAILED: LEFT ANTERIOR SHOULDER
LOCATION DETAILED: RIGHT POSTERIOR NECK
LOCATION DETAILED: RIGHT SUPERIOR HELIX
LOCATION DETAILED: LEFT LATERAL MANDIBULAR CHEEK
LOCATION DETAILED: RIGHT SUPERIOR UPPER BACK
LOCATION DETAILED: RIGHT CENTRAL BUCCAL CHEEK

## 2023-08-14 ASSESSMENT — LOCATION SIMPLE DESCRIPTION DERM
LOCATION SIMPLE: RIGHT EAR
LOCATION SIMPLE: LEFT CHEEK
LOCATION SIMPLE: RIGHT CHEEK
LOCATION SIMPLE: RIGHT UPPER BACK
LOCATION SIMPLE: LEFT SHOULDER
LOCATION SIMPLE: POSTERIOR NECK

## 2023-08-14 ASSESSMENT — LOCATION ZONE DERM
LOCATION ZONE: TRUNK
LOCATION ZONE: ARM
LOCATION ZONE: EAR
LOCATION ZONE: FACE
LOCATION ZONE: NECK

## 2023-08-14 NOTE — PROCEDURE: BIOPSY BY SHAVE METHOD
Hide Anesthesia Volume?: No
Wound Care: Vaseline
Electrodesiccation And Curettage Text: The wound bed was treated with electrodesiccation and curettage after the biopsy was performed.
Anesthesia Type: 1% lidocaine with epinephrine
Information: Selecting Yes will display possible errors in your note based on the variables you have selected. This validation is only offered as a suggestion for you. PLEASE NOTE THAT THE VALIDATION TEXT WILL BE REMOVED WHEN YOU FINALIZE YOUR NOTE. IF YOU WANT TO FAX A PRELIMINARY NOTE YOU WILL NEED TO TOGGLE THIS TO 'NO' IF YOU DO NOT WANT IT IN YOUR FAXED NOTE.
Dressing: bandage
Depth Of Biopsy: dermis
Curettage Text: The wound bed was treated with curettage after the biopsy was performed.
Cryotherapy Text: The wound bed was treated with cryotherapy after the biopsy was performed.
Was A Bandage Applied: Yes
Type Of Destruction Used: Curettage
Hemostasis: Electrocautery
Notification Instructions: Patient will be notified of biopsy results. However, patient instructed to call the office if not contacted within 2 weeks.
Electrodesiccation Text: The wound bed was treated with electrodesiccation after the biopsy was performed.
Consent: Written consent was obtained and risks were reviewed including but not limited to scarring, infection, bleeding, scabbing, nerve damage and allergy to anesthesia. Intent of procedure is to obtain tissue sample for histopathic examination.  A skin biopsy is considered a necessary and appropriate to clarify the diagnosis.
Silver Nitrate Text: The wound bed was treated with silver nitrate after the biopsy was performed.
Biopsy Type: H and E
Anesthesia Volume In Cc: 0.5
Size Of Lesion In Cm: 1.8
Detail Level: Detailed
Additional Anesthesia Volume In Cc (Will Not Render If 0): 0
Post-Care Instructions: I reviewed with the patient in detail post-care instructions. Patient is to keep the biopsy site dry overnight, and then apply bacitracin twice daily until healed. Patient may apply hydrogen peroxide soaks to remove any crusting.
Size Of Lesion In Cm: 1.6
Size Of Lesion In Cm: 1.3
Billing Type: Third-Party Bill
Biopsy Method: 10 blade

## 2023-08-14 NOTE — PROCEDURE: MIPS QUALITY
Quality 111:Pneumonia Vaccination Status For Older Adults: Pneumococcal Vaccination not Administered or Previously Received, Reason not Otherwise Specified
Quality 226: Preventive Care And Screening: Tobacco Use: Screening And Cessation Intervention: Patient screened for tobacco use and is an ex/non-smoker
Quality 47: Advance Care Plan: Advance Care Planning discussed and documented; advance care plan or surrogate decision maker documented in the medical record.
Detail Level: Detailed
Quality 110: Preventive Care And Screening: Influenza Immunization: Influenza Immunization Administered during Influenza season

## 2023-09-13 ENCOUNTER — APPOINTMENT (OUTPATIENT)
Age: 68
Setting detail: DERMATOLOGY
End: 2023-09-14

## 2023-09-13 VITALS — TEMPERATURE: 98.7 F

## 2023-09-13 DIAGNOSIS — L57.0 ACTINIC KERATOSIS: ICD-10-CM

## 2023-09-13 PROBLEM — D04.39 CARCINOMA IN SITU OF SKIN OF OTHER PARTS OF FACE: Status: ACTIVE | Noted: 2023-09-13

## 2023-09-13 PROCEDURE — OTHER PRESCRIPTION: OTHER

## 2023-09-13 PROCEDURE — 17000 DESTRUCT PREMALG LESION: CPT

## 2023-09-13 PROCEDURE — OTHER TREATMENT REGIMEN: OTHER

## 2023-09-13 PROCEDURE — OTHER COUNSELING: OTHER

## 2023-09-13 PROCEDURE — OTHER MIPS QUALITY: OTHER

## 2023-09-13 PROCEDURE — 99213 OFFICE O/P EST LOW 20 MIN: CPT | Mod: 25

## 2023-09-13 PROCEDURE — OTHER LIQUID NITROGEN: OTHER

## 2023-09-13 RX ORDER — MUPIROCIN 20 MG/G
OINTMENT TOPICAL
Qty: 22 | Refills: 2 | Status: ERX | COMMUNITY
Start: 2023-09-13

## 2023-09-13 ASSESSMENT — LOCATION ZONE DERM: LOCATION ZONE: FACE

## 2023-09-13 ASSESSMENT — LOCATION DETAILED DESCRIPTION DERM: LOCATION DETAILED: RIGHT CENTRAL MANDIBULAR CHEEK

## 2023-09-13 ASSESSMENT — LOCATION SIMPLE DESCRIPTION DERM: LOCATION SIMPLE: RIGHT CHEEK

## 2023-09-13 NOTE — PROCEDURE: LIQUID NITROGEN
Render Note In Bullet Format When Appropriate: No
Detail Level: Detailed
Show Applicator Variable?: Yes
Duration Of Freeze Thaw-Cycle (Seconds): 5
Consent: The patient's consent was obtained including but not limited to risks of crusting, scabbing, blistering, scarring, darker or lighter pigmentary change, recurrence, incomplete removal and infection.
Post-Care Instructions: I reviewed with the patient in detail post-care instructions. Patient is to wear sunprotection, and avoid picking at any of the treated lesions. Pt may apply Vaseline to crusted or scabbing areas.
Number Of Freeze-Thaw Cycles: 1 freeze-thaw cycle

## 2023-09-13 NOTE — PROCEDURE: MIPS QUALITY
Quality 110: Preventive Care And Screening: Influenza Immunization: Influenza Immunization Administered during Influenza season
Quality 111:Pneumonia Vaccination Status For Older Adults: Pneumococcal Vaccination not Administered or Previously Received, Reason not Otherwise Specified
Quality 47: Advance Care Plan: Advance Care Planning discussed and documented; advance care plan or surrogate decision maker documented in the medical record.
Detail Level: Detailed
Quality 226: Preventive Care And Screening: Tobacco Use: Screening And Cessation Intervention: Patient screened for tobacco use and is an ex/non-smoker

## 2023-10-02 ENCOUNTER — APPOINTMENT (OUTPATIENT)
Age: 68
Setting detail: DERMATOLOGY
End: 2023-10-03

## 2023-10-02 PROBLEM — D04.39 CARCINOMA IN SITU OF SKIN OF OTHER PARTS OF FACE: Status: ACTIVE | Noted: 2023-10-02

## 2023-10-02 PROCEDURE — 77427 RADIATION TX MANAGEMENT X5: CPT

## 2023-10-02 PROCEDURE — OTHER IMAGE GUIDED - SUPERFICIAL RADIOTHERAPY: OTHER

## 2023-10-02 PROCEDURE — OTHER IMAGE GUIDED - SUPERFICIAL RADIOTHERAPY: SIMULATION VISIT: OTHER

## 2023-10-02 PROCEDURE — 77332 RADIATION TREATMENT AID(S): CPT

## 2023-10-02 PROCEDURE — 77261 THER RADIOLOGY TX PLNG SMPL: CPT

## 2023-10-02 PROCEDURE — G6001 ECHO GUIDANCE RADIOTHERAPY: HCPCS

## 2023-10-02 PROCEDURE — 77280 THER RAD SIMULAJ FIELD SMPL: CPT

## 2023-10-02 PROCEDURE — OTHER IMAGE GUIDED - SUPERFICIAL RADIOTHERAPY: TREATMENT VISIT: OTHER

## 2023-10-02 PROCEDURE — 77300 RADIATION THERAPY DOSE PLAN: CPT

## 2023-10-02 NOTE — PROCEDURE: IMAGE GUIDED - SUPERFICIAL RADIOTHERAPY: SIMULATION VISIT
Bill For Ultrasound Evaluation (): Yes
Patient Positioning: Sitting
Additional Comments (Add Customization Of Note Here): NOTE: Pt was simulated and treated today because pt lives 40 minutes from the clinic.  scar
Ultrasound Used Text: Ultrasound depth is 1.92 mm.
Simulation Documentation: Per the request of Dr. Robin, the patient was seen today for Superficial Radiation Therapy planning requiring initial simulation in preparation for treatment of specific diseased sites. Simulation is necessary to determine the correct patient and treatment portal positioning, to deliver safe and effective radiation therapy. A high-frequency ultrasound image was acquired prior to treatment today for two- dimensional evaluation of tumor volume and response to treatment throughout course of care, in addition, to geometric accuracy of field placement. Physician evaluation of the ultrasound tumor depth, width, and breadth will be ongoing through the course of treatment and is deemed medically necessary ensuring efficacy of treatment. Today’s image and setup were evaluated to determine the initiation of treatment with the current plan or necessary changes as appropriate. All appropriate custom blocking and treatment parameters were verified by the radiation therapist according to the initial simulation. Ultrasound image guidance and field placement prior to treatment delivery were performed. \\n\\nPer Dr. Robin, continued daily ultrasound guidance and simulation are required for field placement, measurement of tumor depth, width and breadth, progress, and edema monitoring. \\nPer the request of Dr. Robin, continuing medical physics review as per radiotherapy standard of care post every 5th fraction for the patient, including assessment of treatment parameters,  of dose delivery, and review of patient treatment documentation in support of the provider, is ordered, ensuring efficacy and continued safe delivery of radiotherapy. Included in the physics check is a review of patient setup information, all pertinent simulation and treatment photograph(s) checks, prescription, dose calculation verification, daily dose charted correctly, elapsed days and treatment days correctly charted, cumulative dose correct, and review of any prescription changes. Continued medical physics review post every 5th fraction of radiotherapy is requested by the provider for appropriate radiotherapy management and is deemed medically necessary and standard of care.
Bill For Treatment Planning: Yes- (Simple Planning- 1 Site: [id number])
Bill For Simulation: Yes- (Simple- 1 Site: [id number])
Ultrasound Not Used Text: Ultrasound was not performed today due to

## 2023-10-02 NOTE — PROCEDURE: IMAGE GUIDED - SUPERFICIAL RADIOTHERAPY
Add A Second Interruption?: No
Additional Fraction(S) Needed:: 0
Applicator Size In Cm: 4.0 cm
Treatment Time (Min): 0.44
Physics Documentation: (Physics Check Verbiage)
Time, Dose, Fractionation Factor (Tdf) For Prescription 1: 97
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
Field Number: 1
Energy (Kv): 100
Total Dose For Prescription 1 (Cgy): 5464.80
Lesion Dimensions-Y Axis In Cm: 3
Number Of Fractions For Prescription 1: 20
Body Location Override (Optional): Left Lateral Mandibular Cheek
Daily Dose (Cgy): 273.24
Shield Size In Cm: 3.5 cm x 3.5 cm prefabricated lead
Detail Level: Detailed
Bill For Physics Consultation: No - Render Text Only
Lesion Margin Size In Cm: 0.5

## 2023-10-02 NOTE — PROCEDURE: IMAGE GUIDED - SUPERFICIAL RADIOTHERAPY: TREATMENT VISIT
Daily Dosage (Cgy): 273.24
Ultrasound Used Text: High frequency ultrasound depth is 1.92 mm, which is 0.00 mm in difference from previous imaging.
Change Daily Dosage Administered Mid Treatment?: No
Bill For Treatment (79707)?: Yes
Fraction Number: 1
Calculate Total Cumulative Dose Automatically Or Manually: Manually
Ultrasound Not Used Text: Ultrasound was not performed today due to
Add X Modifier?: GODINEZ - Unusual Non-Overlapping Service
Treatment Documentation: This patient has been treated today with image-guided superficial radiation therapy for non-melanoma skin cancer. Written informed consent has been previously obtained from this patient for this treatment. This consent is documented in the patient's chart. The patient gave verbal consent to continue treatment today. The patient was treated with a specific radiation dose and setup as prescribed by the provider listed on this visit note.  A Radiation Therapist performed administration of radiation under the supervision of a provider. The treatment parameters and cumulative dose are indicated above. Prior to administering the radiation, the patient underwent a verification therapeutic radiology simulation-aided field setting defining relevant normal and abnormal target anatomy and acquiring images with high-frequency ultrasound in addition to data necessary to develop an optimal radiation treatment process for the patient. This process includes verification of the treatment port(s) and proper treatment positioning. All treatment ports were photographed within electronic medical records. The patient's customized lead blocking along with gross tumor volume and margin was confirmed. Considering superficial radiotherapy is clinical in setup, this requires the physician and radiation therapist to clarify the location interest being treated against initial images, ultrasound, pathology, and patient anatomy. Care was taken to ensure marcos treated were geometrically accurate and properly positioned using therapeutic radiology simulation-aided field setting verification per fraction. This process is also utilized to determine if any prescription or setup changes are necessary. These steps are therefore medically necessary to ensure safe and effective administration of radiation. Ongoing therapeutic radiology simulation-aided field setting verification is ordered throughout the course of therapy.\\nA high-frequency ultrasound image was acquired today for a two-dimensional evaluation of the tumor volume, depth, width, breadth, and response to treatment, in addition to geometric accuracy of field placement. \\n\\nThe field placement and ultrasound imaging, per fraction, is separate and distinct from the initial simulation and is an important task in providing safe administration of superficial radiation therapy. Physician evaluation of the ultrasound tumor depth will be ongoing throughout the course of treatment and is deemed medically necessary to ensure the efficacy of treatment and any necessary changes. Today's image was evaluated for determination of continuation of treatment with the current plan or with necessary changes as appropriate. According to the review of verification therapeutic radiology simulation-aided field setting and imaging, no change is required. Additionally, the use of ultrasound visualization and targeted assessment allows the patient to be able to see his or her cancer(s) progress, encouraging the patient to complete and maintain compliance through the full course of prescribed radiotherapy. Per Dr. Robin, continued ultrasound guidance and therapeutic radiology simulation-aided field setting verification per fraction is required for field placement, measurement of tumor depth, progress, and acute effect monitoring.
Energy (Kv): 100

## 2023-10-04 ENCOUNTER — APPOINTMENT (OUTPATIENT)
Age: 68
Setting detail: DERMATOLOGY
End: 2023-10-06

## 2023-10-04 PROBLEM — D04.39 CARCINOMA IN SITU OF SKIN OF OTHER PARTS OF FACE: Status: ACTIVE | Noted: 2023-10-04

## 2023-10-04 PROCEDURE — 77280 THER RAD SIMULAJ FIELD SMPL: CPT

## 2023-10-04 PROCEDURE — OTHER IMAGE GUIDED - SUPERFICIAL RADIOTHERAPY: OTHER

## 2023-10-04 PROCEDURE — G6001 ECHO GUIDANCE RADIOTHERAPY: HCPCS | Mod: XU

## 2023-10-04 PROCEDURE — OTHER IMAGE GUIDED - SUPERFICIAL RADIOTHERAPY: TREATMENT VISIT: OTHER

## 2023-10-04 PROCEDURE — 77401 RADIATION TX DELIVERY SUPFC: CPT

## 2023-10-04 NOTE — PROCEDURE: IMAGE GUIDED - SUPERFICIAL RADIOTHERAPY: TREATMENT VISIT
Additional Change To Daily Dosage Administered Mid Treatment?: No
Fraction Number: 2
Total Cumulative Dose (Cgy): 546.48
Calculate Total Cumulative Dose Automatically Or Manually: Manually
Prescription Used: 1
Was Ultrasound Performed Today?: Yes
Treatment Documentation: This patient has been treated today with image-guided superficial radiation therapy for non-melanoma skin cancer. Written informed consent has been previously obtained from this patient for this treatment. This consent is documented in the patient's chart. The patient gave verbal consent to continue treatment today. The patient was treated with a specific radiation dose and setup as prescribed by the provider listed on this visit note.  A Radiation Therapist performed administration of radiation under the supervision of a provider. The treatment parameters and cumulative dose are indicated above. Prior to administering the radiation, the patient underwent a verification therapeutic radiology simulation-aided field setting defining relevant normal and abnormal target anatomy and acquiring images with high-frequency ultrasound in addition to data necessary to develop an optimal radiation treatment process for the patient. This process includes verification of the treatment port(s) and proper treatment positioning. All treatment ports were photographed within electronic medical records. The patient's customized lead blocking along with gross tumor volume and margin was confirmed. Considering superficial radiotherapy is clinical in setup, this requires the physician and radiation therapist to clarify the location interest being treated against initial images, ultrasound, pathology, and patient anatomy. Care was taken to ensure marcos treated were geometrically accurate and properly positioned using therapeutic radiology simulation-aided field setting verification per fraction. This process is also utilized to determine if any prescription or setup changes are necessary. These steps are therefore medically necessary to ensure safe and effective administration of radiation. Ongoing therapeutic radiology simulation-aided field setting verification is ordered throughout the course of therapy.\\nA high-frequency ultrasound image was acquired today for a two-dimensional evaluation of the tumor volume, depth, width, breadth, and response to treatment, in addition to geometric accuracy of field placement. \\n\\nThe field placement and ultrasound imaging, per fraction, is separate and distinct from the initial simulation and is an important task in providing safe administration of superficial radiation therapy. Physician evaluation of the ultrasound tumor depth will be ongoing throughout the course of treatment and is deemed medically necessary to ensure the efficacy of treatment and any necessary changes. Today's image was evaluated for determination of continuation of treatment with the current plan or with necessary changes as appropriate. According to the review of verification therapeutic radiology simulation-aided field setting and imaging, no change is required. Additionally, the use of ultrasound visualization and targeted assessment allows the patient to be able to see his or her cancer(s) progress, encouraging the patient to complete and maintain compliance through the full course of prescribed radiotherapy. Per Dr. Robin, continued ultrasound guidance and therapeutic radiology simulation-aided field setting verification per fraction is required for field placement, measurement of tumor depth, progress, and acute effect monitoring.
Ultrasound Used Text: High frequency ultrasound depth is 1.96 mm, which is 0.04 mm in difference from previous imaging.
Add X Modifier?: GODINEZ - Unusual Non-Overlapping Service
Ultrasound Not Used Text: Ultrasound was not performed today due to
Daily Dosage (Cgy): 273.24
Energy (Kv): 100

## 2023-10-04 NOTE — PROCEDURE: IMAGE GUIDED - SUPERFICIAL RADIOTHERAPY
Bill For Dosimetry Calculation (35797) - Select Yes Only If Not Concurrently Performing Simulation Or Decay And Dose Adjustment: No
Energy (Kv): 100
Lesion Dimensions-Y Axis In Cm: 3
Additional Fraction(S) Needed:: 0
Time, Dose, Fractionation Factor (Tdf) For Prescription 1: 97
Detail Level: Detailed
Shield Size In Cm: 3.5 cm x 3.5 cm prefabricated lead
Lesion Margin Size In Cm: 0.5
Physics Documentation: (Physics Check Verbiage)
Body Location Override (Optional): Left Lateral Mandibular Cheek
Treatment Time (Min): 0.44
Number Of Fractions For Prescription 1: 20
Bill For Physics Consultation: No - Render Text Only
Daily Dose (Cgy): 273.24
Applicator Size In Cm: 4.0 cm
Field Number: 1
Total Dose For Prescription 1 (Cgy): 5464.80
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

## 2023-10-06 ENCOUNTER — APPOINTMENT (OUTPATIENT)
Age: 68
Setting detail: DERMATOLOGY
End: 2023-10-06

## 2023-10-06 PROBLEM — D04.39 CARCINOMA IN SITU OF SKIN OF OTHER PARTS OF FACE: Status: ACTIVE | Noted: 2023-10-06

## 2023-10-06 PROCEDURE — 77401 RADIATION TX DELIVERY SUPFC: CPT

## 2023-10-06 PROCEDURE — 77280 THER RAD SIMULAJ FIELD SMPL: CPT

## 2023-10-06 PROCEDURE — OTHER IMAGE GUIDED - SUPERFICIAL RADIOTHERAPY: TREATMENT VISIT: OTHER

## 2023-10-06 PROCEDURE — OTHER IMAGE GUIDED - SUPERFICIAL RADIOTHERAPY: OTHER

## 2023-10-06 PROCEDURE — G6001 ECHO GUIDANCE RADIOTHERAPY: HCPCS | Mod: XU

## 2023-10-06 NOTE — PROCEDURE: IMAGE GUIDED - SUPERFICIAL RADIOTHERAPY
Add A Second Interruption?: No
Number Of Fractions For Prescription 1: 20
Lesion Margin Size In Cm: 0.5
Body Location Override (Optional): Left Lateral Mandibular Cheek
Lesion Dimensions-Y Axis In Cm: 3
Shield Size In Cm: 3.5 cm x 3.5 cm prefabricated lead
Total Dose For Prescription 1 (Cgy): 5464.80
Applicator Size In Cm: 4.0 cm
Detail Level: Detailed
Physics Documentation: (Physics Check Verbiage)
Additional Fraction(S) Needed:: 0
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
Energy (Kv): 100
Bill For Physics Consultation: No - Render Text Only
Daily Dose (Cgy): 273.24
Field Number: 1
Treatment Time (Min): 0.44
Time, Dose, Fractionation Factor (Tdf) For Prescription 1: 97

## 2023-10-06 NOTE — PROCEDURE: IMAGE GUIDED - SUPERFICIAL RADIOTHERAPY: TREATMENT VISIT
Calculate Total Cumulative Dose Automatically Or Manually: Manually
Additional Change To Daily Dosage Administered Mid Treatment?: No
Show Ultrasound In Note?: Yes
Treatment Documentation: This patient has been treated today with image-guided superficial radiation therapy for non-melanoma skin cancer. Written informed consent has been previously obtained from this patient for this treatment. This consent is documented in the patient's chart. The patient gave verbal consent to continue treatment today. The patient was treated with a specific radiation dose and setup as prescribed by the provider listed on this visit note.  A Radiation Therapist performed administration of radiation under the supervision of a provider. The treatment parameters and cumulative dose are indicated above. Prior to administering the radiation, the patient underwent a verification therapeutic radiology simulation-aided field setting defining relevant normal and abnormal target anatomy and acquiring images with high-frequency ultrasound in addition to data necessary to develop an optimal radiation treatment process for the patient. This process includes verification of the treatment port(s) and proper treatment positioning. All treatment ports were photographed within electronic medical records. The patient's customized lead blocking along with gross tumor volume and margin was confirmed. Considering superficial radiotherapy is clinical in setup, this requires the physician and radiation therapist to clarify the location interest being treated against initial images, ultrasound, pathology, and patient anatomy. Care was taken to ensure marcos treated were geometrically accurate and properly positioned using therapeutic radiology simulation-aided field setting verification per fraction. This process is also utilized to determine if any prescription or setup changes are necessary. These steps are therefore medically necessary to ensure safe and effective administration of radiation. Ongoing therapeutic radiology simulation-aided field setting verification is ordered throughout the course of therapy.\\nA high-frequency ultrasound image was acquired today for a two-dimensional evaluation of the tumor volume, depth, width, breadth, and response to treatment, in addition to geometric accuracy of field placement. \\n\\nThe field placement and ultrasound imaging, per fraction, is separate and distinct from the initial simulation and is an important task in providing safe administration of superficial radiation therapy. Physician evaluation of the ultrasound tumor depth will be ongoing throughout the course of treatment and is deemed medically necessary to ensure the efficacy of treatment and any necessary changes. Today's image was evaluated for determination of continuation of treatment with the current plan or with necessary changes as appropriate. According to the review of verification therapeutic radiology simulation-aided field setting and imaging, no change is required. Additionally, the use of ultrasound visualization and targeted assessment allows the patient to be able to see his or her cancer(s) progress, encouraging the patient to complete and maintain compliance through the full course of prescribed radiotherapy. Per Dr. Robin, continued ultrasound guidance and therapeutic radiology simulation-aided field setting verification per fraction is required for field placement, measurement of tumor depth, progress, and acute effect monitoring.
Ultrasound Used Text: High frequency ultrasound depth is 1.80 mm, which is 0.16 mm in difference from previous imaging.
Fraction Number: 3
Daily Dosage (Cgy): 273.24
Total Cumulative Dose (Cgy): 819.72
Add X Modifier?: GODINEZ - Unusual Non-Overlapping Service
Energy (Kv): 100
Ultrasound Not Used Text: Ultrasound was not performed today due to
Prescription Used: 1

## 2023-10-09 ENCOUNTER — APPOINTMENT (OUTPATIENT)
Age: 68
Setting detail: DERMATOLOGY
End: 2023-10-09

## 2023-10-09 PROBLEM — D04.39 CARCINOMA IN SITU OF SKIN OF OTHER PARTS OF FACE: Status: ACTIVE | Noted: 2023-10-09

## 2023-10-09 PROCEDURE — G6001 ECHO GUIDANCE RADIOTHERAPY: HCPCS | Mod: XU

## 2023-10-09 PROCEDURE — OTHER IMAGE GUIDED - SUPERFICIAL RADIOTHERAPY: OTHER

## 2023-10-09 PROCEDURE — 77401 RADIATION TX DELIVERY SUPFC: CPT

## 2023-10-09 PROCEDURE — OTHER IMAGE GUIDED - SUPERFICIAL RADIOTHERAPY: TREATMENT VISIT: OTHER

## 2023-10-09 PROCEDURE — 77280 THER RAD SIMULAJ FIELD SMPL: CPT

## 2023-10-09 NOTE — PROCEDURE: IMAGE GUIDED - SUPERFICIAL RADIOTHERAPY
Add A Third Interruption?: No
Bill For Physics Consultation: No - Render Text Only
Number Of Fractions For Prescription 1: 20
Applicator Size In Cm: 4.0 cm
Treatments Per Week: 3
Shield Size In Cm: 3.5 cm x 3.5 cm prefabricated lead
Additional Fraction(S) Needed:: 0
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
Total Dose For Prescription 1 (Cgy): 5464.80
Physics Documentation: (Physics Check Verbiage)
Body Location Override (Optional): Left Lateral Mandibular Cheek
Treatment Time (Min): 0.44
Field Number: 1
Energy (Kv): 100
Detail Level: Detailed
Daily Dose (Cgy): 273.24
Lesion Margin Size In Cm: 0.5
Time, Dose, Fractionation Factor (Tdf) For Prescription 1: 97

## 2023-10-11 ENCOUNTER — APPOINTMENT (OUTPATIENT)
Age: 68
Setting detail: DERMATOLOGY
End: 2023-10-13

## 2023-10-11 PROBLEM — D04.39 CARCINOMA IN SITU OF SKIN OF OTHER PARTS OF FACE: Status: ACTIVE | Noted: 2023-10-11

## 2023-10-11 PROCEDURE — OTHER IMAGE GUIDED - SUPERFICIAL RADIOTHERAPY: OTHER

## 2023-10-11 PROCEDURE — G6001 ECHO GUIDANCE RADIOTHERAPY: HCPCS | Mod: XU

## 2023-10-11 PROCEDURE — 77280 THER RAD SIMULAJ FIELD SMPL: CPT

## 2023-10-11 PROCEDURE — 77401 RADIATION TX DELIVERY SUPFC: CPT

## 2023-10-11 PROCEDURE — OTHER IMAGE GUIDED - SUPERFICIAL RADIOTHERAPY: TREATMENT VISIT: OTHER

## 2023-10-11 NOTE — PROCEDURE: IMAGE GUIDED - SUPERFICIAL RADIOTHERAPY
Total Dose For Prescription 1 (Cgy): 5464.80
Bill For Dosimetry Calculation (04558): No
Applicator Size In Cm: 4.0 cm
Treatments Per Week: 3
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
Energy (Kv): 100
Bill For Physics Consultation: No - Render Text Only
Physics Documentation: (Physics Check Verbiage)
Additional Fraction(S) Needed:: 0
Treatment Time (Min): 0.44
Time, Dose, Fractionation Factor (Tdf) For Prescription 1: 97
Field Number: 1
Shield Size In Cm: 3.5 cm x 3.5 cm prefabricated lead
Number Of Fractions For Prescription 1: 20
Detail Level: Detailed
Daily Dose (Cgy): 273.24
Lesion Margin Size In Cm: 0.5
Body Location Override (Optional): Left Lateral Mandibular Cheek

## 2023-10-11 NOTE — PROCEDURE: IMAGE GUIDED - SUPERFICIAL RADIOTHERAPY: TREATMENT VISIT
Bill For Set Radiation Therapy Field (58302)?: Yes
Additional Change To Daily Dosage Administered Mid Treatment?: No
Daily Dosage (Cgy): 273.24
Energy (Kv): 100
Prescription Used: 1
Fraction Number: 5
Ultrasound Used Text: High frequency ultrasound depth is 1.80 mm, which is +0.11  mm in difference from previous imaging.
Treatment Documentation: This patient has been treated today with image-guided superficial radiation therapy for non-melanoma skin cancer. Written informed consent has been previously obtained from this patient for this treatment. This consent is documented in the patient's chart. The patient gave verbal consent to continue treatment today. The patient was treated with a specific radiation dose and setup as prescribed by the provider listed on this visit note.  A Radiation Therapist performed administration of radiation under the supervision of a provider. The treatment parameters and cumulative dose are indicated above. Prior to administering the radiation, the patient underwent a verification therapeutic radiology simulation-aided field setting defining relevant normal and abnormal target anatomy and acquiring images with high-frequency ultrasound in addition to data necessary to develop an optimal radiation treatment process for the patient. This process includes verification of the treatment port(s) and proper treatment positioning. All treatment ports were photographed within electronic medical records. The patient's customized lead blocking along with gross tumor volume and margin was confirmed. Considering superficial radiotherapy is clinical in setup, this requires the physician and radiation therapist to clarify the location interest being treated against initial images, ultrasound, pathology, and patient anatomy. Care was taken to ensure marcos treated were geometrically accurate and properly positioned using therapeutic radiology simulation-aided field setting verification per fraction. This process is also utilized to determine if any prescription or setup changes are necessary. These steps are therefore medically necessary to ensure safe and effective administration of radiation. Ongoing therapeutic radiology simulation-aided field setting verification is ordered throughout the course of therapy.\\nA high-frequency ultrasound image was acquired today for a two-dimensional evaluation of the tumor volume, depth, width, breadth, and response to treatment, in addition to geometric accuracy of field placement. \\n\\nThe field placement and ultrasound imaging, per fraction, is separate and distinct from the initial simulation and is an important task in providing safe administration of superficial radiation therapy. Physician evaluation of the ultrasound tumor depth will be ongoing throughout the course of treatment and is deemed medically necessary to ensure the efficacy of treatment and any necessary changes. Today's image was evaluated for determination of continuation of treatment with the current plan or with necessary changes as appropriate. According to the review of verification therapeutic radiology simulation-aided field setting and imaging, no change is required. Additionally, the use of ultrasound visualization and targeted assessment allows the patient to be able to see his or her cancer(s) progress, encouraging the patient to complete and maintain compliance through the full course of prescribed radiotherapy. Per Dr. Robin, continued ultrasound guidance and therapeutic radiology simulation-aided field setting verification per fraction is required for field placement, measurement of tumor depth, progress, and acute effect monitoring.
Calculate Total Cumulative Dose Automatically Or Manually: Manually
Total Cumulative Dose (Cgy): 1366.20
Add X Modifier?: GODINEZ - Unusual Non-Overlapping Service
Additional Comments (Add Customization Of Note Here): No OTV done today.  mk
Ultrasound Not Used Text: Ultrasound was not performed today due to

## 2023-10-13 ENCOUNTER — APPOINTMENT (OUTPATIENT)
Age: 68
Setting detail: DERMATOLOGY
End: 2023-10-13

## 2023-10-13 PROBLEM — D04.39 CARCINOMA IN SITU OF SKIN OF OTHER PARTS OF FACE: Status: ACTIVE | Noted: 2023-10-13

## 2023-10-13 PROCEDURE — OTHER IMAGE GUIDED - SUPERFICIAL RADIOTHERAPY: TREATMENT VISIT: OTHER

## 2023-10-13 PROCEDURE — 77401 RADIATION TX DELIVERY SUPFC: CPT

## 2023-10-13 PROCEDURE — G6001 ECHO GUIDANCE RADIOTHERAPY: HCPCS | Mod: XU

## 2023-10-13 PROCEDURE — 77280 THER RAD SIMULAJ FIELD SMPL: CPT

## 2023-10-13 PROCEDURE — OTHER IMAGE GUIDED - SUPERFICIAL RADIOTHERAPY: OTHER

## 2023-10-13 NOTE — PROCEDURE: IMAGE GUIDED - SUPERFICIAL RADIOTHERAPY
Add Decay And Dose Adjustment Calculation?: No
Detail Level: Detailed
Total Dose For Prescription 1 (Cgy): 5464.80
Body Location Override (Optional): Left Lateral Mandibular Cheek
Applicator Size In Cm: 4.0 cm
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
Field Number: 1
Energy (Kv): 100
Treatment Time (Min): 0.44
Additional Fraction(S) Needed:: 0
Lesion Dimensions-Y Axis In Cm: 3
Daily Dose (Cgy): 273.24
Bill For Physics Consultation: No - Render Text Only
Time, Dose, Fractionation Factor (Tdf) For Prescription 1: 97
Physics Documentation: (Physics Check Verbiage)
Shield Size In Cm: 3.5 cm x 3.5 cm prefabricated lead
Number Of Fractions For Prescription 1: 20
Lesion Margin Size In Cm: 0.5

## 2023-10-13 NOTE — PROCEDURE: IMAGE GUIDED - SUPERFICIAL RADIOTHERAPY: TREATMENT VISIT
Treatment Documentation: This patient has been treated today with image-guided superficial radiation therapy for non-melanoma skin cancer. Written informed consent has been previously obtained from this patient for this treatment. This consent is documented in the patient's chart. The patient gave verbal consent to continue treatment today. The patient was treated with a specific radiation dose and setup as prescribed by the provider listed on this visit note.  A Radiation Therapist performed administration of radiation under the supervision of a provider. The treatment parameters and cumulative dose are indicated above. Prior to administering the radiation, the patient underwent a verification therapeutic radiology simulation-aided field setting defining relevant normal and abnormal target anatomy and acquiring images with high-frequency ultrasound in addition to data necessary to develop an optimal radiation treatment process for the patient. This process includes verification of the treatment port(s) and proper treatment positioning. All treatment ports were photographed within electronic medical records. The patient's customized lead blocking along with gross tumor volume and margin was confirmed. Considering superficial radiotherapy is clinical in setup, this requires the physician and radiation therapist to clarify the location interest being treated against initial images, ultrasound, pathology, and patient anatomy. Care was taken to ensure marcos treated were geometrically accurate and properly positioned using therapeutic radiology simulation-aided field setting verification per fraction. This process is also utilized to determine if any prescription or setup changes are necessary. These steps are therefore medically necessary to ensure safe and effective administration of radiation. Ongoing therapeutic radiology simulation-aided field setting verification is ordered throughout the course of therapy.\\nA high-frequency ultrasound image was acquired today for a two-dimensional evaluation of the tumor volume, depth, width, breadth, and response to treatment, in addition to geometric accuracy of field placement. \\n\\nThe field placement and ultrasound imaging, per fraction, is separate and distinct from the initial simulation and is an important task in providing safe administration of superficial radiation therapy. Physician evaluation of the ultrasound tumor depth will be ongoing throughout the course of treatment and is deemed medically necessary to ensure the efficacy of treatment and any necessary changes. Today's image was evaluated for determination of continuation of treatment with the current plan or with necessary changes as appropriate. According to the review of verification therapeutic radiology simulation-aided field setting and imaging, no change is required. Additionally, the use of ultrasound visualization and targeted assessment allows the patient to be able to see his or her cancer(s) progress, encouraging the patient to complete and maintain compliance through the full course of prescribed radiotherapy. Per Dr. Robin, continued ultrasound guidance and therapeutic radiology simulation-aided field setting verification per fraction is required for field placement, measurement of tumor depth, progress, and acute effect monitoring.
Bill For Set Radiation Therapy Field (01751)?: Yes
Calculate Total Cumulative Dose Automatically Or Manually: Manually
Fraction Number: 6
Additional Change To Daily Dosage Administered Mid Treatment?: No
Daily Dosage (Cgy): 273.24
Energy (Kv): 100
Ultrasound Not Used Text: Ultrasound was not performed today due to
Ultrasound Used Text: High frequency ultrasound depth is 1.96 mm, which is +0.16 mm in difference from previous imaging.
Total Cumulative Dose (Cgy): 1639.44
Add X Modifier?: GODINEZ - Unusual Non-Overlapping Service
Prescription Used: 1

## 2023-10-14 ENCOUNTER — APPOINTMENT (OUTPATIENT)
Age: 68
Setting detail: DERMATOLOGY
End: 2023-12-17

## 2023-10-14 PROCEDURE — 77336 RADIATION PHYSICS CONSULT: CPT

## 2023-10-16 ENCOUNTER — APPOINTMENT (OUTPATIENT)
Age: 68
Setting detail: DERMATOLOGY
End: 2023-10-21

## 2023-10-16 PROBLEM — D04.39 CARCINOMA IN SITU OF SKIN OF OTHER PARTS OF FACE: Status: ACTIVE | Noted: 2023-10-16

## 2023-10-16 PROCEDURE — G6001 ECHO GUIDANCE RADIOTHERAPY: HCPCS | Mod: XU

## 2023-10-16 PROCEDURE — OTHER IMAGE GUIDED - SUPERFICIAL RADIOTHERAPY: OTHER

## 2023-10-16 PROCEDURE — 77280 THER RAD SIMULAJ FIELD SMPL: CPT

## 2023-10-16 PROCEDURE — OTHER IMAGE GUIDED - SUPERFICIAL RADIOTHERAPY: TREATMENT VISIT: OTHER

## 2023-10-16 PROCEDURE — 77401 RADIATION TX DELIVERY SUPFC: CPT

## 2023-10-16 NOTE — PROCEDURE: IMAGE GUIDED - SUPERFICIAL RADIOTHERAPY: TREATMENT VISIT
Energy (Kv): 70
Prescription Used: 1
Bill For Ultrasound Evaluation (): Yes
Ultrasound Used Text: High frequency ultrasound depth is 2.28 mm, which is +0.32 mm in difference from previous imaging.
Additional Change To Daily Dosage Administered Mid Treatment?: No
Fraction Number: 7
Add X Modifier?: GODINEZ - Unusual Non-Overlapping Service
Calculate Total Cumulative Dose Automatically Or Manually: Manually
Treatment Documentation: This patient has been treated today with image-guided superficial radiation therapy for non-melanoma skin cancer. Written informed consent has been previously obtained from this patient for this treatment. This consent is documented in the patient's chart. The patient gave verbal consent to continue treatment today. The patient was treated with a specific radiation dose and setup as prescribed by the provider listed on this visit note.  A Radiation Therapist performed administration of radiation under the supervision of a provider. The treatment parameters and cumulative dose are indicated above. Prior to administering the radiation, the patient underwent a verification therapeutic radiology simulation-aided field setting defining relevant normal and abnormal target anatomy and acquiring images with high-frequency ultrasound in addition to data necessary to develop an optimal radiation treatment process for the patient. This process includes verification of the treatment port(s) and proper treatment positioning. All treatment ports were photographed within electronic medical records. The patient's customized lead blocking along with gross tumor volume and margin was confirmed. Considering superficial radiotherapy is clinical in setup, this requires the physician and radiation therapist to clarify the location interest being treated against initial images, ultrasound, pathology, and patient anatomy. Care was taken to ensure marcos treated were geometrically accurate and properly positioned using therapeutic radiology simulation-aided field setting verification per fraction. This process is also utilized to determine if any prescription or setup changes are necessary. These steps are therefore medically necessary to ensure safe and effective administration of radiation. Ongoing therapeutic radiology simulation-aided field setting verification is ordered throughout the course of therapy.\\nA high-frequency ultrasound image was acquired today for a two-dimensional evaluation of the tumor volume, depth, width, breadth, and response to treatment, in addition to geometric accuracy of field placement. \\n\\nThe field placement and ultrasound imaging, per fraction, is separate and distinct from the initial simulation and is an important task in providing safe administration of superficial radiation therapy. Physician evaluation of the ultrasound tumor depth will be ongoing throughout the course of treatment and is deemed medically necessary to ensure the efficacy of treatment and any necessary changes. Today's image was evaluated for determination of continuation of treatment with the current plan or with necessary changes as appropriate. According to the review of verification therapeutic radiology simulation-aided field setting and imaging, no change is required. Additionally, the use of ultrasound visualization and targeted assessment allows the patient to be able to see his or her cancer(s) progress, encouraging the patient to complete and maintain compliance through the full course of prescribed radiotherapy. Per Dr. Robin, continued ultrasound guidance and therapeutic radiology simulation-aided field setting verification per fraction is required for field placement, measurement of tumor depth, progress, and acute effect monitoring.
Ultrasound Not Used Text: Ultrasound was not performed today due to
Daily Dosage (Cgy): 273.24
Total Cumulative Dose (Cgy): 1912.68
Patient/surrogate refused vaccine...

## 2023-10-18 ENCOUNTER — APPOINTMENT (OUTPATIENT)
Age: 68
Setting detail: DERMATOLOGY
End: 2023-10-21

## 2023-10-18 PROBLEM — D04.39 CARCINOMA IN SITU OF SKIN OF OTHER PARTS OF FACE: Status: ACTIVE | Noted: 2023-10-18

## 2023-10-18 PROCEDURE — G6001 ECHO GUIDANCE RADIOTHERAPY: HCPCS | Mod: XU

## 2023-10-18 PROCEDURE — 77401 RADIATION TX DELIVERY SUPFC: CPT

## 2023-10-18 PROCEDURE — OTHER IMAGE GUIDED - SUPERFICIAL RADIOTHERAPY: TREATMENT VISIT: OTHER

## 2023-10-18 PROCEDURE — 77280 THER RAD SIMULAJ FIELD SMPL: CPT

## 2023-10-18 PROCEDURE — OTHER IMAGE GUIDED - SUPERFICIAL RADIOTHERAPY: OTHER

## 2023-10-18 NOTE — PROCEDURE: IMAGE GUIDED - SUPERFICIAL RADIOTHERAPY
Daily Dose (Cgy): 273.24
Add A Third Interruption?: No
Detail Level: Detailed
Number Of Fractions For Prescription 1: 20
Treatment Time (Min): 0.44
Time, Dose, Fractionation Factor (Tdf) For Prescription 1: 97
Total Dose For Prescription 1 (Cgy): 5464.80
Bill For Physics Consultation: No - Render Text Only
Lesion Dimensions-X Axis In Cm: 3
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
Field Number: 1
Shield Size In Cm: 3.5 cm x 3.5 cm prefabricated lead
Applicator Size In Cm: 4.0 cm
Physics Documentation: (Physics Check Verbiage)
Lesion Margin Size In Cm: 0.5
Energy (Kv): 100
Body Location Override (Optional): Left Lateral Mandibular Cheek
Additional Fraction(S) Needed:: 0

## 2023-10-18 NOTE — PROCEDURE: IMAGE GUIDED - SUPERFICIAL RADIOTHERAPY: TREATMENT VISIT
Was Ultrasound Performed Today?: Yes
Additional Change To Daily Dosage Administered Mid Treatment?: No
Prescription Used: 1
Daily Dosage (Cgy): 273.24
Calculate Total Cumulative Dose Automatically Or Manually: Manually
Treatment Documentation: This patient has been treated today with image-guided superficial radiation therapy for non-melanoma skin cancer. Written informed consent has been previously obtained from this patient for this treatment. This consent is documented in the patient's chart. The patient gave verbal consent to continue treatment today. The patient was treated with a specific radiation dose and setup as prescribed by the provider listed on this visit note.  A Radiation Therapist performed administration of radiation under the supervision of a provider. The treatment parameters and cumulative dose are indicated above. Prior to administering the radiation, the patient underwent a verification therapeutic radiology simulation-aided field setting defining relevant normal and abnormal target anatomy and acquiring images with high-frequency ultrasound in addition to data necessary to develop an optimal radiation treatment process for the patient. This process includes verification of the treatment port(s) and proper treatment positioning. All treatment ports were photographed within electronic medical records. The patient's customized lead blocking along with gross tumor volume and margin was confirmed. Considering superficial radiotherapy is clinical in setup, this requires the physician and radiation therapist to clarify the location interest being treated against initial images, ultrasound, pathology, and patient anatomy. Care was taken to ensure marcos treated were geometrically accurate and properly positioned using therapeutic radiology simulation-aided field setting verification per fraction. This process is also utilized to determine if any prescription or setup changes are necessary. These steps are therefore medically necessary to ensure safe and effective administration of radiation. Ongoing therapeutic radiology simulation-aided field setting verification is ordered throughout the course of therapy.\\nA high-frequency ultrasound image was acquired today for a two-dimensional evaluation of the tumor volume, depth, width, breadth, and response to treatment, in addition to geometric accuracy of field placement. \\n\\nThe field placement and ultrasound imaging, per fraction, is separate and distinct from the initial simulation and is an important task in providing safe administration of superficial radiation therapy. Physician evaluation of the ultrasound tumor depth will be ongoing throughout the course of treatment and is deemed medically necessary to ensure the efficacy of treatment and any necessary changes. Today's image was evaluated for determination of continuation of treatment with the current plan or with necessary changes as appropriate. According to the review of verification therapeutic radiology simulation-aided field setting and imaging, no change is required. Additionally, the use of ultrasound visualization and targeted assessment allows the patient to be able to see his or her cancer(s) progress, encouraging the patient to complete and maintain compliance through the full course of prescribed radiotherapy. Per Dr. Robin, continued ultrasound guidance and therapeutic radiology simulation-aided field setting verification per fraction is required for field placement, measurement of tumor depth, progress, and acute effect monitoring.
Energy (Kv): 100
Add X Modifier?: GODINEZ - Unusual Non-Overlapping Service
Ultrasound Used Text: High frequency ultrasound depth is 1.26 mm, which is -1.02 mm in difference from previous imaging.
Ultrasound Not Used Text: Ultrasound was not performed today due to
Total Cumulative Dose (Cgy): 2185.92
Fraction Number: 8

## 2023-10-20 ENCOUNTER — APPOINTMENT (OUTPATIENT)
Age: 68
Setting detail: DERMATOLOGY
End: 2023-10-21

## 2023-10-20 PROBLEM — D04.39 CARCINOMA IN SITU OF SKIN OF OTHER PARTS OF FACE: Status: ACTIVE | Noted: 2023-10-20

## 2023-10-20 PROCEDURE — G6001 ECHO GUIDANCE RADIOTHERAPY: HCPCS | Mod: XU

## 2023-10-20 PROCEDURE — 77401 RADIATION TX DELIVERY SUPFC: CPT

## 2023-10-20 PROCEDURE — 77280 THER RAD SIMULAJ FIELD SMPL: CPT

## 2023-10-20 PROCEDURE — OTHER IMAGE GUIDED - SUPERFICIAL RADIOTHERAPY: TREATMENT VISIT: OTHER

## 2023-10-20 PROCEDURE — OTHER IMAGE GUIDED - SUPERFICIAL RADIOTHERAPY: OTHER

## 2023-10-20 NOTE — PROCEDURE: IMAGE GUIDED - SUPERFICIAL RADIOTHERAPY
Physics Documentation: (Physics Check Verbiage)
Treatment Time (Min): 0.44
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
Add A Sixth Interruption?: No
Field Number: 1
Time, Dose, Fractionation Factor (Tdf) For Prescription 1: 97
Energy (Kv): 100
Treatments Per Week: 3
Lesion Margin Size In Cm: 0.5
Daily Dose (Cgy): 273.24
Number Of Fractions For Prescription 1: 20
Shield Size In Cm: 3.5 cm x 3.5 cm prefabricated lead
Total Dose For Prescription 1 (Cgy): 5464.80
Bill For Physics Consultation: No - Render Text Only
Body Location Override (Optional): Left Lateral Mandibular Cheek
Detail Level: Detailed
Additional Fraction(S) Needed:: 0
Applicator Size In Cm: 4.0 cm

## 2023-10-20 NOTE — PROCEDURE: IMAGE GUIDED - SUPERFICIAL RADIOTHERAPY: TREATMENT VISIT
Fraction Number: 9
Additional Change To Daily Dosage Administered Mid Treatment?: No
Bill For Treatment ([id number])?: Yes
Daily Dosage (Cgy): 273.24
Treatment Documentation: This patient has been treated today with image-guided superficial radiation therapy for non-melanoma skin cancer. Written informed consent has been previously obtained from this patient for this treatment. This consent is documented in the patient's chart. The patient gave verbal consent to continue treatment today. The patient was treated with a specific radiation dose and setup as prescribed by the provider listed on this visit note.  A Radiation Therapist performed administration of radiation under the supervision of a provider. The treatment parameters and cumulative dose are indicated above. Prior to administering the radiation, the patient underwent a verification therapeutic radiology simulation-aided field setting defining relevant normal and abnormal target anatomy and acquiring images with high-frequency ultrasound in addition to data necessary to develop an optimal radiation treatment process for the patient. This process includes verification of the treatment port(s) and proper treatment positioning. All treatment ports were photographed within electronic medical records. The patient's customized lead blocking along with gross tumor volume and margin was confirmed. Considering superficial radiotherapy is clinical in setup, this requires the physician and radiation therapist to clarify the location interest being treated against initial images, ultrasound, pathology, and patient anatomy. Care was taken to ensure marcos treated were geometrically accurate and properly positioned using therapeutic radiology simulation-aided field setting verification per fraction. This process is also utilized to determine if any prescription or setup changes are necessary. These steps are therefore medically necessary to ensure safe and effective administration of radiation. Ongoing therapeutic radiology simulation-aided field setting verification is ordered throughout the course of therapy.\\nA high-frequency ultrasound image was acquired today for a two-dimensional evaluation of the tumor volume, depth, width, breadth, and response to treatment, in addition to geometric accuracy of field placement. \\n\\nThe field placement and ultrasound imaging, per fraction, is separate and distinct from the initial simulation and is an important task in providing safe administration of superficial radiation therapy. Physician evaluation of the ultrasound tumor depth will be ongoing throughout the course of treatment and is deemed medically necessary to ensure the efficacy of treatment and any necessary changes. Today's image was evaluated for determination of continuation of treatment with the current plan or with necessary changes as appropriate. According to the review of verification therapeutic radiology simulation-aided field setting and imaging, no change is required. Additionally, the use of ultrasound visualization and targeted assessment allows the patient to be able to see his or her cancer(s) progress, encouraging the patient to complete and maintain compliance through the full course of prescribed radiotherapy. Per Dr. Robin, continued ultrasound guidance and therapeutic radiology simulation-aided field setting verification per fraction is required for field placement, measurement of tumor depth, progress, and acute effect monitoring.
Additional Comments (Add Customization Of Note Here): edema and erythema
Ultrasound Not Used Text: Ultrasound was not performed today due to
Prescription Used: 1
Total Cumulative Dose (Cgy): 2459.16
Energy (Kv): 100
Add X Modifier?: GODINEZ - Unusual Non-Overlapping Service
Ultrasound Used Text: High frequency ultrasound depth is 2.16 mm, which is +0.90 mm in difference from previous imaging.
Calculate Total Cumulative Dose Automatically Or Manually: Manually

## 2023-10-23 ENCOUNTER — APPOINTMENT (OUTPATIENT)
Age: 68
Setting detail: DERMATOLOGY
End: 2023-10-24

## 2023-10-23 PROBLEM — D04.39 CARCINOMA IN SITU OF SKIN OF OTHER PARTS OF FACE: Status: ACTIVE | Noted: 2023-10-23

## 2023-10-23 PROBLEM — D04.9 CARCINOMA IN SITU OF SKIN, UNSPECIFIED: Status: ACTIVE | Noted: 2023-10-23

## 2023-10-23 PROCEDURE — 77427 RADIATION TX MANAGEMENT X5: CPT

## 2023-10-23 PROCEDURE — 77280 THER RAD SIMULAJ FIELD SMPL: CPT

## 2023-10-23 PROCEDURE — 77300 RADIATION THERAPY DOSE PLAN: CPT

## 2023-10-23 PROCEDURE — OTHER IMAGE GUIDED - SUPERFICIAL RADIOTHERAPY: OTHER

## 2023-10-23 PROCEDURE — 77401 RADIATION TX DELIVERY SUPFC: CPT

## 2023-10-23 PROCEDURE — OTHER IMAGE GUIDED - SUPERFICIAL RADIOTHERAPY: TREATMENT VISIT: OTHER

## 2023-10-23 PROCEDURE — G6001 ECHO GUIDANCE RADIOTHERAPY: HCPCS | Mod: XU

## 2023-10-23 PROCEDURE — OTHER IMAGE GUIDED - SUPERFICIAL RADIOTHERAPY: EVALUATION VISIT: OTHER

## 2023-10-23 NOTE — PROCEDURE: IMAGE GUIDED - SUPERFICIAL RADIOTHERAPY
Treatment Time (Min): 0.44
Energy (Kv): 70
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
Add A Sixth Interruption?: No
Energy (Kv): 100
Physics Documentation: (Physics Check Verbiage)
Daily Dose (Cgy): 273.24
Additional Fraction(S) Needed:: 0
Number Of Fractions For Prescription 1: 10
Time, Dose, Fractionation Factor (Tdf) For Prescription 2: 48 + 49 = Total TDF at 97
Lesion Dimensions-Y Axis In Cm: 3
Time, Dose, Fractionation Factor (Tdf) For Prescription 1: 48
Field Number: 1
Bill For Physics Consultation: No - Render Text Only
Applicator Size In Cm: 4.0 cm
Total Dose For Prescription 1 (Cgy): 2732.40
Lesion Margin Size In Cm: 0.5
Shield Size In Cm: 3.5 cm x 3.5 cm prefabricated lead
Daily Dose (Cgy): 275.88
Detail Level: Detailed
Total Dose For Prescription 2 (Cgy): 2758.80  total cumulative dose 5491.20
Body Location Override (Optional): Left Lateral Mandibular Cheek
Add A Second Prescription?: Yes

## 2023-10-23 NOTE — PROCEDURE: IMAGE GUIDED - SUPERFICIAL RADIOTHERAPY: EVALUATION VISIT
Bill For Evaluation ([id number])?: Yes
Ultrasound Not Used Text: Ultrasound was not performed today due to
Ultrasound Used Text: Ultrasound depth is 1.26 mm.
Evaluation Plan: : The patient is undergoing superficial radiation therapy for skin cancer and presents for weekly evaluation and management. Per protocol and as documented on the flow sheet, the patient was questioned as to subjective redness, pruritus, pain, drainage, fatigue, or any other symptoms. Objectively, the radiation area was evaluated with regards to erythema, atrophy, scale, crusting, erosion, ulceration, edema, purpura, tenderness, warmth, drainage, and any other findings. The plan was extensively reviewed including dose and dosing schedule. The simulation and clinical setup were also reviewed as were external and any internal shields and based on this review the appropriateness and sufficiency of treatment was determined.\\nPer Dr. Robin, continued daily US guidance and simulation is required for field placement, measurement of tumor depth, wide, breadth, tumor progress and edema monitoring.
Is This Visit For Evaluation During Treatment Or Follow Up Post Treatment?: evaluation
Additional Comments (Add Customization Of Note Here): REVISED Prescription.   Pt having extreme reaction to 100 kV.  Ultrasound depth is currently 1.26 mm.  Energy last 10 treatments to 70 kV.  mk
Assessment: Appropriate reaction
Radiation Therapy Oncology [facility] (Rtog) Score: 2

## 2023-10-23 NOTE — PROCEDURE: IMAGE GUIDED - SUPERFICIAL RADIOTHERAPY: TREATMENT VISIT
Total Cumulative Dose (Cgy): 2732.40
Treatment Documentation: This patient has been treated today with image-guided superficial radiation therapy for non-melanoma skin cancer. Written informed consent has been previously obtained from this patient for this treatment. This consent is documented in the patient's chart. The patient gave verbal consent to continue treatment today. The patient was treated with a specific radiation dose and setup as prescribed by the provider listed on this visit note.  A Radiation Therapist performed administration of radiation under the supervision of a provider. The treatment parameters and cumulative dose are indicated above. Prior to administering the radiation, the patient underwent a verification therapeutic radiology simulation-aided field setting defining relevant normal and abnormal target anatomy and acquiring images with high-frequency ultrasound in addition to data necessary to develop an optimal radiation treatment process for the patient. This process includes verification of the treatment port(s) and proper treatment positioning. All treatment ports were photographed within electronic medical records. The patient's customized lead blocking along with gross tumor volume and margin was confirmed. Considering superficial radiotherapy is clinical in setup, this requires the physician and radiation therapist to clarify the location interest being treated against initial images, ultrasound, pathology, and patient anatomy. Care was taken to ensure macros treated were geometrically accurate and properly positioned using therapeutic radiology simulation-aided field setting verification per fraction. This process is also utilized to determine if any prescription or setup changes are necessary. These steps are therefore medically necessary to ensure safe and effective administration of radiation. Ongoing therapeutic radiology simulation-aided field setting verification is ordered throughout the course of therapy.\\nA high-frequency ultrasound image was acquired today for a two-dimensional evaluation of the tumor volume, depth, width, breadth, and response to treatment, in addition to geometric accuracy of field placement. \\n\\nThe field placement and ultrasound imaging, per fraction, is separate and distinct from the initial simulation and is an important task in providing safe administration of superficial radiation therapy. Physician evaluation of the ultrasound tumor depth will be ongoing throughout the course of treatment and is deemed medically necessary to ensure the efficacy of treatment and any necessary changes. Today's image was evaluated for determination of continuation of treatment with the current plan or with necessary changes as appropriate. According to the review of verification therapeutic radiology simulation-aided field setting and imaging, no change is required. Additionally, the use of ultrasound visualization and targeted assessment allows the patient to be able to see his or her cancer(s) progress, encouraging the patient to complete and maintain compliance through the full course of prescribed radiotherapy. Per Dr. Robin, continued ultrasound guidance and therapeutic radiology simulation-aided field setting verification per fraction is required for field placement, measurement of tumor depth, progress, and acute effect monitoring.
Additional Change To Daily Dosage Administered Mid Treatment?: No
Bill For Ultrasound Evaluation (): Yes
Energy (Kv): 100
Fraction Number: 10
Ultrasound Used Text: High frequency ultrasound depth is 1.26 mm, which is -0.90  mm in difference from previous imaging.
Daily Dosage (Cgy): 273.24
Prescription Used: 1
Calculate Total Cumulative Dose Automatically Or Manually: Manually
Ultrasound Not Used Text: Ultrasound was not performed today due to
Add X Modifier?: GODINEZ - Unusual Non-Overlapping Service

## 2023-10-25 ENCOUNTER — APPOINTMENT (OUTPATIENT)
Age: 68
Setting detail: DERMATOLOGY
End: 2023-10-31

## 2023-10-25 PROBLEM — D04.39 CARCINOMA IN SITU OF SKIN OF OTHER PARTS OF FACE: Status: ACTIVE | Noted: 2023-10-25

## 2023-10-25 PROCEDURE — G6001 ECHO GUIDANCE RADIOTHERAPY: HCPCS | Mod: XU

## 2023-10-25 PROCEDURE — OTHER IMAGE GUIDED - SUPERFICIAL RADIOTHERAPY: TREATMENT VISIT: OTHER

## 2023-10-25 PROCEDURE — 77280 THER RAD SIMULAJ FIELD SMPL: CPT

## 2023-10-25 PROCEDURE — 77401 RADIATION TX DELIVERY SUPFC: CPT

## 2023-10-25 PROCEDURE — OTHER IMAGE GUIDED - SUPERFICIAL RADIOTHERAPY: OTHER

## 2023-10-25 NOTE — PROCEDURE: IMAGE GUIDED - SUPERFICIAL RADIOTHERAPY: TREATMENT VISIT
Fraction Number: 11
Bill For Set Radiation Therapy Field ([id number])?: Yes
Ultrasound Used Text: High frequency ultrasound depth is 1.22 mm, which is -0.04 mm in difference from previous imaging.
Additional Change To Daily Dosage Administered Mid Treatment?: No
Ultrasound Not Used Text: Ultrasound was not performed today due to
Add X Modifier?: GODINEZ - Unusual Non-Overlapping Service
Treatment Documentation: This patient has been treated today with image-guided superficial radiation therapy for non-melanoma skin cancer. Written informed consent has been previously obtained from this patient for this treatment. This consent is documented in the patient's chart. The patient gave verbal consent to continue treatment today. The patient was treated with a specific radiation dose and setup as prescribed by the provider listed on this visit note.  A Radiation Therapist performed administration of radiation under the supervision of a provider. The treatment parameters and cumulative dose are indicated above. Prior to administering the radiation, the patient underwent a verification therapeutic radiology simulation-aided field setting defining relevant normal and abnormal target anatomy and acquiring images with high-frequency ultrasound in addition to data necessary to develop an optimal radiation treatment process for the patient. This process includes verification of the treatment port(s) and proper treatment positioning. All treatment ports were photographed within electronic medical records. The patient's customized lead blocking along with gross tumor volume and margin was confirmed. Considering superficial radiotherapy is clinical in setup, this requires the physician and radiation therapist to clarify the location interest being treated against initial images, ultrasound, pathology, and patient anatomy. Care was taken to ensure marcos treated were geometrically accurate and properly positioned using therapeutic radiology simulation-aided field setting verification per fraction. This process is also utilized to determine if any prescription or setup changes are necessary. These steps are therefore medically necessary to ensure safe and effective administration of radiation. Ongoing therapeutic radiology simulation-aided field setting verification is ordered throughout the course of therapy.\\nA high-frequency ultrasound image was acquired today for a two-dimensional evaluation of the tumor volume, depth, width, breadth, and response to treatment, in addition to geometric accuracy of field placement. \\n\\nThe field placement and ultrasound imaging, per fraction, is separate and distinct from the initial simulation and is an important task in providing safe administration of superficial radiation therapy. Physician evaluation of the ultrasound tumor depth will be ongoing throughout the course of treatment and is deemed medically necessary to ensure the efficacy of treatment and any necessary changes. Today's image was evaluated for determination of continuation of treatment with the current plan or with necessary changes as appropriate. According to the review of verification therapeutic radiology simulation-aided field setting and imaging, no change is required. Additionally, the use of ultrasound visualization and targeted assessment allows the patient to be able to see his or her cancer(s) progress, encouraging the patient to complete and maintain compliance through the full course of prescribed radiotherapy. Per Dr. Robin, continued ultrasound guidance and therapeutic radiology simulation-aided field setting verification per fraction is required for field placement, measurement of tumor depth, progress, and acute effect monitoring.
Daily Dosage (Cgy): 275.88
Prescription Used: 1
Total Cumulative Dose (Cgy): 3008.28
Energy (Kv): 70
Calculate Total Cumulative Dose Automatically Or Manually: Manually

## 2023-10-25 NOTE — PROCEDURE: IMAGE GUIDED - SUPERFICIAL RADIOTHERAPY
Add A Fifth Prescription?: No
Treatments Per Week: 3
Total Dose For Prescription 2 (Cgy): 2758.80 + 2732.40 = Total dose at 5491.20
Bill For Physics Consultation: No - Render Text Only
Total Dose For Prescription 1 (Cgy): 2732.40
Field Number: 1
Number Of Fractions For Prescription 2: 10
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
Lesion Margin Size In Cm: 0.5
Shield Size In Cm: 3.5 cm x 3.5 cm prefabricated lead
Add A Second Prescription?: Yes
Treatment Time (Min): 0.44
Time, Dose, Fractionation Factor (Tdf) For Prescription 2: 49 + 48= Total 97
Applicator Size In Cm: 4.0 cm
Physics Documentation: (Physics Check Verbiage)
Energy (Kv): 70
Time, Dose, Fractionation Factor (Tdf) For Prescription 1: 48
Daily Dose (Cgy): 273.24
Detail Level: Detailed
Daily Dose (Cgy): 275.88
Energy (Kv): 100
Additional Fraction(S) Needed:: 0
Body Location Override (Optional): Left Lateral Mandibular Cheek

## 2023-10-30 ENCOUNTER — APPOINTMENT (OUTPATIENT)
Age: 68
Setting detail: DERMATOLOGY
End: 2023-10-31

## 2023-10-30 PROBLEM — D04.39 CARCINOMA IN SITU OF SKIN OF OTHER PARTS OF FACE: Status: ACTIVE | Noted: 2023-10-30

## 2023-10-30 PROCEDURE — G6001 ECHO GUIDANCE RADIOTHERAPY: HCPCS | Mod: XU

## 2023-10-30 PROCEDURE — OTHER IMAGE GUIDED - SUPERFICIAL RADIOTHERAPY: TREATMENT VISIT: OTHER

## 2023-10-30 PROCEDURE — 77280 THER RAD SIMULAJ FIELD SMPL: CPT

## 2023-10-30 PROCEDURE — OTHER IMAGE GUIDED - SUPERFICIAL RADIOTHERAPY: OTHER

## 2023-10-30 PROCEDURE — 77401 RADIATION TX DELIVERY SUPFC: CPT

## 2023-10-30 NOTE — PROCEDURE: IMAGE GUIDED - SUPERFICIAL RADIOTHERAPY
Daily Dose (Cgy): 275.88
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
Number Of Fractions For Prescription 2: 10
Time, Dose, Fractionation Factor (Tdf) For Prescription 1: 48
Energy (Kv): 100
Additional Fraction(S) Needed:: 0
Treatment Time (Min): 0.44
Add Physics Consultation: No
Time, Dose, Fractionation Factor (Tdf) For Prescription 2: 49 + 48= Total 97
Bill For Physics Consultation: No - Render Text Only
Lesion Dimensions-X Axis In Cm: 3
Total Dose For Prescription 2 (Cgy): 2758.80 + 2732.40 = Total dose at 5491.20
Field Number: 1
Daily Dose (Cgy): 273.24
Shield Size In Cm: 3.5 cm x 3.5 cm prefabricated lead
Applicator Size In Cm: 4.0 cm
Total Dose For Prescription 1 (Cgy): 2732.40
Add A Second Prescription?: Yes
Lesion Margin Size In Cm: 0.5
Energy (Kv): 70
Physics Documentation: (Physics Check Verbiage)
Detail Level: Detailed
Body Location Override (Optional): Left Lateral Mandibular Cheek

## 2023-11-01 ENCOUNTER — APPOINTMENT (OUTPATIENT)
Age: 68
Setting detail: DERMATOLOGY
End: 2023-11-03

## 2023-11-01 PROBLEM — D04.39 CARCINOMA IN SITU OF SKIN OF OTHER PARTS OF FACE: Status: ACTIVE | Noted: 2023-11-01

## 2023-11-01 PROCEDURE — G6001 ECHO GUIDANCE RADIOTHERAPY: HCPCS | Mod: XU

## 2023-11-01 PROCEDURE — OTHER IMAGE GUIDED - SUPERFICIAL RADIOTHERAPY: TREATMENT VISIT: OTHER

## 2023-11-01 PROCEDURE — 77280 THER RAD SIMULAJ FIELD SMPL: CPT

## 2023-11-01 PROCEDURE — 77401 RADIATION TX DELIVERY SUPFC: CPT

## 2023-11-01 PROCEDURE — OTHER IMAGE GUIDED - SUPERFICIAL RADIOTHERAPY: OTHER

## 2023-11-01 NOTE — PROCEDURE: IMAGE GUIDED - SUPERFICIAL RADIOTHERAPY
Time, Dose, Fractionation Factor (Tdf) For Prescription 2: 49 + 48= Total 97
Add A Third Interruption?: No
Number Of Fractions For Prescription 1: 10
Daily Dose (Cgy): 273.24
Shield Size In Cm: 3.5 cm x 3.5 cm prefabricated lead
Treatment Time (Min): 0.44
Detail Level: Detailed
Bill For Physics Consultation: No - Render Text Only
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
Total Dose For Prescription 1 (Cgy): 2732.40
Treatments Per Week: 3
Applicator Size In Cm: 4.0 cm
Daily Dose (Cgy): 275.88
Energy (Kv): 100
Total Dose For Prescription 2 (Cgy): 2758.80 + 2732.40 = Total dose at 5491.20
Add A Second Prescription?: Yes
Physics Documentation: (Physics Check Verbiage)
Field Number: 1
Body Location Override (Optional): Left Lateral Mandibular Cheek
Energy (Kv): 70
Lesion Margin Size In Cm: 0.5
Time, Dose, Fractionation Factor (Tdf) For Prescription 1: 48
Additional Fraction(S) Needed:: 0

## 2023-11-03 ENCOUNTER — APPOINTMENT (OUTPATIENT)
Age: 68
Setting detail: DERMATOLOGY
End: 2023-11-03

## 2023-11-03 PROBLEM — D04.39 CARCINOMA IN SITU OF SKIN OF OTHER PARTS OF FACE: Status: ACTIVE | Noted: 2023-11-03

## 2023-11-03 PROCEDURE — G6001 ECHO GUIDANCE RADIOTHERAPY: HCPCS | Mod: XU

## 2023-11-03 PROCEDURE — OTHER IMAGE GUIDED - SUPERFICIAL RADIOTHERAPY: OTHER

## 2023-11-03 PROCEDURE — 77401 RADIATION TX DELIVERY SUPFC: CPT

## 2023-11-03 PROCEDURE — 77280 THER RAD SIMULAJ FIELD SMPL: CPT

## 2023-11-03 PROCEDURE — OTHER IMAGE GUIDED - SUPERFICIAL RADIOTHERAPY: TREATMENT VISIT: OTHER

## 2023-11-03 NOTE — PROCEDURE: IMAGE GUIDED - SUPERFICIAL RADIOTHERAPY
Add A Fifth Prescription?: No
Treatment Time (Min): 0.44
Daily Dose (Cgy): 275.88
Time, Dose, Fractionation Factor (Tdf) For Prescription 1: 48
Number Of Fractions For Prescription 2: 10
Physics Documentation: (Physics Check Verbiage)
Energy (Kv): 100
Time, Dose, Fractionation Factor (Tdf) For Prescription 2: 49 + 48= Total 97
Treatments Per Week: 3
Daily Dose (Cgy): 273.24
Body Location Override (Optional): Left Lateral Mandibular Cheek
Total Dose For Prescription 2 (Cgy): 2758.80 + 2732.40 = Total dose at 5491.20
Detail Level: Detailed
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
Total Dose For Prescription 1 (Cgy): 2732.40
Field Number: 1
Add A Second Prescription?: Yes
Energy (Kv): 70
Shield Size In Cm: 3.5 cm x 3.5 cm prefabricated lead
Additional Fraction(S) Needed:: 0
Applicator Size In Cm: 4.0 cm
Bill For Physics Consultation: No - Render Text Only
Lesion Margin Size In Cm: 0.5

## 2023-11-03 NOTE — PROCEDURE: IMAGE GUIDED - SUPERFICIAL RADIOTHERAPY: TREATMENT VISIT
Energy (Kv): 70
Daily Dosage (Cgy): 275.88
Treatment Documentation: : This patient has been treated today with image-guided superficial radiation therapy for non-melanoma skin cancer. Written informed consent has been previously obtained from this patient for this treatment. This consent is documented in the patient's chart. The patient gave verbal consent to continue treatment today. The patient was treated with a specific radiation dose and setup as prescribed by the provider listed on this visit note.  A Radiation Therapist performed administration of radiation under the supervision of a provider. The treatment parameters and cumulative dose are indicated above. Prior to administering the radiation, the patient underwent a verification therapeutic radiology simulation-aided field setting defining relevant normal and abnormal target anatomy and acquiring images with high-frequency ultrasound in addition to data necessary to develop an optimal radiation treatment process for the patient. This process includes verification of the treatment port(s) and proper treatment positioning. All treatment ports were photographed within electronic medical records. The patient's customized lead blocking along with gross tumor volume and margin was confirmed. Considering superficial radiotherapy is clinical in setup, this requires the physician and radiation therapist to clarify the location interest being treated against initial images, ultrasound, pathology, and patient anatomy. Care was taken to ensure marcos treated were geometrically accurate and properly positioned using therapeutic radiology simulation-aided field setting verification per fraction. This process is also utilized to determine if any prescription or setup changes are necessary. These steps are therefore medically necessary to ensure safe and effective administration of radiation. Ongoing therapeutic radiology simulation-aided field setting verification is ordered throughout the course of therapy.\\nA high-frequency ultrasound image was acquired today for a two-dimensional evaluation of the tumor volume, depth, width, breadth, and response to treatment, in addition to geometric accuracy of field placement. \\n\\nThe field placement and ultrasound imaging, per fraction, is separate and distinct from the initial simulation and is an important task in providing safe administration of superficial radiation therapy. Physician evaluation of the ultrasound tumor depth will be ongoing throughout the course of treatment and is deemed medically necessary to ensure the efficacy of treatment and any necessary changes. Today's image was evaluated for determination of continuation of treatment with the current plan or with necessary changes as appropriate. According to the review of verification therapeutic radiology simulation-aided field setting and imaging, no change is required. Additionally, the use of ultrasound visualization and targeted assessment allows the patient to be able to see his or her cancer(s) progress, encouraging the patient to complete and maintain compliance through the full course of prescribed radiotherapy. Per Dr. Robin, continued ultrasound guidance and therapeutic radiology simulation-aided field setting verification per fraction is required for field placement, measurement of tumor depth, progress, and acute effect monitoring.
Add X Modifier?: GODINEZ - Unusual Non-Overlapping Service
Ultrasound Not Used Text: Ultrasound was not performed today due to
Additional Change To Daily Dosage Administered Mid Treatment?: No
Prescription Used: 1
Total Cumulative Dose (Cgy): 3835.92
Ultrasound Used Text: High frequency ultrasound depth is 1.12 mm, which is -0.46 mm in difference from previous imaging.
Bill For Set Radiation Therapy Field (89896)?: Yes
Calculate Total Cumulative Dose Automatically Or Manually: Manually
Fraction Number: 14

## 2023-11-06 ENCOUNTER — APPOINTMENT (OUTPATIENT)
Age: 68
Setting detail: DERMATOLOGY
End: 2023-11-10

## 2023-11-06 PROBLEM — D04.39 CARCINOMA IN SITU OF SKIN OF OTHER PARTS OF FACE: Status: ACTIVE | Noted: 2023-11-06

## 2023-11-06 PROCEDURE — G6001 ECHO GUIDANCE RADIOTHERAPY: HCPCS | Mod: XU

## 2023-11-06 PROCEDURE — OTHER IMAGE GUIDED - SUPERFICIAL RADIOTHERAPY: EVALUATION VISIT: OTHER

## 2023-11-06 PROCEDURE — OTHER IMAGE GUIDED - SUPERFICIAL RADIOTHERAPY: TREATMENT VISIT: OTHER

## 2023-11-06 PROCEDURE — OTHER IMAGE GUIDED - SUPERFICIAL RADIOTHERAPY: OTHER

## 2023-11-06 PROCEDURE — 77427 RADIATION TX MANAGEMENT X5: CPT

## 2023-11-06 PROCEDURE — 77401 RADIATION TX DELIVERY SUPFC: CPT

## 2023-11-06 PROCEDURE — 77280 THER RAD SIMULAJ FIELD SMPL: CPT

## 2023-11-06 NOTE — PROCEDURE: IMAGE GUIDED - SUPERFICIAL RADIOTHERAPY: EVALUATION VISIT
Show Ultrasound In Note?: Yes
Radiation Therapy Oncology Group (Rtog) Score: 1
Ultrasound Not Used Text: Ultrasound was not performed today due to
Is This Visit For Evaluation During Treatment Or Follow Up Post Treatment?: evaluation
Ultrasound Used Text: Ultrasound depth is 1.30 mm.
Assessment: Appropriate reaction
Evaluation Plan: The patient is undergoing superficial radiation therapy for skin cancer and presents for weekly evaluation and management. Per protocol and as documented on the flow sheet, the patient was questioned as to subjective redness, pruritus, pain, drainage, fatigue, or any other symptoms. Objectively, the radiation area was evaluated with regards to erythema, atrophy, scale, crusting, erosion, ulceration, edema, purpura, tenderness, warmth, drainage, and any other findings. The plan was extensively reviewed including dose and dosing schedule. The simulation and clinical setup were also reviewed as were external and any internal shields and based on this review the appropriateness and sufficiency of treatment was determined.\\nPer Dr. Robin, continued daily US guidance and simulation is required for field placement, measurement of tumor depth, wide, breadth, tumor progress and edema monitoring.

## 2023-11-06 NOTE — PROCEDURE: IMAGE GUIDED - SUPERFICIAL RADIOTHERAPY: TREATMENT VISIT
Additional Change To Daily Dosage Administered Mid Treatment?: No
Bill For Ultrasound Evaluation (): Yes
Total Cumulative Dose (Cgy): 4111.80
Prescription Used: 1
Treatment Documentation: This patient has been treated today with image-guided superficial radiation therapy for non-melanoma skin cancer. Written informed consent has been previously obtained from this patient for this treatment. This consent is documented in the patient's chart. The patient gave verbal consent to continue treatment today. The patient was treated with a specific radiation dose and setup as prescribed by the provider listed on this visit note.  A Radiation Therapist performed administration of radiation under the supervision of a provider. The treatment parameters and cumulative dose are indicated above. Prior to administering the radiation, the patient underwent a verification therapeutic radiology simulation-aided field setting defining relevant normal and abnormal target anatomy and acquiring images with high-frequency ultrasound in addition to data necessary to develop an optimal radiation treatment process for the patient. This process includes verification of the treatment port(s) and proper treatment positioning. All treatment ports were photographed within electronic medical records. The patient's customized lead blocking along with gross tumor volume and margin was confirmed. Considering superficial radiotherapy is clinical in setup, this requires the physician and radiation therapist to clarify the location interest being treated against initial images, ultrasound, pathology, and patient anatomy. Care was taken to ensure marcos treated were geometrically accurate and properly positioned using therapeutic radiology simulation-aided field setting verification per fraction. This process is also utilized to determine if any prescription or setup changes are necessary. These steps are therefore medically necessary to ensure safe and effective administration of radiation. Ongoing therapeutic radiology simulation-aided field setting verification is ordered throughout the course of therapy.\\nA high-frequency ultrasound image was acquired today for a two-dimensional evaluation of the tumor volume, depth, width, breadth, and response to treatment, in addition to geometric accuracy of field placement. \\n\\nThe field placement and ultrasound imaging, per fraction, is separate and distinct from the initial simulation and is an important task in providing safe administration of superficial radiation therapy. Physician evaluation of the ultrasound tumor depth will be ongoing throughout the course of treatment and is deemed medically necessary to ensure the efficacy of treatment and any necessary changes. Today's image was evaluated for determination of continuation of treatment with the current plan or with necessary changes as appropriate. According to the review of verification therapeutic radiology simulation-aided field setting and imaging, no change is required. Additionally, the use of ultrasound visualization and targeted assessment allows the patient to be able to see his or her cancer(s) progress, encouraging the patient to complete and maintain compliance through the full course of prescribed radiotherapy. Per Dr. Robin, continued ultrasound guidance and therapeutic radiology simulation-aided field setting verification per fraction is required for field placement, measurement of tumor depth, progress, and acute effect monitoring.
Ultrasound Used Text: High frequency ultrasound depth is 1.30 mm, which is +0.18 mm in difference from previous imaging.
Ultrasound Not Used Text: Ultrasound was not performed today due to
Fraction Number: 15
Energy (Kv): 70
Add X Modifier?: GODINEZ - Unusual Non-Overlapping Service
Daily Dosage (Cgy): 275.88
Calculate Total Cumulative Dose Automatically Or Manually: Manually

## 2023-11-06 NOTE — PROCEDURE: IMAGE GUIDED - SUPERFICIAL RADIOTHERAPY
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
Treatments Per Week: 3
Add A Second Prescription?: Yes
Additional Fraction(S) Needed:: 0
Add Decay And Dose Adjustment Calculation?: No
Number Of Fractions For Prescription 2: 10
Energy (Kv): 70
Time, Dose, Fractionation Factor (Tdf) For Prescription 1: 48
Total Dose For Prescription 2 (Cgy): 2758.80 + 2732.40 = Total dose at 5491.20
Field Number: 1
Bill For Physics Consultation: No - Render Text Only
Energy (Kv): 100
Treatment Time (Min): 0.44
Shield Size In Cm: 3.5 cm x 3.5 cm prefabricated lead
Physics Documentation: (Physics Check Verbiage)
Lesion Margin Size In Cm: 0.5
Daily Dose (Cgy): 273.24
Total Dose For Prescription 1 (Cgy): 2732.40
Detail Level: Detailed
Daily Dose (Cgy): 275.88
Body Location Override (Optional): Left Lateral Mandibular Cheek
Applicator Size In Cm: 4.0 cm
Time, Dose, Fractionation Factor (Tdf) For Prescription 2: 49 + 48= Total 97

## 2023-11-08 ENCOUNTER — APPOINTMENT (OUTPATIENT)
Age: 68
Setting detail: DERMATOLOGY
End: 2023-11-10

## 2023-11-08 PROBLEM — D04.39 CARCINOMA IN SITU OF SKIN OF OTHER PARTS OF FACE: Status: ACTIVE | Noted: 2023-11-08

## 2023-11-08 PROCEDURE — OTHER IMAGE GUIDED - SUPERFICIAL RADIOTHERAPY: TREATMENT VISIT: OTHER

## 2023-11-08 PROCEDURE — G6001 ECHO GUIDANCE RADIOTHERAPY: HCPCS | Mod: XU

## 2023-11-08 PROCEDURE — OTHER IMAGE GUIDED - SUPERFICIAL RADIOTHERAPY: OTHER

## 2023-11-08 PROCEDURE — 77401 RADIATION TX DELIVERY SUPFC: CPT

## 2023-11-08 PROCEDURE — 77280 THER RAD SIMULAJ FIELD SMPL: CPT

## 2023-11-08 NOTE — PROCEDURE: IMAGE GUIDED - SUPERFICIAL RADIOTHERAPY
Daily Dose (Cgy): 273.24
Number Of Fractions For Prescription 2: 10
Add A Fourth Interruption?: No
Bill For Physics Consultation: No - Render Text Only
Treatments Per Week: 3
Treatment Time (Min): 0.44
Time, Dose, Fractionation Factor (Tdf) For Prescription 2: 49 + 48= Total 97
Body Location Override (Optional): Left Lateral Mandibular Cheek
Daily Dose (Cgy): 275.88
Detail Level: Detailed
Time, Dose, Fractionation Factor (Tdf) For Prescription 1: 48
Total Dose For Prescription 1 (Cgy): 2732.40
Physics Documentation: (Physics Check Verbiage)
Additional Fraction(S) Needed:: 0
Total Dose For Prescription 2 (Cgy): 2758.80 + 2732.40 = Total dose at 5491.20
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
Field Number: 1
Applicator Size In Cm: 4.0 cm
Add A Second Prescription?: Yes
Lesion Margin Size In Cm: 0.5
Shield Size In Cm: 3.5 cm x 3.5 cm prefabricated lead
Energy (Kv): 100
Energy (Kv): 70

## 2023-11-08 NOTE — PROCEDURE: IMAGE GUIDED - SUPERFICIAL RADIOTHERAPY: TREATMENT VISIT
Bill For Treatment (07881)?: Yes
Additional Change To Daily Dosage Administered Mid Treatment?: No
Total Cumulative Dose (Cgy): 4387.68
Ultrasound Not Used Text: Ultrasound was not performed today due to
Add X Modifier?: GODINEZ - Unusual Non-Overlapping Service
Fraction Number: 16
Daily Dosage (Cgy): 275.88
Energy (Kv): 70
Prescription Used: 1
Calculate Total Cumulative Dose Automatically Or Manually: Manually
Ultrasound Used Text: High frequency ultrasound depth is 1.65 mm, which is +0.35 mm in difference from previous imaging.
Treatment Documentation: This patient has been treated today with image-guided superficial radiation therapy for non-melanoma skin cancer. Written informed consent has been previously obtained from this patient for this treatment. This consent is documented in the patient's chart. The patient gave verbal consent to continue treatment today. The patient was treated with a specific radiation dose and setup as prescribed by the provider listed on this visit note.  A Radiation Therapist performed administration of radiation under the supervision of a provider. The treatment parameters and cumulative dose are indicated above. Prior to administering the radiation, the patient underwent a verification therapeutic radiology simulation-aided field setting defining relevant normal and abnormal target anatomy and acquiring images with high-frequency ultrasound in addition to data necessary to develop an optimal radiation treatment process for the patient. This process includes verification of the treatment port(s) and proper treatment positioning. All treatment ports were photographed within electronic medical records. The patient's customized lead blocking along with gross tumor volume and margin was confirmed. Considering superficial radiotherapy is clinical in setup, this requires the physician and radiation therapist to clarify the location interest being treated against initial images, ultrasound, pathology, and patient anatomy. Care was taken to ensure marcos treated were geometrically accurate and properly positioned using therapeutic radiology simulation-aided field setting verification per fraction. This process is also utilized to determine if any prescription or setup changes are necessary. These steps are therefore medically necessary to ensure safe and effective administration of radiation. Ongoing therapeutic radiology simulation-aided field setting verification is ordered throughout the course of therapy.\\nA high-frequency ultrasound image was acquired today for a two-dimensional evaluation of the tumor volume, depth, width, breadth, and response to treatment, in addition to geometric accuracy of field placement. \\n\\nThe field placement and ultrasound imaging, per fraction, is separate and distinct from the initial simulation and is an important task in providing safe administration of superficial radiation therapy. Physician evaluation of the ultrasound tumor depth will be ongoing throughout the course of treatment and is deemed medically necessary to ensure the efficacy of treatment and any necessary changes. Today's image was evaluated for determination of continuation of treatment with the current plan or with necessary changes as appropriate. According to the review of verification therapeutic radiology simulation-aided field setting and imaging, no change is required. Additionally, the use of ultrasound visualization and targeted assessment allows the patient to be able to see his or her cancer(s) progress, encouraging the patient to complete and maintain compliance through the full course of prescribed radiotherapy. Per Dr. Robin, continued ultrasound guidance and therapeutic radiology simulation-aided field setting verification per fraction is required for field placement, measurement of tumor depth, progress, and acute effect monitoring.

## 2023-11-10 ENCOUNTER — APPOINTMENT (OUTPATIENT)
Age: 68
Setting detail: DERMATOLOGY
End: 2023-11-10

## 2023-11-10 PROBLEM — D04.39 CARCINOMA IN SITU OF SKIN OF OTHER PARTS OF FACE: Status: ACTIVE | Noted: 2023-11-10

## 2023-11-10 PROCEDURE — G6001 ECHO GUIDANCE RADIOTHERAPY: HCPCS | Mod: XU

## 2023-11-10 PROCEDURE — 77401 RADIATION TX DELIVERY SUPFC: CPT

## 2023-11-10 PROCEDURE — OTHER IMAGE GUIDED - SUPERFICIAL RADIOTHERAPY: TREATMENT VISIT: OTHER

## 2023-11-10 PROCEDURE — OTHER IMAGE GUIDED - SUPERFICIAL RADIOTHERAPY: OTHER

## 2023-11-10 PROCEDURE — 77280 THER RAD SIMULAJ FIELD SMPL: CPT

## 2023-11-10 NOTE — PROCEDURE: IMAGE GUIDED - SUPERFICIAL RADIOTHERAPY: TREATMENT VISIT
Daily Dosage (Cgy): 275.88
Bill For Ultrasound Evaluation (): Yes
Total Cumulative Dose (Cgy): 4663.56
Additional Change To Daily Dosage Administered Mid Treatment?: No
Treatment Documentation: This patient has been treated today with image-guided superficial radiation therapy for non-melanoma skin cancer. Written informed consent has been previously obtained from this patient for this treatment. This consent is documented in the patient's chart. The patient gave verbal consent to continue treatment today. The patient was treated with a specific radiation dose and setup as prescribed by the provider listed on this visit note.  A Radiation Therapist performed administration of radiation under the supervision of a provider. The treatment parameters and cumulative dose are indicated above. Prior to administering the radiation, the patient underwent a verification therapeutic radiology simulation-aided field setting defining relevant normal and abnormal target anatomy and acquiring images with high-frequency ultrasound in addition to data necessary to develop an optimal radiation treatment process for the patient. This process includes verification of the treatment port(s) and proper treatment positioning. All treatment ports were photographed within electronic medical records. The patient's customized lead blocking along with gross tumor volume and margin was confirmed. Considering superficial radiotherapy is clinical in setup, this requires the physician and radiation therapist to clarify the location interest being treated against initial images, ultrasound, pathology, and patient anatomy. Care was taken to ensure marcos treated were geometrically accurate and properly positioned using therapeutic radiology simulation-aided field setting verification per fraction. This process is also utilized to determine if any prescription or setup changes are necessary. These steps are therefore medically necessary to ensure safe and effective administration of radiation. Ongoing therapeutic radiology simulation-aided field setting verification is ordered throughout the course of therapy.\\nA high-frequency ultrasound image was acquired today for a two-dimensional evaluation of the tumor volume, depth, width, breadth, and response to treatment, in addition to geometric accuracy of field placement. \\n\\nThe field placement and ultrasound imaging, per fraction, is separate and distinct from the initial simulation and is an important task in providing safe administration of superficial radiation therapy. Physician evaluation of the ultrasound tumor depth will be ongoing throughout the course of treatment and is deemed medically necessary to ensure the efficacy of treatment and any necessary changes. Today's image was evaluated for determination of continuation of treatment with the current plan or with necessary changes as appropriate. According to the review of verification therapeutic radiology simulation-aided field setting and imaging, no change is required. Additionally, the use of ultrasound visualization and targeted assessment allows the patient to be able to see his or her cancer(s) progress, encouraging the patient to complete and maintain compliance through the full course of prescribed radiotherapy. Per Dr. Robin, continued ultrasound guidance and therapeutic radiology simulation-aided field setting verification per fraction is required for field placement, measurement of tumor depth, progress, and acute effect monitoring.
Ultrasound Not Used Text: Ultrasound was not performed today due to
Calculate Total Cumulative Dose Automatically Or Manually: Manually
Fraction Number: 17
Add X Modifier?: GODINEZ - Unusual Non-Overlapping Service
Prescription Used: 1
Energy (Kv): 70
Ultrasound Used Text: High frequency ultrasound depth is 1.67 mm, which is 0.02 mm in difference from previous imaging.

## 2023-11-10 NOTE — PROCEDURE: IMAGE GUIDED - SUPERFICIAL RADIOTHERAPY
Add A Fifth Interruption?: No
Treatment Time (Min): 0.44
Time, Dose, Fractionation Factor (Tdf) For Prescription 2: 49 + 48= Total 97
Treatments Per Week: 3
Detail Level: Detailed
Applicator Size In Cm: 4.0 cm
Shield Size In Cm: 3.5 cm x 3.5 cm prefabricated lead
Body Location Override (Optional): Left Lateral Mandibular Cheek
Bill For Physics Consultation: No - Render Text Only
Energy (Kv): 70
Number Of Fractions For Prescription 1: 10
Physics Documentation: (Physics Check Verbiage)
Daily Dose (Cgy): 275.88
Add A Second Prescription?: Yes
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
Total Dose For Prescription 1 (Cgy): 2732.40
Additional Fraction(S) Needed:: 0
Total Dose For Prescription 2 (Cgy): 2758.80 + 2732.40 = Total dose at 5491.20
Field Number: 1
Energy (Kv): 100
Lesion Margin Size In Cm: 0.5
Daily Dose (Cgy): 273.24
Time, Dose, Fractionation Factor (Tdf) For Prescription 1: 48

## 2023-11-11 ENCOUNTER — APPOINTMENT (OUTPATIENT)
Age: 68
Setting detail: DERMATOLOGY
End: 2023-12-17

## 2023-11-11 PROCEDURE — 77336 RADIATION PHYSICS CONSULT: CPT

## 2023-11-13 ENCOUNTER — APPOINTMENT (OUTPATIENT)
Age: 68
Setting detail: DERMATOLOGY
End: 2023-11-17

## 2023-11-13 PROBLEM — D04.39 CARCINOMA IN SITU OF SKIN OF OTHER PARTS OF FACE: Status: ACTIVE | Noted: 2023-11-13

## 2023-11-13 PROCEDURE — 77401 RADIATION TX DELIVERY SUPFC: CPT

## 2023-11-13 PROCEDURE — OTHER IMAGE GUIDED - SUPERFICIAL RADIOTHERAPY: OTHER

## 2023-11-13 PROCEDURE — G6001 ECHO GUIDANCE RADIOTHERAPY: HCPCS | Mod: XU

## 2023-11-13 PROCEDURE — 77280 THER RAD SIMULAJ FIELD SMPL: CPT

## 2023-11-13 PROCEDURE — OTHER IMAGE GUIDED - SUPERFICIAL RADIOTHERAPY: TREATMENT VISIT: OTHER

## 2023-11-13 NOTE — PROCEDURE: IMAGE GUIDED - SUPERFICIAL RADIOTHERAPY
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
Add A Fifth Interruption?: No
Total Dose For Prescription 1 (Cgy): 2732.40
Treatments Per Week: 3
Treatment Time (Min): 0.44
Daily Dose (Cgy): 275.88
Energy (Kv): 100
Field Number: 1
Add A Second Prescription?: Yes
Time, Dose, Fractionation Factor (Tdf) For Prescription 2: 49 + 48= Total 97
Additional Fraction(S) Needed:: 0
Time, Dose, Fractionation Factor (Tdf) For Prescription 1: 48
Lesion Margin Size In Cm: 0.5
Body Location Override (Optional): Left Lateral Mandibular Cheek
Number Of Fractions For Prescription 2: 10
Bill For Physics Consultation: No - Render Text Only
Total Dose For Prescription 2 (Cgy): 2758.80 + 2732.40 = Total dose at 5491.20
Detail Level: Detailed
Physics Documentation: (Physics Check Verbiage)
Energy (Kv): 70
Applicator Size In Cm: 4.0 cm
Daily Dose (Cgy): 273.24
Shield Size In Cm: 3.5 cm x 3.5 cm prefabricated lead
Dizziness

## 2023-11-13 NOTE — PROCEDURE: IMAGE GUIDED - SUPERFICIAL RADIOTHERAPY: TREATMENT VISIT
Treatment Documentation: This patient has been treated today with image-guided superficial radiation therapy for non-melanoma skin cancer. Written informed consent has been previously obtained from this patient for this treatment. This consent is documented in the patient's chart. The patient gave verbal consent to continue treatment today. The patient was treated with a specific radiation dose and setup as prescribed by the provider listed on this visit note.  A Radiation Therapist performed administration of radiation under the supervision of a provider. The treatment parameters and cumulative dose are indicated above. Prior to administering the radiation, the patient underwent a verification therapeutic radiology simulation-aided field setting defining relevant normal and abnormal target anatomy and acquiring images with high-frequency ultrasound in addition to data necessary to develop an optimal radiation treatment process for the patient. This process includes verification of the treatment port(s) and proper treatment positioning. All treatment ports were photographed within electronic medical records. The patient's customized lead blocking along with gross tumor volume and margin was confirmed. Considering superficial radiotherapy is clinical in setup, this requires the physician and radiation therapist to clarify the location interest being treated against initial images, ultrasound, pathology, and patient anatomy. Care was taken to ensure marcos treated were geometrically accurate and properly positioned using therapeutic radiology simulation-aided field setting verification per fraction. This process is also utilized to determine if any prescription or setup changes are necessary. These steps are therefore medically necessary to ensure safe and effective administration of radiation. Ongoing therapeutic radiology simulation-aided field setting verification is ordered throughout the course of therapy.\\nA high-frequency ultrasound image was acquired today for a two-dimensional evaluation of the tumor volume, depth, width, breadth, and response to treatment, in addition to geometric accuracy of field placement. \\n\\nThe field placement and ultrasound imaging, per fraction, is separate and distinct from the initial simulation and is an important task in providing safe administration of superficial radiation therapy. Physician evaluation of the ultrasound tumor depth will be ongoing throughout the course of treatment and is deemed medically necessary to ensure the efficacy of treatment and any necessary changes. Today's image was evaluated for determination of continuation of treatment with the current plan or with necessary changes as appropriate. According to the review of verification therapeutic radiology simulation-aided field setting and imaging, no change is required. Additionally, the use of ultrasound visualization and targeted assessment allows the patient to be able to see his or her cancer(s) progress, encouraging the patient to complete and maintain compliance through the full course of prescribed radiotherapy. Per Dr. Robin, continued ultrasound guidance and therapeutic radiology simulation-aided field setting verification per fraction is required for field placement, measurement of tumor depth, progress, and acute effect monitoring.
Prescription Used: 2
Energy (Kv): 70
Ultrasound Not Used Text: Ultrasound was not performed today due to
Additional Change To Daily Dosage Administered Mid Treatment?: No
Total Cumulative Dose (Cgy): 4939.44
Show Ultrasound In Note?: Yes
Daily Dosage (Cgy): 275.88
Calculate Total Cumulative Dose Automatically Or Manually: Manually
Fraction Number: 18
Add X Modifier?: GODINEZ - Unusual Non-Overlapping Service
Ultrasound Used Text: High frequency ultrasound depth is 1.49 mm, which is 0.18 mm in difference from previous imaging.

## 2023-11-15 ENCOUNTER — APPOINTMENT (OUTPATIENT)
Age: 68
Setting detail: DERMATOLOGY
End: 2023-11-17

## 2023-11-15 PROBLEM — D04.39 CARCINOMA IN SITU OF SKIN OF OTHER PARTS OF FACE: Status: ACTIVE | Noted: 2023-11-15

## 2023-11-15 PROCEDURE — OTHER IMAGE GUIDED - SUPERFICIAL RADIOTHERAPY: TREATMENT VISIT: OTHER

## 2023-11-15 PROCEDURE — OTHER IMAGE GUIDED - SUPERFICIAL RADIOTHERAPY: OTHER

## 2023-11-15 PROCEDURE — 77280 THER RAD SIMULAJ FIELD SMPL: CPT

## 2023-11-15 PROCEDURE — G6001 ECHO GUIDANCE RADIOTHERAPY: HCPCS | Mod: XU

## 2023-11-15 PROCEDURE — 77401 RADIATION TX DELIVERY SUPFC: CPT

## 2023-11-15 NOTE — PROCEDURE: IMAGE GUIDED - SUPERFICIAL RADIOTHERAPY
Total Dose For Prescription 1 (Cgy): 2732.40
Bill For Dosimetry Calculation (15833): No
Number Of Fractions For Prescription 1: 10
Treatments Per Week: 3
Applicator Size In Cm: 4.0 cm
Detail Level: Detailed
Energy (Kv): 100
Add A Second Prescription?: Yes
Body Location Override (Optional): Left Lateral Mandibular Cheek
Time, Dose, Fractionation Factor (Tdf) For Prescription 1: 48
Bill For Physics Consultation: No - Render Text Only
Treatment Time (Min): 0.44
Time, Dose, Fractionation Factor (Tdf) For Prescription 2: 49 + 48= Total 97
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
Field Number: 1
Energy (Kv): 70
Physics Documentation: (Physics Check Verbiage)
Shield Size In Cm: 3.5 cm x 3.5 cm prefabricated lead
Total Dose For Prescription 2 (Cgy): 2758.80 + 2732.40 = Total dose at 5491.20
Daily Dose (Cgy): 273.24
Daily Dose (Cgy): 275.88
Additional Fraction(S) Needed:: 0
Lesion Margin Size In Cm: 0.5

## 2023-11-15 NOTE — PROCEDURE: IMAGE GUIDED - SUPERFICIAL RADIOTHERAPY: TREATMENT VISIT
Treatment Documentation: This patient has been treated today with image-guided superficial radiation therapy for non-melanoma skin cancer. Written informed consent has been previously obtained from this patient for this treatment. This consent is documented in the patient's chart. The patient gave verbal consent to continue treatment today. The patient was treated with a specific radiation dose and setup as prescribed by the provider listed on this visit note.  A Radiation Therapist performed administration of radiation under the supervision of a provider. The treatment parameters and cumulative dose are indicated above. Prior to administering the radiation, the patient underwent a verification therapeutic radiology simulation-aided field setting defining relevant normal and abnormal target anatomy and acquiring images with high-frequency ultrasound in addition to data necessary to develop an optimal radiation treatment process for the patient. This process includes verification of the treatment port(s) and proper treatment positioning. All treatment ports were photographed within electronic medical records. The patient's customized lead blocking along with gross tumor volume and margin was confirmed. Considering superficial radiotherapy is clinical in setup, this requires the physician and radiation therapist to clarify the location interest being treated against initial images, ultrasound, pathology, and patient anatomy. Care was taken to ensure marcos treated were geometrically accurate and properly positioned using therapeutic radiology simulation-aided field setting verification per fraction. This process is also utilized to determine if any prescription or setup changes are necessary. These steps are therefore medically necessary to ensure safe and effective administration of radiation. Ongoing therapeutic radiology simulation-aided field setting verification is ordered throughout the course of therapy.\\nA high-frequency ultrasound image was acquired today for a two-dimensional evaluation of the tumor volume, depth, width, breadth, and response to treatment, in addition to geometric accuracy of field placement. \\n\\nThe field placement and ultrasound imaging, per fraction, is separate and distinct from the initial simulation and is an important task in providing safe administration of superficial radiation therapy. Physician evaluation of the ultrasound tumor depth will be ongoing throughout the course of treatment and is deemed medically necessary to ensure the efficacy of treatment and any necessary changes. Today's image was evaluated for determination of continuation of treatment with the current plan or with necessary changes as appropriate. According to the review of verification therapeutic radiology simulation-aided field setting and imaging, no change is required. Additionally, the use of ultrasound visualization and targeted assessment allows the patient to be able to see his or her cancer(s) progress, encouraging the patient to complete and maintain compliance through the full course of prescribed radiotherapy. Per Dr. Robin, continued ultrasound guidance and therapeutic radiology simulation-aided field setting verification per fraction is required for field placement, measurement of tumor depth, progress, and acute effect monitoring.\\n\\n\\nPhysician Orders: Radiotherapy Treatment Plan: Superficial Radiotherapy without Ultrasound\\nPlan: This patient has been treated today with image-guided superficial radiation therapy for non-melanoma skin cancer. Written informed consent has been previously obtained from this patient for this treatment. This consent is documented in the patient’s chart. The patient gave verbal consent to continue treatment today. The patient was treated with a specific radiation dose and setup as prescribed by the provider listed on this visit note. A Radiation Therapist performed administration of radiation under the supervision of a provider. The treatment parameters and cumulative dose are indicated above. Prior to administering the radiation, the patient underwent a verification therapeutic radiology simulation-aided field setting defining relevant normal and abnormal target anatomy in addition to data necessary to develop an optimal radiation treatment process for the patient. This process includes verification of the treatment port(s) and proper treatment positioning. All treatment ports were photographed within electronic medical records. The patient’s customized lead blocking along with gross tumor volume and margin was confirmed. Considering superficial radiotherapy is clinical in setup, this requires the physician and radiation therapist to clarify the location interest being treated against initial images, pathology, and patient anatomy. Care was taken to ensure marcos treated were geometrically accurate and properly positioned using therapeutic radiology simulation-aided field setting verification per fraction. This process is also utilized to determine if any prescription or setup changes are necessary. These steps are therefore medically necessary to ensure safe and effective administration of radiation. Ongoing therapeutic radiology simulation-aided field setting verification is ordered throughout the course of therapy. Per , continued therapeutic radiology simulation-aided field setting verification per fraction is required for field placement. Per Dr. Robin, continued ultrasound guidance will continue on OTV treatment days which is required for, measurement of tumor depth, width, breadth, tumor progress, and acute effect monitoring.
Bill For Treatment (82334)?: Yes
Additional Change To Daily Dosage Administered Mid Treatment?: No
Ultrasound Used Text: High frequency ultrasound depth is 1.10 mm, which is -0.39 mm in difference from previous imaging.
Add X Modifier?: GODINEZ - Unusual Non-Overlapping Service
Total Cumulative Dose (Cgy): 5215.32
Prescription Used: 2
Fraction Number: 19
Calculate Total Cumulative Dose Automatically Or Manually: Manually
Daily Dosage (Cgy): 275.88
Ultrasound Not Used Text: Ultrasound was not performed today due to
Energy (Kv): 70

## 2023-11-17 ENCOUNTER — APPOINTMENT (OUTPATIENT)
Age: 68
Setting detail: DERMATOLOGY
End: 2023-11-18

## 2023-11-17 PROBLEM — D04.39 CARCINOMA IN SITU OF SKIN OF OTHER PARTS OF FACE: Status: ACTIVE | Noted: 2023-11-17

## 2023-11-17 PROCEDURE — G6001 ECHO GUIDANCE RADIOTHERAPY: HCPCS | Mod: XU

## 2023-11-17 PROCEDURE — 77280 THER RAD SIMULAJ FIELD SMPL: CPT

## 2023-11-17 PROCEDURE — OTHER IMAGE GUIDED - SUPERFICIAL RADIOTHERAPY: TREATMENT VISIT: OTHER

## 2023-11-17 PROCEDURE — 77427 RADIATION TX MANAGEMENT X5: CPT

## 2023-11-17 PROCEDURE — OTHER IMAGE GUIDED - SUPERFICIAL RADIOTHERAPY: EVALUATION VISIT: OTHER

## 2023-11-17 PROCEDURE — OTHER IMAGE GUIDED - SUPERFICIAL RADIOTHERAPY: OTHER

## 2023-11-17 PROCEDURE — 77401 RADIATION TX DELIVERY SUPFC: CPT

## 2023-11-17 NOTE — PROCEDURE: IMAGE GUIDED - SUPERFICIAL RADIOTHERAPY
Lesion Dimensions-X Axis In Cm: 3
Add A Fifth Prescription?: No
Treatment Time (Min): 0.44
Physics Documentation: (Physics Check Verbiage)
Daily Dose (Cgy): 273.24
Number Of Fractions For Prescription 1: 10
Time, Dose, Fractionation Factor (Tdf) For Prescription 2: 49 + 48= Total 97
Energy (Kv): 70
Field Number: 1
Total Dose For Prescription 1 (Cgy): 2732.40
Lesion Margin Size In Cm: 0.5
Add A Second Prescription?: Yes
Shield Size In Cm: 3.5 cm x 3.5 cm prefabricated lead
Daily Dose (Cgy): 275.88
Additional Fraction(S) Needed:: 0
Applicator Size In Cm: 4.0 cm
Detail Level: Detailed
Body Location Override (Optional): Left Lateral Mandibular Cheek
Total Dose For Prescription 2 (Cgy): 2758.80 + 2732.40 = Total dose at 5491.20
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
Energy (Kv): 100
Time, Dose, Fractionation Factor (Tdf) For Prescription 1: 48
Bill For Physics Consultation: No - Render Text Only

## 2023-11-17 NOTE — PROCEDURE: IMAGE GUIDED - SUPERFICIAL RADIOTHERAPY: EVALUATION VISIT
Bill For Evaluation (99169)?: Yes
Radiation Therapy Oncology Group (Rtog) Score: 1
Assessment: Appropriate reaction
Is This Visit For Evaluation During Treatment Or Follow Up Post Treatment?: evaluation
Ultrasound Not Used Text: Ultrasound was not performed today due to
Ultrasound Used Text: Ultrasound depth is 0.96 mm.
Evaluation Plan: This patient has been treated today with image-guided superficial radiation therapy for non-melanoma skin cancer. Written informed consent has been previously obtained from this patient for this treatment. This consent is documented in the patient's chart. The patient gave verbal consent to continue treatment today. The patient was treated with a specific radiation dose and setup as prescribed by the provider listed on this visit note.  A Radiation Therapist performed administration of radiation under the supervision of a provider. The treatment parameters and cumulative dose are indicated above. Prior to administering the radiation, the patient underwent a verification therapeutic radiology simulation-aided field setting defining relevant normal and abnormal target anatomy and acquiring images with high-frequency ultrasound in addition to data necessary to develop an optimal radiation treatment process for the patient. This process includes verification of the treatment port(s) and proper treatment positioning. All treatment ports were photographed within electronic medical records. The patient's customized lead blocking along with gross tumor volume and margin was confirmed. Considering superficial radiotherapy is clinical in setup, this requires the physician and radiation therapist to clarify the location interest being treated against initial images, ultrasound, pathology, and patient anatomy. Care was taken to ensure marcos treated were geometrically accurate and properly positioned using therapeutic radiology simulation-aided field setting verification per fraction. This process is also utilized to determine if any prescription or setup changes are necessary. These steps are therefore medically necessary to ensure safe and effective administration of radiation. Ongoing therapeutic radiology simulation-aided field setting verification is ordered throughout the course of therapy.\\nA high-frequency ultrasound image was acquired today for a two-dimensional evaluation of the tumor volume, depth, width, breadth, and response to treatment, in addition to geometric accuracy of field placement. \\n\\nThe field placement and ultrasound imaging, per fraction, is separate and distinct from the initial simulation and is an important task in providing safe administration of superficial radiation therapy. Physician evaluation of the ultrasound tumor depth will be ongoing throughout the course of treatment and is deemed medically necessary to ensure the efficacy of treatment and any necessary changes. Today's image was evaluated for determination of continuation of treatment with the current plan or with necessary changes as appropriate. According to the review of verification therapeutic radiology simulation-aided field setting and imaging, no change is required. Additionally, the use of ultrasound visualization and targeted assessment allows the patient to be able to see his or her cancer(s) progress, encouraging the patient to complete and maintain compliance through the full course of prescribed radiotherapy. Per Dr. Robin, continued ultrasound guidance and therapeutic radiology simulation-aided field setting verification per fraction is required for field placement, measurement of tumor depth, progress, and acute effect monitoring.

## 2023-11-17 NOTE — PROCEDURE: IMAGE GUIDED - SUPERFICIAL RADIOTHERAPY: TREATMENT VISIT
Energy (Kv): 70
Additional Change To Daily Dosage Administered Mid Treatment?: No
Add X Modifier?: GODINEZ - Unusual Non-Overlapping Service
Calculate Total Cumulative Dose Automatically Or Manually: Manually
Daily Dosage (Cgy): 275.88
Bill For Ultrasound Evaluation (): Yes
Prescription Used: 2
Ultrasound Not Used Text: Ultrasound was not performed today due to
Fraction Number: 20
Total Cumulative Dose (Cgy): 5491.20
Treatment Documentation: : This patient has been treated today with image-guided superficial radiation therapy for non-melanoma skin cancer. Written informed consent has been previously obtained from this patient for this treatment. This consent is documented in the patient's chart. The patient gave verbal consent to continue treatment today. The patient was treated with a specific radiation dose and setup as prescribed by the provider listed on this visit note.  A Radiation Therapist performed administration of radiation under the supervision of a provider. The treatment parameters and cumulative dose are indicated above. Prior to administering the radiation, the patient underwent a verification therapeutic radiology simulation-aided field setting defining relevant normal and abnormal target anatomy and acquiring images with high-frequency ultrasound in addition to data necessary to develop an optimal radiation treatment process for the patient. This process includes verification of the treatment port(s) and proper treatment positioning. All treatment ports were photographed within electronic medical records. The patient's customized lead blocking along with gross tumor volume and margin was confirmed. Considering superficial radiotherapy is clinical in setup, this requires the physician and radiation therapist to clarify the location interest being treated against initial images, ultrasound, pathology, and patient anatomy. Care was taken to ensure marcos treated were geometrically accurate and properly positioned using therapeutic radiology simulation-aided field setting verification per fraction. This process is also utilized to determine if any prescription or setup changes are necessary. These steps are therefore medically necessary to ensure safe and effective administration of radiation. Ongoing therapeutic radiology simulation-aided field setting verification is ordered throughout the course of therapy.\\nA high-frequency ultrasound image was acquired today for a two-dimensional evaluation of the tumor volume, depth, width, breadth, and response to treatment, in addition to geometric accuracy of field placement. \\n\\nThe field placement and ultrasound imaging, per fraction, is separate and distinct from the initial simulation and is an important task in providing safe administration of superficial radiation therapy. Physician evaluation of the ultrasound tumor depth will be ongoing throughout the course of treatment and is deemed medically necessary to ensure the efficacy of treatment and any necessary changes. Today's image was evaluated for determination of continuation of treatment with the current plan or with necessary changes as appropriate. According to the review of verification therapeutic radiology simulation-aided field setting and imaging, no change is required. Additionally, the use of ultrasound visualization and targeted assessment allows the patient to be able to see his or her cancer(s) progress, encouraging the patient to complete and maintain compliance through the full course of prescribed radiotherapy. Per Dr. Robin, continued ultrasound guidance and therapeutic radiology simulation-aided field setting verification per fraction is required for field placement, measurement of tumor depth, progress, and acute effect monitoring.
Ultrasound Used Text: High frequency ultrasound depth is 0.96 mm, which is -0.14 mm in difference from previous imaging.

## 2023-11-18 ENCOUNTER — APPOINTMENT (OUTPATIENT)
Age: 68
Setting detail: DERMATOLOGY
End: 2023-12-17

## 2023-11-18 PROCEDURE — 77336 RADIATION PHYSICS CONSULT: CPT

## 2024-01-06 NOTE — PROCEDURE: IMAGE GUIDED - SUPERFICIAL RADIOTHERAPY: TREATMENT VISIT
Ultrasound Not Used Text: Ultrasound was not performed today due to
Show Ultrasound In Note?: Yes
Ultrasound Used Text: High frequency ultrasound depth is 1.58 mm, which is -0.18 mm in difference from previous imaging.
Additional Change To Daily Dosage Administered Mid Treatment?: No
Treatment Documentation: This patient has been treated today with image-guided superficial radiation therapy for non-melanoma skin cancer. Written informed consent has been previously obtained from this patient for this treatment. This consent is documented in the patient's chart. The patient gave verbal consent to continue treatment today. The patient was treated with a specific radiation dose and setup as prescribed by the provider listed on this visit note.  A Radiation Therapist performed administration of radiation under the supervision of a provider. The treatment parameters and cumulative dose are indicated above. Prior to administering the radiation, the patient underwent a verification therapeutic radiology simulation-aided field setting defining relevant normal and abnormal target anatomy and acquiring images with high-frequency ultrasound in addition to data necessary to develop an optimal radiation treatment process for the patient. This process includes verification of the treatment port(s) and proper treatment positioning. All treatment ports were photographed within electronic medical records. The patient's customized lead blocking along with gross tumor volume and margin was confirmed. Considering superficial radiotherapy is clinical in setup, this requires the physician and radiation therapist to clarify the location interest being treated against initial images, ultrasound, pathology, and patient anatomy. Care was taken to ensure marcos treated were geometrically accurate and properly positioned using therapeutic radiology simulation-aided field setting verification per fraction. This process is also utilized to determine if any prescription or setup changes are necessary. These steps are therefore medically necessary to ensure safe and effective administration of radiation. Ongoing therapeutic radiology simulation-aided field setting verification is ordered throughout the course of therapy.\\nA high-frequency ultrasound image was acquired today for a two-dimensional evaluation of the tumor volume, depth, width, breadth, and response to treatment, in addition to geometric accuracy of field placement. \\n\\nThe field placement and ultrasound imaging, per fraction, is separate and distinct from the initial simulation and is an important task in providing safe administration of superficial radiation therapy. Physician evaluation of the ultrasound tumor depth will be ongoing throughout the course of treatment and is deemed medically necessary to ensure the efficacy of treatment and any necessary changes. Today's image was evaluated for determination of continuation of treatment with the current plan or with necessary changes as appropriate. According to the review of verification therapeutic radiology simulation-aided field setting and imaging, no change is required. Additionally, the use of ultrasound visualization and targeted assessment allows the patient to be able to see his or her cancer(s) progress, encouraging the patient to complete and maintain compliance through the full course of prescribed radiotherapy. Per Dr. Robin, continued ultrasound guidance and therapeutic radiology simulation-aided field setting verification per fraction is required for field placement, measurement of tumor depth, progress, and acute effect monitoring.
Fraction Number: 13
Prescription Used: 1
Add X Modifier?: GODINEZ - Unusual Non-Overlapping Service
Calculate Total Cumulative Dose Automatically Or Manually: Manually
Daily Dosage (Cgy): 275.88
Total Cumulative Dose (Cgy): 3560.04
Energy (Kv): 70
Patient/Caregiver provided printed discharge information.

## 2024-02-14 ENCOUNTER — APPOINTMENT (OUTPATIENT)
Age: 69
Setting detail: DERMATOLOGY
End: 2024-02-15

## 2024-02-14 DIAGNOSIS — D22 MELANOCYTIC NEVI: ICD-10-CM

## 2024-02-14 DIAGNOSIS — Z85.828 PERSONAL HISTORY OF OTHER MALIGNANT NEOPLASM OF SKIN: ICD-10-CM

## 2024-02-14 DIAGNOSIS — L82.1 OTHER SEBORRHEIC KERATOSIS: ICD-10-CM

## 2024-02-14 DIAGNOSIS — L57.0 ACTINIC KERATOSIS: ICD-10-CM

## 2024-02-14 DIAGNOSIS — L81.4 OTHER MELANIN HYPERPIGMENTATION: ICD-10-CM

## 2024-02-14 PROBLEM — D22.5 MELANOCYTIC NEVI OF TRUNK: Status: ACTIVE | Noted: 2024-02-14

## 2024-02-14 PROCEDURE — 99213 OFFICE O/P EST LOW 20 MIN: CPT | Mod: 25

## 2024-02-14 PROCEDURE — OTHER LIQUID NITROGEN: OTHER

## 2024-02-14 PROCEDURE — OTHER MIPS QUALITY: OTHER

## 2024-02-14 PROCEDURE — OTHER TREATMENT REGIMEN: OTHER

## 2024-02-14 PROCEDURE — 17003 DESTRUCT PREMALG LES 2-14: CPT

## 2024-02-14 PROCEDURE — OTHER COUNSELING: OTHER

## 2024-02-14 PROCEDURE — 17000 DESTRUCT PREMALG LESION: CPT

## 2024-02-14 ASSESSMENT — LOCATION SIMPLE DESCRIPTION DERM
LOCATION SIMPLE: RIGHT TEMPLE
LOCATION SIMPLE: RIGHT CHEEK
LOCATION SIMPLE: LEFT CHEEK
LOCATION SIMPLE: LEFT FOREARM
LOCATION SIMPLE: LEFT TEMPLE
LOCATION SIMPLE: UPPER BACK
LOCATION SIMPLE: RIGHT UPPER BACK
LOCATION SIMPLE: LEFT UPPER ARM
LOCATION SIMPLE: RIGHT FOREARM
LOCATION SIMPLE: LEFT ZYGOMA

## 2024-02-14 ASSESSMENT — LOCATION ZONE DERM
LOCATION ZONE: TRUNK
LOCATION ZONE: ARM
LOCATION ZONE: FACE

## 2024-02-14 ASSESSMENT — LOCATION DETAILED DESCRIPTION DERM
LOCATION DETAILED: LEFT DISTAL POSTERIOR UPPER ARM
LOCATION DETAILED: SUPERIOR THORACIC SPINE
LOCATION DETAILED: LEFT DISTAL DORSAL FOREARM
LOCATION DETAILED: LEFT INFERIOR CENTRAL MALAR CHEEK
LOCATION DETAILED: RIGHT CENTRAL MALAR CHEEK
LOCATION DETAILED: LEFT LATERAL ZYGOMA
LOCATION DETAILED: LEFT CENTRAL TEMPLE
LOCATION DETAILED: LEFT CENTRAL MALAR CHEEK
LOCATION DETAILED: RIGHT MEDIAL UPPER BACK
LOCATION DETAILED: LEFT MEDIAL ZYGOMA
LOCATION DETAILED: RIGHT SUPERIOR LATERAL MALAR CHEEK
LOCATION DETAILED: RIGHT DISTAL DORSAL FOREARM
LOCATION DETAILED: RIGHT CENTRAL TEMPLE
LOCATION DETAILED: LEFT PROXIMAL DORSAL FOREARM

## 2024-02-14 NOTE — PROCEDURE: MIPS QUALITY
Quality 47: Advance Care Plan: Advance Care Planning discussed and documented; advance care plan or surrogate decision maker documented in the medical record.
Quality 110: Preventive Care And Screening: Influenza Immunization: Influenza Immunization Administered during Influenza season
Detail Level: Detailed
Quality 111:Pneumonia Vaccination Status For Older Adults: Pneumococcal Vaccination not Administered or Previously Received, Reason not Otherwise Specified
Quality 226: Preventive Care And Screening: Tobacco Use: Screening And Cessation Intervention: Patient screened for tobacco use and is an ex/non-smoker

## 2024-02-14 NOTE — PROCEDURE: LIQUID NITROGEN
Show Applicator Variable?: Yes
Duration Of Freeze Thaw-Cycle (Seconds): 5
Detail Level: Detailed
Number Of Freeze-Thaw Cycles: 1 freeze-thaw cycle
Post-Care Instructions: I reviewed with the patient in detail post-care instructions. Patient is to wear sunprotection, and avoid picking at any of the treated lesions. Pt may apply Vaseline to crusted or scabbing areas.
Consent: The patient's consent was obtained including but not limited to risks of crusting, scabbing, blistering, scarring, darker or lighter pigmentary change, recurrence, incomplete removal and infection.
Render Post-Care Instructions In Note?: no

## 2024-03-20 ENCOUNTER — APPOINTMENT (OUTPATIENT)
Age: 69
Setting detail: DERMATOLOGY
End: 2024-03-21

## 2024-03-20 DIAGNOSIS — L57.0 ACTINIC KERATOSIS: ICD-10-CM

## 2024-03-20 DIAGNOSIS — Z85.828 PERSONAL HISTORY OF OTHER MALIGNANT NEOPLASM OF SKIN: ICD-10-CM

## 2024-03-20 DIAGNOSIS — L81.4 OTHER MELANIN HYPERPIGMENTATION: ICD-10-CM

## 2024-03-20 DIAGNOSIS — L57.8 OTHER SKIN CHANGES DUE TO CHRONIC EXPOSURE TO NONIONIZING RADIATION: ICD-10-CM

## 2024-03-20 DIAGNOSIS — L82.1 OTHER SEBORRHEIC KERATOSIS: ICD-10-CM

## 2024-03-20 PROCEDURE — OTHER MIPS QUALITY: OTHER

## 2024-03-20 PROCEDURE — 17003 DESTRUCT PREMALG LES 2-14: CPT

## 2024-03-20 PROCEDURE — 17000 DESTRUCT PREMALG LESION: CPT

## 2024-03-20 PROCEDURE — OTHER CHEMICAL PEEL (PREMALIGNANT DESTRUCTION): OTHER

## 2024-03-20 PROCEDURE — OTHER COUNSELING: OTHER

## 2024-03-20 PROCEDURE — OTHER PRESCRIPTION MEDICATION MANAGEMENT: OTHER

## 2024-03-20 PROCEDURE — 99213 OFFICE O/P EST LOW 20 MIN: CPT | Mod: 25

## 2024-03-20 ASSESSMENT — LOCATION DETAILED DESCRIPTION DERM
LOCATION DETAILED: LEFT ELBOW
LOCATION DETAILED: RIGHT INFERIOR CENTRAL MALAR CHEEK
LOCATION DETAILED: RIGHT PROXIMAL DORSAL FOREARM
LOCATION DETAILED: LEFT INFERIOR CENTRAL MALAR CHEEK
LOCATION DETAILED: LEFT PROXIMAL DORSAL FOREARM
LOCATION DETAILED: LEFT CENTRAL MALAR CHEEK
LOCATION DETAILED: RIGHT CENTRAL MALAR CHEEK

## 2024-03-20 ASSESSMENT — LOCATION SIMPLE DESCRIPTION DERM
LOCATION SIMPLE: LEFT ELBOW
LOCATION SIMPLE: RIGHT FOREARM
LOCATION SIMPLE: LEFT CHEEK
LOCATION SIMPLE: RIGHT CHEEK
LOCATION SIMPLE: LEFT FOREARM

## 2024-03-20 ASSESSMENT — LOCATION ZONE DERM
LOCATION ZONE: ARM
LOCATION ZONE: FACE

## 2024-03-20 NOTE — PROCEDURE: CHEMICAL PEEL (PREMALIGNANT DESTRUCTION)
Consent: Prior to the procedure, written consent was obtained and risks were reviewed, including but not limited to: redness, peeling, blistering, pigmentary change, scarring, infection, and pain.
Endpoint: light frosting
Total Number Of Aks Treated: 5
Detail Level: Zone
Chemical Peel: TCA 35%
Post-Care Instructions: I reviewed with the patient in detail post-care instructions. Patient should avoid sun exposure and wear sun protection.
Time (Mins): 1

## 2024-04-22 ENCOUNTER — APPOINTMENT (OUTPATIENT)
Age: 69
Setting detail: DERMATOLOGY
End: 2024-04-23

## 2024-04-22 DIAGNOSIS — L57.0 ACTINIC KERATOSIS: ICD-10-CM

## 2024-04-22 DIAGNOSIS — Z85.828 PERSONAL HISTORY OF OTHER MALIGNANT NEOPLASM OF SKIN: ICD-10-CM

## 2024-04-22 PROCEDURE — OTHER LIQUID NITROGEN: OTHER

## 2024-04-22 PROCEDURE — 99213 OFFICE O/P EST LOW 20 MIN: CPT | Mod: 25

## 2024-04-22 PROCEDURE — OTHER MIPS QUALITY: OTHER

## 2024-04-22 PROCEDURE — 17000 DESTRUCT PREMALG LESION: CPT

## 2024-04-22 PROCEDURE — OTHER COUNSELING: OTHER

## 2024-04-22 PROCEDURE — OTHER TREATMENT REGIMEN: OTHER

## 2024-04-22 ASSESSMENT — LOCATION DETAILED DESCRIPTION DERM
LOCATION DETAILED: RIGHT MEDIAL FOREHEAD
LOCATION DETAILED: LEFT SUPERIOR HELIX

## 2024-04-22 ASSESSMENT — LOCATION SIMPLE DESCRIPTION DERM
LOCATION SIMPLE: RIGHT FOREHEAD
LOCATION SIMPLE: LEFT EAR

## 2024-04-22 ASSESSMENT — LOCATION ZONE DERM
LOCATION ZONE: EAR
LOCATION ZONE: FACE

## 2024-04-22 NOTE — PROCEDURE: LIQUID NITROGEN
Duration Of Freeze Thaw-Cycle (Seconds): 0
Post-Care Instructions: I reviewed with the patient in detail post-care instructions. Patient is to wear sunprotection, and avoid picking at any of the treated lesions. Pt may apply Vaseline to crusted or scabbing areas.
Render Post-Care Instructions In Note?: no
Consent: The patient's consent was obtained including but not limited to risks of crusting, scabbing, blistering, scarring, darker or lighter pigmentary change, recurrence, incomplete removal and infection.
Show Applicator Variable?: Yes
Detail Level: Detailed

## 2024-05-22 ENCOUNTER — APPOINTMENT (OUTPATIENT)
Age: 69
Setting detail: DERMATOLOGY
End: 2024-05-23

## 2024-05-22 VITALS — TEMPERATURE: 98.7 F

## 2024-05-22 DIAGNOSIS — L57.0 ACTINIC KERATOSIS: ICD-10-CM

## 2024-05-22 DIAGNOSIS — Z85.828 PERSONAL HISTORY OF OTHER MALIGNANT NEOPLASM OF SKIN: ICD-10-CM

## 2024-05-22 PROCEDURE — 17000 DESTRUCT PREMALG LESION: CPT

## 2024-05-22 PROCEDURE — OTHER TREATMENT REGIMEN: OTHER

## 2024-05-22 PROCEDURE — OTHER LIQUID NITROGEN: OTHER

## 2024-05-22 PROCEDURE — 99213 OFFICE O/P EST LOW 20 MIN: CPT | Mod: 25

## 2024-05-22 PROCEDURE — OTHER MIPS QUALITY: OTHER

## 2024-05-22 PROCEDURE — OTHER COUNSELING: OTHER

## 2024-05-22 ASSESSMENT — LOCATION SIMPLE DESCRIPTION DERM
LOCATION SIMPLE: RIGHT FOREHEAD
LOCATION SIMPLE: LEFT CHEEK

## 2024-05-22 ASSESSMENT — LOCATION ZONE DERM: LOCATION ZONE: FACE

## 2024-05-22 ASSESSMENT — LOCATION DETAILED DESCRIPTION DERM
LOCATION DETAILED: LEFT CENTRAL MALAR CHEEK
LOCATION DETAILED: RIGHT MEDIAL FOREHEAD

## 2024-08-14 ENCOUNTER — APPOINTMENT (OUTPATIENT)
Age: 69
Setting detail: DERMATOLOGY
End: 2024-08-15

## 2024-08-14 DIAGNOSIS — L81.4 OTHER MELANIN HYPERPIGMENTATION: ICD-10-CM

## 2024-08-14 DIAGNOSIS — Z85.828 PERSONAL HISTORY OF OTHER MALIGNANT NEOPLASM OF SKIN: ICD-10-CM

## 2024-08-14 DIAGNOSIS — L82.1 OTHER SEBORRHEIC KERATOSIS: ICD-10-CM

## 2024-08-14 DIAGNOSIS — L57.0 ACTINIC KERATOSIS: ICD-10-CM

## 2024-08-14 PROCEDURE — 17003 DESTRUCT PREMALG LES 2-14: CPT

## 2024-08-14 PROCEDURE — OTHER TREATMENT REGIMEN: OTHER

## 2024-08-14 PROCEDURE — OTHER LIQUID NITROGEN: OTHER

## 2024-08-14 PROCEDURE — 17000 DESTRUCT PREMALG LESION: CPT

## 2024-08-14 PROCEDURE — OTHER MIPS QUALITY: OTHER

## 2024-08-14 PROCEDURE — OTHER COUNSELING: OTHER

## 2024-08-14 PROCEDURE — 99213 OFFICE O/P EST LOW 20 MIN: CPT | Mod: 25

## 2024-08-14 ASSESSMENT — LOCATION DETAILED DESCRIPTION DERM
LOCATION DETAILED: LEFT LATERAL ZYGOMA
LOCATION DETAILED: RIGHT SUPERIOR LATERAL MALAR CHEEK
LOCATION DETAILED: LEFT MID PREAURICULAR CHEEK
LOCATION DETAILED: LEFT SUPERIOR UPPER BACK
LOCATION DETAILED: LEFT LATERAL TEMPLE
LOCATION DETAILED: RIGHT ANTIHELIX
LOCATION DETAILED: LEFT MID TEMPLE
LOCATION DETAILED: LEFT CENTRAL TEMPLE
LOCATION DETAILED: LEFT LATERAL DISTAL PRETIBIAL REGION
LOCATION DETAILED: RIGHT SUPERIOR MEDIAL UPPER BACK

## 2024-08-14 ASSESSMENT — LOCATION ZONE DERM
LOCATION ZONE: FACE
LOCATION ZONE: TRUNK
LOCATION ZONE: EAR
LOCATION ZONE: LEG

## 2024-08-14 ASSESSMENT — LOCATION SIMPLE DESCRIPTION DERM
LOCATION SIMPLE: RIGHT EAR
LOCATION SIMPLE: LEFT CHEEK
LOCATION SIMPLE: LEFT PRETIBIAL REGION
LOCATION SIMPLE: LEFT ZYGOMA
LOCATION SIMPLE: RIGHT CHEEK
LOCATION SIMPLE: RIGHT UPPER BACK
LOCATION SIMPLE: LEFT TEMPLE
LOCATION SIMPLE: LEFT UPPER BACK

## 2024-10-28 NOTE — PROCEDURE: EXCISION
Show Curettage Variables: Yes
Did You Provide Opioid Counseling: No
Adjacent Tissue Transfer Text: The defect edges were debeveled with a #15 scalpel blade. Given the location of the defect and the proximity to free margins an adjacent tissue transfer was deemed most appropriate. Using a sterile surgical marker, an appropriate flap was drawn incorporating the defect and placing the expected incisions within the relaxed skin tension lines where possible. The area thus outlined was incised deep to adipose tissue with a #15 scalpel blade. The skin margins were undermined to an appropriate distance in all directions utilizing iris scissors and carried over to close the primary defect.
PAIN
Helical Rim Text: The closure involved the helical rim.
Nostril Rim Text: The closure involved the nostril rim.
Excision Method: Fusiform
Complex Repair And Dorsal Nasal Flap Text: The defect edges were debeveled with a #15 scalpel blade.  The primary defect was closed partially with a complex linear closure.  Given the location of the remaining defect, shape of the defect and the proximity to free margins a dorsal nasal flap was deemed most appropriate for complete closure of the defect.  Using a sterile surgical marker, an appropriate flap was drawn incorporating the defect and placing the expected incisions within the relaxed skin tension lines where possible.    The area thus outlined was incised deep to adipose tissue with a #15 scalpel blade.  The skin margins were undermined to an appropriate distance in all directions utilizing iris scissors.
Dorsal Nasal Flap Text: The defect edges were debeveled with a #15 scalpel blade.  Given the location of the defect and the proximity to free margins a dorsal nasal flap was deemed most appropriate.  Using a sterile surgical marker, an appropriate dorsal nasal flap was drawn around the defect.    The area thus outlined was incised deep to adipose tissue with a #15 scalpel blade.  The skin margins were undermined to an appropriate distance in all directions utilizing iris scissors.
Mucosal Advancement Flap Text: Given the location of the defect, shape of the defect and the proximity to free margins a mucosal advancement flap was deemed most appropriate. Incisions were made with a 15 blade scalpel in the appropriate fashion along the cutaneous vermilion border and the mucosal lip. The remaining actinically damaged mucosal tissue was excised.  The mucosal advancement flap was then elevated to the gingival sulcus with care taken to preserve the neurovascular structures and advanced into the primary defect. Care was taken to ensure that precise realignment of the vermilion border was achieved.
Path Notes (To The Dermatopathologist): Please check margins.
Xenograft Text: The defect edges were debeveled with a #15 scalpel blade.  Given the location of the defect, shape of the defect and the proximity to free margins a xenograft was deemed most appropriate.  The graft was then trimmed to fit the size of the defect.  The graft was then placed in the primary defect and oriented appropriately.
Z Plasty Text: The lesion was extirpated to the level of the fat with a #15 scalpel blade.  Given the location of the defect, shape of the defect and the proximity to free margins a Z-plasty was deemed most appropriate for repair.  Using a sterile surgical marker, the appropriate transposition arms of the Z-plasty were drawn incorporating the defect and placing the expected incisions within the relaxed skin tension lines where possible.    The area thus outlined was incised deep to adipose tissue with a #15 scalpel blade.  The skin margins were undermined to an appropriate distance in all directions utilizing iris scissors.  The opposing transposition arms were then transposed into place in opposite direction and anchored with interrupted buried subcutaneous sutures.
Crescentic Intermediate Repair Preamble Text (Leave Blank If You Do Not Want): Undermining was performed with blunt dissection.
Transposition Flap Text: The defect edges were debeveled with a #15 scalpel blade.  Given the location of the defect and the proximity to free margins a transposition flap was deemed most appropriate.  Using a sterile surgical marker, an appropriate transposition flap was drawn incorporating the defect.    The area thus outlined was incised deep to adipose tissue with a #15 scalpel blade.  The skin margins were undermined to an appropriate distance in all directions utilizing iris scissors.
Excisional Biopsy Additional Text (Leave Blank If You Do Not Want): The margin was drawn around the clinically apparent lesion. An elliptical shape was then drawn on the skin incorporating the lesion and margins.  Incisions were then made along these lines to the appropriate tissue plane and the lesion was extirpated.
Billing Type: Third-Party Bill
Keystone Flap Text: The defect edges were debeveled with a #15 scalpel blade.  Given the location of the defect, shape of the defect a keystone flap was deemed most appropriate.  Using a sterile surgical marker, an appropriate keystone flap was drawn incorporating the defect, outlining the appropriate donor tissue and placing the expected incisions within the relaxed skin tension lines where possible. The area thus outlined was incised deep to adipose tissue with a #15 scalpel blade.  The skin margins were undermined to an appropriate distance in all directions around the primary defect and laterally outward around the flap utilizing iris scissors.
Crescentic Advancement Flap Text: The defect edges were debeveled with a #15 scalpel blade.  Given the location of the defect and the proximity to free margins a crescentic advancement flap was deemed most appropriate.  Using a sterile surgical marker, the appropriate advancement flap was drawn incorporating the defect and placing the expected incisions within the relaxed skin tension lines where possible.    The area thus outlined was incised deep to adipose tissue with a #15 scalpel blade.  The skin margins were undermined to an appropriate distance in all directions utilizing iris scissors.
Suturegard Retention Suture: 2-0 Nylon
Secondary Defect Width (In Cm): 0
Anesthesia Type: 1% lidocaine with epinephrine
Bilateral Helical Rim Advancement Flap Text: The defect edges were debeveled with a #15 blade scalpel.  Given the location of the defect and the proximity to free margins (helical rim) a bilateral helical rim advancement flap was deemed most appropriate.  Using a sterile surgical marker, the appropriate advancement flaps were drawn incorporating the defect and placing the expected incisions between the helical rim and antihelix where possible.  The area thus outlined was incised through and through with a #15 scalpel blade.  With a skin hook and iris scissors, the flaps were gently and sharply undermined and freed up.
Ftsg Text: The defect edges were debeveled with a #15 scalpel blade.  Given the location of the defect, shape of the defect and the proximity to free margins a full thickness skin graft was deemed most appropriate.  Using a sterile surgical marker, the primary defect shape was transferred to the donor site. The area thus outlined was incised deep to adipose tissue with a #15 scalpel blade.  The harvested graft was then trimmed of adipose tissue until only dermis and epidermis was left.  The skin margins of the secondary defect were undermined to an appropriate distance in all directions utilizing iris scissors.  The secondary defect was closed with interrupted buried subcutaneous sutures.  The skin edges were then re-apposed with running  sutures.  The skin graft was then placed in the primary defect and oriented appropriately.
Island Pedicle Flap-Requiring Vessel Identification Text: The defect edges were debeveled with a #15 scalpel blade.  Given the location of the defect, shape of the defect and the proximity to free margins an island pedicle advancement flap was deemed most appropriate.  Using a sterile surgical marker, an appropriate advancement flap was drawn, based on the axial vessel mentioned above, incorporating the defect, outlining the appropriate donor tissue and placing the expected incisions within the relaxed skin tension lines where possible.    The area thus outlined was incised deep to adipose tissue with a #15 scalpel blade.  The skin margins were undermined to an appropriate distance in all directions around the primary defect and laterally outward around the island pedicle utilizing iris scissors.  There was minimal undermining beneath the pedicle flap.
Complex Repair And Z Plasty Text: The defect edges were debeveled with a #15 scalpel blade.  The primary defect was closed partially with a complex linear closure.  Given the location of the remaining defect, shape of the defect and the proximity to free margins a Z plasty was deemed most appropriate for complete closure of the defect.  Using a sterile surgical marker, an appropriate advancement flap was drawn incorporating the defect and placing the expected incisions within the relaxed skin tension lines where possible.    The area thus outlined was incised deep to adipose tissue with a #15 scalpel blade.  The skin margins were undermined to an appropriate distance in all directions utilizing iris scissors.
Complex Repair And Rhombic Flap Text: The defect edges were debeveled with a #15 scalpel blade.  The primary defect was closed partially with a complex linear closure.  Given the location of the remaining defect, shape of the defect and the proximity to free margins a rhombic flap was deemed most appropriate for complete closure of the defect.  Using a sterile surgical marker, an appropriate advancement flap was drawn incorporating the defect and placing the expected incisions within the relaxed skin tension lines where possible.    The area thus outlined was incised deep to adipose tissue with a #15 scalpel blade.  The skin margins were undermined to an appropriate distance in all directions utilizing iris scissors.
Melolabial Interpolation Flap Text: A decision was made to reconstruct the defect utilizing an interpolation axial flap and a staged reconstruction.  A telfa template was made of the defect.  This telfa template was then used to outline the melolabial interpolation flap.  The donor area for the pedicle flap was then injected with anesthesia.  The flap was excised through the skin and subcutaneous tissue down to the layer of the underlying musculature.  The pedicle flap was carefully excised within this deep plane to maintain its blood supply.  The edges of the donor site were undermined.   The donor site was closed in a primary fashion.  The pedicle was then rotated into position and sutured.  Once the tube was sutured into place, adequate blood supply was confirmed with blanching and refill.  The pedicle was then wrapped with xeroform gauze and dressed appropriately with a telfa and gauze bandage to ensure continued blood supply and protect the attached pedicle.
Slit Excision Additional Text (Leave Blank If You Do Not Want): A linear line was drawn on the skin overlying the lesion. An incision was made slowly until the lesion was visualized.  Once visualized, the lesion was removed with blunt dissection.
Wound Care: Bacitracin
Epidermal Closure: interrupted subcuticular
Retention Suture Bite Size: 3 mm
Curvilinear Excision Additional Text (Leave Blank If You Do Not Want): The margin was drawn around the clinically apparent lesion.  A curvilinear shape was then drawn on the skin incorporating the lesion and margins.  Incisions were then made along these lines to the appropriate tissue plane and the lesion was extirpated.
Complex Repair And Modified Advancement Flap Text: The defect edges were debeveled with a #15 scalpel blade.  The primary defect was closed partially with a complex linear closure.  Given the location of the remaining defect, shape of the defect and the proximity to free margins a modified advancement flap was deemed most appropriate for complete closure of the defect.  Using a sterile surgical marker, an appropriate advancement flap was drawn incorporating the defect and placing the expected incisions within the relaxed skin tension lines where possible.    The area thus outlined was incised deep to adipose tissue with a #15 scalpel blade.  The skin margins were undermined to an appropriate distance in all directions utilizing iris scissors.
Graft Donor Site Bandage (Optional-Leave Blank If You Don't Want In Note): Steri-strips and a pressure bandage were applied to the donor site.
Double O-Z Flap Text: The defect edges were debeveled with a #15 scalpel blade.  Given the location of the defect, shape of the defect and the proximity to free margins a Double O-Z flap was deemed most appropriate.  Using a sterile surgical marker, an appropriate transposition flap was drawn incorporating the defect and placing the expected incisions within the relaxed skin tension lines where possible. The area thus outlined was incised deep to adipose tissue with a #15 scalpel blade.  The skin margins were undermined to an appropriate distance in all directions utilizing iris scissors.
Mastoid Interpolation Flap Text: A decision was made to reconstruct the defect utilizing an interpolation axial flap and a staged reconstruction.  A telfa template was made of the defect.  This telfa template was then used to outline the mastoid interpolation flap.  The donor area for the pedicle flap was then injected with anesthesia.  The flap was excised through the skin and subcutaneous tissue down to the layer of the underlying musculature.  The pedicle flap was carefully excised within this deep plane to maintain its blood supply.  The edges of the donor site were undermined.   The donor site was closed in a primary fashion.  The pedicle was then rotated into position and sutured.  Once the tube was sutured into place, adequate blood supply was confirmed with blanching and refill.  The pedicle was then wrapped with xeroform gauze and dressed appropriately with a telfa and gauze bandage to ensure continued blood supply and protect the attached pedicle.
Star Wedge Flap Text: The defect edges were debeveled with a #15 scalpel blade.  Given the location of the defect, shape of the defect and the proximity to free margins a star wedge flap was deemed most appropriate.  Using a sterile surgical marker, an appropriate rotation flap was drawn incorporating the defect and placing the expected incisions within the relaxed skin tension lines where possible. The area thus outlined was incised deep to adipose tissue with a #15 scalpel blade.  The skin margins were undermined to an appropriate distance in all directions utilizing iris scissors.
Tissue Cultured Epidermal Autograft Text: The defect edges were debeveled with a #15 scalpel blade.  Given the location of the defect, shape of the defect and the proximity to free margins a tissue cultured epidermal autograft was deemed most appropriate.  The graft was then trimmed to fit the size of the defect.  The graft was then placed in the primary defect and oriented appropriately.
Lip Wedge Excision Repair Text: Given the location of the defect and the proximity to free margins a full thickness wedge repair was deemed most appropriate.  Using a sterile surgical marker, the appropriate repair was drawn incorporating the defect and placing the expected incisions perpendicular to the vermilion border.  The vermilion border was also meticulously outlined to ensure appropriate reapproximation during the repair.  The area thus outlined was incised through and through with a #15 scalpel blade.  The muscularis and dermis were reaproximated with deep sutures following hemostasis. Care was taken to realign the vermilion border before proceeding with the superficial closure.  Once the vermilion was realigned the superfical and mucosal closure was finished.
Crescentic Complex Repair Preamble Text (Leave Blank If You Do Not Want): Extensive wide undermining was performed.
W Plasty Text: The lesion was extirpated to the level of the fat with a #15 scalpel blade.  Given the location of the defect, shape of the defect and the proximity to free margins a W-plasty was deemed most appropriate for repair.  Using a sterile surgical marker, the appropriate transposition arms of the W-plasty were drawn incorporating the defect and placing the expected incisions within the relaxed skin tension lines where possible.    The area thus outlined was incised deep to adipose tissue with a #15 scalpel blade.  The skin margins were undermined to an appropriate distance in all directions utilizing iris scissors.  The opposing transposition arms were then transposed into place in opposite direction and anchored with interrupted buried subcutaneous sutures.
Complex Repair And Tissue Cultured Epidermal Autograft Text: The defect edges were debeveled with a #15 scalpel blade.  The primary defect was closed partially with a complex linear closure.  Given the location of the defect, shape of the defect and the proximity to free margins an tissue cultured epidermal autograft was deemed most appropriate to repair the remaining defect.  The graft was trimmed to fit the size of the remaining defect.  The graft was then placed in the primary defect, oriented appropriately, and sutured into place.
Complex Repair And O-T Advancement Flap Text: The defect edges were debeveled with a #15 scalpel blade.  The primary defect was closed partially with a complex linear closure.  Given the location of the remaining defect, shape of the defect and the proximity to free margins an O-T advancement flap was deemed most appropriate for complete closure of the defect.  Using a sterile surgical marker, an appropriate advancement flap was drawn incorporating the defect and placing the expected incisions within the relaxed skin tension lines where possible.    The area thus outlined was incised deep to adipose tissue with a #15 scalpel blade.  The skin margins were undermined to an appropriate distance in all directions utilizing iris scissors.
O-T Plasty Text: The defect edges were debeveled with a #15 scalpel blade.  Given the location of the defect, shape of the defect and the proximity to free margins an O-T plasty was deemed most appropriate.  Using a sterile surgical marker, an appropriate O-T plasty was drawn incorporating the defect and placing the expected incisions within the relaxed skin tension lines where possible.    The area thus outlined was incised deep to adipose tissue with a #15 scalpel blade.  The skin margins were undermined to an appropriate distance in all directions utilizing iris scissors.
Mustarde Flap Text: The defect edges were debeveled with a #15 scalpel blade.  Given the size, depth and location of the defect and the proximity to free margins a Mustarde flap was deemed most appropriate. Using a sterile surgical marker, an appropriate flap was drawn incorporating the defect. The area thus outlined was incised with a #15 scalpel blade. The skin margins were undermined to an appropriate distance in all directions utilizing iris scissors. Following this, the designed flap was carried into the primary defect and sutured into place.
Complex Repair And V-Y Plasty Text: The defect edges were debeveled with a #15 scalpel blade.  The primary defect was closed partially with a complex linear closure.  Given the location of the remaining defect, shape of the defect and the proximity to free margins a V-Y plasty was deemed most appropriate for complete closure of the defect.  Using a sterile surgical marker, an appropriate advancement flap was drawn incorporating the defect and placing the expected incisions within the relaxed skin tension lines where possible.    The area thus outlined was incised deep to adipose tissue with a #15 scalpel blade.  The skin margins were undermined to an appropriate distance in all directions utilizing iris scissors.
Burow's Graft Text: The defect edges were debeveled with a #15 scalpel blade. Given the location of the defect, shape of the defect, the proximity to free margins and the presence of a standing cone deformity a Burow's skin graft was deemed most appropriate. The standing cone was removed and this tissue was then trimmed to the shape of the primary defect. The adipose tissue was also removed until only dermis and epidermis were left.  The skin margins of the secondary defect were undermined to an appropriate distance in all directions utilizing iris scissors.  The secondary defect was closed with interrupted buried subcutaneous sutures.  The skin edges were then re-apposed with running  sutures.  The skin graft was then placed in the primary defect and oriented appropriately.
A-T Advancement Flap Text: The defect edges were debeveled with a #15 scalpel blade.  Given the location of the defect, shape of the defect and the proximity to free margins an A-T advancement flap was deemed most appropriate.  Using a sterile surgical marker, an appropriate advancement flap was drawn incorporating the defect and placing the expected incisions within the relaxed skin tension lines where possible.    The area thus outlined was incised deep to adipose tissue with a #15 scalpel blade.  The skin margins were undermined to an appropriate distance in all directions utilizing iris scissors.
Hemigard Postcare Instructions: The HEMIGARD strips are to remain completely dry for at least 5-7 days.
Complex Repair And Ftsg Text: The defect edges were debeveled with a #15 scalpel blade.  The primary defect was closed partially with a complex linear closure.  Given the location of the defect, shape of the defect and the proximity to free margins a full thickness skin graft was deemed most appropriate to repair the remaining defect.  The graft was trimmed to fit the size of the remaining defect.  The graft was then placed in the primary defect, oriented appropriately, and sutured into place.
Perilesional Excision Additional Text (Leave Blank If You Do Not Want): The margin was drawn around the clinically apparent lesion. Incisions were then made along these lines to the appropriate tissue plane and the lesion was extirpated.
Excision Depth: adipose tissue
Cartilage Graft Text: The defect edges were debeveled with a #15 scalpel blade.  Given the location of the defect, shape of the defect, the fact the defect involved a full thickness cartilage defect a cartilage graft was deemed most appropriate.  An appropriate donor site was identified, cleansed, and anesthetized. The cartilage graft was then harvested and transferred to the recipient site, oriented appropriately and then sutured into place.  The secondary defect was then repaired using a primary closure.
Purse String (Simple) Text: Given the location of the defect and the characteristics of the surrounding skin a purse string simple closure was deemed most appropriate.  Undermining was performed circumferentially around the surgical defect.  A purse string suture was then placed and tightened.
Nasal Turnover Hinge Flap Text: The defect edges were debeveled with a #15 scalpel blade.  Given the size, depth, location of the defect and the defect being full thickness a nasal turnover hinge flap was deemed most appropriate. Using a sterile surgical marker, an appropriate hinge flap was drawn incorporating the defect. The area thus outlined was incised with a #15 scalpel blade. The flap was designed to recreate the nasal mucosal lining and the alar rim. The skin margins were undermined to an appropriate distance in all directions utilizing iris scissors. Following this, the designed flap was carried over into the primary defect and sutured into place
Elliptical Excision Additional Text (Leave Blank If You Do Not Want): The margin was drawn around the clinically apparent lesion.  An elliptical shape was then drawn on the skin incorporating the lesion and margins.  Incisions were then made along these lines to the appropriate tissue plane and the lesion was extirpated.
Paramedian Forehead Flap Text: A decision was made to reconstruct the defect utilizing an interpolation axial flap and a staged reconstruction.  A telfa template was made of the defect.  This telfa template was then used to outline the paramedian forehead pedicle flap.  The donor area for the pedicle flap was then injected with anesthesia.  The flap was excised through the skin and subcutaneous tissue down to the layer of the underlying musculature.  The pedicle flap was carefully excised within this deep plane to maintain its blood supply.  The edges of the donor site were undermined.   The donor site was closed in a primary fashion.  The pedicle was then rotated into position and sutured.  Once the tube was sutured into place, adequate blood supply was confirmed with blanching and refill.  The pedicle was then wrapped with xeroform gauze and dressed appropriately with a telfa and gauze bandage to ensure continued blood supply and protect the attached pedicle.
Dressing: dry sterile dressing
Advancement-Rotation Flap Text: The defect edges were debeveled with a #15 scalpel blade.  Given the location of the defect, shape of the defect and the proximity to free margins an advancement-rotation flap was deemed most appropriate.  Using a sterile surgical marker, an appropriate flap was drawn incorporating the defect and placing the expected incisions within the relaxed skin tension lines where possible. The area thus outlined was incised deep to adipose tissue with a #15 scalpel blade.  The skin margins were undermined to an appropriate distance in all directions utilizing iris scissors.
Additional Anesthesia Volume In Cc: 6
Complex Repair And A-T Advancement Flap Text: The defect edges were debeveled with a #15 scalpel blade.  The primary defect was closed partially with a complex linear closure.  Given the location of the remaining defect, shape of the defect and the proximity to free margins an A-T advancement flap was deemed most appropriate for complete closure of the defect.  Using a sterile surgical marker, an appropriate advancement flap was drawn incorporating the defect and placing the expected incisions within the relaxed skin tension lines where possible.    The area thus outlined was incised deep to adipose tissue with a #15 scalpel blade.  The skin margins were undermined to an appropriate distance in all directions utilizing iris scissors.
Suturegard Body: The suture ends were repeatedly re-tightened and re-clamped to achieve the desired tissue expansion.
Zygomaticofacial Flap Text: Given the location of the defect, shape of the defect and the proximity to free margins a zygomaticofacial flap was deemed most appropriate for repair. Using a sterile surgical marker, the appropriate flap was drawn incorporating the defect and placing the expected incisions within the relaxed skin tension lines where possible. The area thus outlined was incised deep to adipose tissue with a #15 scalpel blade with preservation of a vascular pedicle.  The skin margins were undermined to an appropriate distance in all directions utilizing iris scissors. The flap was then carried over into the defect and anchored with interrupted buried subcutaneous sutures.
V-Y Flap Text: The defect edges were debeveled with a #15 scalpel blade.  Given the location of the defect, shape of the defect and the proximity to free margins a V-Y flap was deemed most appropriate.  Using a sterile surgical marker, an appropriate advancement flap was drawn incorporating the defect and placing the expected incisions within the relaxed skin tension lines where possible.    The area thus outlined was incised deep to adipose tissue with a #15 scalpel blade.  The skin margins were undermined to an appropriate distance in all directions utilizing iris scissors.
Posterior Auricular Interpolation Flap Text: A decision was made to reconstruct the defect utilizing an interpolation axial flap and a staged reconstruction.  A telfa template was made of the defect.  This telfa template was then used to outline the posterior auricular interpolation flap.  The donor area for the pedicle flap was then injected with anesthesia.  The flap was excised through the skin and subcutaneous tissue down to the layer of the underlying musculature.  The pedicle flap was carefully excised within this deep plane to maintain its blood supply.  The edges of the donor site were undermined.   The donor site was closed in a primary fashion.  The pedicle was then rotated into position and sutured.  Once the tube was sutured into place, adequate blood supply was confirmed with blanching and refill.  The pedicle was then wrapped with xeroform gauze and dressed appropriately with a telfa and gauze bandage to ensure continued blood supply and protect the attached pedicle.
Complex Repair And Transposition Flap Text: The defect edges were debeveled with a #15 scalpel blade.  The primary defect was closed partially with a complex linear closure.  Given the location of the remaining defect, shape of the defect and the proximity to free margins a transposition flap was deemed most appropriate for complete closure of the defect.  Using a sterile surgical marker, an appropriate advancement flap was drawn incorporating the defect and placing the expected incisions within the relaxed skin tension lines where possible.    The area thus outlined was incised deep to adipose tissue with a #15 scalpel blade.  The skin margins were undermined to an appropriate distance in all directions utilizing iris scissors.
Fusiform Excision Additional Text (Leave Blank If You Do Not Want): The margin was drawn around the clinically apparent lesion.  A fusiform shape was then drawn on the skin incorporating the lesion and margins.  Incisions were then made along these lines to the appropriate tissue plane and the lesion was extirpated.
Positioning (Leave Blank If You Do Not Want): The patient was placed in a comfortable position exposing the surgical site.
Split-Thickness Skin Graft Text: The defect edges were debeveled with a #15 scalpel blade.  Given the location of the defect, shape of the defect and the proximity to free margins a split thickness skin graft was deemed most appropriate.  Using a sterile surgical marker, the primary defect shape was transferred to the donor site. The split thickness graft was then harvested.  The skin graft was then placed in the primary defect and oriented appropriately.
Number Of Hemigard Strips Per Side: 1
Ear Star Wedge Flap Text: The defect edges were debeveled with a #15 blade scalpel.  Given the location of the defect and the proximity to free margins (helical rim) an ear star wedge flap was deemed most appropriate.  Using a sterile surgical marker, the appropriate flap was drawn incorporating the defect and placing the expected incisions between the helical rim and antihelix where possible.  The area thus outlined was incised through and through with a #15 scalpel blade.
Island Pedicle Flap Text: The defect edges were debeveled with a #15 scalpel blade.  Given the location of the defect, shape of the defect and the proximity to free margins an island pedicle advancement flap was deemed most appropriate.  Using a sterile surgical marker, an appropriate advancement flap was drawn incorporating the defect, outlining the appropriate donor tissue and placing the expected incisions within the relaxed skin tension lines where possible.    The area thus outlined was incised deep to adipose tissue with a #15 scalpel blade.  The skin margins were undermined to an appropriate distance in all directions around the primary defect and laterally outward around the island pedicle utilizing iris scissors.  There was minimal undermining beneath the pedicle flap.
Peng Advancement Flap Text: The defect edges were debeveled with a #15 scalpel blade. Given the location of the defect, shape of the defect and the proximity to free margins a Peng advancement flap was deemed most appropriate. Using a sterile surgical marker, an appropriate advancement flap was drawn incorporating the defect and placing the expected incisions within the relaxed skin tension lines where possible. The area thus outlined was incised deep to adipose tissue with a #15 scalpel blade. The skin margins were undermined to an appropriate distance in all directions utilizing iris scissors. Following this, the designed flap was advanced and carried over into the primary defect and sutured into place.
Purse String (Intermediate) Text: Given the location of the defect and the characteristics of the surrounding skin a pursestring intermediate closure was deemed most appropriate.  Undermining was performed circumfirentially around the surgical defect.  A purstring suture was then placed and tightened.
Consent was obtained from the patient. The risks and benefits to therapy were discussed in detail. Specifically, the risks of infection, scarring, bleeding, prolonged wound healing, incomplete removal, allergy to anesthesia, nerve injury and recurrence were addressed. Prior to the procedure, the treatment site was clearly identified and confirmed by the patient. All components of Universal Protocol/PAUSE Rule completed.
Advancement Flap (Single) Text: The defect edges were debeveled with a #15 scalpel blade.  Given the location of the defect and the proximity to free margins a single advancement flap was deemed most appropriate.  Using a sterile surgical marker, an appropriate advancement flap was drawn incorporating the defect and placing the expected incisions within the relaxed skin tension lines where possible.    The area thus outlined was incised deep to adipose tissue with a #15 scalpel blade.  The skin margins were undermined to an appropriate distance in all directions utilizing iris scissors.
Complex Repair And Dermal Autograft Text: The defect edges were debeveled with a #15 scalpel blade.  The primary defect was closed partially with a complex linear closure.  Given the location of the defect, shape of the defect and the proximity to free margins an dermal autograft was deemed most appropriate to repair the remaining defect.  The graft was trimmed to fit the size of the remaining defect.  The graft was then placed in the primary defect, oriented appropriately, and sutured into place.
Muscle Hinge Flap Text: The defect edges were debeveled with a #15 scalpel blade.  Given the size, depth and location of the defect and the proximity to free margins a muscle hinge flap was deemed most appropriate.  Using a sterile surgical marker, an appropriate hinge flap was drawn incorporating the defect. The area thus outlined was incised with a #15 scalpel blade.  The skin margins were undermined to an appropriate distance in all directions utilizing iris scissors.
Home Suture Removal Text: Patient was provided a home suture removal kit and will remove their sutures at home.  If they have any questions or difficulties they will call the office.
Vermilion Border Text: The closure involved the vermilion border.
Complex Repair And Bilobe Flap Text: The defect edges were debeveled with a #15 scalpel blade.  The primary defect was closed partially with a complex linear closure.  Given the location of the remaining defect, shape of the defect and the proximity to free margins a bilobe flap was deemed most appropriate for complete closure of the defect.  Using a sterile surgical marker, an appropriate advancement flap was drawn incorporating the defect and placing the expected incisions within the relaxed skin tension lines where possible.    The area thus outlined was incised deep to adipose tissue with a #15 scalpel blade.  The skin margins were undermined to an appropriate distance in all directions utilizing iris scissors.
Rotation Flap Text: The defect edges were debeveled with a #15 scalpel blade.  Given the location of the defect, shape of the defect and the proximity to free margins a rotation flap was deemed most appropriate.  Using a sterile surgical marker, an appropriate rotation flap was drawn incorporating the defect and placing the expected incisions within the relaxed skin tension lines where possible.    The area thus outlined was incised deep to adipose tissue with a #15 scalpel blade.  The skin margins were undermined to an appropriate distance in all directions utilizing iris scissors.
Eye Clamp Note Details: An eye clamp was used during the procedure.
Double O-Z Plasty Text: The defect edges were debeveled with a #15 scalpel blade.  Given the location of the defect, shape of the defect and the proximity to free margins a Double O-Z plasty (double transposition flap) was deemed most appropriate.  Using a sterile surgical marker, the appropriate transposition flaps were drawn incorporating the defect and placing the expected incisions within the relaxed skin tension lines where possible. The area thus outlined was incised deep to adipose tissue with a #15 scalpel blade.  The skin margins were undermined to an appropriate distance in all directions utilizing iris scissors.  Hemostasis was achieved with electrocautery.  The flaps were then transposed into place, one clockwise and the other counterclockwise, and anchored with interrupted buried subcutaneous sutures.
Interpolation Flap Text: A decision was made to reconstruct the defect utilizing an interpolation axial flap and a staged reconstruction.  A telfa template was made of the defect.  This telfa template was then used to outline the interpolation flap.  The donor area for the pedicle flap was then injected with anesthesia.  The flap was excised through the skin and subcutaneous tissue down to the layer of the underlying musculature.  The interpolation flap was carefully excised within this deep plane to maintain its blood supply.  The edges of the donor site were undermined.   The donor site was closed in a primary fashion.  The pedicle was then rotated into position and sutured.  Once the tube was sutured into place, adequate blood supply was confirmed with blanching and refill.  The pedicle was then wrapped with xeroform gauze and dressed appropriately with a telfa and gauze bandage to ensure continued blood supply and protect the attached pedicle.
Repair Performed By Another Provider Text (Leave Blank If You Do Not Want): After the tissue was excised the defect was repaired by another provider.
O-L Flap Text: The defect edges were debeveled with a #15 scalpel blade.  Given the location of the defect, shape of the defect and the proximity to free margins an O-L flap was deemed most appropriate.  Using a sterile surgical marker, an appropriate advancement flap was drawn incorporating the defect and placing the expected incisions within the relaxed skin tension lines where possible.    The area thus outlined was incised deep to adipose tissue with a #15 scalpel blade.  The skin margins were undermined to an appropriate distance in all directions utilizing iris scissors.
Bilobed Flap Text: The defect edges were debeveled with a #15 scalpel blade.  Given the location of the defect and the proximity to free margins a bilobe flap was deemed most appropriate.  Using a sterile surgical marker, an appropriate bilobe flap drawn around the defect.    The area thus outlined was incised deep to adipose tissue with a #15 scalpel blade.  The skin margins were undermined to an appropriate distance in all directions utilizing iris scissors.
Partial Purse String (Simple) Text: Given the location of the defect and the characteristics of the surrounding skin a simple purse string closure was deemed most appropriate.  Undermining was performed circumferentially around the surgical defect.  A purse string suture was then placed and tightened. Wound tension of the circular defect prevented complete closure of the wound.
Hemostasis: Electrocautery
Size Of Lesion In Cm: 2.4
Length To Time In Minutes Device Was In Place: 10
Epidermal Autograft Text: The defect edges were debeveled with a #15 scalpel blade.  Given the location of the defect, shape of the defect and the proximity to free margins an epidermal autograft was deemed most appropriate.  Using a sterile surgical marker, the primary defect shape was transferred to the donor site. The epidermal graft was then harvested.  The skin graft was then placed in the primary defect and oriented appropriately.
Melolabial Transposition Flap Text: The defect edges were debeveled with a #15 scalpel blade.  Given the location of the defect and the proximity to free margins a melolabial flap was deemed most appropriate.  Using a sterile surgical marker, an appropriate melolabial transposition flap was drawn incorporating the defect.    The area thus outlined was incised deep to adipose tissue with a #15 scalpel blade.  The skin margins were undermined to an appropriate distance in all directions utilizing iris scissors.
Complex Repair And Split-Thickness Skin Graft Text: The defect edges were debeveled with a #15 scalpel blade.  The primary defect was closed partially with a complex linear closure.  Given the location of the defect, shape of the defect and the proximity to free margins a split thickness skin graft was deemed most appropriate to repair the remaining defect.  The graft was trimmed to fit the size of the remaining defect.  The graft was then placed in the primary defect, oriented appropriately, and sutured into place.
Abbe Flap (Lower To Upper Lip) Text: The defect of the upper lip was assessed and measured.  Given the location and size of the defect, an Abbe flap was deemed most appropriate. Using a sterile surgical marker, an appropriate Abbe flap was measured and drawn on the lower lip. Local anesthesia was then infiltrated. A scalpel was then used to incise the upper lip through and through the skin, vermilion, muscle and mucosa, leaving the flap pedicled on the opposite side.  The flap was then rotated and transferred to the lower lip defect.  The flap was then sutured into place with a three layer technique, closing the orbicularis oris muscle layer with subcutaneous buried sutures, followed by a mucosal layer and an epidermal layer.
Abbe Flap (Upper To Lower Lip) Text: The defect of the lower lip was assessed and measured.  Given the location and size of the defect, an Abbe flap was deemed most appropriate. Using a sterile surgical marker, an appropriate Abbe flap was measured and drawn on the upper lip. Local anesthesia was then infiltrated.  A scalpel was then used to incise the upper lip through and through the skin, vermilion, muscle and mucosa, leaving the flap pedicled on the opposite side.  The flap was then rotated and transferred to the lower lip defect.  The flap was then sutured into place with a three layer technique, closing the orbicularis oris muscle layer with subcutaneous buried sutures, followed by a mucosal layer and an epidermal layer.
Complex Repair And Burow's Graft Text: The defect edges were debeveled with a #15 scalpel blade.  The primary defect was closed partially with a complex linear closure.  Given the location of the defect, shape of the defect, the proximity to free margins and the presence of a standing cone deformity a Burow's graft was deemed most appropriate to repair the remaining defect.  The graft was trimmed to fit the size of the remaining defect.  The graft was then placed in the primary defect, oriented appropriately, and sutured into place.
Nasalis-Muscle-Based Myocutaneous Island Pedicle Flap Text: Using a #15 blade, an incision was made around the donor flap to the level of the nasalis muscle. Wide lateral undermining was then performed in both the subcutaneous plane above the nasalis muscle, and in a submuscular plane just above periosteum. This allowed the formation of a free nasalis muscle axial pedicle (based on the angular artery) which was still attached to the actual cutaneous flap, increasing its mobility and vascular viability. Hemostasis was obtained with pinpoint electrocoagulation. The flap was mobilized into position and the pivotal anchor points positioned and stabilized with buried interrupted sutures. Subcutaneous and dermal tissues were closed in a multilayered fashion with sutures. Tissue redundancies were excised, and the epidermal edges were apposed without significant tension and sutured with sutures.
Saucerization Depth: dermis and superficial adipose tissue
Pre-Excision Curettage Text (Leave Blank If You Do Not Want): Prior to drawing the surgical margin the visible lesion was removed with electrodesiccation and curettage to clearly define the lesion size.
Repair Type: Intermediate
Banner Transposition Flap Text: The defect edges were debeveled with a #15 scalpel blade.  Given the location of the defect and the proximity to free margins a Banner transposition flap was deemed most appropriate.  Using a sterile surgical marker, an appropriate flap drawn around the defect. The area thus outlined was incised deep to adipose tissue with a #15 scalpel blade.  The skin margins were undermined to an appropriate distance in all directions utilizing iris scissors.
Cheek Interpolation Flap Text: A decision was made to reconstruct the defect utilizing an interpolation axial flap and a staged reconstruction.  A telfa template was made of the defect.  This telfa template was then used to outline the Cheek Interpolation flap.  The donor area for the pedicle flap was then injected with anesthesia.  The flap was excised through the skin and subcutaneous tissue down to the layer of the underlying musculature.  The interpolation flap was carefully excised within this deep plane to maintain its blood supply.  The edges of the donor site were undermined.   The donor site was closed in a primary fashion.  The pedicle was then rotated into position and sutured.  Once the tube was sutured into place, adequate blood supply was confirmed with blanching and refill.  The pedicle was then wrapped with xeroform gauze and dressed appropriately with a telfa and gauze bandage to ensure continued blood supply and protect the attached pedicle.
Advancement Flap (Double) Text: The defect edges were debeveled with a #15 scalpel blade.  Given the location of the defect and the proximity to free margins a double advancement flap was deemed most appropriate.  Using a sterile surgical marker, the appropriate advancement flaps were drawn incorporating the defect and placing the expected incisions within the relaxed skin tension lines where possible.    The area thus outlined was incised deep to adipose tissue with a #15 scalpel blade.  The skin margins were undermined to an appropriate distance in all directions utilizing iris scissors.
Retention Suture Text: Retention sutures were placed to support the closure and prevent dehiscence.
Complex Repair And Xenograft Text: The defect edges were debeveled with a #15 scalpel blade.  The primary defect was closed partially with a complex linear closure.  Given the location of the defect, shape of the defect and the proximity to free margins a xenograft was deemed most appropriate to repair the remaining defect.  The graft was trimmed to fit the size of the remaining defect.  The graft was then placed in the primary defect, oriented appropriately, and sutured into place.
Suturegard Intro: Intraoperative tissue expansion was performed, utilizing the SUTUREGARD device, in order to reduce wound tension.
Hatchet Flap Text: The defect edges were debeveled with a #15 scalpel blade.  Given the location of the defect, shape of the defect and the proximity to free margins a hatchet flap was deemed most appropriate.  Using a sterile surgical marker, an appropriate hatchet flap was drawn incorporating the defect and placing the expected incisions within the relaxed skin tension lines where possible.    The area thus outlined was incised deep to adipose tissue with a #15 scalpel blade.  The skin margins were undermined to an appropriate distance in all directions utilizing iris scissors.
Orbicularis Oris Muscle Flap Text: The defect edges were debeveled with a #15 scalpel blade.  Given that the defect affected the competency of the oral sphincter an orbicularis oris muscle flap was deemed most appropriate to restore this competency and normal muscle function.  Using a sterile surgical marker, an appropriate flap was drawn incorporating the defect. The area thus outlined was incised with a #15 scalpel blade. Following this, the designed flap was carried over into the primary defect and sutured into place.
Mercedes Flap Text: The defect edges were debeveled with a #15 scalpel blade.  Given the location of the defect, shape of the defect and the proximity to free margins a Mercedes flap was deemed most appropriate.  Using a sterile surgical marker, an appropriate advancement flap was drawn incorporating the defect and placing the expected incisions within the relaxed skin tension lines where possible. The area thus outlined was incised deep to adipose tissue with a #15 scalpel blade.  The skin margins were undermined to an appropriate distance in all directions utilizing iris scissors.
Composite Graft Text: The defect edges were debeveled with a #15 scalpel blade.  Given the location of the defect, shape of the defect, the proximity to free margins and the fact the defect was full thickness a composite graft was deemed most appropriate.  The defect was outline and then transferred to the donor site.  A full thickness graft was then excised from the donor site. The graft was then placed in the primary defect, oriented appropriately and then sutured into place.  The secondary defect was then repaired using a primary closure.
Cheek-To-Nose Interpolation Flap Text: A decision was made to reconstruct the defect utilizing an interpolation axial flap and a staged reconstruction.  A telfa template was made of the defect.  This telfa template was then used to outline the Cheek-To-Nose Interpolation flap.  The donor area for the pedicle flap was then injected with anesthesia.  The flap was excised through the skin and subcutaneous tissue down to the layer of the underlying musculature.  The interpolation flap was carefully excised within this deep plane to maintain its blood supply.  The edges of the donor site were undermined.   The donor site was closed in a primary fashion.  The pedicle was then rotated into position and sutured.  Once the tube was sutured into place, adequate blood supply was confirmed with blanching and refill.  The pedicle was then wrapped with xeroform gauze and dressed appropriately with a telfa and gauze bandage to ensure continued blood supply and protect the attached pedicle.
Detail Level: Detailed
Partial Purse String (Intermediate) Text: Given the location of the defect and the characteristics of the surrounding skin an intermediate purse string closure was deemed most appropriate.  Undermining was performed circumferentially around the surgical defect.  A purse string suture was then placed and tightened. Wound tension of the circular defect prevented complete closure of the wound.
Post-Care Instructions: I reviewed with the patient in detail post-care instructions. Patient is not to engage in any heavy lifting, exercise, or swimming for the next 14 days. Should the patient develop any fevers, chills, bleeding, severe pain patient will contact the office immediately.
Complex Repair And Skin Substitute Graft Text: The defect edges were debeveled with a #15 scalpel blade.  The primary defect was closed partially with a complex linear closure.  Given the location of the remaining defect, shape of the defect and the proximity to free margins a skin substitute graft was deemed most appropriate to repair the remaining defect.  The graft was trimmed to fit the size of the remaining defect.  The graft was then placed in the primary defect, oriented appropriately, and sutured into place.
O-Z Plasty Text: The defect edges were debeveled with a #15 scalpel blade.  Given the location of the defect, shape of the defect and the proximity to free margins an O-Z plasty (double transposition flap) was deemed most appropriate.  Using a sterile surgical marker, the appropriate transposition flaps were drawn incorporating the defect and placing the expected incisions within the relaxed skin tension lines where possible.    The area thus outlined was incised deep to adipose tissue with a #15 scalpel blade.  The skin margins were undermined to an appropriate distance in all directions utilizing iris scissors.  Hemostasis was achieved with electrocautery.  The flaps were then transposed into place, one clockwise and the other counterclockwise, and anchored with interrupted buried subcutaneous sutures.
Complex Repair And O-L Flap Text: The defect edges were debeveled with a #15 scalpel blade.  The primary defect was closed partially with a complex linear closure.  Given the location of the remaining defect, shape of the defect and the proximity to free margins an O-L flap was deemed most appropriate for complete closure of the defect.  Using a sterile surgical marker, an appropriate flap was drawn incorporating the defect and placing the expected incisions within the relaxed skin tension lines where possible.    The area thus outlined was incised deep to adipose tissue with a #15 scalpel blade.  The skin margins were undermined to an appropriate distance in all directions utilizing iris scissors.
O-T Advancement Flap Text: The defect edges were debeveled with a #15 scalpel blade.  Given the location of the defect, shape of the defect and the proximity to free margins an O-T advancement flap was deemed most appropriate.  Using a sterile surgical marker, an appropriate advancement flap was drawn incorporating the defect and placing the expected incisions within the relaxed skin tension lines where possible.    The area thus outlined was incised deep to adipose tissue with a #15 scalpel blade.  The skin margins were undermined to an appropriate distance in all directions utilizing iris scissors.
Complex Repair And M Plasty Text: The defect edges were debeveled with a #15 scalpel blade.  The primary defect was closed partially with a complex linear closure.  Given the location of the remaining defect, shape of the defect and the proximity to free margins an M plasty was deemed most appropriate for complete closure of the defect.  Using a sterile surgical marker, an appropriate advancement flap was drawn incorporating the defect and placing the expected incisions within the relaxed skin tension lines where possible.    The area thus outlined was incised deep to adipose tissue with a #15 scalpel blade.  The skin margins were undermined to an appropriate distance in all directions utilizing iris scissors.
Epidermal Sutures: 4-0 Nylon
Island Pedicle Flap With Canthal Suspension Text: The defect edges were debeveled with a #15 scalpel blade.  Given the location of the defect, shape of the defect and the proximity to free margins an island pedicle advancement flap was deemed most appropriate.  Using a sterile surgical marker, an appropriate advancement flap was drawn incorporating the defect, outlining the appropriate donor tissue and placing the expected incisions within the relaxed skin tension lines where possible. The area thus outlined was incised deep to adipose tissue with a #15 scalpel blade.  The skin margins were undermined to an appropriate distance in all directions around the primary defect and laterally outward around the island pedicle utilizing iris scissors.  There was minimal undermining beneath the pedicle flap. A suspension suture was placed in the canthal tendon to prevent tension and prevent ectropion.
Skin Substitute Text: The defect edges were debeveled with a #15 scalpel blade.  Given the location of the defect, shape of the defect and the proximity to free margins a skin substitute graft was deemed most appropriate.  The graft material was trimmed to fit the size of the defect. The graft was then placed in the primary defect and oriented appropriately.
Intermediate / Complex Repair - Final Wound Length In Cm: 3.7
Trilobed Flap Text: The defect edges were debeveled with a #15 scalpel blade.  Given the location of the defect and the proximity to free margins a trilobed flap was deemed most appropriate.  Using a sterile surgical marker, an appropriate trilobed flap drawn around the defect.    The area thus outlined was incised deep to adipose tissue with a #15 scalpel blade.  The skin margins were undermined to an appropriate distance in all directions utilizing iris scissors.
O-Z Flap Text: The defect edges were debeveled with a #15 scalpel blade.  Given the location of the defect, shape of the defect and the proximity to free margins an O-Z flap was deemed most appropriate.  Using a sterile surgical marker, an appropriate transposition flap was drawn incorporating the defect and placing the expected incisions within the relaxed skin tension lines where possible. The area thus outlined was incised deep to adipose tissue with a #15 scalpel blade.  The skin margins were undermined to an appropriate distance in all directions utilizing iris scissors.
Complex Repair And Single Advancement Flap Text: The defect edges were debeveled with a #15 scalpel blade.  The primary defect was closed partially with a complex linear closure.  Given the location of the remaining defect, shape of the defect and the proximity to free margins a single advancement flap was deemed most appropriate for complete closure of the defect.  Using a sterile surgical marker, an appropriate advancement flap was drawn incorporating the defect and placing the expected incisions within the relaxed skin tension lines where possible.    The area thus outlined was incised deep to adipose tissue with a #15 scalpel blade.  The skin margins were undermined to an appropriate distance in all directions utilizing iris scissors.
Complex Repair And W Plasty Text: The defect edges were debeveled with a #15 scalpel blade.  The primary defect was closed partially with a complex linear closure.  Given the location of the remaining defect, shape of the defect and the proximity to free margins a W plasty was deemed most appropriate for complete closure of the defect.  Using a sterile surgical marker, an appropriate advancement flap was drawn incorporating the defect and placing the expected incisions within the relaxed skin tension lines where possible.    The area thus outlined was incised deep to adipose tissue with a #15 scalpel blade.  The skin margins were undermined to an appropriate distance in all directions utilizing iris scissors.
Bi-Rhombic Flap Text: The defect edges were debeveled with a #15 scalpel blade.  Given the location of the defect and the proximity to free margins a bi-rhombic flap was deemed most appropriate.  Using a sterile surgical marker, an appropriate rhombic flap was drawn incorporating the defect. The area thus outlined was incised deep to adipose tissue with a #15 scalpel blade.  The skin margins were undermined to an appropriate distance in all directions utilizing iris scissors.
Complex Repair And Rotation Flap Text: The defect edges were debeveled with a #15 scalpel blade.  The primary defect was closed partially with a complex linear closure.  Given the location of the remaining defect, shape of the defect and the proximity to free margins a rotation flap was deemed most appropriate for complete closure of the defect.  Using a sterile surgical marker, an appropriate advancement flap was drawn incorporating the defect and placing the expected incisions within the relaxed skin tension lines where possible.    The area thus outlined was incised deep to adipose tissue with a #15 scalpel blade.  The skin margins were undermined to an appropriate distance in all directions utilizing iris scissors.
No Repair - Repaired With Adjacent Surgical Defect Text (Leave Blank If You Do Not Want): After the excision the defect was repaired concurrently with another surgical defect which was in close approximation.
Estimated Blood Loss (Cc): minimal
Estlander Flap (Lower To Upper Lip) Text: The defect of the lower lip was assessed and measured.  Given the location and size of the defect, an Estlander flap was deemed most appropriate. Using a sterile surgical marker, an appropriate Estlander flap was measured and drawn on the upper lip. Local anesthesia was then infiltrated. A scalpel was then used to incise the lateral aspect of the flap, through skin, muscle and mucosa, leaving the flap pedicled medially.  The flap was then rotated and positioned to fill the lower lip defect.  The flap was then sutured into place with a three layer technique, closing the orbicularis oris muscle layer with subcutaneous buried sutures, followed by a mucosal layer and an epidermal layer.
Double Island Pedicle Flap Text: The defect edges were debeveled with a #15 scalpel blade.  Given the location of the defect, shape of the defect and the proximity to free margins a double island pedicle advancement flap was deemed most appropriate.  Using a sterile surgical marker, an appropriate advancement flap was drawn incorporating the defect, outlining the appropriate donor tissue and placing the expected incisions within the relaxed skin tension lines where possible.    The area thus outlined was incised deep to adipose tissue with a #15 scalpel blade.  The skin margins were undermined to an appropriate distance in all directions around the primary defect and laterally outward around the island pedicle utilizing iris scissors.  There was minimal undermining beneath the pedicle flap.
Chonodrocutaneous Helical Advancement Flap Text: The defect edges were debeveled with a #15 scalpel blade. Given the location of the defect and the proximity to free margins a chondrocutaneous helical advancement flap was deemed most appropriate. Using a sterile surgical marker, the appropriate advancement flap was drawn incorporating the defect and placing the expected incisions within the relaxed skin tension lines where possible. The area thus outlined was incised deep to adipose tissue with a #15 scalpel blade. The skin margins were undermined to an appropriate distance in all directions utilizing iris scissors. Following this, the designed flap was advanced and carried over into the primary defect and sutured into place.
Epidermal Closure Graft Donor Site (Optional): simple interrupted
Size Of Margin In Cm: 0.3
Helical Rim Advancement Flap Text: The defect edges were debeveled with a #15 blade scalpel.  Given the location of the defect and the proximity to free margins (helical rim) a double helical rim advancement flap was deemed most appropriate.  Using a sterile surgical marker, the appropriate advancement flaps were drawn incorporating the defect and placing the expected incisions between the helical rim and antihelix where possible.  The area thus outlined was incised through and through with a #15 scalpel blade.  With a skin hook and iris scissors, the flaps were gently and sharply undermined and freed up.
Modified Advancement Flap Text: The defect edges were debeveled with a #15 scalpel blade.  Given the location of the defect, shape of the defect and the proximity to free margins a modified advancement flap was deemed most appropriate.  Using a sterile surgical marker, an appropriate advancement flap was drawn incorporating the defect and placing the expected incisions within the relaxed skin tension lines where possible.    The area thus outlined was incised deep to adipose tissue with a #15 scalpel blade.  The skin margins were undermined to an appropriate distance in all directions utilizing iris scissors.
Staged Advancement Flap Text: The defect edges were debeveled with a #15 scalpel blade. Given the location of the defect, shape of the defect and the proximity to free margins a staged advancement flap was deemed most appropriate. Using a sterile surgical marker, an appropriate advancement flap was drawn incorporating the defect and placing the expected incisions within the relaxed skin tension lines where possible. The area thus outlined was incised deep to adipose tissue with a #15 scalpel blade. The skin margins were undermined to an appropriate distance in all directions utilizing iris scissors. Following this, the designed flap was carried over into the primary defect and sutured into place.
Undermining Type: Entire Wound
Accession #: [id number]
H Plasty Text: Given the location of the defect, shape of the defect and the proximity to free margins a H-plasty was deemed most appropriate for repair.  Using a sterile surgical marker, the appropriate advancement arms of the H-plasty were drawn incorporating the defect and placing the expected incisions within the relaxed skin tension lines where possible. The area thus outlined was incised deep to adipose tissue with a #15 scalpel blade. The skin margins were undermined to an appropriate distance in all directions utilizing iris scissors.  The opposing advancement arms were then advanced into place in opposite direction and anchored with interrupted buried subcutaneous sutures.
Complex Repair And Double Advancement Flap Text: The defect edges were debeveled with a #15 scalpel blade.  The primary defect was closed partially with a complex linear closure.  Given the location of the remaining defect, shape of the defect and the proximity to free margins a double advancement flap was deemed most appropriate for complete closure of the defect.  Using a sterile surgical marker, an appropriate advancement flap was drawn incorporating the defect and placing the expected incisions within the relaxed skin tension lines where possible.    The area thus outlined was incised deep to adipose tissue with a #15 scalpel blade.  The skin margins were undermined to an appropriate distance in all directions utilizing iris scissors.
V-Y Plasty Text: The defect edges were debeveled with a #15 scalpel blade.  Given the location of the defect, shape of the defect and the proximity to free margins an V-Y advancement flap was deemed most appropriate.  Using a sterile surgical marker, an appropriate advancement flap was drawn incorporating the defect and placing the expected incisions within the relaxed skin tension lines where possible.    The area thus outlined was incised deep to adipose tissue with a #15 scalpel blade.  The skin margins were undermined to an appropriate distance in all directions utilizing iris scissors.
Complex Repair And Melolabial Flap Text: The defect edges were debeveled with a #15 scalpel blade.  The primary defect was closed partially with a complex linear closure.  Given the location of the remaining defect, shape of the defect and the proximity to free margins a melolabial flap was deemed most appropriate for complete closure of the defect.  Using a sterile surgical marker, an appropriate advancement flap was drawn incorporating the defect and placing the expected incisions within the relaxed skin tension lines where possible.    The area thus outlined was incised deep to adipose tissue with a #15 scalpel blade.  The skin margins were undermined to an appropriate distance in all directions utilizing iris scissors.
Deep Sutures: 4-0 PGA
Hemigard Intro: Due to skin fragility and wound tension, it was decided to use HEMIGARD adhesive retention suture devices to permit a linear closure. The skin was cleaned and dried for a 6cm distance away from the wound. Excessive hair, if present, was removed to allow for adhesion.
Burow's Advancement Flap Text: The defect edges were debeveled with a #15 scalpel blade.  Given the location of the defect and the proximity to free margins a Burow's advancement flap was deemed most appropriate.  Using a sterile surgical marker, the appropriate advancement flap was drawn incorporating the defect and placing the expected incisions within the relaxed skin tension lines where possible.    The area thus outlined was incised deep to adipose tissue with a #15 scalpel blade.  The skin margins were undermined to an appropriate distance in all directions utilizing iris scissors.
Spiral Flap Text: The defect edges were debeveled with a #15 scalpel blade.  Given the location of the defect, shape of the defect and the proximity to free margins a spiral flap was deemed most appropriate.  Using a sterile surgical marker, an appropriate rotation flap was drawn incorporating the defect and placing the expected incisions within the relaxed skin tension lines where possible. The area thus outlined was incised deep to adipose tissue with a #15 scalpel blade.  The skin margins were undermined to an appropriate distance in all directions utilizing iris scissors.
Saucerization Excision Additional Text (Leave Blank If You Do Not Want): The margin was drawn around the clinically apparent lesion.  Incisions were then made along these lines, in a tangential fashion, to the appropriate tissue plane and the lesion was extirpated.
Alar Island Pedicle Flap Text: The defect edges were debeveled with a #15 scalpel blade.  Given the location of the defect, shape of the defect and the proximity to the alar rim an island pedicle advancement flap was deemed most appropriate.  Using a sterile surgical marker, an appropriate advancement flap was drawn incorporating the defect, outlining the appropriate donor tissue and placing the expected incisions within the nasal ala running parallel to the alar rim. The area thus outlined was incised with a #15 scalpel blade.  The skin margins were undermined minimally to an appropriate distance in all directions around the primary defect and laterally outward around the island pedicle utilizing iris scissors.  There was minimal undermining beneath the pedicle flap.
Bilobed Transposition Flap Text: The defect edges were debeveled with a #15 scalpel blade.  Given the location of the defect and the proximity to free margins a bilobed transposition flap was deemed most appropriate.  Using a sterile surgical marker, an appropriate bilobe flap drawn around the defect.    The area thus outlined was incised deep to adipose tissue with a #15 scalpel blade.  The skin margins were undermined to an appropriate distance in all directions utilizing iris scissors.
Rhombic Flap Text: The defect edges were debeveled with a #15 scalpel blade.  Given the location of the defect and the proximity to free margins a rhombic flap was deemed most appropriate.  Using a sterile surgical marker, an appropriate rhombic flap was drawn incorporating the defect.    The area thus outlined was incised deep to adipose tissue with a #15 scalpel blade.  The skin margins were undermined to an appropriate distance in all directions utilizing iris scissors.
Complex Repair And Double M Plasty Text: The defect edges were debeveled with a #15 scalpel blade.  The primary defect was closed partially with a complex linear closure.  Given the location of the remaining defect, shape of the defect and the proximity to free margins a double M plasty was deemed most appropriate for complete closure of the defect.  Using a sterile surgical marker, an appropriate advancement flap was drawn incorporating the defect and placing the expected incisions within the relaxed skin tension lines where possible.    The area thus outlined was incised deep to adipose tissue with a #15 scalpel blade.  The skin margins were undermined to an appropriate distance in all directions utilizing iris scissors.
Debridement Text: The wound edges were debrided prior to proceeding with the closure to facilitate wound healing.
Complex Repair And Epidermal Autograft Text: The defect edges were debeveled with a #15 scalpel blade.  The primary defect was closed partially with a complex linear closure.  Given the location of the defect, shape of the defect and the proximity to free margins an epidermal autograft was deemed most appropriate to repair the remaining defect.  The graft was trimmed to fit the size of the remaining defect.  The graft was then placed in the primary defect, oriented appropriately, and sutured into place.
Suture Removal: 14 days
Where Do You Want The Question To Include Opioid Counseling Located?: Case Summary Tab
Rhomboid Transposition Flap Text: The defect edges were debeveled with a #15 scalpel blade.  Given the location of the defect and the proximity to free margins a rhomboid transposition flap was deemed most appropriate.  Using a sterile surgical marker, an appropriate rhomboid flap was drawn incorporating the defect.    The area thus outlined was incised deep to adipose tissue with a #15 scalpel blade.  The skin margins were undermined to an appropriate distance in all directions utilizing iris scissors.
Dermal Autograft Text: The defect edges were debeveled with a #15 scalpel blade.  Given the location of the defect, shape of the defect and the proximity to free margins a dermal autograft was deemed most appropriate.  Using a sterile surgical marker, the primary defect shape was transferred to the donor site. The area thus outlined was incised deep to adipose tissue with a #15 scalpel blade.  The harvested graft was then trimmed of adipose and epidermal tissue until only dermis was left.  The skin graft was then placed in the primary defect and oriented appropriately.
Scalpel Size: 10 blade
Information: Selecting Yes will display possible errors in your note based on the variables you have selected. This validation is only offered as a suggestion for you. PLEASE NOTE THAT THE VALIDATION TEXT WILL BE REMOVED WHEN YOU FINALIZE YOUR NOTE. IF YOU WANT TO FAX A PRELIMINARY NOTE YOU WILL NEED TO TOGGLE THIS TO 'NO' IF YOU DO NOT WANT IT IN YOUR FAXED NOTE.

## 2024-11-20 ENCOUNTER — APPOINTMENT (OUTPATIENT)
Age: 69
Setting detail: DERMATOLOGY
End: 2024-11-21

## 2024-11-20 DIAGNOSIS — Z85.828 PERSONAL HISTORY OF OTHER MALIGNANT NEOPLASM OF SKIN: ICD-10-CM

## 2024-11-20 DIAGNOSIS — L82.1 OTHER SEBORRHEIC KERATOSIS: ICD-10-CM

## 2024-11-20 DIAGNOSIS — L57.0 ACTINIC KERATOSIS: ICD-10-CM

## 2024-11-20 PROCEDURE — OTHER TREATMENT REGIMEN: OTHER

## 2024-11-20 PROCEDURE — 99212 OFFICE O/P EST SF 10 MIN: CPT | Mod: 25

## 2024-11-20 PROCEDURE — OTHER CHEMICAL PEEL (PREMALIGNANT DESTRUCTION): OTHER

## 2024-11-20 PROCEDURE — 17000 DESTRUCT PREMALG LESION: CPT

## 2024-11-20 PROCEDURE — OTHER MIPS QUALITY: OTHER

## 2024-11-20 PROCEDURE — 17003 DESTRUCT PREMALG LES 2-14: CPT

## 2024-11-20 PROCEDURE — OTHER COUNSELING: OTHER

## 2024-11-20 ASSESSMENT — LOCATION DETAILED DESCRIPTION DERM
LOCATION DETAILED: LEFT LATERAL DISTAL PRETIBIAL REGION
LOCATION DETAILED: RIGHT ANTIHELIX
LOCATION DETAILED: LEFT CENTRAL TEMPLE
LOCATION DETAILED: SUPERIOR MID FOREHEAD
LOCATION DETAILED: LEFT MID PREAURICULAR CHEEK
LOCATION DETAILED: RIGHT SUPERIOR LATERAL MALAR CHEEK
LOCATION DETAILED: LEFT LATERAL ZYGOMA
LOCATION DETAILED: LEFT LATERAL TEMPLE
LOCATION DETAILED: RIGHT PROXIMAL DORSAL FOREARM
LOCATION DETAILED: LEFT PROXIMAL DORSAL FOREARM
LOCATION DETAILED: LEFT MID TEMPLE
LOCATION DETAILED: RIGHT SUPERIOR MEDIAL UPPER BACK

## 2024-11-20 ASSESSMENT — LOCATION SIMPLE DESCRIPTION DERM
LOCATION SIMPLE: LEFT PRETIBIAL REGION
LOCATION SIMPLE: SUPERIOR FOREHEAD
LOCATION SIMPLE: RIGHT UPPER BACK
LOCATION SIMPLE: LEFT CHEEK
LOCATION SIMPLE: RIGHT CHEEK
LOCATION SIMPLE: LEFT ZYGOMA
LOCATION SIMPLE: LEFT FOREARM
LOCATION SIMPLE: RIGHT FOREARM
LOCATION SIMPLE: RIGHT EAR
LOCATION SIMPLE: LEFT TEMPLE

## 2024-11-20 ASSESSMENT — LOCATION ZONE DERM
LOCATION ZONE: FACE
LOCATION ZONE: TRUNK
LOCATION ZONE: EAR
LOCATION ZONE: LEG
LOCATION ZONE: ARM

## 2024-11-20 NOTE — PROCEDURE: CHEMICAL PEEL (PREMALIGNANT DESTRUCTION)
Post-Care Instructions: I reviewed with the patient in detail post-care instructions. Patient should avoid sun exposure and wear sun protection.
Chemical Peel: TCA 50%
Detail Level: Zone
Time (Mins): 1
Treatment Number: 4
Total Number Of Aks Treated: 5
Endpoint: light frosting
Consent: Prior to the procedure, written consent was obtained and risks were reviewed, including but not limited to: redness, peeling, blistering, pigmentary change, scarring, infection, and pain.

## 2025-01-30 ENCOUNTER — APPOINTMENT (OUTPATIENT)
Age: 70
Setting detail: DERMATOLOGY
End: 2025-01-31

## 2025-01-30 DIAGNOSIS — L57.0 ACTINIC KERATOSIS: ICD-10-CM

## 2025-01-30 DIAGNOSIS — L82.1 OTHER SEBORRHEIC KERATOSIS: ICD-10-CM

## 2025-01-30 DIAGNOSIS — D49.2 NEOPLASM OF UNSPECIFIED BEHAVIOR OF BONE, SOFT TISSUE, AND SKIN: ICD-10-CM

## 2025-01-30 DIAGNOSIS — Z85.828 PERSONAL HISTORY OF OTHER MALIGNANT NEOPLASM OF SKIN: ICD-10-CM

## 2025-01-30 DIAGNOSIS — D485 NEOPLASM OF UNCERTAIN BEHAVIOR OF SKIN: ICD-10-CM

## 2025-01-30 PROBLEM — D48.5 NEOPLASM OF UNCERTAIN BEHAVIOR OF SKIN: Status: ACTIVE | Noted: 2025-01-30

## 2025-01-30 PROCEDURE — OTHER COUNSELING: OTHER

## 2025-01-30 PROCEDURE — 17003 DESTRUCT PREMALG LES 2-14: CPT

## 2025-01-30 PROCEDURE — OTHER CHEMICAL PEEL (PREMALIGNANT DESTRUCTION): OTHER

## 2025-01-30 PROCEDURE — OTHER SEPARATE AND IDENTIFIABLE DOCUMENTATION: OTHER

## 2025-01-30 PROCEDURE — 99214 OFFICE O/P EST MOD 30 MIN: CPT | Mod: 25

## 2025-01-30 PROCEDURE — 11103 TANGNTL BX SKIN EA SEP/ADDL: CPT

## 2025-01-30 PROCEDURE — 11102 TANGNTL BX SKIN SINGLE LES: CPT

## 2025-01-30 PROCEDURE — 17000 DESTRUCT PREMALG LESION: CPT | Mod: 59

## 2025-01-30 PROCEDURE — OTHER MIPS QUALITY: OTHER

## 2025-01-30 PROCEDURE — OTHER TREATMENT REGIMEN: OTHER

## 2025-01-30 PROCEDURE — OTHER RECOMMENDATIONS: OTHER

## 2025-01-30 PROCEDURE — OTHER BIOPSY BY SHAVE METHOD: OTHER

## 2025-01-30 ASSESSMENT — LOCATION ZONE DERM
LOCATION ZONE: TRUNK
LOCATION ZONE: LEG
LOCATION ZONE: FACE
LOCATION ZONE: ARM

## 2025-01-30 ASSESSMENT — LOCATION DETAILED DESCRIPTION DERM
LOCATION DETAILED: LEFT LATERAL DISTAL PRETIBIAL REGION
LOCATION DETAILED: RIGHT INFERIOR LATERAL MALAR CHEEK
LOCATION DETAILED: RIGHT SUPERIOR MEDIAL UPPER BACK
LOCATION DETAILED: LEFT LATERAL BUCCAL CHEEK
LOCATION DETAILED: LEFT DISTAL DORSAL FOREARM
LOCATION DETAILED: RIGHT PROXIMAL DORSAL FOREARM

## 2025-01-30 ASSESSMENT — LOCATION SIMPLE DESCRIPTION DERM
LOCATION SIMPLE: RIGHT UPPER BACK
LOCATION SIMPLE: RIGHT FOREARM
LOCATION SIMPLE: LEFT PRETIBIAL REGION
LOCATION SIMPLE: RIGHT CHEEK
LOCATION SIMPLE: LEFT FOREARM
LOCATION SIMPLE: LEFT CHEEK

## 2025-01-30 NOTE — PROCEDURE: CHEMICAL PEEL (PREMALIGNANT DESTRUCTION)
Detail Level: Zone
Post-Care Instructions: I reviewed with the patient in detail post-care instructions. Patient should avoid sun exposure and wear sun protection.
Chemical Peel: TCA 35%
Time (Mins): 1
Endpoint: light frosting
Treatment Number: 2
Total Number Of Aks Treated: 5
Consent: Prior to the procedure, written consent was obtained and risks were reviewed, including but not limited to: redness, peeling, blistering, pigmentary change, scarring, infection, and pain.

## 2025-01-30 NOTE — PROCEDURE: BIOPSY BY SHAVE METHOD
Detail Level: Detailed
Depth Of Biopsy: dermis
Was A Bandage Applied: Yes
Size Of Lesion In Cm: 0.5
X Size Of Lesion In Cm: 0
Biopsy Type: H and E
Biopsy Method: Dermablade
Anesthesia Type: 1% lidocaine with epinephrine
Hemostasis: Drysol
Wound Care: Petrolatum
Dressing: bandage
Destruction After The Procedure: No
Type Of Destruction Used: Curettage
Curettage Text: The wound bed was treated with curettage after the biopsy was performed.
Cryotherapy Text: The wound bed was treated with cryotherapy after the biopsy was performed.
Electrodesiccation Text: The wound bed was treated with electrodesiccation after the biopsy was performed.
Electrodesiccation And Curettage Text: The wound bed was treated with electrodesiccation and curettage after the biopsy was performed.
Silver Nitrate Text: The wound bed was treated with silver nitrate after the biopsy was performed.
Lab: [id number]
Lab Facility: [id number]
Medical Necessity Information: It is in your best interest to select a reason for this procedure from the list below. All of these items fulfill various CMS LCD requirements except the new and changing color options.
Consent: Written consent was obtained and risks were reviewed including but not limited to scarring, infection, bleeding, scabbing, incomplete removal, nerve damage and allergy to anesthesia.
Post-Care Instructions: I reviewed with the patient in detail post-care instructions. Patient is to keep the biopsy site dry overnight, and then apply bacitracin twice daily until healed. Patient may apply hydrogen peroxide soaks to remove any crusting.
Notification Instructions: Patient will be notified of biopsy results. However, patient instructed to call the office if not contacted within 2 weeks.
Billing Type: Third-Party Bill
Information: Selecting Yes will display possible errors in your note based on the variables you have selected. This validation is only offered as a suggestion for you. PLEASE NOTE THAT THE VALIDATION TEXT WILL BE REMOVED WHEN YOU FINALIZE YOUR NOTE. IF YOU WANT TO FAX A PRELIMINARY NOTE YOU WILL NEED TO TOGGLE THIS TO 'NO' IF YOU DO NOT WANT IT IN YOUR FAXED NOTE.

## 2025-02-18 ENCOUNTER — APPOINTMENT (OUTPATIENT)
Age: 70
Setting detail: DERMATOLOGY
End: 2025-02-19

## 2025-02-18 DIAGNOSIS — Z85.828 PERSONAL HISTORY OF OTHER MALIGNANT NEOPLASM OF SKIN: ICD-10-CM

## 2025-02-18 DIAGNOSIS — L82.1 OTHER SEBORRHEIC KERATOSIS: ICD-10-CM

## 2025-02-18 DIAGNOSIS — L57.0 ACTINIC KERATOSIS: ICD-10-CM

## 2025-02-18 PROBLEM — C44.319 BASAL CELL CARCINOMA OF SKIN OF OTHER PARTS OF FACE: Status: ACTIVE | Noted: 2025-02-18

## 2025-02-18 PROBLEM — C44.629 SQUAMOUS CELL CARCINOMA OF SKIN OF LEFT UPPER LIMB, INCLUDING SHOULDER: Status: ACTIVE | Noted: 2025-02-18

## 2025-02-18 PROCEDURE — OTHER LIQUID NITROGEN: OTHER

## 2025-02-18 PROCEDURE — OTHER COUNSELING: OTHER

## 2025-02-18 PROCEDURE — 17003 DESTRUCT PREMALG LES 2-14: CPT

## 2025-02-18 PROCEDURE — OTHER MIPS QUALITY: OTHER

## 2025-02-18 PROCEDURE — OTHER TREATMENT REGIMEN: OTHER

## 2025-02-18 PROCEDURE — 17000 DESTRUCT PREMALG LESION: CPT

## 2025-02-18 PROCEDURE — 99213 OFFICE O/P EST LOW 20 MIN: CPT | Mod: 25

## 2025-02-18 ASSESSMENT — LOCATION SIMPLE DESCRIPTION DERM
LOCATION SIMPLE: POSTERIOR NECK
LOCATION SIMPLE: LEFT FOREARM
LOCATION SIMPLE: CHEST

## 2025-02-18 ASSESSMENT — LOCATION DETAILED DESCRIPTION DERM
LOCATION DETAILED: RIGHT POSTERIOR NECK
LOCATION DETAILED: LEFT MEDIAL SUPERIOR CHEST
LOCATION DETAILED: LEFT PROXIMAL DORSAL FOREARM

## 2025-02-18 ASSESSMENT — LOCATION ZONE DERM
LOCATION ZONE: NECK
LOCATION ZONE: ARM
LOCATION ZONE: TRUNK

## 2025-02-24 ENCOUNTER — APPOINTMENT (OUTPATIENT)
Age: 70
Setting detail: DERMATOLOGY
End: 2025-02-25

## 2025-02-24 PROBLEM — C44.629 SQUAMOUS CELL CARCINOMA OF SKIN OF LEFT UPPER LIMB, INCLUDING SHOULDER: Status: ACTIVE | Noted: 2025-02-24

## 2025-02-24 PROBLEM — C44.319 BASAL CELL CARCINOMA OF SKIN OF OTHER PARTS OF FACE: Status: ACTIVE | Noted: 2025-02-24

## 2025-02-24 PROCEDURE — G6001 ECHO GUIDANCE RADIOTHERAPY: HCPCS

## 2025-02-24 PROCEDURE — 77262 THER RADIOLOGY TX PLNG INTRM: CPT

## 2025-02-24 PROCEDURE — 99213 OFFICE O/P EST LOW 20 MIN: CPT | Mod: 25

## 2025-02-24 PROCEDURE — 77285 THER RAD SIMULAJ FIELD INTRM: CPT

## 2025-02-24 PROCEDURE — 77333 RADIATION TREATMENT AID(S): CPT

## 2025-02-24 PROCEDURE — 77300 RADIATION THERAPY DOSE PLAN: CPT

## 2025-02-24 PROCEDURE — OTHER IMAGE GUIDED - SUPERFICIAL RADIOTHERAPY: OTHER

## 2025-02-24 PROCEDURE — OTHER IMAGE GUIDED - SUPERFICIAL RADIOTHERAPY: SIMULATION VISIT: OTHER

## 2025-02-24 NOTE — HPI: IMAGE GUIDED- SUPERFICIAL RADIOTHERAPY QUESTIONNAIRE
Functional Status?: 0 (fully active)
Additional History: Medication for high cholesterol.  \\nPatient has had MOHS Surgeries many times.  He has also had IG SRT previously and patient prefers IG SRT over MOHS.

## 2025-02-24 NOTE — PROCEDURE: IMAGE GUIDED - SUPERFICIAL RADIOTHERAPY
Detail Level: Detailed
Pathology Override (Pathology Will Render As Diagnosis Name If Left Blank): Well-Differentiated SCC
Field Number: 1
Lesion Margin Size In Cm: 0.5
Shield Size In Cm: 2.0x2.0
Applicator Size In Cm: 2.5 cm
Energy (Kv): 70
Treatment Time (Min): 0.43
Time, Dose, Fractionation Factor (Tdf) For Prescription 1: 97
Daily Dose (Cgy): 273.48
Number Of Fractions For Prescription 1: 20
Treatments Per Week: 3
Total Dose For Prescription 1 (Cgy): 5469.6
Add A Second Prescription?: No
Additional Fraction(S) Needed:: 0
Bill For Physics Consultation: No - Render Text Only
Physics Documentation: (Physics Check Verbiage)
Pathology Override (Pathology Will Render As Diagnosis Name If Left Blank): Ulcerated and Nodular BCC
Field Number: 2
Lesion Dimensions-X Axis In Cm: 1.7
Shield Size In Cm: 2.7x2.7
Applicator Size In Cm: 4.0 cm
Treatment Time (Min): 0.42
Time, Dose, Fractionation Factor (Tdf) For Prescription 1: 99
Daily Dose (Cgy): 276.36
Total Dose For Prescription 1 (Cgy): 5527.2

## 2025-02-24 NOTE — PROCEDURE: IMAGE GUIDED - SUPERFICIAL RADIOTHERAPY: SIMULATION VISIT
Simulation Documentation: Left Distal Dorsal Forearm: \\nPlan: Per the request of Dr. Robin the patient was seen today for Superficial Radiation Therapy planning requiring initial simulation in preparation for treatment of specific diseased sites. Simulation is necessary to determine the correct patient and treatment portal positioning, to deliver safe and effective Radiotherapy. A high-frequency ultrasound image was acquired prior to treatment today for two- dimensional evaluation of tumor volume and will be repeated per fraction for response to treatment, in addition, to geometric accuracy of field placement. Physician evaluation of the ultrasound tumor depth, width, and breadth will be ongoing through the course of treatment and is deemed medically necessary ensuring efficacy of treatment and determine whether to proceed with delivery of therapeutic. Today’s image and setup were evaluated to determine the initiation of treatment. All appropriate blocking and treatment parameters were verified by the radiation therapist and provider.\\nPer Dr. Robin, continued per fraction ultrasound guidance and simulation are required for field placement, measurement of tumor depth, width and breadth, progress, and edema monitoring.\\nPer the request of Dr. Robin, continuing medical physics review as per radiotherapy standard of care post every 5th fraction for the patient, including assessment of treatment parameters,  of dose delivery, and review of patient treatment documentation in support of the provider, is ordered, ensuring efficacy and continued safe delivery of radiotherapy. Included in the physics check is a review of patient setup information, all pertinent simulation and treatment photograph(s) checks, prescription, dose calculation verification, per fraction dose charted correctly, elapsed days and treatment days correctly charted, cumulative dose correct, and review of any prescription changes. Continued medical physics review post every 5th fraction of radiotherapy is requested by the provider for appropriate radiotherapy management and is deemed medically necessary and standard of care
Ultrasound Not Used Text: Ultrasound was not performed today due to
Bill For Treatment Device Design (02800, 45241, 63502): Yes
Bill For Treatment Planning: Yes- (Moderate Planning- 2 Sites: 10382)
Patient Positioning: Supine
Limb Positioning Or Elevation: Left arm relaxed by side, resting on treatment couch
Ultrasound Used Text: Ultrasound depth is 1.06mm.
Bill For Simulation: Yes- (Simple- 2 Sites: 82116)
Additional Comments (Add Customization Of Note Here): Patient was placed in comfortable, reproducible set up.
Simulation Documentation: Left Lateral Buccal Cheek: \\nPlan: Per the request of Dr. Robin the patient was seen today for Superficial Radiation Therapy planning requiring initial simulation in preparation for treatment of specific diseased sites. Simulation is necessary to determine the correct patient and treatment portal positioning, to deliver safe and effective Radiotherapy. A high-frequency ultrasound image was acquired prior to treatment today for two- dimensional evaluation of tumor volume and will be repeated per fraction for response to treatment, in addition, to geometric accuracy of field placement. Physician evaluation of the ultrasound tumor depth, width, and breadth will be ongoing through the course of treatment and is deemed medically necessary ensuring efficacy of treatment and determine whether to proceed with delivery of therapeutic. Today’s image and setup were evaluated to determine the initiation of treatment. All appropriate blocking and treatment parameters were verified by the radiation therapist and provider.\\nPer Dr. Robin, continued per fraction ultrasound guidance and simulation are required for field placement, measurement of tumor depth, width and breadth, progress, and edema monitoring.\\nPer the request of Dr. Robin, continuing medical physics review as per radiotherapy standard of care post every 5th fraction for the patient, including assessment of treatment parameters,  of dose delivery, and review of patient treatment documentation in support of the provider, is ordered, ensuring efficacy and continued safe delivery of radiotherapy. Included in the physics check is a review of patient setup information, all pertinent simulation and treatment photograph(s) checks, prescription, dose calculation verification, per fraction dose charted correctly, elapsed days and treatment days correctly charted, cumulative dose correct, and review of any prescription changes. Continued medical physics review post every 5th fraction of radiotherapy is requested by the provider for appropriate radiotherapy management and is deemed medically necessary and standard of care
Limb Positioning Or Elevation: Pillow with head slightly Right
Additional Comments (Add Customization Of Note Here): Patient placed in comfortable position and patient can maintain for each treatment.
Ultrasound Used Text: Ultrasound depth is 1.30mm.

## 2025-03-17 ENCOUNTER — APPOINTMENT (OUTPATIENT)
Age: 70
Setting detail: DERMATOLOGY
End: 2025-03-18

## 2025-03-17 PROBLEM — C44.319 BASAL CELL CARCINOMA OF SKIN OF OTHER PARTS OF FACE: Status: ACTIVE | Noted: 2025-03-17

## 2025-03-17 PROBLEM — C44.629 SQUAMOUS CELL CARCINOMA OF SKIN OF LEFT UPPER LIMB, INCLUDING SHOULDER: Status: ACTIVE | Noted: 2025-03-17

## 2025-03-17 PROCEDURE — 77401 RADIATION TX DELIVERY SUPFC: CPT

## 2025-03-17 PROCEDURE — OTHER IMAGE GUIDED - SUPERFICIAL RADIOTHERAPY: OTHER

## 2025-03-17 PROCEDURE — G6001 ECHO GUIDANCE RADIOTHERAPY: HCPCS | Mod: XU

## 2025-03-17 PROCEDURE — 77280 THER RAD SIMULAJ FIELD SMPL: CPT

## 2025-03-17 PROCEDURE — OTHER IMAGE GUIDED - SUPERFICIAL RADIOTHERAPY: TREATMENT VISIT: OTHER

## 2025-03-17 NOTE — PROCEDURE: IMAGE GUIDED - SUPERFICIAL RADIOTHERAPY: TREATMENT VISIT
Additional Comments (Add Customization Of Note Here): Reviewed patient skin care instructions and schedule.  
Additional Change To Daily Dosage Administered Mid Treatment?: No
Prescription Used: 1
Bill For Treatment (14026)?: Yes
Calculate Total Cumulative Dose Automatically Or Manually: Manually
Bill For Simulation (Per Medicare, Typical Course Of Radiation Therapy Will Require Between One To Three Simulations): Yes- (Simple- 1 Site: 05051)
Energy (Kv): 70
Daily Dosage (Cgy): 273.48
Ultrasound Used Text: High frequency ultrasound depth is 0.96mm, which is 0.10mm in difference from previous imaging. Repop. ++
Ultrasound Not Used Text: Ultrasound was not performed today due to
Treatment Documentation: Per the request of Dr. Robin, the patient was seen today for Superficial Radiation Therapy planning requiring initial This patient has been treated today with image-guided superficial radiation therapy for non-melanoma skin cancer. Written informed consent has been previously obtained from this patient for this treatment. This consent is documented in the patient's chart. The patient gave verbal consent to continue treatment today. The patient was treated with a specific radiation dose and setup as prescribed by the provider listed on this visit note. A Radiation Therapist performed administration of radiation under the supervision of a provider. The treatment parameters and cumulative dose are indicated above. Prior to administering the radiation, the patient underwent a verification therapeutic radiology simulation-aided field setting defining relevant normal and abnormal target anatomy and acquiring images with separate and distinct diagnostic high-frequency ultrasound to delineate tissues and determine whether to proceed with delivery of therapeutic, in addition to retrieve data necessary to develop an optimal radiation treatment process for the patient. The field placement simulation documents any change seen in overall tumor volume documented in the patient’s record, allows the clinician to indicate any needed changes in the treatment plan and/or prescription, provides diagnostic evaluation as the basis for performing the therapeutic procedure, and clearly identifies the information needed to decide to proceed with the therapeutic procedure. This process includes verification of the treatment port(s) and proper treatment positioning. All treatment ports were photographed within electronic medical records. The patient's lead blocking along with gross tumor volume and margin was confirmed. Considering superficial radiotherapy is clinical in setup, this requires the physician and radiation therapist to clarify the location interest being treated against initial images, ultrasound, pathology, and patient anatomy. Care was taken to ensure marcos treated were geometrically accurate and properly positioned using therapeutic radiology simulation-aided field setting verification per fraction. This process is also utilized to determine if any prescription or setup changes are necessary. These steps are therefore medically necessary to ensure safe and effective administration of radiation. Ongoing therapeutic radiology simulation-aided field setting verification is ordered throughout the course of therapy.\\n\\nA high-frequency ultrasound image was acquired today for a two-dimensional evaluation of the tumor volume, depth, width, breadth, review of prior response to treatment, provide geometric accuracy of field placement, and determine whether to proceed with therapeutic delivery. \\n\\nThe field placement and ultrasound imaging, per fraction, is separate and distinct from the initial simulation and is an important task in providing safe administration of superficial radiation therapy. Physician evaluation of the ultrasound information will be ongoing throughout the course of treatment and is deemed medically necessary to ensure the efficacy of treatment, whether to proceed with therapeutic delivery, and determine any necessary changes. Today's images were evaluated for determination of continuation of treatment with the current plan or with necessary changes as appropriate. Additionally, the use of ultrasound visualization and targeted assessment allows the patient to be able to see their cancer(s) progress, encouraging the patient to complete and maintain compliance through the full course of prescribed radiotherapy. Per Dr. Robin, continued ultrasound guidance and therapeutic radiology simulation-aided field setting verification per fraction is required for field placement, measurement of tumor depth, tissue evaluation, progress, acute effect monitoring, and determination for therapeutic treatment delivery is appropriate.
Add X Modifier?: GODINEZ - Unusual Non-Overlapping Service
Ultrasound Used Text: High frequency ultrasound depth is 1.32mm, which is 0.02mm in difference from previous imaging. Repop. ++
Daily Dosage (Cgy): 276.36

## 2025-03-18 ENCOUNTER — APPOINTMENT (OUTPATIENT)
Age: 70
Setting detail: DERMATOLOGY
End: 2025-03-20

## 2025-03-18 DIAGNOSIS — L57.0 ACTINIC KERATOSIS: ICD-10-CM

## 2025-03-18 DIAGNOSIS — Z85.828 PERSONAL HISTORY OF OTHER MALIGNANT NEOPLASM OF SKIN: ICD-10-CM

## 2025-03-18 DIAGNOSIS — L82.1 OTHER SEBORRHEIC KERATOSIS: ICD-10-CM

## 2025-03-18 PROCEDURE — OTHER MIPS QUALITY: OTHER

## 2025-03-18 PROCEDURE — 17000 DESTRUCT PREMALG LESION: CPT

## 2025-03-18 PROCEDURE — 17003 DESTRUCT PREMALG LES 2-14: CPT

## 2025-03-18 PROCEDURE — OTHER CHEMICAL PEEL (PREMALIGNANT DESTRUCTION): OTHER

## 2025-03-18 PROCEDURE — 99212 OFFICE O/P EST SF 10 MIN: CPT | Mod: 25

## 2025-03-18 PROCEDURE — OTHER COUNSELING: OTHER

## 2025-03-18 PROCEDURE — OTHER TREATMENT REGIMEN: OTHER

## 2025-03-18 ASSESSMENT — LOCATION SIMPLE DESCRIPTION DERM
LOCATION SIMPLE: RIGHT CHEEK
LOCATION SIMPLE: LEFT PRETIBIAL REGION
LOCATION SIMPLE: RIGHT UPPER BACK

## 2025-03-18 ASSESSMENT — LOCATION ZONE DERM
LOCATION ZONE: TRUNK
LOCATION ZONE: FACE
LOCATION ZONE: LEG

## 2025-03-18 ASSESSMENT — LOCATION DETAILED DESCRIPTION DERM
LOCATION DETAILED: RIGHT INFERIOR LATERAL MALAR CHEEK
LOCATION DETAILED: RIGHT SUPERIOR MEDIAL UPPER BACK
LOCATION DETAILED: LEFT LATERAL DISTAL PRETIBIAL REGION

## 2025-03-18 NOTE — PROCEDURE: CHEMICAL PEEL (PREMALIGNANT DESTRUCTION)
Detail Level: Zone
Post-Care Instructions: I reviewed with the patient in detail post-care instructions. Patient should avoid sun exposure and wear sun protection.
Chemical Peel: TCA 50%
Time (Mins): 1
Endpoint: light frosting
Treatment Number: 3
Total Number Of Aks Treated: 5
Consent: Prior to the procedure, written consent was obtained and risks were reviewed, including but not limited to: redness, peeling, blistering, pigmentary change, scarring, infection, and pain.

## 2025-03-19 ENCOUNTER — APPOINTMENT (OUTPATIENT)
Age: 70
Setting detail: DERMATOLOGY
End: 2025-03-20

## 2025-03-19 PROBLEM — C44.629 SQUAMOUS CELL CARCINOMA OF SKIN OF LEFT UPPER LIMB, INCLUDING SHOULDER: Status: ACTIVE | Noted: 2025-03-19

## 2025-03-19 PROBLEM — C44.319 BASAL CELL CARCINOMA OF SKIN OF OTHER PARTS OF FACE: Status: ACTIVE | Noted: 2025-03-19

## 2025-03-19 PROCEDURE — G6001 ECHO GUIDANCE RADIOTHERAPY: HCPCS | Mod: XU

## 2025-03-19 PROCEDURE — OTHER IMAGE GUIDED - SUPERFICIAL RADIOTHERAPY: TREATMENT VISIT: OTHER

## 2025-03-19 PROCEDURE — 77401 RADIATION TX DELIVERY SUPFC: CPT

## 2025-03-19 PROCEDURE — OTHER IMAGE GUIDED - SUPERFICIAL RADIOTHERAPY: OTHER

## 2025-03-19 PROCEDURE — 77280 THER RAD SIMULAJ FIELD SMPL: CPT

## 2025-03-19 NOTE — PROCEDURE: IMAGE GUIDED - SUPERFICIAL RADIOTHERAPY: TREATMENT VISIT
Additional Comments (Add Customization Of Note Here): Patient is okay as scheduled.  
Additional Change To Daily Dosage Administered Mid Treatment?: No
Prescription Used: 1
Bill For Treatment (22525)?: Yes
Calculate Total Cumulative Dose Automatically Or Manually: Manually
Fraction Number: 2
Bill For Simulation (Per Medicare, Typical Course Of Radiation Therapy Will Require Between One To Three Simulations): Yes- (Simple- 1 Site: 46237)
Energy (Kv): 70
Daily Dosage (Cgy): 273.48
Ultrasound Used Text: High frequency ultrasound depth is 1.01mm, which is 0.05mm in difference from previous imaging. Repop. ++
Ultrasound Not Used Text: Ultrasound was not performed today due to
Total Cumulative Dose (Cgy): 546.96
Treatment Documentation: Per the request of Dr. Robin, the patient was seen today for Superficial Radiation Therapy planning requiring initial This patient has been treated today with image-guided superficial radiation therapy for non-melanoma skin cancer. Written informed consent has been previously obtained from this patient for this treatment. This consent is documented in the patient's chart. The patient gave verbal consent to continue treatment today. The patient was treated with a specific radiation dose and setup as prescribed by the provider listed on this visit note. A Radiation Therapist performed administration of radiation under the supervision of a provider. The treatment parameters and cumulative dose are indicated above. Prior to administering the radiation, the patient underwent a verification therapeutic radiology simulation-aided field setting defining relevant normal and abnormal target anatomy and acquiring images with separate and distinct diagnostic high-frequency ultrasound to delineate tissues and determine whether to proceed with delivery of therapeutic, in addition to retrieve data necessary to develop an optimal radiation treatment process for the patient. The field placement simulation documents any change seen in overall tumor volume documented in the patient’s record, allows the clinician to indicate any needed changes in the treatment plan and/or prescription, provides diagnostic evaluation as the basis for performing the therapeutic procedure, and clearly identifies the information needed to decide to proceed with the therapeutic procedure. This process includes verification of the treatment port(s) and proper treatment positioning. All treatment ports were photographed within electronic medical records. The patient's lead blocking along with gross tumor volume and margin was confirmed. Considering superficial radiotherapy is clinical in setup, this requires the physician and radiation therapist to clarify the location interest being treated against initial images, ultrasound, pathology, and patient anatomy. Care was taken to ensure marcos treated were geometrically accurate and properly positioned using therapeutic radiology simulation-aided field setting verification per fraction. This process is also utilized to determine if any prescription or setup changes are necessary. These steps are therefore medically necessary to ensure safe and effective administration of radiation. Ongoing therapeutic radiology simulation-aided field setting verification is ordered throughout the course of therapy.\\n\\nA high-frequency ultrasound image was acquired today for a two-dimensional evaluation of the tumor volume, depth, width, breadth, review of prior response to treatment, provide geometric accuracy of field placement, and determine whether to proceed with therapeutic delivery. \\n\\nThe field placement and ultrasound imaging, per fraction, is separate and distinct from the initial simulation and is an important task in providing safe administration of superficial radiation therapy. Physician evaluation of the ultrasound information will be ongoing throughout the course of treatment and is deemed medically necessary to ensure the efficacy of treatment, whether to proceed with therapeutic delivery, and determine any necessary changes. Today's images were evaluated for determination of continuation of treatment with the current plan or with necessary changes as appropriate. Additionally, the use of ultrasound visualization and targeted assessment allows the patient to be able to see their cancer(s) progress, encouraging the patient to complete and maintain compliance through the full course of prescribed radiotherapy. Per Dr. Robin, continued ultrasound guidance and therapeutic radiology simulation-aided field setting verification per fraction is required for field placement, measurement of tumor depth, tissue evaluation, progress, acute effect monitoring, and determination for therapeutic treatment delivery is appropriate.
Add X Modifier?: GODINEZ - Unusual Non-Overlapping Service
Ultrasound Used Text: High frequency ultrasound depth is 0.97mm, which is 0.35mm in difference from previous imaging. Repop. ++
Daily Dosage (Cgy): 276.36
Total Cumulative Dose (Cgy): 552.72

## 2025-03-20 ENCOUNTER — APPOINTMENT (OUTPATIENT)
Age: 70
Setting detail: DERMATOLOGY
End: 2025-03-21

## 2025-03-20 PROBLEM — C44.319 BASAL CELL CARCINOMA OF SKIN OF OTHER PARTS OF FACE: Status: ACTIVE | Noted: 2025-03-20

## 2025-03-20 PROBLEM — C44.629 SQUAMOUS CELL CARCINOMA OF SKIN OF LEFT UPPER LIMB, INCLUDING SHOULDER: Status: ACTIVE | Noted: 2025-03-20

## 2025-03-20 PROCEDURE — 77401 RADIATION TX DELIVERY SUPFC: CPT

## 2025-03-20 PROCEDURE — OTHER IMAGE GUIDED - SUPERFICIAL RADIOTHERAPY: OTHER

## 2025-03-20 PROCEDURE — 77280 THER RAD SIMULAJ FIELD SMPL: CPT

## 2025-03-20 PROCEDURE — OTHER IMAGE GUIDED - SUPERFICIAL RADIOTHERAPY: TREATMENT VISIT: OTHER

## 2025-03-20 PROCEDURE — G6001 ECHO GUIDANCE RADIOTHERAPY: HCPCS | Mod: XU

## 2025-03-20 NOTE — PROCEDURE: IMAGE GUIDED - SUPERFICIAL RADIOTHERAPY: TREATMENT VISIT
Additional Comments (Add Customization Of Note Here): Patient rescheduled to later time today.
Additional Change To Daily Dosage Administered Mid Treatment?: No
Prescription Used: 1
Bill For Treatment (02525)?: Yes
Calculate Total Cumulative Dose Automatically Or Manually: Manually
Fraction Number: 3
Bill For Simulation (Per Medicare, Typical Course Of Radiation Therapy Will Require Between One To Three Simulations): Yes- (Simple- 1 Site: 31485)
Energy (Kv): 70
Daily Dosage (Cgy): 273.48
Ultrasound Used Text: High frequency ultrasound depth is 1.23mm, which is 0.22mm in difference from previous imaging. Repop. ++
Ultrasound Not Used Text: Ultrasound was not performed today due to
Total Cumulative Dose (Cgy): 820.44
Treatment Documentation: Per the request of Dr. Robin, the patient was seen today for Superficial Radiation Therapy planning requiring initial This patient has been treated today with image-guided superficial radiation therapy for non-melanoma skin cancer. Written informed consent has been previously obtained from this patient for this treatment. This consent is documented in the patient's chart. The patient gave verbal consent to continue treatment today. The patient was treated with a specific radiation dose and setup as prescribed by the provider listed on this visit note. A Radiation Therapist performed administration of radiation under the supervision of a provider. The treatment parameters and cumulative dose are indicated above. Prior to administering the radiation, the patient underwent a verification therapeutic radiology simulation-aided field setting defining relevant normal and abnormal target anatomy and acquiring images with separate and distinct diagnostic high-frequency ultrasound to delineate tissues and determine whether to proceed with delivery of therapeutic, in addition to retrieve data necessary to develop an optimal radiation treatment process for the patient. The field placement simulation documents any change seen in overall tumor volume documented in the patient’s record, allows the clinician to indicate any needed changes in the treatment plan and/or prescription, provides diagnostic evaluation as the basis for performing the therapeutic procedure, and clearly identifies the information needed to decide to proceed with the therapeutic procedure. This process includes verification of the treatment port(s) and proper treatment positioning. All treatment ports were photographed within electronic medical records. The patient's lead blocking along with gross tumor volume and margin was confirmed. Considering superficial radiotherapy is clinical in setup, this requires the physician and radiation therapist to clarify the location interest being treated against initial images, ultrasound, pathology, and patient anatomy. Care was taken to ensure marcos treated were geometrically accurate and properly positioned using therapeutic radiology simulation-aided field setting verification per fraction. This process is also utilized to determine if any prescription or setup changes are necessary. These steps are therefore medically necessary to ensure safe and effective administration of radiation. Ongoing therapeutic radiology simulation-aided field setting verification is ordered throughout the course of therapy.\\n\\nA high-frequency ultrasound image was acquired today for a two-dimensional evaluation of the tumor volume, depth, width, breadth, review of prior response to treatment, provide geometric accuracy of field placement, and determine whether to proceed with therapeutic delivery. \\n\\nThe field placement and ultrasound imaging, per fraction, is separate and distinct from the initial simulation and is an important task in providing safe administration of superficial radiation therapy. Physician evaluation of the ultrasound information will be ongoing throughout the course of treatment and is deemed medically necessary to ensure the efficacy of treatment, whether to proceed with therapeutic delivery, and determine any necessary changes. Today's images were evaluated for determination of continuation of treatment with the current plan or with necessary changes as appropriate. Additionally, the use of ultrasound visualization and targeted assessment allows the patient to be able to see their cancer(s) progress, encouraging the patient to complete and maintain compliance through the full course of prescribed radiotherapy. Per Dr. Robin, continued ultrasound guidance and therapeutic radiology simulation-aided field setting verification per fraction is required for field placement, measurement of tumor depth, tissue evaluation, progress, acute effect monitoring, and determination for therapeutic treatment delivery is appropriate.
Add X Modifier?: GODINEZ - Unusual Non-Overlapping Service
Ultrasound Used Text: High frequency ultrasound depth is 1.01mm, which is 0.04mm in difference from previous imaging. Repop. ++
Daily Dosage (Cgy): 276.36
Additional Comments (Add Customization Of Note Here): Patient was okay coming in 30 min later today.
Total Cumulative Dose (Cgy): 829.08

## 2025-03-24 ENCOUNTER — APPOINTMENT (OUTPATIENT)
Age: 70
Setting detail: DERMATOLOGY
End: 2025-03-25

## 2025-03-24 PROBLEM — C44.629 SQUAMOUS CELL CARCINOMA OF SKIN OF LEFT UPPER LIMB, INCLUDING SHOULDER: Status: ACTIVE | Noted: 2025-03-24

## 2025-03-24 PROBLEM — C44.319 BASAL CELL CARCINOMA OF SKIN OF OTHER PARTS OF FACE: Status: ACTIVE | Noted: 2025-03-24

## 2025-03-24 PROCEDURE — 77401 RADIATION TX DELIVERY SUPFC: CPT

## 2025-03-24 PROCEDURE — OTHER IMAGE GUIDED - SUPERFICIAL RADIOTHERAPY: TREATMENT VISIT: OTHER

## 2025-03-24 PROCEDURE — OTHER IMAGE GUIDED - SUPERFICIAL RADIOTHERAPY: OTHER

## 2025-03-24 PROCEDURE — 77280 THER RAD SIMULAJ FIELD SMPL: CPT

## 2025-03-24 PROCEDURE — G6001 ECHO GUIDANCE RADIOTHERAPY: HCPCS | Mod: XU

## 2025-03-24 NOTE — PROCEDURE: IMAGE GUIDED - SUPERFICIAL RADIOTHERAPY: TREATMENT VISIT
Additional Comments (Add Customization Of Note Here): Patient has no questions or concerns.
Additional Change To Daily Dosage Administered Mid Treatment?: No
Prescription Used: 1
Bill For Treatment (33437)?: Yes
Calculate Total Cumulative Dose Automatically Or Manually: Manually
Fraction Number: 4
Bill For Simulation (Per Medicare, Typical Course Of Radiation Therapy Will Require Between One To Three Simulations): Yes- (Simple- 1 Site: 43771)
Energy (Kv): 70
Daily Dosage (Cgy): 273.48
Ultrasound Used Text: High frequency ultrasound depth is 1.24mm, which is 0.01mm in difference from previous imaging. Repop. ++
Ultrasound Not Used Text: Ultrasound was not performed today due to
Total Cumulative Dose (Cgy): 1093.92
Treatment Documentation: Per the request of Dr. Robin, the patient was seen today for Superficial Radiation Therapy planning requiring initial This patient has been treated today with image-guided superficial radiation therapy for non-melanoma skin cancer. Written informed consent has been previously obtained from this patient for this treatment. This consent is documented in the patient's chart. The patient gave verbal consent to continue treatment today. The patient was treated with a specific radiation dose and setup as prescribed by the provider listed on this visit note. A Radiation Therapist performed administration of radiation under the supervision of a provider. The treatment parameters and cumulative dose are indicated above. Prior to administering the radiation, the patient underwent a verification therapeutic radiology simulation-aided field setting defining relevant normal and abnormal target anatomy and acquiring images with separate and distinct diagnostic high-frequency ultrasound to delineate tissues and determine whether to proceed with delivery of therapeutic, in addition to retrieve data necessary to develop an optimal radiation treatment process for the patient. The field placement simulation documents any change seen in overall tumor volume documented in the patient’s record, allows the clinician to indicate any needed changes in the treatment plan and/or prescription, provides diagnostic evaluation as the basis for performing the therapeutic procedure, and clearly identifies the information needed to decide to proceed with the therapeutic procedure. This process includes verification of the treatment port(s) and proper treatment positioning. All treatment ports were photographed within electronic medical records. The patient's lead blocking along with gross tumor volume and margin was confirmed. Considering superficial radiotherapy is clinical in setup, this requires the physician and radiation therapist to clarify the location interest being treated against initial images, ultrasound, pathology, and patient anatomy. Care was taken to ensure marcos treated were geometrically accurate and properly positioned using therapeutic radiology simulation-aided field setting verification per fraction. This process is also utilized to determine if any prescription or setup changes are necessary. These steps are therefore medically necessary to ensure safe and effective administration of radiation. Ongoing therapeutic radiology simulation-aided field setting verification is ordered throughout the course of therapy.\\n\\nA high-frequency ultrasound image was acquired today for a two-dimensional evaluation of the tumor volume, depth, width, breadth, review of prior response to treatment, provide geometric accuracy of field placement, and determine whether to proceed with therapeutic delivery. \\n\\nThe field placement and ultrasound imaging, per fraction, is separate and distinct from the initial simulation and is an important task in providing safe administration of superficial radiation therapy. Physician evaluation of the ultrasound information will be ongoing throughout the course of treatment and is deemed medically necessary to ensure the efficacy of treatment, whether to proceed with therapeutic delivery, and determine any necessary changes. Today's images were evaluated for determination of continuation of treatment with the current plan or with necessary changes as appropriate. Additionally, the use of ultrasound visualization and targeted assessment allows the patient to be able to see their cancer(s) progress, encouraging the patient to complete and maintain compliance through the full course of prescribed radiotherapy. Per Dr. Robin, continued ultrasound guidance and therapeutic radiology simulation-aided field setting verification per fraction is required for field placement, measurement of tumor depth, tissue evaluation, progress, acute effect monitoring, and determination for therapeutic treatment delivery is appropriate.
Add X Modifier?: GODINEZ - Unusual Non-Overlapping Service
Ultrasound Used Text: High frequency ultrasound depth is 1.02mm, which is 0.01mm in difference from previous imaging. Repop. ++
Daily Dosage (Cgy): 276.36
Additional Comments (Add Customization Of Note Here): Reminded patient to begin using aquaphor at least 1 x daily, prefer 2x daily.
Total Cumulative Dose (Cgy): 1105.44

## 2025-03-26 ENCOUNTER — APPOINTMENT (OUTPATIENT)
Age: 70
Setting detail: DERMATOLOGY
End: 2025-03-27

## 2025-03-26 PROBLEM — C44.629 SQUAMOUS CELL CARCINOMA OF SKIN OF LEFT UPPER LIMB, INCLUDING SHOULDER: Status: ACTIVE | Noted: 2025-03-26

## 2025-03-26 PROBLEM — C44.319 BASAL CELL CARCINOMA OF SKIN OF OTHER PARTS OF FACE: Status: ACTIVE | Noted: 2025-03-26

## 2025-03-26 PROCEDURE — 77280 THER RAD SIMULAJ FIELD SMPL: CPT

## 2025-03-26 PROCEDURE — G6001 ECHO GUIDANCE RADIOTHERAPY: HCPCS | Mod: XU

## 2025-03-26 PROCEDURE — OTHER IMAGE GUIDED - SUPERFICIAL RADIOTHERAPY: EVALUATION VISIT: OTHER

## 2025-03-26 PROCEDURE — 77401 RADIATION TX DELIVERY SUPFC: CPT

## 2025-03-26 PROCEDURE — OTHER IMAGE GUIDED - SUPERFICIAL RADIOTHERAPY: OTHER

## 2025-03-26 PROCEDURE — OTHER IMAGE GUIDED - SUPERFICIAL RADIOTHERAPY: TREATMENT VISIT: OTHER

## 2025-03-26 PROCEDURE — 99212 OFFICE O/P EST SF 10 MIN: CPT | Mod: 25

## 2025-03-26 NOTE — PROCEDURE: IMAGE GUIDED - SUPERFICIAL RADIOTHERAPY: EVALUATION VISIT
Bill For Ultrasound Evaluation (): No
Assessment: Appropriate reaction
Evaluation Plan: Left Distal Dorsal Forearm: \\nPlan: The patient is undergoing superficial radiation therapy for skin cancer and presents for weekly evaluation and management. Per protocol and as documented on the flow sheet, the patient was questioned as to subjective redness, pruritus, pain, drainage, fatigue, or any other symptoms. Objectively, the radiation area was evaluated with regards to erythema, atrophy, scale, crusting, erosion, ulceration, edema, purpura, tenderness, warmth, drainage, and any other findings. The plan was extensively reviewed including dose and dosing schedule. The simulation and clinical setup were also reviewed as were external and any internal shields and based on this review the appropriateness and sufficiency of treatment was determined.\\nA high-frequency ultrasound image was acquired today for a two-dimensional evaluation of the tumor volume, depth, width, breadth, review of prior response to treatment, provide geometric accuracy of field placement, and determine whether to proceed with therapeutic delivery.\\nPer Dr. Robin, continued ultrasound guidance and therapeutic radiology simulation-aided field setting verification per fraction is required for field placement, measurement of tumor depth, tissues evaluation, progress, acute effect monitoring, and determination for therapeutic treatment delivery is appropriate.
Is This Visit For Evaluation During Treatment Or Follow Up Post Treatment?: evaluation
Ultrasound Not Used Text: Ultrasound was not performed today due to
Ultrasound Used Text: Ultrasound depth is mm.
Radiation Therapy Oncology Group (Rtog) Score: 0
Radiation Therapy Oncology Group (Rtog) Score: 1
Evaluation Plan: Left Lateral Buccal Cheek: \\nPlan: The patient is undergoing superficial radiation therapy for skin cancer and presents for weekly evaluation and management. Per protocol and as documented on the flow sheet, the patient was questioned as to subjective redness, pruritus, pain, drainage, fatigue, or any other symptoms. Objectively, the radiation area was evaluated with regards to erythema, atrophy, scale, crusting, erosion, ulceration, edema, purpura, tenderness, warmth, drainage, and any other findings. The plan was extensively reviewed including dose and dosing schedule. The simulation and clinical setup were also reviewed as were external and any internal shields and based on this review the appropriateness and sufficiency of treatment was determined.\\nA high-frequency ultrasound image was acquired today for a two-dimensional evaluation of the tumor volume, depth, width, breadth, review of prior response to treatment, provide geometric accuracy of field placement, and determine whether to proceed with therapeutic delivery.\\nPer Dr. Robin, continued ultrasound guidance and therapeutic radiology simulation-aided field setting verification per fraction is required for field placement, measurement of tumor depth, tissues evaluation, progress, acute effect monitoring, and determination for therapeutic treatment delivery is appropriate.

## 2025-03-26 NOTE — PROCEDURE: IMAGE GUIDED - SUPERFICIAL RADIOTHERAPY: TREATMENT VISIT
Additional Comments (Add Customization Of Note Here): Patient will see provider for OTV
Additional Change To Daily Dosage Administered Mid Treatment?: No
Prescription Used: 1
Bill For Treatment (52359)?: Yes
Calculate Total Cumulative Dose Automatically Or Manually: Manually
Fraction Number: 5
Bill For Simulation (Per Medicare, Typical Course Of Radiation Therapy Will Require Between One To Three Simulations): Yes- (Simple- 1 Site: 92231)
Energy (Kv): 70
Daily Dosage (Cgy): 273.48
Ultrasound Used Text: High frequency ultrasound depth is 1.22mm, which is 0.02mm in difference from previous imaging. Repop. ++
Ultrasound Not Used Text: Ultrasound was not performed today due to
Total Cumulative Dose (Cgy): 1367.4
Treatment Documentation: Per the request of Dr. Robin, the patient was seen today for Superficial Radiation Therapy planning requiring initial This patient has been treated today with image-guided superficial radiation therapy for non-melanoma skin cancer. Written informed consent has been previously obtained from this patient for this treatment. This consent is documented in the patient's chart. The patient gave verbal consent to continue treatment today. The patient was treated with a specific radiation dose and setup as prescribed by the provider listed on this visit note. A Radiation Therapist performed administration of radiation under the supervision of a provider. The treatment parameters and cumulative dose are indicated above. Prior to administering the radiation, the patient underwent a verification therapeutic radiology simulation-aided field setting defining relevant normal and abnormal target anatomy and acquiring images with separate and distinct diagnostic high-frequency ultrasound to delineate tissues and determine whether to proceed with delivery of therapeutic, in addition to retrieve data necessary to develop an optimal radiation treatment process for the patient. The field placement simulation documents any change seen in overall tumor volume documented in the patient’s record, allows the clinician to indicate any needed changes in the treatment plan and/or prescription, provides diagnostic evaluation as the basis for performing the therapeutic procedure, and clearly identifies the information needed to decide to proceed with the therapeutic procedure. This process includes verification of the treatment port(s) and proper treatment positioning. All treatment ports were photographed within electronic medical records. The patient's lead blocking along with gross tumor volume and margin was confirmed. Considering superficial radiotherapy is clinical in setup, this requires the physician and radiation therapist to clarify the location interest being treated against initial images, ultrasound, pathology, and patient anatomy. Care was taken to ensure marcos treated were geometrically accurate and properly positioned using therapeutic radiology simulation-aided field setting verification per fraction. This process is also utilized to determine if any prescription or setup changes are necessary. These steps are therefore medically necessary to ensure safe and effective administration of radiation. Ongoing therapeutic radiology simulation-aided field setting verification is ordered throughout the course of therapy.\\n\\nA high-frequency ultrasound image was acquired today for a two-dimensional evaluation of the tumor volume, depth, width, breadth, review of prior response to treatment, provide geometric accuracy of field placement, and determine whether to proceed with therapeutic delivery. \\n\\nThe field placement and ultrasound imaging, per fraction, is separate and distinct from the initial simulation and is an important task in providing safe administration of superficial radiation therapy. Physician evaluation of the ultrasound information will be ongoing throughout the course of treatment and is deemed medically necessary to ensure the efficacy of treatment, whether to proceed with therapeutic delivery, and determine any necessary changes. Today's images were evaluated for determination of continuation of treatment with the current plan or with necessary changes as appropriate. Additionally, the use of ultrasound visualization and targeted assessment allows the patient to be able to see their cancer(s) progress, encouraging the patient to complete and maintain compliance through the full course of prescribed radiotherapy. Per Dr. Robni, continued ultrasound guidance and therapeutic radiology simulation-aided field setting verification per fraction is required for field placement, measurement of tumor depth, tissue evaluation, progress, acute effect monitoring, and determination for therapeutic treatment delivery is appropriate.
Add X Modifier?: GODINEZ - Unusual Non-Overlapping Service
Ultrasound Used Text: High frequency ultrasound depth is 0.91mm, which is 0.11mm in difference from previous imaging. Repop. ++
Daily Dosage (Cgy): 276.36
Additional Comments (Add Customization Of Note Here): Patient to see provider for OTV
Total Cumulative Dose (Cgy): 1381.8

## 2025-03-27 ENCOUNTER — APPOINTMENT (OUTPATIENT)
Age: 70
Setting detail: DERMATOLOGY
End: 2025-03-28

## 2025-03-27 PROBLEM — C44.319 BASAL CELL CARCINOMA OF SKIN OF OTHER PARTS OF FACE: Status: ACTIVE | Noted: 2025-03-27

## 2025-03-27 PROBLEM — C44.629 SQUAMOUS CELL CARCINOMA OF SKIN OF LEFT UPPER LIMB, INCLUDING SHOULDER: Status: ACTIVE | Noted: 2025-03-27

## 2025-03-27 PROCEDURE — 77280 THER RAD SIMULAJ FIELD SMPL: CPT

## 2025-03-27 PROCEDURE — G6001 ECHO GUIDANCE RADIOTHERAPY: HCPCS | Mod: XU

## 2025-03-27 PROCEDURE — OTHER IMAGE GUIDED - SUPERFICIAL RADIOTHERAPY: TREATMENT VISIT: OTHER

## 2025-03-27 PROCEDURE — OTHER IMAGE GUIDED - SUPERFICIAL RADIOTHERAPY: OTHER

## 2025-03-27 PROCEDURE — 77401 RADIATION TX DELIVERY SUPFC: CPT

## 2025-03-27 NOTE — PROCEDURE: IMAGE GUIDED - SUPERFICIAL RADIOTHERAPY: TREATMENT VISIT
Additional Comments (Add Customization Of Note Here): Patient treatments will continue as planned.
Additional Change To Daily Dosage Administered Mid Treatment?: No
Prescription Used: 1
Bill For Treatment (87174)?: Yes
Calculate Total Cumulative Dose Automatically Or Manually: Manually
Fraction Number: 6
Bill For Simulation (Per Medicare, Typical Course Of Radiation Therapy Will Require Between One To Three Simulations): Yes- (Simple- 1 Site: 98857)
Energy (Kv): 70
Daily Dosage (Cgy): 273.48
Ultrasound Used Text: High frequency ultrasound depth is 1.01mm, which is 0.21mm in difference from previous imaging. Repop. ++
Ultrasound Not Used Text: Ultrasound was not performed today due to
Total Cumulative Dose (Cgy): 1640.88
Treatment Documentation: Per the request of Dr. Robin, the patient was seen today for Superficial Radiation Therapy planning requiring initial This patient has been treated today with image-guided superficial radiation therapy for non-melanoma skin cancer. Written informed consent has been previously obtained from this patient for this treatment. This consent is documented in the patient's chart. The patient gave verbal consent to continue treatment today. The patient was treated with a specific radiation dose and setup as prescribed by the provider listed on this visit note. A Radiation Therapist performed administration of radiation under the supervision of a provider. The treatment parameters and cumulative dose are indicated above. Prior to administering the radiation, the patient underwent a verification therapeutic radiology simulation-aided field setting defining relevant normal and abnormal target anatomy and acquiring images with separate and distinct diagnostic high-frequency ultrasound to delineate tissues and determine whether to proceed with delivery of therapeutic, in addition to retrieve data necessary to develop an optimal radiation treatment process for the patient. The field placement simulation documents any change seen in overall tumor volume documented in the patient’s record, allows the clinician to indicate any needed changes in the treatment plan and/or prescription, provides diagnostic evaluation as the basis for performing the therapeutic procedure, and clearly identifies the information needed to decide to proceed with the therapeutic procedure. This process includes verification of the treatment port(s) and proper treatment positioning. All treatment ports were photographed within electronic medical records. The patient's lead blocking along with gross tumor volume and margin was confirmed. Considering superficial radiotherapy is clinical in setup, this requires the physician and radiation therapist to clarify the location interest being treated against initial images, ultrasound, pathology, and patient anatomy. Care was taken to ensure marcos treated were geometrically accurate and properly positioned using therapeutic radiology simulation-aided field setting verification per fraction. This process is also utilized to determine if any prescription or setup changes are necessary. These steps are therefore medically necessary to ensure safe and effective administration of radiation. Ongoing therapeutic radiology simulation-aided field setting verification is ordered throughout the course of therapy.\\n\\nA high-frequency ultrasound image was acquired today for a two-dimensional evaluation of the tumor volume, depth, width, breadth, review of prior response to treatment, provide geometric accuracy of field placement, and determine whether to proceed with therapeutic delivery. \\n\\nThe field placement and ultrasound imaging, per fraction, is separate and distinct from the initial simulation and is an important task in providing safe administration of superficial radiation therapy. Physician evaluation of the ultrasound information will be ongoing throughout the course of treatment and is deemed medically necessary to ensure the efficacy of treatment, whether to proceed with therapeutic delivery, and determine any necessary changes. Today's images were evaluated for determination of continuation of treatment with the current plan or with necessary changes as appropriate. Additionally, the use of ultrasound visualization and targeted assessment allows the patient to be able to see their cancer(s) progress, encouraging the patient to complete and maintain compliance through the full course of prescribed radiotherapy. Per Dr. Robin, continued ultrasound guidance and therapeutic radiology simulation-aided field setting verification per fraction is required for field placement, measurement of tumor depth, tissue evaluation, progress, acute effect monitoring, and determination for therapeutic treatment delivery is appropriate.
Add X Modifier?: GODINEZ - Unusual Non-Overlapping Service
Ultrasound Used Text: High frequency ultrasound depth is 1.13mm, which is 0.22mm in difference from previous imaging. Repop. ++
Daily Dosage (Cgy): 276.36
Total Cumulative Dose (Cgy): 1658.16

## 2025-03-29 ENCOUNTER — APPOINTMENT (OUTPATIENT)
Age: 70
Setting detail: DERMATOLOGY
End: 2025-03-29

## 2025-03-29 PROBLEM — C44.629 SQUAMOUS CELL CARCINOMA OF SKIN OF LEFT UPPER LIMB, INCLUDING SHOULDER: Status: ACTIVE | Noted: 2025-03-29

## 2025-03-29 PROBLEM — C44.319 BASAL CELL CARCINOMA OF SKIN OF OTHER PARTS OF FACE: Status: ACTIVE | Noted: 2025-03-29

## 2025-03-29 PROCEDURE — 77336 RADIATION PHYSICS CONSULT: CPT

## 2025-03-29 PROCEDURE — OTHER IMAGE GUIDED - SUPERFICIAL RADIOTHERAPY: OTHER

## 2025-03-31 ENCOUNTER — APPOINTMENT (OUTPATIENT)
Age: 70
Setting detail: DERMATOLOGY
End: 2025-04-01

## 2025-03-31 PROBLEM — C44.319 BASAL CELL CARCINOMA OF SKIN OF OTHER PARTS OF FACE: Status: ACTIVE | Noted: 2025-03-31

## 2025-03-31 PROBLEM — C44.629 SQUAMOUS CELL CARCINOMA OF SKIN OF LEFT UPPER LIMB, INCLUDING SHOULDER: Status: ACTIVE | Noted: 2025-03-31

## 2025-03-31 PROCEDURE — OTHER IMAGE GUIDED - SUPERFICIAL RADIOTHERAPY: TREATMENT VISIT: OTHER

## 2025-03-31 PROCEDURE — G6001 ECHO GUIDANCE RADIOTHERAPY: HCPCS | Mod: XU

## 2025-03-31 PROCEDURE — 77401 RADIATION TX DELIVERY SUPFC: CPT

## 2025-03-31 PROCEDURE — 77280 THER RAD SIMULAJ FIELD SMPL: CPT

## 2025-03-31 PROCEDURE — OTHER IMAGE GUIDED - SUPERFICIAL RADIOTHERAPY: OTHER

## 2025-03-31 NOTE — PROCEDURE: IMAGE GUIDED - SUPERFICIAL RADIOTHERAPY: TREATMENT VISIT
Additional Comments (Add Customization Of Note Here): Treatment area is tolerating treatments well.
Additional Change To Daily Dosage Administered Mid Treatment?: No
Prescription Used: 1
Bill For Treatment (51059)?: Yes
Calculate Total Cumulative Dose Automatically Or Manually: Manually
Fraction Number: 7
Bill For Simulation (Per Medicare, Typical Course Of Radiation Therapy Will Require Between One To Three Simulations): Yes- (Simple- 1 Site: 52035)
Energy (Kv): 70
Daily Dosage (Cgy): 273.48
Ultrasound Used Text: High frequency ultrasound depth is 1.13mm, which is 0.12mm in difference from previous imaging. Repop. ++
Ultrasound Not Used Text: Ultrasound was not performed today due to
Total Cumulative Dose (Cgy): 1914.36
Treatment Documentation: Per the request of Dr. Robin, the patient was seen today for Superficial Radiation Therapy planning requiring initial This patient has been treated today with image-guided superficial radiation therapy for non-melanoma skin cancer. Written informed consent has been previously obtained from this patient for this treatment. This consent is documented in the patient's chart. The patient gave verbal consent to continue treatment today. The patient was treated with a specific radiation dose and setup as prescribed by the provider listed on this visit note. A Radiation Therapist performed administration of radiation under the supervision of a provider. The treatment parameters and cumulative dose are indicated above. Prior to administering the radiation, the patient underwent a verification therapeutic radiology simulation-aided field setting defining relevant normal and abnormal target anatomy and acquiring images with separate and distinct diagnostic high-frequency ultrasound to delineate tissues and determine whether to proceed with delivery of therapeutic, in addition to retrieve data necessary to develop an optimal radiation treatment process for the patient. The field placement simulation documents any change seen in overall tumor volume documented in the patient’s record, allows the clinician to indicate any needed changes in the treatment plan and/or prescription, provides diagnostic evaluation as the basis for performing the therapeutic procedure, and clearly identifies the information needed to decide to proceed with the therapeutic procedure. This process includes verification of the treatment port(s) and proper treatment positioning. All treatment ports were photographed within electronic medical records. The patient's lead blocking along with gross tumor volume and margin was confirmed. Considering superficial radiotherapy is clinical in setup, this requires the physician and radiation therapist to clarify the location interest being treated against initial images, ultrasound, pathology, and patient anatomy. Care was taken to ensure marcos treated were geometrically accurate and properly positioned using therapeutic radiology simulation-aided field setting verification per fraction. This process is also utilized to determine if any prescription or setup changes are necessary. These steps are therefore medically necessary to ensure safe and effective administration of radiation. Ongoing therapeutic radiology simulation-aided field setting verification is ordered throughout the course of therapy.\\n\\nA high-frequency ultrasound image was acquired today for a two-dimensional evaluation of the tumor volume, depth, width, breadth, review of prior response to treatment, provide geometric accuracy of field placement, and determine whether to proceed with therapeutic delivery. \\n\\nThe field placement and ultrasound imaging, per fraction, is separate and distinct from the initial simulation and is an important task in providing safe administration of superficial radiation therapy. Physician evaluation of the ultrasound information will be ongoing throughout the course of treatment and is deemed medically necessary to ensure the efficacy of treatment, whether to proceed with therapeutic delivery, and determine any necessary changes. Today's images were evaluated for determination of continuation of treatment with the current plan or with necessary changes as appropriate. Additionally, the use of ultrasound visualization and targeted assessment allows the patient to be able to see their cancer(s) progress, encouraging the patient to complete and maintain compliance through the full course of prescribed radiotherapy. Per Dr. Robin, continued ultrasound guidance and therapeutic radiology simulation-aided field setting verification per fraction is required for field placement, measurement of tumor depth, tissue evaluation, progress, acute effect monitoring, and determination for therapeutic treatment delivery is appropriate.
Add X Modifier?: GODINEZ - Unusual Non-Overlapping Service
Ultrasound Used Text: High frequency ultrasound depth is 1.28mm, which is 0.15mm in difference from previous imaging. Repop. ++
Daily Dosage (Cgy): 276.36
Additional Comments (Add Customization Of Note Here): Patient reports treatment area is red with mild burning.  Applied heavy aquaphor coating to treatment area prior to patient leaving.  Requested patient to use aquaphor 3 x daily.  Patient is using an electric razor and suggested he stop using it over treatment area.
Total Cumulative Dose (Cgy): 1934.52

## 2025-04-01 ENCOUNTER — APPOINTMENT (OUTPATIENT)
Age: 70
Setting detail: DERMATOLOGY
End: 2025-04-01

## 2025-04-01 PROBLEM — C44.319 BASAL CELL CARCINOMA OF SKIN OF OTHER PARTS OF FACE: Status: ACTIVE | Noted: 2025-04-01

## 2025-04-01 PROBLEM — C44.629 SQUAMOUS CELL CARCINOMA OF SKIN OF LEFT UPPER LIMB, INCLUDING SHOULDER: Status: ACTIVE | Noted: 2025-04-01

## 2025-04-01 PROCEDURE — 77280 THER RAD SIMULAJ FIELD SMPL: CPT

## 2025-04-01 PROCEDURE — G6001 ECHO GUIDANCE RADIOTHERAPY: HCPCS | Mod: XU

## 2025-04-01 PROCEDURE — OTHER IMAGE GUIDED - SUPERFICIAL RADIOTHERAPY: OTHER

## 2025-04-01 PROCEDURE — OTHER IMAGE GUIDED - SUPERFICIAL RADIOTHERAPY: TREATMENT VISIT: OTHER

## 2025-04-01 PROCEDURE — 77401 RADIATION TX DELIVERY SUPFC: CPT

## 2025-04-01 NOTE — PROCEDURE: IMAGE GUIDED - SUPERFICIAL RADIOTHERAPY: TREATMENT VISIT
Additional Comments (Add Customization Of Note Here): Patients presents for fraction #8 of 20.
Additional Change To Daily Dosage Administered Mid Treatment?: No
Prescription Used: 1
Bill For Treatment (16664)?: Yes
Calculate Total Cumulative Dose Automatically Or Manually: Manually
Fraction Number: 8
Bill For Simulation (Per Medicare, Typical Course Of Radiation Therapy Will Require Between One To Three Simulations): Yes- (Simple- 1 Site: 38328)
Energy (Kv): 70
Daily Dosage (Cgy): 273.48
Ultrasound Used Text: High frequency ultrasound depth is 0.73mm, which is 0.40mm in difference from previous imaging. Repop. ++
Ultrasound Not Used Text: Ultrasound was not performed today due to
Total Cumulative Dose (Cgy): 2187.84
Treatment Documentation: Per the request of Dr. Robin, the patient was seen today for Superficial Radiation Therapy planning requiring initial This patient has been treated today with image-guided superficial radiation therapy for non-melanoma skin cancer. Written informed consent has been previously obtained from this patient for this treatment. This consent is documented in the patient's chart. The patient gave verbal consent to continue treatment today. The patient was treated with a specific radiation dose and setup as prescribed by the provider listed on this visit note. A Radiation Therapist performed administration of radiation under the supervision of a provider. The treatment parameters and cumulative dose are indicated above. Prior to administering the radiation, the patient underwent a verification therapeutic radiology simulation-aided field setting defining relevant normal and abnormal target anatomy and acquiring images with separate and distinct diagnostic high-frequency ultrasound to delineate tissues and determine whether to proceed with delivery of therapeutic, in addition to retrieve data necessary to develop an optimal radiation treatment process for the patient. The field placement simulation documents any change seen in overall tumor volume documented in the patient’s record, allows the clinician to indicate any needed changes in the treatment plan and/or prescription, provides diagnostic evaluation as the basis for performing the therapeutic procedure, and clearly identifies the information needed to decide to proceed with the therapeutic procedure. This process includes verification of the treatment port(s) and proper treatment positioning. All treatment ports were photographed within electronic medical records. The patient's lead blocking along with gross tumor volume and margin was confirmed. Considering superficial radiotherapy is clinical in setup, this requires the physician and radiation therapist to clarify the location interest being treated against initial images, ultrasound, pathology, and patient anatomy. Care was taken to ensure marcos treated were geometrically accurate and properly positioned using therapeutic radiology simulation-aided field setting verification per fraction. This process is also utilized to determine if any prescription or setup changes are necessary. These steps are therefore medically necessary to ensure safe and effective administration of radiation. Ongoing therapeutic radiology simulation-aided field setting verification is ordered throughout the course of therapy.\\n\\nA high-frequency ultrasound image was acquired today for a two-dimensional evaluation of the tumor volume, depth, width, breadth, review of prior response to treatment, provide geometric accuracy of field placement, and determine whether to proceed with therapeutic delivery. \\n\\nThe field placement and ultrasound imaging, per fraction, is separate and distinct from the initial simulation and is an important task in providing safe administration of superficial radiation therapy. Physician evaluation of the ultrasound information will be ongoing throughout the course of treatment and is deemed medically necessary to ensure the efficacy of treatment, whether to proceed with therapeutic delivery, and determine any necessary changes. Today's images were evaluated for determination of continuation of treatment with the current plan or with necessary changes as appropriate. Additionally, the use of ultrasound visualization and targeted assessment allows the patient to be able to see their cancer(s) progress, encouraging the patient to complete and maintain compliance through the full course of prescribed radiotherapy. Per Dr. Robin, continued ultrasound guidance and therapeutic radiology simulation-aided field setting verification per fraction is required for field placement, measurement of tumor depth, tissue evaluation, progress, acute effect monitoring, and determination for therapeutic treatment delivery is appropriate.
Add X Modifier?: GODINEZ - Unusual Non-Overlapping Service
Ultrasound Used Text: High frequency ultrasound depth is 1.38mm, which is 0.10mm in difference from previous imaging. Repop. ++
Daily Dosage (Cgy): 276.36
Additional Comments (Add Customization Of Note Here): Patient area is slightly improved today.  Continue as planned.
Total Cumulative Dose (Cgy): 2210.88

## 2025-04-03 ENCOUNTER — APPOINTMENT (OUTPATIENT)
Age: 70
Setting detail: DERMATOLOGY
End: 2025-04-03

## 2025-04-03 PROBLEM — C44.629 SQUAMOUS CELL CARCINOMA OF SKIN OF LEFT UPPER LIMB, INCLUDING SHOULDER: Status: ACTIVE | Noted: 2025-04-03

## 2025-04-03 PROBLEM — C44.319 BASAL CELL CARCINOMA OF SKIN OF OTHER PARTS OF FACE: Status: ACTIVE | Noted: 2025-04-03

## 2025-04-03 PROCEDURE — G6001 ECHO GUIDANCE RADIOTHERAPY: HCPCS | Mod: XU

## 2025-04-03 PROCEDURE — 77280 THER RAD SIMULAJ FIELD SMPL: CPT

## 2025-04-03 PROCEDURE — 77401 RADIATION TX DELIVERY SUPFC: CPT

## 2025-04-03 PROCEDURE — OTHER IMAGE GUIDED - SUPERFICIAL RADIOTHERAPY: OTHER

## 2025-04-03 PROCEDURE — OTHER IMAGE GUIDED - SUPERFICIAL RADIOTHERAPY: TREATMENT VISIT: OTHER

## 2025-04-03 NOTE — PROCEDURE: IMAGE GUIDED - SUPERFICIAL RADIOTHERAPY: TREATMENT VISIT
Additional Comments (Add Customization Of Note Here): Patient states are going well.
Additional Change To Daily Dosage Administered Mid Treatment?: No
Prescription Used: 1
Bill For Treatment (86711)?: Yes
Calculate Total Cumulative Dose Automatically Or Manually: Manually
Fraction Number: 9
Bill For Simulation (Per Medicare, Typical Course Of Radiation Therapy Will Require Between One To Three Simulations): Yes- (Simple- 1 Site: 34993)
Energy (Kv): 70
Daily Dosage (Cgy): 273.48
Ultrasound Used Text: High frequency ultrasound depth is 1.03mm, which is 0.30mm in difference from previous imaging. Repop. ++
Ultrasound Not Used Text: Ultrasound was not performed today due to
Total Cumulative Dose (Cgy): 2461.32
Treatment Documentation: Per the request of Dr. Robin, the patient was seen today for Superficial Radiation Therapy planning requiring initial This patient has been treated today with image-guided superficial radiation therapy for non-melanoma skin cancer. Written informed consent has been previously obtained from this patient for this treatment. This consent is documented in the patient's chart. The patient gave verbal consent to continue treatment today. The patient was treated with a specific radiation dose and setup as prescribed by the provider listed on this visit note. A Radiation Therapist performed administration of radiation under the supervision of a provider. The treatment parameters and cumulative dose are indicated above. Prior to administering the radiation, the patient underwent a verification therapeutic radiology simulation-aided field setting defining relevant normal and abnormal target anatomy and acquiring images with separate and distinct diagnostic high-frequency ultrasound to delineate tissues and determine whether to proceed with delivery of therapeutic, in addition to retrieve data necessary to develop an optimal radiation treatment process for the patient. The field placement simulation documents any change seen in overall tumor volume documented in the patient’s record, allows the clinician to indicate any needed changes in the treatment plan and/or prescription, provides diagnostic evaluation as the basis for performing the therapeutic procedure, and clearly identifies the information needed to decide to proceed with the therapeutic procedure. This process includes verification of the treatment port(s) and proper treatment positioning. All treatment ports were photographed within electronic medical records. The patient's lead blocking along with gross tumor volume and margin was confirmed. Considering superficial radiotherapy is clinical in setup, this requires the physician and radiation therapist to clarify the location interest being treated against initial images, ultrasound, pathology, and patient anatomy. Care was taken to ensure marcos treated were geometrically accurate and properly positioned using therapeutic radiology simulation-aided field setting verification per fraction. This process is also utilized to determine if any prescription or setup changes are necessary. These steps are therefore medically necessary to ensure safe and effective administration of radiation. Ongoing therapeutic radiology simulation-aided field setting verification is ordered throughout the course of therapy.\\n\\nA high-frequency ultrasound image was acquired today for a two-dimensional evaluation of the tumor volume, depth, width, breadth, review of prior response to treatment, provide geometric accuracy of field placement, and determine whether to proceed with therapeutic delivery. \\n\\nThe field placement and ultrasound imaging, per fraction, is separate and distinct from the initial simulation and is an important task in providing safe administration of superficial radiation therapy. Physician evaluation of the ultrasound information will be ongoing throughout the course of treatment and is deemed medically necessary to ensure the efficacy of treatment, whether to proceed with therapeutic delivery, and determine any necessary changes. Today's images were evaluated for determination of continuation of treatment with the current plan or with necessary changes as appropriate. Additionally, the use of ultrasound visualization and targeted assessment allows the patient to be able to see their cancer(s) progress, encouraging the patient to complete and maintain compliance through the full course of prescribed radiotherapy. Per Dr. Robin, continued ultrasound guidance and therapeutic radiology simulation-aided field setting verification per fraction is required for field placement, measurement of tumor depth, tissue evaluation, progress, acute effect monitoring, and determination for therapeutic treatment delivery is appropriate.
Add X Modifier?: GODINEZ - Unusual Non-Overlapping Service
Ultrasound Used Text: High frequency ultrasound depth is 1.64mm, which is 0.26mm in difference from previous imaging. Repop. ++
Daily Dosage (Cgy): 276.36
Additional Comments (Add Customization Of Note Here): Patient treatment area improving.
Total Cumulative Dose (Cgy): 2486.7

## 2025-04-07 ENCOUNTER — APPOINTMENT (OUTPATIENT)
Age: 70
Setting detail: DERMATOLOGY
End: 2025-04-10

## 2025-04-07 PROBLEM — C44.319 BASAL CELL CARCINOMA OF SKIN OF OTHER PARTS OF FACE: Status: ACTIVE | Noted: 2025-04-07

## 2025-04-07 PROBLEM — C44.629 SQUAMOUS CELL CARCINOMA OF SKIN OF LEFT UPPER LIMB, INCLUDING SHOULDER: Status: ACTIVE | Noted: 2025-04-07

## 2025-04-07 PROCEDURE — G6001 ECHO GUIDANCE RADIOTHERAPY: HCPCS | Mod: XU

## 2025-04-07 PROCEDURE — OTHER IMAGE GUIDED - SUPERFICIAL RADIOTHERAPY: OTHER

## 2025-04-07 PROCEDURE — 77280 THER RAD SIMULAJ FIELD SMPL: CPT

## 2025-04-07 PROCEDURE — OTHER IMAGE GUIDED - SUPERFICIAL RADIOTHERAPY: EVALUATION VISIT: OTHER

## 2025-04-07 PROCEDURE — 99212 OFFICE O/P EST SF 10 MIN: CPT | Mod: 25

## 2025-04-07 PROCEDURE — 77401 RADIATION TX DELIVERY SUPFC: CPT

## 2025-04-07 PROCEDURE — OTHER IMAGE GUIDED - SUPERFICIAL RADIOTHERAPY: TREATMENT VISIT: OTHER

## 2025-04-07 NOTE — PROCEDURE: IMAGE GUIDED - SUPERFICIAL RADIOTHERAPY: EVALUATION VISIT
Is This Visit For Evaluation During Treatment Or Follow Up Post Treatment?: evaluation
Bill For Ultrasound Evaluation (): No
Evaluation Plan: The patient is undergoing superficial radiation therapy for skin cancer and presents for weekly evaluation and management. Per protocol and as documented on the flow sheet, the patient was questioned as to subjective redness, pruritus, pain, drainage, fatigue, or any other symptoms. Objectively, the radiation area was evaluated with regards to erythema, atrophy, scale, crusting, erosion, ulceration, edema, purpura, tenderness, warmth, drainage, and any other findings. The plan was extensively reviewed including dose and dosing schedule. The simulation and clinical setup were also reviewed as were external and any internal shields and based on this review the appropriateness and sufficiency of treatment was determined.
Radiation Therapy Oncology Group (Rtog) Score: 1
Ultrasound Used Text: Ultrasound depth is mm.
Assessment: Appropriate reaction
Ultrasound Not Used Text: Ultrasound was not performed today due to
Radiation Therapy Oncology Group (Rtog) Score: 2
Additional Comments (Add Customization Of Note Here): Provided cicaplast samples to use 2x daily along with aquaphor.

## 2025-04-07 NOTE — PROCEDURE: IMAGE GUIDED - SUPERFICIAL RADIOTHERAPY: TREATMENT VISIT
Additional Comments (Add Customization Of Note Here): Patient will see provider for OTV
Additional Change To Daily Dosage Administered Mid Treatment?: No
Prescription Used: 1
Bill For Treatment (19861)?: Yes
Calculate Total Cumulative Dose Automatically Or Manually: Manually
Fraction Number: 10
Bill For Simulation (Per Medicare, Typical Course Of Radiation Therapy Will Require Between One To Three Simulations): Yes- (Simple- 1 Site: 07347)
Energy (Kv): 70
Daily Dosage (Cgy): 273.48
Ultrasound Used Text: High frequency ultrasound depth is 1.03mm, which is 0.0mm in difference from previous imaging. Repop. ++
Ultrasound Not Used Text: Ultrasound was not performed today due to
Total Cumulative Dose (Cgy): 2734.8
Treatment Documentation: Per the request of Dr. Robin, the patient was seen today for Superficial Radiation Therapy planning requiring initial This patient has been treated today with image-guided superficial radiation therapy for non-melanoma skin cancer. Written informed consent has been previously obtained from this patient for this treatment. This consent is documented in the patient's chart. The patient gave verbal consent to continue treatment today. The patient was treated with a specific radiation dose and setup as prescribed by the provider listed on this visit note. A Radiation Therapist performed administration of radiation under the supervision of a provider. The treatment parameters and cumulative dose are indicated above. Prior to administering the radiation, the patient underwent a verification therapeutic radiology simulation-aided field setting defining relevant normal and abnormal target anatomy and acquiring images with separate and distinct diagnostic high-frequency ultrasound to delineate tissues and determine whether to proceed with delivery of therapeutic, in addition to retrieve data necessary to develop an optimal radiation treatment process for the patient. The field placement simulation documents any change seen in overall tumor volume documented in the patient’s record, allows the clinician to indicate any needed changes in the treatment plan and/or prescription, provides diagnostic evaluation as the basis for performing the therapeutic procedure, and clearly identifies the information needed to decide to proceed with the therapeutic procedure. This process includes verification of the treatment port(s) and proper treatment positioning. All treatment ports were photographed within electronic medical records. The patient's lead blocking along with gross tumor volume and margin was confirmed. Considering superficial radiotherapy is clinical in setup, this requires the physician and radiation therapist to clarify the location interest being treated against initial images, ultrasound, pathology, and patient anatomy. Care was taken to ensure marcos treated were geometrically accurate and properly positioned using therapeutic radiology simulation-aided field setting verification per fraction. This process is also utilized to determine if any prescription or setup changes are necessary. These steps are therefore medically necessary to ensure safe and effective administration of radiation. Ongoing therapeutic radiology simulation-aided field setting verification is ordered throughout the course of therapy.\\n\\nA high-frequency ultrasound image was acquired today for a two-dimensional evaluation of the tumor volume, depth, width, breadth, review of prior response to treatment, provide geometric accuracy of field placement, and determine whether to proceed with therapeutic delivery. \\n\\nThe field placement and ultrasound imaging, per fraction, is separate and distinct from the initial simulation and is an important task in providing safe administration of superficial radiation therapy. Physician evaluation of the ultrasound information will be ongoing throughout the course of treatment and is deemed medically necessary to ensure the efficacy of treatment, whether to proceed with therapeutic delivery, and determine any necessary changes. Today's images were evaluated for determination of continuation of treatment with the current plan or with necessary changes as appropriate. Additionally, the use of ultrasound visualization and targeted assessment allows the patient to be able to see their cancer(s) progress, encouraging the patient to complete and maintain compliance through the full course of prescribed radiotherapy. Per Dr. Robin, continued ultrasound guidance and therapeutic radiology simulation-aided field setting verification per fraction is required for field placement, measurement of tumor depth, tissue evaluation, progress, acute effect monitoring, and determination for therapeutic treatment delivery is appropriate.
Add X Modifier?: GODINEZ - Unusual Non-Overlapping Service
Ultrasound Used Text: High frequency ultrasound depth is 1.64mm, which is 0.0mm in difference from previous imaging. Repop. ++
Daily Dosage (Cgy): 276.36
Total Cumulative Dose (Cgy): 2763.06

## 2025-04-08 ENCOUNTER — APPOINTMENT (OUTPATIENT)
Age: 70
Setting detail: DERMATOLOGY
End: 2025-04-11

## 2025-04-08 PROBLEM — C44.319 BASAL CELL CARCINOMA OF SKIN OF OTHER PARTS OF FACE: Status: ACTIVE | Noted: 2025-04-08

## 2025-04-08 PROBLEM — C44.629 SQUAMOUS CELL CARCINOMA OF SKIN OF LEFT UPPER LIMB, INCLUDING SHOULDER: Status: ACTIVE | Noted: 2025-04-08

## 2025-04-08 PROCEDURE — OTHER IMAGE GUIDED - SUPERFICIAL RADIOTHERAPY: OTHER

## 2025-04-08 PROCEDURE — 77280 THER RAD SIMULAJ FIELD SMPL: CPT

## 2025-04-08 PROCEDURE — 77401 RADIATION TX DELIVERY SUPFC: CPT

## 2025-04-08 PROCEDURE — OTHER IMAGE GUIDED - SUPERFICIAL RADIOTHERAPY: TREATMENT VISIT: OTHER

## 2025-04-08 PROCEDURE — G6001 ECHO GUIDANCE RADIOTHERAPY: HCPCS | Mod: XU

## 2025-04-08 NOTE — PROCEDURE: IMAGE GUIDED - SUPERFICIAL RADIOTHERAPY: TREATMENT VISIT
Additional Comments (Add Customization Of Note Here): Reminded patient to use aquaphor routinely on the forearm also.  \\nTreatments will continue as planned. 
Additional Change To Daily Dosage Administered Mid Treatment?: No
Prescription Used: 1
Bill For Treatment (44383)?: Yes
Calculate Total Cumulative Dose Automatically Or Manually: Manually
Fraction Number: 11
Bill For Simulation (Per Medicare, Typical Course Of Radiation Therapy Will Require Between One To Three Simulations): Yes- (Simple- 1 Site: 71010)
Energy (Kv): 70
Daily Dosage (Cgy): 273.48
Ultrasound Used Text: High frequency ultrasound depth is 1.13mm, which is 0.10mm in difference from previous imaging. Repop. ++
Ultrasound Not Used Text: Ultrasound was not performed today due to
Total Cumulative Dose (Cgy): 3008.28
Treatment Documentation: Per the request of Dr. Robin, the patient was seen today for Superficial Radiation Therapy planning requiring initial This patient has been treated today with image-guided superficial radiation therapy for non-melanoma skin cancer. Written informed consent has been previously obtained from this patient for this treatment. This consent is documented in the patient's chart. The patient gave verbal consent to continue treatment today. The patient was treated with a specific radiation dose and setup as prescribed by the provider listed on this visit note. A Radiation Therapist performed administration of radiation under the supervision of a provider. The treatment parameters and cumulative dose are indicated above. Prior to administering the radiation, the patient underwent a verification therapeutic radiology simulation-aided field setting defining relevant normal and abnormal target anatomy and acquiring images with separate and distinct diagnostic high-frequency ultrasound to delineate tissues and determine whether to proceed with delivery of therapeutic, in addition to retrieve data necessary to develop an optimal radiation treatment process for the patient. The field placement simulation documents any change seen in overall tumor volume documented in the patient’s record, allows the clinician to indicate any needed changes in the treatment plan and/or prescription, provides diagnostic evaluation as the basis for performing the therapeutic procedure, and clearly identifies the information needed to decide to proceed with the therapeutic procedure. This process includes verification of the treatment port(s) and proper treatment positioning. All treatment ports were photographed within electronic medical records. The patient's lead blocking along with gross tumor volume and margin was confirmed. Considering superficial radiotherapy is clinical in setup, this requires the physician and radiation therapist to clarify the location interest being treated against initial images, ultrasound, pathology, and patient anatomy. Care was taken to ensure marcos treated were geometrically accurate and properly positioned using therapeutic radiology simulation-aided field setting verification per fraction. This process is also utilized to determine if any prescription or setup changes are necessary. These steps are therefore medically necessary to ensure safe and effective administration of radiation. Ongoing therapeutic radiology simulation-aided field setting verification is ordered throughout the course of therapy.\\n\\nA high-frequency ultrasound image was acquired today for a two-dimensional evaluation of the tumor volume, depth, width, breadth, review of prior response to treatment, provide geometric accuracy of field placement, and determine whether to proceed with therapeutic delivery. \\n\\nThe field placement and ultrasound imaging, per fraction, is separate and distinct from the initial simulation and is an important task in providing safe administration of superficial radiation therapy. Physician evaluation of the ultrasound information will be ongoing throughout the course of treatment and is deemed medically necessary to ensure the efficacy of treatment, whether to proceed with therapeutic delivery, and determine any necessary changes. Today's images were evaluated for determination of continuation of treatment with the current plan or with necessary changes as appropriate. Additionally, the use of ultrasound visualization and targeted assessment allows the patient to be able to see their cancer(s) progress, encouraging the patient to complete and maintain compliance through the full course of prescribed radiotherapy. Per Dr. Robin, continued ultrasound guidance and therapeutic radiology simulation-aided field setting verification per fraction is required for field placement, measurement of tumor depth, tissue evaluation, progress, acute effect monitoring, and determination for therapeutic treatment delivery is appropriate.
Add X Modifier?: GODINEZ - Unusual Non-Overlapping Service
Ultrasound Used Text: High frequency ultrasound depth is 1.37mm, which is 0.27mm in difference from previous imaging. Repop. ++
Daily Dosage (Cgy): 276.36
Additional Comments (Add Customization Of Note Here): Treatment area improved today. \\nTreatments will continue as planned.  
Total Cumulative Dose (Cgy): 3039.42

## 2025-04-10 ENCOUNTER — APPOINTMENT (OUTPATIENT)
Age: 70
Setting detail: DERMATOLOGY
End: 2025-04-13

## 2025-04-10 PROBLEM — C44.319 BASAL CELL CARCINOMA OF SKIN OF OTHER PARTS OF FACE: Status: ACTIVE | Noted: 2025-04-10

## 2025-04-10 PROBLEM — C44.629 SQUAMOUS CELL CARCINOMA OF SKIN OF LEFT UPPER LIMB, INCLUDING SHOULDER: Status: ACTIVE | Noted: 2025-04-10

## 2025-04-10 PROCEDURE — 77280 THER RAD SIMULAJ FIELD SMPL: CPT

## 2025-04-10 PROCEDURE — G6001 ECHO GUIDANCE RADIOTHERAPY: HCPCS | Mod: XU

## 2025-04-10 PROCEDURE — OTHER IMAGE GUIDED - SUPERFICIAL RADIOTHERAPY: OTHER

## 2025-04-10 PROCEDURE — 77401 RADIATION TX DELIVERY SUPFC: CPT

## 2025-04-10 PROCEDURE — OTHER IMAGE GUIDED - SUPERFICIAL RADIOTHERAPY: TREATMENT VISIT: OTHER

## 2025-04-10 NOTE — PROCEDURE: IMAGE GUIDED - SUPERFICIAL RADIOTHERAPY: TREATMENT VISIT
Additional Comments (Add Customization Of Note Here): Patient will return next week for treatment as scheduled.
Additional Change To Daily Dosage Administered Mid Treatment?: No
Prescription Used: 1
Bill For Treatment (23756)?: Yes
Calculate Total Cumulative Dose Automatically Or Manually: Manually
Fraction Number: 12
Bill For Simulation (Per Medicare, Typical Course Of Radiation Therapy Will Require Between One To Three Simulations): Yes- (Simple- 1 Site: 48310)
Energy (Kv): 70
Daily Dosage (Cgy): 273.48
Ultrasound Used Text: High frequency ultrasound depth is 1.15mm, which is 0.02mm in difference from previous imaging. Repop. ++
Ultrasound Not Used Text: Ultrasound was not performed today due to
Total Cumulative Dose (Cgy): 3281.76
Treatment Documentation: Per the request of Dr. Robin, the patient was seen today for Superficial Radiation Therapy planning requiring initial This patient has been treated today with image-guided superficial radiation therapy for non-melanoma skin cancer. Written informed consent has been previously obtained from this patient for this treatment. This consent is documented in the patient's chart. The patient gave verbal consent to continue treatment today. The patient was treated with a specific radiation dose and setup as prescribed by the provider listed on this visit note. A Radiation Therapist performed administration of radiation under the supervision of a provider. The treatment parameters and cumulative dose are indicated above. Prior to administering the radiation, the patient underwent a verification therapeutic radiology simulation-aided field setting defining relevant normal and abnormal target anatomy and acquiring images with separate and distinct diagnostic high-frequency ultrasound to delineate tissues and determine whether to proceed with delivery of therapeutic, in addition to retrieve data necessary to develop an optimal radiation treatment process for the patient. The field placement simulation documents any change seen in overall tumor volume documented in the patient’s record, allows the clinician to indicate any needed changes in the treatment plan and/or prescription, provides diagnostic evaluation as the basis for performing the therapeutic procedure, and clearly identifies the information needed to decide to proceed with the therapeutic procedure. This process includes verification of the treatment port(s) and proper treatment positioning. All treatment ports were photographed within electronic medical records. The patient's lead blocking along with gross tumor volume and margin was confirmed. Considering superficial radiotherapy is clinical in setup, this requires the physician and radiation therapist to clarify the location interest being treated against initial images, ultrasound, pathology, and patient anatomy. Care was taken to ensure marcos treated were geometrically accurate and properly positioned using therapeutic radiology simulation-aided field setting verification per fraction. This process is also utilized to determine if any prescription or setup changes are necessary. These steps are therefore medically necessary to ensure safe and effective administration of radiation. Ongoing therapeutic radiology simulation-aided field setting verification is ordered throughout the course of therapy.\\n\\nA high-frequency ultrasound image was acquired today for a two-dimensional evaluation of the tumor volume, depth, width, breadth, review of prior response to treatment, provide geometric accuracy of field placement, and determine whether to proceed with therapeutic delivery. \\n\\nThe field placement and ultrasound imaging, per fraction, is separate and distinct from the initial simulation and is an important task in providing safe administration of superficial radiation therapy. Physician evaluation of the ultrasound information will be ongoing throughout the course of treatment and is deemed medically necessary to ensure the efficacy of treatment, whether to proceed with therapeutic delivery, and determine any necessary changes. Today's images were evaluated for determination of continuation of treatment with the current plan or with necessary changes as appropriate. Additionally, the use of ultrasound visualization and targeted assessment allows the patient to be able to see their cancer(s) progress, encouraging the patient to complete and maintain compliance through the full course of prescribed radiotherapy. Per Dr. Robin, continued ultrasound guidance and therapeutic radiology simulation-aided field setting verification per fraction is required for field placement, measurement of tumor depth, tissue evaluation, progress, acute effect monitoring, and determination for therapeutic treatment delivery is appropriate.
Add X Modifier?: GODINEZ - Unusual Non-Overlapping Service
Ultrasound Used Text: High frequency ultrasound depth is 1.04mm, which is 0.33mm in difference from previous imaging. Repop. ++
Daily Dosage (Cgy): 276.36
Additional Comments (Add Customization Of Note Here): Patient will return next week for treatments as scheduled.
Total Cumulative Dose (Cgy): 3315.78

## 2025-04-12 ENCOUNTER — APPOINTMENT (OUTPATIENT)
Age: 70
Setting detail: DERMATOLOGY
End: 2025-04-12

## 2025-04-12 PROBLEM — C44.319 BASAL CELL CARCINOMA OF SKIN OF OTHER PARTS OF FACE: Status: ACTIVE | Noted: 2025-04-12

## 2025-04-12 PROBLEM — C44.629 SQUAMOUS CELL CARCINOMA OF SKIN OF LEFT UPPER LIMB, INCLUDING SHOULDER: Status: ACTIVE | Noted: 2025-04-12

## 2025-04-12 PROCEDURE — OTHER IMAGE GUIDED - SUPERFICIAL RADIOTHERAPY: OTHER

## 2025-04-12 PROCEDURE — 77336 RADIATION PHYSICS CONSULT: CPT

## 2025-04-14 ENCOUNTER — APPOINTMENT (OUTPATIENT)
Age: 70
Setting detail: DERMATOLOGY
End: 2025-04-15

## 2025-04-14 PROBLEM — C44.629 SQUAMOUS CELL CARCINOMA OF SKIN OF LEFT UPPER LIMB, INCLUDING SHOULDER: Status: ACTIVE | Noted: 2025-04-14

## 2025-04-14 PROBLEM — C44.319 BASAL CELL CARCINOMA OF SKIN OF OTHER PARTS OF FACE: Status: ACTIVE | Noted: 2025-04-14

## 2025-04-14 PROCEDURE — G6001 ECHO GUIDANCE RADIOTHERAPY: HCPCS | Mod: XU

## 2025-04-14 PROCEDURE — 77280 THER RAD SIMULAJ FIELD SMPL: CPT

## 2025-04-14 PROCEDURE — 77401 RADIATION TX DELIVERY SUPFC: CPT

## 2025-04-14 PROCEDURE — OTHER IMAGE GUIDED - SUPERFICIAL RADIOTHERAPY: OTHER

## 2025-04-14 PROCEDURE — OTHER IMAGE GUIDED - SUPERFICIAL RADIOTHERAPY: TREATMENT VISIT: OTHER

## 2025-04-14 NOTE — PROCEDURE: IMAGE GUIDED - SUPERFICIAL RADIOTHERAPY: TREATMENT VISIT
Additional Comments (Add Customization Of Note Here): No changes from previous
Additional Change To Daily Dosage Administered Mid Treatment?: No
Prescription Used: 1
Bill For Treatment (26871)?: Yes
Calculate Total Cumulative Dose Automatically Or Manually: Manually
Fraction Number: 13
Bill For Simulation (Per Medicare, Typical Course Of Radiation Therapy Will Require Between One To Three Simulations): Yes- (Simple- 1 Site: 69046)
Energy (Kv): 70
Daily Dosage (Cgy): 273.48
Ultrasound Used Text: High frequency ultrasound depth is 1.23mm, which is 0.08mm in difference from previous imaging. Repop. ++
Ultrasound Not Used Text: Ultrasound was not performed today due to
Total Cumulative Dose (Cgy): 3555.24
Treatment Documentation: Per the request of Dr. Robin, the patient was seen today for Superficial Radiation Therapy planning requiring initial This patient has been treated today with image-guided superficial radiation therapy for non-melanoma skin cancer. Written informed consent has been previously obtained from this patient for this treatment. This consent is documented in the patient's chart. The patient gave verbal consent to continue treatment today. The patient was treated with a specific radiation dose and setup as prescribed by the provider listed on this visit note. A Radiation Therapist performed administration of radiation under the supervision of a provider. The treatment parameters and cumulative dose are indicated above. Prior to administering the radiation, the patient underwent a verification therapeutic radiology simulation-aided field setting defining relevant normal and abnormal target anatomy and acquiring images with separate and distinct diagnostic high-frequency ultrasound to delineate tissues and determine whether to proceed with delivery of therapeutic, in addition to retrieve data necessary to develop an optimal radiation treatment process for the patient. The field placement simulation documents any change seen in overall tumor volume documented in the patient’s record, allows the clinician to indicate any needed changes in the treatment plan and/or prescription, provides diagnostic evaluation as the basis for performing the therapeutic procedure, and clearly identifies the information needed to decide to proceed with the therapeutic procedure. This process includes verification of the treatment port(s) and proper treatment positioning. All treatment ports were photographed within electronic medical records. The patient's lead blocking along with gross tumor volume and margin was confirmed. Considering superficial radiotherapy is clinical in setup, this requires the physician and radiation therapist to clarify the location interest being treated against initial images, ultrasound, pathology, and patient anatomy. Care was taken to ensure marcos treated were geometrically accurate and properly positioned using therapeutic radiology simulation-aided field setting verification per fraction. This process is also utilized to determine if any prescription or setup changes are necessary. These steps are therefore medically necessary to ensure safe and effective administration of radiation. Ongoing therapeutic radiology simulation-aided field setting verification is ordered throughout the course of therapy.\\n\\nA high-frequency ultrasound image was acquired today for a two-dimensional evaluation of the tumor volume, depth, width, breadth, review of prior response to treatment, provide geometric accuracy of field placement, and determine whether to proceed with therapeutic delivery. \\n\\nThe field placement and ultrasound imaging, per fraction, is separate and distinct from the initial simulation and is an important task in providing safe administration of superficial radiation therapy. Physician evaluation of the ultrasound information will be ongoing throughout the course of treatment and is deemed medically necessary to ensure the efficacy of treatment, whether to proceed with therapeutic delivery, and determine any necessary changes. Today's images were evaluated for determination of continuation of treatment with the current plan or with necessary changes as appropriate. Additionally, the use of ultrasound visualization and targeted assessment allows the patient to be able to see their cancer(s) progress, encouraging the patient to complete and maintain compliance through the full course of prescribed radiotherapy. Per Dr. Robin, continued ultrasound guidance and therapeutic radiology simulation-aided field setting verification per fraction is required for field placement, measurement of tumor depth, tissue evaluation, progress, acute effect monitoring, and determination for therapeutic treatment delivery is appropriate.
Add X Modifier?: GODINEZ - Unusual Non-Overlapping Service
Ultrasound Used Text: High frequency ultrasound depth is 1.12mm, which is 0.08mm in difference from previous imaging. Repop. ++
Daily Dosage (Cgy): 276.36
Total Cumulative Dose (Cgy): 3592.68

## 2025-04-15 ENCOUNTER — APPOINTMENT (OUTPATIENT)
Age: 70
Setting detail: DERMATOLOGY
End: 2025-04-17

## 2025-04-15 PROBLEM — C44.319 BASAL CELL CARCINOMA OF SKIN OF OTHER PARTS OF FACE: Status: ACTIVE | Noted: 2025-04-15

## 2025-04-15 PROBLEM — C44.629 SQUAMOUS CELL CARCINOMA OF SKIN OF LEFT UPPER LIMB, INCLUDING SHOULDER: Status: ACTIVE | Noted: 2025-04-15

## 2025-04-15 PROCEDURE — OTHER IMAGE GUIDED - SUPERFICIAL RADIOTHERAPY: OTHER

## 2025-04-15 PROCEDURE — OTHER IMAGE GUIDED - SUPERFICIAL RADIOTHERAPY: TREATMENT VISIT: OTHER

## 2025-04-15 PROCEDURE — G6001 ECHO GUIDANCE RADIOTHERAPY: HCPCS | Mod: XU

## 2025-04-15 PROCEDURE — 77280 THER RAD SIMULAJ FIELD SMPL: CPT

## 2025-04-15 PROCEDURE — 77401 RADIATION TX DELIVERY SUPFC: CPT

## 2025-04-15 NOTE — PROCEDURE: IMAGE GUIDED - SUPERFICIAL RADIOTHERAPY: TREATMENT VISIT
Additional Comments (Add Customization Of Note Here): Ultrasound is showing increase due to local edema within the lesion area. \\nPT has no complaints
Additional Change To Daily Dosage Administered Mid Treatment?: No
Prescription Used: 1
Bill For Treatment (52486)?: Yes
Calculate Total Cumulative Dose Automatically Or Manually: Manually
Fraction Number: 14
Bill For Simulation (Per Medicare, Typical Course Of Radiation Therapy Will Require Between One To Three Simulations): Yes- (Simple- 1 Site: 73872)
Energy (Kv): 70
Daily Dosage (Cgy): 273.48
Ultrasound Used Text: High frequency ultrasound depth is 1.34mm, which is 0.08mm in difference from previous imaging. Repop. ++
Ultrasound Not Used Text: Ultrasound was not performed today due to
Total Cumulative Dose (Cgy): 3828.72
Treatment Documentation: Per the request of Dr. Robin, the patient was seen today for Superficial Radiation Therapy planning requiring initial This patient has been treated today with image-guided superficial radiation therapy for non-melanoma skin cancer. Written informed consent has been previously obtained from this patient for this treatment. This consent is documented in the patient's chart. The patient gave verbal consent to continue treatment today. The patient was treated with a specific radiation dose and setup as prescribed by the provider listed on this visit note. A Radiation Therapist performed administration of radiation under the supervision of a provider. The treatment parameters and cumulative dose are indicated above. Prior to administering the radiation, the patient underwent a verification therapeutic radiology simulation-aided field setting defining relevant normal and abnormal target anatomy and acquiring images with separate and distinct diagnostic high-frequency ultrasound to delineate tissues and determine whether to proceed with delivery of therapeutic, in addition to retrieve data necessary to develop an optimal radiation treatment process for the patient. The field placement simulation documents any change seen in overall tumor volume documented in the patient’s record, allows the clinician to indicate any needed changes in the treatment plan and/or prescription, provides diagnostic evaluation as the basis for performing the therapeutic procedure, and clearly identifies the information needed to decide to proceed with the therapeutic procedure. This process includes verification of the treatment port(s) and proper treatment positioning. All treatment ports were photographed within electronic medical records. The patient's lead blocking along with gross tumor volume and margin was confirmed. Considering superficial radiotherapy is clinical in setup, this requires the physician and radiation therapist to clarify the location interest being treated against initial images, ultrasound, pathology, and patient anatomy. Care was taken to ensure marcos treated were geometrically accurate and properly positioned using therapeutic radiology simulation-aided field setting verification per fraction. This process is also utilized to determine if any prescription or setup changes are necessary. These steps are therefore medically necessary to ensure safe and effective administration of radiation. Ongoing therapeutic radiology simulation-aided field setting verification is ordered throughout the course of therapy.\\n\\nA high-frequency ultrasound image was acquired today for a two-dimensional evaluation of the tumor volume, depth, width, breadth, review of prior response to treatment, provide geometric accuracy of field placement, and determine whether to proceed with therapeutic delivery. \\n\\nThe field placement and ultrasound imaging, per fraction, is separate and distinct from the initial simulation and is an important task in providing safe administration of superficial radiation therapy. Physician evaluation of the ultrasound information will be ongoing throughout the course of treatment and is deemed medically necessary to ensure the efficacy of treatment, whether to proceed with therapeutic delivery, and determine any necessary changes. Today's images were evaluated for determination of continuation of treatment with the current plan or with necessary changes as appropriate. Additionally, the use of ultrasound visualization and targeted assessment allows the patient to be able to see their cancer(s) progress, encouraging the patient to complete and maintain compliance through the full course of prescribed radiotherapy. Per Dr. Robin, continued ultrasound guidance and therapeutic radiology simulation-aided field setting verification per fraction is required for field placement, measurement of tumor depth, tissue evaluation, progress, acute effect monitoring, and determination for therapeutic treatment delivery is appropriate.
Add X Modifier?: GODINEZ - Unusual Non-Overlapping Service
Ultrasound Used Text: High frequency ultrasound depth is 1.40mm, which is 0.28mm in difference from previous imaging. Repop. ++
Daily Dosage (Cgy): 276.36
Additional Comments (Add Customization Of Note Here): Ultrasound is showing moderate increase in local lesion area; Patient concerned about swelling. Provider looked at the lesion and recommended the use of Silvadene to help with inflammation.
Total Cumulative Dose (Cgy): 3869.04

## 2025-04-17 ENCOUNTER — APPOINTMENT (OUTPATIENT)
Age: 70
Setting detail: DERMATOLOGY
End: 2025-04-22

## 2025-04-17 PROBLEM — C44.629 SQUAMOUS CELL CARCINOMA OF SKIN OF LEFT UPPER LIMB, INCLUDING SHOULDER: Status: ACTIVE | Noted: 2025-04-17

## 2025-04-17 PROBLEM — C44.319 BASAL CELL CARCINOMA OF SKIN OF OTHER PARTS OF FACE: Status: ACTIVE | Noted: 2025-04-17

## 2025-04-17 PROCEDURE — 77280 THER RAD SIMULAJ FIELD SMPL: CPT

## 2025-04-17 PROCEDURE — OTHER IMAGE GUIDED - SUPERFICIAL RADIOTHERAPY: EVALUATION VISIT: OTHER

## 2025-04-17 PROCEDURE — 77401 RADIATION TX DELIVERY SUPFC: CPT

## 2025-04-17 PROCEDURE — OTHER IMAGE GUIDED - SUPERFICIAL RADIOTHERAPY: TREATMENT VISIT: OTHER

## 2025-04-17 PROCEDURE — 99212 OFFICE O/P EST SF 10 MIN: CPT | Mod: 25

## 2025-04-17 PROCEDURE — G6001 ECHO GUIDANCE RADIOTHERAPY: HCPCS | Mod: XU

## 2025-04-17 PROCEDURE — OTHER IMAGE GUIDED - SUPERFICIAL RADIOTHERAPY: OTHER

## 2025-04-17 NOTE — PROCEDURE: IMAGE GUIDED - SUPERFICIAL RADIOTHERAPY: TREATMENT VISIT
Additional Comments (Add Customization Of Note Here): Ultrasound imaging reveals moderate edema, evidenced by an increase in overall tissue depth. The treatment area also demonstrates moderate erythema. Patient denies pain or discomfort and reports adherence to the prescribed skincare regimen.
Additional Change To Daily Dosage Administered Mid Treatment?: No
Prescription Used: 1
Bill For Treatment (54716)?: Yes
Calculate Total Cumulative Dose Automatically Or Manually: Manually
Fraction Number: 15
Bill For Simulation (Per Medicare, Typical Course Of Radiation Therapy Will Require Between One To Three Simulations): Yes- (Simple- 1 Site: 63003)
Energy (Kv): 70
Daily Dosage (Cgy): 273.48
Ultrasound Used Text: High frequency ultrasound depth is 1.42mm, which is 0.39mm in difference from previous imaging. Repop. ++
Ultrasound Not Used Text: Ultrasound was not performed today due to
Total Cumulative Dose (Cgy): 4102.20
Treatment Documentation: Per the request of Dr. Robin, the patient was seen today for Superficial Radiation Therapy planning requiring initial This patient has been treated today with image-guided superficial radiation therapy for non-melanoma skin cancer. Written informed consent has been previously obtained from this patient for this treatment. This consent is documented in the patient's chart. The patient gave verbal consent to continue treatment today. The patient was treated with a specific radiation dose and setup as prescribed by the provider listed on this visit note. A Radiation Therapist performed administration of radiation under the supervision of a provider. The treatment parameters and cumulative dose are indicated above. Prior to administering the radiation, the patient underwent a verification therapeutic radiology simulation-aided field setting defining relevant normal and abnormal target anatomy and acquiring images with separate and distinct diagnostic high-frequency ultrasound to delineate tissues and determine whether to proceed with delivery of therapeutic, in addition to retrieve data necessary to develop an optimal radiation treatment process for the patient. The field placement simulation documents any change seen in overall tumor volume documented in the patient’s record, allows the clinician to indicate any needed changes in the treatment plan and/or prescription, provides diagnostic evaluation as the basis for performing the therapeutic procedure, and clearly identifies the information needed to decide to proceed with the therapeutic procedure. This process includes verification of the treatment port(s) and proper treatment positioning. All treatment ports were photographed within electronic medical records. The patient's lead blocking along with gross tumor volume and margin was confirmed. Considering superficial radiotherapy is clinical in setup, this requires the physician and radiation therapist to clarify the location interest being treated against initial images, ultrasound, pathology, and patient anatomy. Care was taken to ensure marcos treated were geometrically accurate and properly positioned using therapeutic radiology simulation-aided field setting verification per fraction. This process is also utilized to determine if any prescription or setup changes are necessary. These steps are therefore medically necessary to ensure safe and effective administration of radiation. Ongoing therapeutic radiology simulation-aided field setting verification is ordered throughout the course of therapy.\\n\\nA high-frequency ultrasound image was acquired today for a two-dimensional evaluation of the tumor volume, depth, width, breadth, review of prior response to treatment, provide geometric accuracy of field placement, and determine whether to proceed with therapeutic delivery. \\n\\nThe field placement and ultrasound imaging, per fraction, is separate and distinct from the initial simulation and is an important task in providing safe administration of superficial radiation therapy. Physician evaluation of the ultrasound information will be ongoing throughout the course of treatment and is deemed medically necessary to ensure the efficacy of treatment, whether to proceed with therapeutic delivery, and determine any necessary changes. Today's images were evaluated for determination of continuation of treatment with the current plan or with necessary changes as appropriate. Additionally, the use of ultrasound visualization and targeted assessment allows the patient to be able to see their cancer(s) progress, encouraging the patient to complete and maintain compliance through the full course of prescribed radiotherapy. Per Dr. Robin, continued ultrasound guidance and therapeutic radiology simulation-aided field setting verification per fraction is required for field placement, measurement of tumor depth, tissue evaluation, progress, acute effect monitoring, and determination for therapeutic treatment delivery is appropriate.
Add X Modifier?: GODINEZ - Unusual Non-Overlapping Service
Ultrasound Used Text: High frequency ultrasound depth is 1.42mm, which is 0.22mm in difference from previous imaging. Repop. ++
Daily Dosage (Cgy): 276.36
Additional Comments (Add Customization Of Note Here): Ultrasound imaging indicates a reduction in lesion depth and a decrease in overall edema within the treatment area. The lesion demonstrates signs of moist desquamation. White vinegar and water soaks have been recommended for local care. Patient denies pain or discomfort and reports continued adherence to the prescribed skincare regimen.
Total Cumulative Dose (Cgy): 4145.40

## 2025-04-22 ENCOUNTER — APPOINTMENT (OUTPATIENT)
Age: 70
Setting detail: DERMATOLOGY
End: 2025-04-25

## 2025-04-22 PROBLEM — C44.319 BASAL CELL CARCINOMA OF SKIN OF OTHER PARTS OF FACE: Status: ACTIVE | Noted: 2025-04-22

## 2025-04-22 PROBLEM — C44.629 SQUAMOUS CELL CARCINOMA OF SKIN OF LEFT UPPER LIMB, INCLUDING SHOULDER: Status: ACTIVE | Noted: 2025-04-22

## 2025-04-22 PROCEDURE — 77280 THER RAD SIMULAJ FIELD SMPL: CPT

## 2025-04-22 PROCEDURE — OTHER IMAGE GUIDED - SUPERFICIAL RADIOTHERAPY: OTHER

## 2025-04-22 PROCEDURE — OTHER IMAGE GUIDED - SUPERFICIAL RADIOTHERAPY: TREATMENT VISIT: OTHER

## 2025-04-22 PROCEDURE — 77401 RADIATION TX DELIVERY SUPFC: CPT

## 2025-04-22 PROCEDURE — G6001 ECHO GUIDANCE RADIOTHERAPY: HCPCS | Mod: XU

## 2025-04-22 NOTE — PROCEDURE: IMAGE GUIDED - SUPERFICIAL RADIOTHERAPY: TREATMENT VISIT
Additional Change To Daily Dosage Administered Mid Treatment?: No
Prescription Used: 1
Bill For Treatment (77412)?: Yes
Calculate Total Cumulative Dose Automatically Or Manually: Manually
Fraction Number: 16
Bill For Simulation (Per Medicare, Typical Course Of Radiation Therapy Will Require Between One To Three Simulations): Yes- (Simple- 1 Site: 05439)
Energy (Kv): 70
Daily Dosage (Cgy): 273.48
Ultrasound Used Text: High frequency ultrasound depth is 1.50mm, which is 0.08mm in difference from previous imaging. Repop. ++
Ultrasound Not Used Text: Ultrasound was not performed today due to
Total Cumulative Dose (Cgy): 4375.68
Treatment Documentation: Per the request of Dr. Robin, the patient was seen today for Superficial Radiation Therapy planning requiring initial This patient has been treated today with image-guided superficial radiation therapy for non-melanoma skin cancer. Written informed consent has been previously obtained from this patient for this treatment. This consent is documented in the patient's chart. The patient gave verbal consent to continue treatment today. The patient was treated with a specific radiation dose and setup as prescribed by the provider listed on this visit note. A Radiation Therapist performed administration of radiation under the supervision of a provider. The treatment parameters and cumulative dose are indicated above. Prior to administering the radiation, the patient underwent a verification therapeutic radiology simulation-aided field setting defining relevant normal and abnormal target anatomy and acquiring images with separate and distinct diagnostic high-frequency ultrasound to delineate tissues and determine whether to proceed with delivery of therapeutic, in addition to retrieve data necessary to develop an optimal radiation treatment process for the patient. The field placement simulation documents any change seen in overall tumor volume documented in the patient’s record, allows the clinician to indicate any needed changes in the treatment plan and/or prescription, provides diagnostic evaluation as the basis for performing the therapeutic procedure, and clearly identifies the information needed to decide to proceed with the therapeutic procedure. This process includes verification of the treatment port(s) and proper treatment positioning. All treatment ports were photographed within electronic medical records. The patient's lead blocking along with gross tumor volume and margin was confirmed. Considering superficial radiotherapy is clinical in setup, this requires the physician and radiation therapist to clarify the location interest being treated against initial images, ultrasound, pathology, and patient anatomy. Care was taken to ensure marcos treated were geometrically accurate and properly positioned using therapeutic radiology simulation-aided field setting verification per fraction. This process is also utilized to determine if any prescription or setup changes are necessary. These steps are therefore medically necessary to ensure safe and effective administration of radiation. Ongoing therapeutic radiology simulation-aided field setting verification is ordered throughout the course of therapy.\\n\\nA high-frequency ultrasound image was acquired today for a two-dimensional evaluation of the tumor volume, depth, width, breadth, review of prior response to treatment, provide geometric accuracy of field placement, and determine whether to proceed with therapeutic delivery. \\n\\nThe field placement and ultrasound imaging, per fraction, is separate and distinct from the initial simulation and is an important task in providing safe administration of superficial radiation therapy. Physician evaluation of the ultrasound information will be ongoing throughout the course of treatment and is deemed medically necessary to ensure the efficacy of treatment, whether to proceed with therapeutic delivery, and determine any necessary changes. Today's images were evaluated for determination of continuation of treatment with the current plan or with necessary changes as appropriate. Additionally, the use of ultrasound visualization and targeted assessment allows the patient to be able to see their cancer(s) progress, encouraging the patient to complete and maintain compliance through the full course of prescribed radiotherapy. Per Dr. Robin, continued ultrasound guidance and therapeutic radiology simulation-aided field setting verification per fraction is required for field placement, measurement of tumor depth, tissue evaluation, progress, acute effect monitoring, and determination for therapeutic treatment delivery is appropriate.
Add X Modifier?: GODINEZ - Unusual Non-Overlapping Service
Ultrasound Used Text: High frequency ultrasound depth is 1.42mm, which is 0.22mm in difference from previous imaging. Repop. ++
Ultrasound Not Used Text: Ultrasound was not performed today due to the presence of an open lesion and significant tenderness in the area.
Treatment Documentation: This patient has been treated today with image-guided superficial radiation therapy for non-melanoma skin cancer. Written informed consent has been previously obtained from this patient for this treatment. This consent is documented in the patient’s chart. The patient gave verbal consent to continue treatment today. The patient was treated with a specific radiation dose and setup as prescribed by the provider listed on this visit note. A Radiation Therapist performed administration of radiation under the supervision of a provider. The treatment parameters and cumulative dose are indicated above. Prior to administering the radiation, the patient underwent a verification therapeutic radiology simulation-aided field setting defining relevant normal and abnormal target anatomy in addition to data necessary to develop an optimal radiation treatment process for the patient. The field placement simulation documents any change seen in overall tumor volume documented in the patient’s record, allows the clinician to indicate any needed changes in the treatment plan and/or prescription, provides diagnostic evaluation as the basis for performing the therapeutic procedure, and clearly identifies the information needed to decide to proceed with the therapeutic procedure. This process includes verification of the treatment port(s) and proper treatment positioning. All treatment ports were photographed within electronic medical records. The patient’s lead blocking along with gross tumor volume and margin was confirmed. Considering superficial radiotherapy is clinical in setup, this requires the physician and radiation therapist to clarify the location interest being treated against initial images, pathology, and patient anatomy. Care was taken to ensure marcos treated were geometrically accurate and properly positioned using therapeutic radiology simulation-aided field setting verification per fraction. This process is also utilized to determine if any prescription or setup changes are necessary. These steps are therefore medically necessary to ensure safe and effective administration of radiation. Ongoing therapeutic radiology simulation-aided field setting verification is ordered throughout the course of therapy. Per Dr. Robin, continued therapeutic radiology simulation-aided field setting verification per fraction is required for field placement.
Daily Dosage (Cgy): 276.36
Additional Comments (Add Customization Of Note Here): The patient is experiencing moist desquamation in the treatment area, which is currently too tender to tolerate the use of the ultrasound probe. Use of the probe would likely further aggravate the affected tissue. The provider evaluated the lesion and recommended continuing current wound care, including the application of vinegar soaks and Aquaphor to support healing.
Total Cumulative Dose (Cgy): 4421.76

## 2025-04-24 ENCOUNTER — APPOINTMENT (OUTPATIENT)
Age: 70
Setting detail: DERMATOLOGY
End: 2025-04-26

## 2025-04-24 PROBLEM — C44.629 SQUAMOUS CELL CARCINOMA OF SKIN OF LEFT UPPER LIMB, INCLUDING SHOULDER: Status: ACTIVE | Noted: 2025-04-24

## 2025-04-24 PROBLEM — C44.319 BASAL CELL CARCINOMA OF SKIN OF OTHER PARTS OF FACE: Status: ACTIVE | Noted: 2025-04-24

## 2025-04-24 PROCEDURE — OTHER IMAGE GUIDED - SUPERFICIAL RADIOTHERAPY: OTHER

## 2025-04-24 PROCEDURE — 77280 THER RAD SIMULAJ FIELD SMPL: CPT

## 2025-04-24 PROCEDURE — G6001 ECHO GUIDANCE RADIOTHERAPY: HCPCS | Mod: XU

## 2025-04-24 PROCEDURE — 77401 RADIATION TX DELIVERY SUPFC: CPT

## 2025-04-24 PROCEDURE — OTHER IMAGE GUIDED - SUPERFICIAL RADIOTHERAPY: TREATMENT VISIT: OTHER

## 2025-04-24 NOTE — PROCEDURE: IMAGE GUIDED - SUPERFICIAL RADIOTHERAPY: TREATMENT VISIT
Additional Comments (Add Customization Of Note Here): Ultrasound imaging indicates a reduction in swelling. The lesion appears to be healing well, with no scab formation noted. The area now demonstrates consistent, healthy epithelialization, suggestive of proper new skin growth.
Additional Change To Daily Dosage Administered Mid Treatment?: No
Prescription Used: 1
Bill For Treatment (34286)?: Yes
Calculate Total Cumulative Dose Automatically Or Manually: Manually
Fraction Number: 17
Bill For Simulation (Per Medicare, Typical Course Of Radiation Therapy Will Require Between One To Three Simulations): Yes- (Simple- 1 Site: 64243)
Energy (Kv): 70
Daily Dosage (Cgy): 273.48
Ultrasound Used Text: High frequency ultrasound depth is 1.26mm, which is 0.24mm in difference from previous imaging. Repop. ++
Ultrasound Not Used Text: Ultrasound was not performed today due to
Total Cumulative Dose (Cgy): 4649.16
Treatment Documentation: Per the request of Dr. Robin, the patient was seen today for Superficial Radiation Therapy planning requiring initial This patient has been treated today with image-guided superficial radiation therapy for non-melanoma skin cancer. Written informed consent has been previously obtained from this patient for this treatment. This consent is documented in the patient's chart. The patient gave verbal consent to continue treatment today. The patient was treated with a specific radiation dose and setup as prescribed by the provider listed on this visit note. A Radiation Therapist performed administration of radiation under the supervision of a provider. The treatment parameters and cumulative dose are indicated above. Prior to administering the radiation, the patient underwent a verification therapeutic radiology simulation-aided field setting defining relevant normal and abnormal target anatomy and acquiring images with separate and distinct diagnostic high-frequency ultrasound to delineate tissues and determine whether to proceed with delivery of therapeutic, in addition to retrieve data necessary to develop an optimal radiation treatment process for the patient. The field placement simulation documents any change seen in overall tumor volume documented in the patient’s record, allows the clinician to indicate any needed changes in the treatment plan and/or prescription, provides diagnostic evaluation as the basis for performing the therapeutic procedure, and clearly identifies the information needed to decide to proceed with the therapeutic procedure. This process includes verification of the treatment port(s) and proper treatment positioning. All treatment ports were photographed within electronic medical records. The patient's lead blocking along with gross tumor volume and margin was confirmed. Considering superficial radiotherapy is clinical in setup, this requires the physician and radiation therapist to clarify the location interest being treated against initial images, ultrasound, pathology, and patient anatomy. Care was taken to ensure marcos treated were geometrically accurate and properly positioned using therapeutic radiology simulation-aided field setting verification per fraction. This process is also utilized to determine if any prescription or setup changes are necessary. These steps are therefore medically necessary to ensure safe and effective administration of radiation. Ongoing therapeutic radiology simulation-aided field setting verification is ordered throughout the course of therapy.\\n\\nA high-frequency ultrasound image was acquired today for a two-dimensional evaluation of the tumor volume, depth, width, breadth, review of prior response to treatment, provide geometric accuracy of field placement, and determine whether to proceed with therapeutic delivery. \\n\\nThe field placement and ultrasound imaging, per fraction, is separate and distinct from the initial simulation and is an important task in providing safe administration of superficial radiation therapy. Physician evaluation of the ultrasound information will be ongoing throughout the course of treatment and is deemed medically necessary to ensure the efficacy of treatment, whether to proceed with therapeutic delivery, and determine any necessary changes. Today's images were evaluated for determination of continuation of treatment with the current plan or with necessary changes as appropriate. Additionally, the use of ultrasound visualization and targeted assessment allows the patient to be able to see their cancer(s) progress, encouraging the patient to complete and maintain compliance through the full course of prescribed radiotherapy. Per Dr. Robin, continued ultrasound guidance and therapeutic radiology simulation-aided field setting verification per fraction is required for field placement, measurement of tumor depth, tissue evaluation, progress, acute effect monitoring, and determination for therapeutic treatment delivery is appropriate.
Add X Modifier?: GODINEZ - Unusual Non-Overlapping Service
Ultrasound Used Text: High frequency ultrasound depth is 1.71mm, which is 0.29mm in difference from previous imaging. Repop. ++
Ultrasound Not Used Text: Ultrasound was not performed today due to the presence of an open lesion and significant tenderness in the area.
Treatment Documentation: This patient has been treated today with image-guided superficial radiation therapy for non-melanoma skin cancer. Written informed consent has been previously obtained from this patient for this treatment. This consent is documented in the patient’s chart. The patient gave verbal consent to continue treatment today. The patient was treated with a specific radiation dose and setup as prescribed by the provider listed on this visit note. A Radiation Therapist performed administration of radiation under the supervision of a provider. The treatment parameters and cumulative dose are indicated above. Prior to administering the radiation, the patient underwent a verification therapeutic radiology simulation-aided field setting defining relevant normal and abnormal target anatomy in addition to data necessary to develop an optimal radiation treatment process for the patient. The field placement simulation documents any change seen in overall tumor volume documented in the patient’s record, allows the clinician to indicate any needed changes in the treatment plan and/or prescription, provides diagnostic evaluation as the basis for performing the therapeutic procedure, and clearly identifies the information needed to decide to proceed with the therapeutic procedure. This process includes verification of the treatment port(s) and proper treatment positioning. All treatment ports were photographed within electronic medical records. The patient’s lead blocking along with gross tumor volume and margin was confirmed. Considering superficial radiotherapy is clinical in setup, this requires the physician and radiation therapist to clarify the location interest being treated against initial images, pathology, and patient anatomy. Care was taken to ensure marcos treated were geometrically accurate and properly positioned using therapeutic radiology simulation-aided field setting verification per fraction. This process is also utilized to determine if any prescription or setup changes are necessary. These steps are therefore medically necessary to ensure safe and effective administration of radiation. Ongoing therapeutic radiology simulation-aided field setting verification is ordered throughout the course of therapy. Per Dr. Robin, continued therapeutic radiology simulation-aided field setting verification per fraction is required for field placement.
Daily Dosage (Cgy): 276.36
Additional Comments (Add Customization Of Note Here): Ultrasound imaging shows moderate swelling in the treatment area; however, the lesion appears healthy and is healing well. The patient reports no discomfort and has subjectively noted improvement in the appearance of the area. He expressed satisfaction with the healing progress thus far.
Total Cumulative Dose (Cgy): 4698.12

## 2025-04-25 ENCOUNTER — APPOINTMENT (OUTPATIENT)
Age: 70
Setting detail: DERMATOLOGY
End: 2025-04-25

## 2025-04-25 PROBLEM — C44.319 BASAL CELL CARCINOMA OF SKIN OF OTHER PARTS OF FACE: Status: ACTIVE | Noted: 2025-04-25

## 2025-04-25 PROBLEM — C44.629 SQUAMOUS CELL CARCINOMA OF SKIN OF LEFT UPPER LIMB, INCLUDING SHOULDER: Status: ACTIVE | Noted: 2025-04-25

## 2025-04-25 PROCEDURE — OTHER IMAGE GUIDED - SUPERFICIAL RADIOTHERAPY: OTHER

## 2025-04-25 PROCEDURE — G6001 ECHO GUIDANCE RADIOTHERAPY: HCPCS | Mod: XU

## 2025-04-25 PROCEDURE — OTHER IMAGE GUIDED - SUPERFICIAL RADIOTHERAPY: TREATMENT VISIT: OTHER

## 2025-04-25 PROCEDURE — 77401 RADIATION TX DELIVERY SUPFC: CPT

## 2025-04-25 PROCEDURE — 77280 THER RAD SIMULAJ FIELD SMPL: CPT

## 2025-04-25 NOTE — PROCEDURE: IMAGE GUIDED - SUPERFICIAL RADIOTHERAPY: TREATMENT VISIT
Additional Change To Daily Dosage Administered Mid Treatment?: No
Prescription Used: 1
Bill For Treatment (66455)?: Yes
Calculate Total Cumulative Dose Automatically Or Manually: Manually
Fraction Number: 18
Bill For Simulation (Per Medicare, Typical Course Of Radiation Therapy Will Require Between One To Three Simulations): Yes- (Simple- 1 Site: 22938)
Energy (Kv): 70
Daily Dosage (Cgy): 273.48
Ultrasound Used Text: High frequency ultrasound depth is 1.41mm, which is 0.15mm in difference from previous imaging. Repop. ++
Ultrasound Not Used Text: Ultrasound was not performed today due to
Total Cumulative Dose (Cgy): 4922.64
Treatment Documentation: Per the request of Dr. Robin, the patient was seen today for Superficial Radiation Therapy planning requiring initial This patient has been treated today with image-guided superficial radiation therapy for non-melanoma skin cancer. Written informed consent has been previously obtained from this patient for this treatment. This consent is documented in the patient's chart. The patient gave verbal consent to continue treatment today. The patient was treated with a specific radiation dose and setup as prescribed by the provider listed on this visit note. A Radiation Therapist performed administration of radiation under the supervision of a provider. The treatment parameters and cumulative dose are indicated above. Prior to administering the radiation, the patient underwent a verification therapeutic radiology simulation-aided field setting defining relevant normal and abnormal target anatomy and acquiring images with separate and distinct diagnostic high-frequency ultrasound to delineate tissues and determine whether to proceed with delivery of therapeutic, in addition to retrieve data necessary to develop an optimal radiation treatment process for the patient. The field placement simulation documents any change seen in overall tumor volume documented in the patient’s record, allows the clinician to indicate any needed changes in the treatment plan and/or prescription, provides diagnostic evaluation as the basis for performing the therapeutic procedure, and clearly identifies the information needed to decide to proceed with the therapeutic procedure. This process includes verification of the treatment port(s) and proper treatment positioning. All treatment ports were photographed within electronic medical records. The patient's lead blocking along with gross tumor volume and margin was confirmed. Considering superficial radiotherapy is clinical in setup, this requires the physician and radiation therapist to clarify the location interest being treated against initial images, ultrasound, pathology, and patient anatomy. Care was taken to ensure marcos treated were geometrically accurate and properly positioned using therapeutic radiology simulation-aided field setting verification per fraction. This process is also utilized to determine if any prescription or setup changes are necessary. These steps are therefore medically necessary to ensure safe and effective administration of radiation. Ongoing therapeutic radiology simulation-aided field setting verification is ordered throughout the course of therapy.\\n\\nA high-frequency ultrasound image was acquired today for a two-dimensional evaluation of the tumor volume, depth, width, breadth, review of prior response to treatment, provide geometric accuracy of field placement, and determine whether to proceed with therapeutic delivery. \\n\\nThe field placement and ultrasound imaging, per fraction, is separate and distinct from the initial simulation and is an important task in providing safe administration of superficial radiation therapy. Physician evaluation of the ultrasound information will be ongoing throughout the course of treatment and is deemed medically necessary to ensure the efficacy of treatment, whether to proceed with therapeutic delivery, and determine any necessary changes. Today's images were evaluated for determination of continuation of treatment with the current plan or with necessary changes as appropriate. Additionally, the use of ultrasound visualization and targeted assessment allows the patient to be able to see their cancer(s) progress, encouraging the patient to complete and maintain compliance through the full course of prescribed radiotherapy. Per Dr. Robin, continued ultrasound guidance and therapeutic radiology simulation-aided field setting verification per fraction is required for field placement, measurement of tumor depth, tissue evaluation, progress, acute effect monitoring, and determination for therapeutic treatment delivery is appropriate.
Add X Modifier?: GODINEZ - Unusual Non-Overlapping Service
Ultrasound Used Text: High frequency ultrasound depth is 1.38mm, which is 0.33mm in difference from previous imaging. Repop. ++
Ultrasound Not Used Text: Ultrasound was not performed today due to the presence of an open lesion and significant tenderness in the area.
Daily Dosage (Cgy): 276.36
Total Cumulative Dose (Cgy): 4974.48

## 2025-04-26 ENCOUNTER — APPOINTMENT (OUTPATIENT)
Age: 70
Setting detail: DERMATOLOGY
End: 2025-04-26

## 2025-04-26 PROBLEM — C44.319 BASAL CELL CARCINOMA OF SKIN OF OTHER PARTS OF FACE: Status: ACTIVE | Noted: 2025-04-26

## 2025-04-26 PROBLEM — C44.629 SQUAMOUS CELL CARCINOMA OF SKIN OF LEFT UPPER LIMB, INCLUDING SHOULDER: Status: ACTIVE | Noted: 2025-04-26

## 2025-04-26 PROCEDURE — 77336 RADIATION PHYSICS CONSULT: CPT

## 2025-04-26 PROCEDURE — OTHER IMAGE GUIDED - SUPERFICIAL RADIOTHERAPY: OTHER

## 2025-04-28 ENCOUNTER — APPOINTMENT (OUTPATIENT)
Age: 70
Setting detail: DERMATOLOGY
End: 2025-04-29

## 2025-04-28 PROBLEM — C44.319 BASAL CELL CARCINOMA OF SKIN OF OTHER PARTS OF FACE: Status: ACTIVE | Noted: 2025-04-28

## 2025-04-28 PROBLEM — C44.629 SQUAMOUS CELL CARCINOMA OF SKIN OF LEFT UPPER LIMB, INCLUDING SHOULDER: Status: ACTIVE | Noted: 2025-04-28

## 2025-04-28 PROCEDURE — OTHER IMAGE GUIDED - SUPERFICIAL RADIOTHERAPY: TREATMENT VISIT: OTHER

## 2025-04-28 PROCEDURE — 77280 THER RAD SIMULAJ FIELD SMPL: CPT

## 2025-04-28 PROCEDURE — G6001 ECHO GUIDANCE RADIOTHERAPY: HCPCS | Mod: XU

## 2025-04-28 PROCEDURE — OTHER IMAGE GUIDED - SUPERFICIAL RADIOTHERAPY: OTHER

## 2025-04-28 PROCEDURE — 77401 RADIATION TX DELIVERY SUPFC: CPT

## 2025-04-28 NOTE — PROCEDURE: IMAGE GUIDED - SUPERFICIAL RADIOTHERAPY: TREATMENT VISIT
Additional Change To Daily Dosage Administered Mid Treatment?: No
Prescription Used: 1
Bill For Treatment (61045)?: Yes
Calculate Total Cumulative Dose Automatically Or Manually: Manually
Fraction Number: 19
Bill For Simulation (Per Medicare, Typical Course Of Radiation Therapy Will Require Between One To Three Simulations): Yes- (Simple- 1 Site: 09959)
Energy (Kv): 70
Daily Dosage (Cgy): 273.48
Ultrasound Used Text: High frequency ultrasound depth is 1.55mm, which is 0.14mm in difference from previous imaging. Repop. ++
Ultrasound Not Used Text: Ultrasound was not performed today due to
Total Cumulative Dose (Cgy): 5196.12
Treatment Documentation: Per the request of Dr. Robin, the patient was seen today for Superficial Radiation Therapy planning requiring initial This patient has been treated today with image-guided superficial radiation therapy for non-melanoma skin cancer. Written informed consent has been previously obtained from this patient for this treatment. This consent is documented in the patient's chart. The patient gave verbal consent to continue treatment today. The patient was treated with a specific radiation dose and setup as prescribed by the provider listed on this visit note. A Radiation Therapist performed administration of radiation under the supervision of a provider. The treatment parameters and cumulative dose are indicated above. Prior to administering the radiation, the patient underwent a verification therapeutic radiology simulation-aided field setting defining relevant normal and abnormal target anatomy and acquiring images with separate and distinct diagnostic high-frequency ultrasound to delineate tissues and determine whether to proceed with delivery of therapeutic, in addition to retrieve data necessary to develop an optimal radiation treatment process for the patient. The field placement simulation documents any change seen in overall tumor volume documented in the patient’s record, allows the clinician to indicate any needed changes in the treatment plan and/or prescription, provides diagnostic evaluation as the basis for performing the therapeutic procedure, and clearly identifies the information needed to decide to proceed with the therapeutic procedure. This process includes verification of the treatment port(s) and proper treatment positioning. All treatment ports were photographed within electronic medical records. The patient's lead blocking along with gross tumor volume and margin was confirmed. Considering superficial radiotherapy is clinical in setup, this requires the physician and radiation therapist to clarify the location interest being treated against initial images, ultrasound, pathology, and patient anatomy. Care was taken to ensure marcos treated were geometrically accurate and properly positioned using therapeutic radiology simulation-aided field setting verification per fraction. This process is also utilized to determine if any prescription or setup changes are necessary. These steps are therefore medically necessary to ensure safe and effective administration of radiation. Ongoing therapeutic radiology simulation-aided field setting verification is ordered throughout the course of therapy.\\n\\nA high-frequency ultrasound image was acquired today for a two-dimensional evaluation of the tumor volume, depth, width, breadth, review of prior response to treatment, provide geometric accuracy of field placement, and determine whether to proceed with therapeutic delivery. \\n\\nThe field placement and ultrasound imaging, per fraction, is separate and distinct from the initial simulation and is an important task in providing safe administration of superficial radiation therapy. Physician evaluation of the ultrasound information will be ongoing throughout the course of treatment and is deemed medically necessary to ensure the efficacy of treatment, whether to proceed with therapeutic delivery, and determine any necessary changes. Today's images were evaluated for determination of continuation of treatment with the current plan or with necessary changes as appropriate. Additionally, the use of ultrasound visualization and targeted assessment allows the patient to be able to see their cancer(s) progress, encouraging the patient to complete and maintain compliance through the full course of prescribed radiotherapy. Per Dr. Robin, continued ultrasound guidance and therapeutic radiology simulation-aided field setting verification per fraction is required for field placement, measurement of tumor depth, tissue evaluation, progress, acute effect monitoring, and determination for therapeutic treatment delivery is appropriate.
Add X Modifier?: GODINEZ - Unusual Non-Overlapping Service
Ultrasound Used Text: High frequency ultrasound depth is 0.97mm, which is 0.41mm in difference from previous imaging. Repop. ++
Ultrasound Not Used Text: Ultrasound was not performed today due to the presence of an open lesion and significant tenderness in the area.
Daily Dosage (Cgy): 276.36
Total Cumulative Dose (Cgy): 5250.84

## 2025-04-29 ENCOUNTER — APPOINTMENT (OUTPATIENT)
Age: 70
Setting detail: DERMATOLOGY
End: 2025-04-30

## 2025-04-29 PROBLEM — C44.319 BASAL CELL CARCINOMA OF SKIN OF OTHER PARTS OF FACE: Status: ACTIVE | Noted: 2025-04-29

## 2025-04-29 PROBLEM — C44.629 SQUAMOUS CELL CARCINOMA OF SKIN OF LEFT UPPER LIMB, INCLUDING SHOULDER: Status: ACTIVE | Noted: 2025-04-29

## 2025-04-29 PROCEDURE — 77401 RADIATION TX DELIVERY SUPFC: CPT

## 2025-04-29 PROCEDURE — OTHER IMAGE GUIDED - SUPERFICIAL RADIOTHERAPY: OTHER

## 2025-04-29 PROCEDURE — 77280 THER RAD SIMULAJ FIELD SMPL: CPT

## 2025-04-29 PROCEDURE — OTHER IMAGE GUIDED - SUPERFICIAL RADIOTHERAPY: TREATMENT VISIT: OTHER

## 2025-04-29 PROCEDURE — G6001 ECHO GUIDANCE RADIOTHERAPY: HCPCS | Mod: XU

## 2025-04-29 NOTE — PROCEDURE: IMAGE GUIDED - SUPERFICIAL RADIOTHERAPY: TREATMENT VISIT
Additional Comments (Add Customization Of Note Here): Visit Note – Fraction 20 of 20 (Final SRT Treatment):\\n\\nPatient presents for final (20/20) SRT treatment. Erythema remains present, while edema has partially resolved. Patient reports no discomfort or complaints at this time. Skin care instructions were reviewed in detail. A follow-up appointment has been scheduled. Patient was advised to return to the clinic immediately if any unusual symptoms or concerns arise.
Additional Change To Daily Dosage Administered Mid Treatment?: No
Prescription Used: 1
Bill For Treatment (08615)?: Yes
Calculate Total Cumulative Dose Automatically Or Manually: Manually
Fraction Number: 20
Bill For Simulation (Per Medicare, Typical Course Of Radiation Therapy Will Require Between One To Three Simulations): Yes- (Simple- 1 Site: 45590)
Energy (Kv): 70
Daily Dosage (Cgy): 273.48
Ultrasound Used Text: High frequency ultrasound depth is 1.40mm, which is 0.15mm in difference from previous imaging. Repop. ++
Ultrasound Not Used Text: Ultrasound was not performed today due to
Total Cumulative Dose (Cgy): 5469.60
Treatment Documentation: Per the request of Dr. Robin, the patient was seen today for Superficial Radiation Therapy planning requiring initial This patient has been treated today with image-guided superficial radiation therapy for non-melanoma skin cancer. Written informed consent has been previously obtained from this patient for this treatment. This consent is documented in the patient's chart. The patient gave verbal consent to continue treatment today. The patient was treated with a specific radiation dose and setup as prescribed by the provider listed on this visit note. A Radiation Therapist performed administration of radiation under the supervision of a provider. The treatment parameters and cumulative dose are indicated above. Prior to administering the radiation, the patient underwent a verification therapeutic radiology simulation-aided field setting defining relevant normal and abnormal target anatomy and acquiring images with separate and distinct diagnostic high-frequency ultrasound to delineate tissues and determine whether to proceed with delivery of therapeutic, in addition to retrieve data necessary to develop an optimal radiation treatment process for the patient. The field placement simulation documents any change seen in overall tumor volume documented in the patient’s record, allows the clinician to indicate any needed changes in the treatment plan and/or prescription, provides diagnostic evaluation as the basis for performing the therapeutic procedure, and clearly identifies the information needed to decide to proceed with the therapeutic procedure. This process includes verification of the treatment port(s) and proper treatment positioning. All treatment ports were photographed within electronic medical records. The patient's lead blocking along with gross tumor volume and margin was confirmed. Considering superficial radiotherapy is clinical in setup, this requires the physician and radiation therapist to clarify the location interest being treated against initial images, ultrasound, pathology, and patient anatomy. Care was taken to ensure marcos treated were geometrically accurate and properly positioned using therapeutic radiology simulation-aided field setting verification per fraction. This process is also utilized to determine if any prescription or setup changes are necessary. These steps are therefore medically necessary to ensure safe and effective administration of radiation. Ongoing therapeutic radiology simulation-aided field setting verification is ordered throughout the course of therapy.\\n\\nA high-frequency ultrasound image was acquired today for a two-dimensional evaluation of the tumor volume, depth, width, breadth, review of prior response to treatment, provide geometric accuracy of field placement, and determine whether to proceed with therapeutic delivery. \\n\\nThe field placement and ultrasound imaging, per fraction, is separate and distinct from the initial simulation and is an important task in providing safe administration of superficial radiation therapy. Physician evaluation of the ultrasound information will be ongoing throughout the course of treatment and is deemed medically necessary to ensure the efficacy of treatment, whether to proceed with therapeutic delivery, and determine any necessary changes. Today's images were evaluated for determination of continuation of treatment with the current plan or with necessary changes as appropriate. Additionally, the use of ultrasound visualization and targeted assessment allows the patient to be able to see their cancer(s) progress, encouraging the patient to complete and maintain compliance through the full course of prescribed radiotherapy. Per Dr. Robin, continued ultrasound guidance and therapeutic radiology simulation-aided field setting verification per fraction is required for field placement, measurement of tumor depth, tissue evaluation, progress, acute effect monitoring, and determination for therapeutic treatment delivery is appropriate.
Add X Modifier?: GODINEZ - Unusual Non-Overlapping Service
Ultrasound Used Text: High frequency ultrasound depth is 1.10mm, which is 0.13mm in difference from previous imaging. Repop. ++
Ultrasound Not Used Text: Ultrasound was not performed today due to the presence of an open lesion and significant tenderness in the area.
Daily Dosage (Cgy): 276.36
Total Cumulative Dose (Cgy): 5527.20

## 2025-06-11 ENCOUNTER — APPOINTMENT (OUTPATIENT)
Age: 70
Setting detail: DERMATOLOGY
End: 2025-06-15

## 2025-06-11 PROBLEM — C44.629 SQUAMOUS CELL CARCINOMA OF SKIN OF LEFT UPPER LIMB, INCLUDING SHOULDER: Status: ACTIVE | Noted: 2025-06-11

## 2025-06-11 PROBLEM — C44.319 BASAL CELL CARCINOMA OF SKIN OF OTHER PARTS OF FACE: Status: ACTIVE | Noted: 2025-06-11

## 2025-06-11 PROCEDURE — OTHER IMAGE GUIDED - SUPERFICIAL RADIOTHERAPY: OTHER

## 2025-06-11 PROCEDURE — OTHER IMAGE GUIDED - SUPERFICIAL RADIOTHERAPY: EVALUATION VISIT: OTHER

## 2025-06-11 PROCEDURE — 99024 POSTOP FOLLOW-UP VISIT: CPT

## 2025-06-11 PROCEDURE — G6001 ECHO GUIDANCE RADIOTHERAPY: HCPCS
